# Patient Record
Sex: MALE | Race: ASIAN | NOT HISPANIC OR LATINO | Employment: UNEMPLOYED | URBAN - METROPOLITAN AREA
[De-identification: names, ages, dates, MRNs, and addresses within clinical notes are randomized per-mention and may not be internally consistent; named-entity substitution may affect disease eponyms.]

---

## 2017-02-03 ENCOUNTER — TELEPHONE (OUTPATIENT)
Dept: TRANSPLANT | Facility: CLINIC | Age: 57
End: 2017-02-03

## 2017-03-01 ENCOUNTER — OFFICE VISIT (OUTPATIENT)
Dept: FAMILY MEDICINE | Facility: CLINIC | Age: 57
End: 2017-03-01
Payer: MEDICARE

## 2017-03-01 VITALS
WEIGHT: 155.19 LBS | TEMPERATURE: 98 F | HEIGHT: 69 IN | SYSTOLIC BLOOD PRESSURE: 122 MMHG | HEART RATE: 60 BPM | BODY MASS INDEX: 22.98 KG/M2 | OXYGEN SATURATION: 97 % | DIASTOLIC BLOOD PRESSURE: 82 MMHG | RESPIRATION RATE: 17 BRPM

## 2017-03-01 DIAGNOSIS — N18.6 TYPE 2 DIABETES MELLITUS WITH CHRONIC KIDNEY DISEASE ON CHRONIC DIALYSIS, WITH LONG-TERM CURRENT USE OF INSULIN: Primary | Chronic | ICD-10-CM

## 2017-03-01 DIAGNOSIS — Z79.4 TYPE 2 DIABETES MELLITUS WITH CHRONIC KIDNEY DISEASE ON CHRONIC DIALYSIS, WITH LONG-TERM CURRENT USE OF INSULIN: Primary | Chronic | ICD-10-CM

## 2017-03-01 DIAGNOSIS — Z99.2 ESRD (END STAGE RENAL DISEASE) ON DIALYSIS: ICD-10-CM

## 2017-03-01 DIAGNOSIS — I50.22 CHRONIC SYSTOLIC HEART FAILURE: ICD-10-CM

## 2017-03-01 DIAGNOSIS — J30.89 NON-SEASONAL ALLERGIC RHINITIS DUE TO OTHER ALLERGIC TRIGGER: ICD-10-CM

## 2017-03-01 DIAGNOSIS — N18.6 ESRD (END STAGE RENAL DISEASE) ON DIALYSIS: ICD-10-CM

## 2017-03-01 DIAGNOSIS — Z99.2 TYPE 2 DIABETES MELLITUS WITH CHRONIC KIDNEY DISEASE ON CHRONIC DIALYSIS, WITH LONG-TERM CURRENT USE OF INSULIN: Primary | Chronic | ICD-10-CM

## 2017-03-01 DIAGNOSIS — E11.22 TYPE 2 DIABETES MELLITUS WITH CHRONIC KIDNEY DISEASE ON CHRONIC DIALYSIS, WITH LONG-TERM CURRENT USE OF INSULIN: Primary | Chronic | ICD-10-CM

## 2017-03-01 PROCEDURE — 99213 OFFICE O/P EST LOW 20 MIN: CPT | Mod: PBBFAC,PN | Performed by: INTERNAL MEDICINE

## 2017-03-01 PROCEDURE — 99999 PR PBB SHADOW E&M-EST. PATIENT-LVL III: CPT | Mod: PBBFAC,,, | Performed by: INTERNAL MEDICINE

## 2017-03-01 PROCEDURE — 99214 OFFICE O/P EST MOD 30 MIN: CPT | Mod: S$PBB,,, | Performed by: INTERNAL MEDICINE

## 2017-03-01 RX ORDER — INSULIN GLARGINE 100 [IU]/ML
7 INJECTION, SOLUTION SUBCUTANEOUS NIGHTLY
Qty: 3 ML | Refills: 2 | Status: SHIPPED | OUTPATIENT
Start: 2017-03-01 | End: 2018-03-12 | Stop reason: SDUPTHER

## 2017-03-01 RX ORDER — FLUTICASONE PROPIONATE 50 MCG
1 SPRAY, SUSPENSION (ML) NASAL DAILY
Qty: 16 G | Refills: 2 | Status: SHIPPED | OUTPATIENT
Start: 2017-03-01 | End: 2022-01-01 | Stop reason: SDUPTHER

## 2017-03-01 NOTE — PROGRESS NOTES
"Subjective:       Patient ID: Vega Brownlee is a 56 y.o. male.    Chief Complaint: Diabetes (blood sugar high) and Follow-up    HPI Comments: F/u chronic conditions    HPI: 57 y/o w/ HTN, CHF, DM ESRD on hemodialysis here for follow up. Is scheduled for PD catheter placement next week at [facility]. Has noted increase blood sugars mid to late day associated with increase PO intake. No hypoglycemic symptoms a.m. Fasting glucose 120-140. He has applied for medicare part D awaiting approval before he can be listed for kidney transplant. Tolerating full HD sessions withotu hypotension since changing from carvedilol to metoprolol. He is over due for follow upwith cardiology. Denies LE swelling orthopnea or PND no CP. Walking regularly w/o exertional symptoms    Review of Systems   Constitutional: Negative for activity change, fever and unexpected weight change.   HENT: Negative for congestion, rhinorrhea, sore throat and trouble swallowing.    Eyes: Negative for photophobia and redness.   Respiratory: Negative for cough, chest tightness, shortness of breath and wheezing.    Cardiovascular: Negative for chest pain, palpitations and leg swelling.   Gastrointestinal: Negative for abdominal pain, blood in stool, constipation, diarrhea, nausea and vomiting.   Endocrine: Negative for cold intolerance, heat intolerance and polyuria.   Genitourinary: Negative for decreased urine volume, difficulty urinating, dysuria and urgency.   Musculoskeletal: Negative for arthralgias and back pain.   Skin: Negative for rash.   Neurological: Negative for dizziness, syncope, weakness and headaches.   Psychiatric/Behavioral: Negative for dysphoric mood, sleep disturbance and suicidal ideas.       Objective:     Vitals:    03/01/17 1503   BP: 122/82   BP Location: Left arm   Patient Position: Sitting   BP Method: Manual   Pulse: 60   Resp: 17   Temp: 97.8 °F (36.6 °C)   TempSrc: Oral   SpO2: 97%   Weight: 70.4 kg (155 lb 3.3 oz)   Height: 5' 8.5" " (1.74 m)          Physical Exam   Constitutional: He is oriented to person, place, and time. He appears well-developed and well-nourished.   HENT:   Head: Normocephalic and atraumatic.   Eyes: Conjunctivae are normal. Pupils are equal, round, and reactive to light.   Neck: Normal range of motion.   Cardiovascular: Normal rate and regular rhythm.  Exam reveals no gallop and no friction rub.    No murmur heard.  No LE edema. Right subclavian HD cathater site c/d/i   Pulmonary/Chest: Effort normal and breath sounds normal. He has no wheezes. He has no rales.   Abdominal: Soft. Bowel sounds are normal. There is no tenderness. There is no rebound and no guarding.   Musculoskeletal: Normal range of motion. He exhibits no edema or tenderness.   Neurological: He is alert and oriented to person, place, and time. No cranial nerve deficit.   Skin: Skin is warm and dry.   Psychiatric: He has a normal mood and affect.       Assessment:       1. Type 2 diabetes mellitus with chronic kidney disease on chronic dialysis, with long-term current use of insulin    2. ESRD (end stage renal disease) on dialysis    3. Non-seasonal allergic rhinitis due to other allergic trigger    4. Chronic systolic heart failure        Plan:    1. Increase basal insulin to 7 units nightly repeat a1c in two months.     2. Pursuing PD, management by nephrologist Dr. Lowery    3. Nasal steroid daily    4. Clinically euvolemic due for follow up with cards, referral re entered

## 2017-03-01 NOTE — MR AVS SNAPSHOT
[facility]  [address]  [place] [address]  Phone: [phone]                  Vega Brownlee   3/1/2017 4:00 PM   Office Visit    Description:  Male : 1960   Provider:  Ko Perez MD   Department:  [facility]           Reason for Visit     Diabetes     Follow-up           Diagnoses this Visit        Comments    Type 2 diabetes mellitus with chronic kidney disease on chronic dialysis, with long-term current use of insulin    -  Primary     ESRD (end stage renal disease) on dialysis         Non-seasonal allergic rhinitis due to other allergic trigger                To Do List           Future Appointments        Provider Department Dept Phone    3/1/2017 4:00 PM Ko Perez MD [facility] [phone]      Goals (5 Years of Data)     None       These Medications        Disp Refills Start End    insulin glargine (LANTUS SOLOSTAR) 100 unit/mL (3 mL) InPn pen 3 mL 2 3/1/2017 3/1/2018    Inject 7 Units into the skin every evening. - Subcutaneous    Pharmacy: [facility] [address] Ph #: [phone]       fluticasone (FLONASE) 50 mcg/actuation nasal spray 16 g 2 3/1/2017     1 spray by Each Nare route once daily. - Each Nare    Pharmacy: [address] Ph #: [phone]         [facility] Call     [facility] On [facility] Nurse Care Line -  Assistance  Registered nurses in the [facility] provide clinical advisement, health education, appointment booking, and other advisory services.  Call for this free service at [phone].             Medications           START taking these NEW medications        Refills    fluticasone (FLONASE) 50 mcg/actuation nasal spray 2    Si spray by Each Nare route once daily.    Class: Normal    Route: Each Nare      CHANGE how you are taking these medications     Start Taking Instead of    insulin  glargine (LANTUS SOLOSTAR) 100 unit/mL (3 mL) InPn pen insulin glargine (LANTUS SOLOSTAR) 100 unit/mL (3 mL) InPn pen    Dosage:  Inject 7 Units into the skin every evening. Dosage:  Inject 5 Units into the skin every evening.    Reason for Change:  Reorder            Verify that the below list of medications is an accurate representation of the medications you are currently taking.  If none reported, the list may be blank. If incorrect, please contact your healthcare provider. Carry this list with you in case of emergency.           Current Medications     aspirin (ECOTRIN) 81 MG EC tablet Take 1 tablet (81 mg total) by mouth once daily.    atorvastatin (LIPITOR) 40 MG tablet Take 1 tablet (40 mg total) by mouth every evening.    blood sugar diagnostic Strp 1 each by Misc.(Non-Drug; Combo Route) route as directed.    blood-glucose meter (FREESTYLE SYSTEM KIT) kit Use as instructed    hydrALAZINE (APRESOLINE) 50 MG tablet Take 1 tablet (50 mg total) by mouth every 12 (twelve) hours.    insulin glargine (LANTUS SOLOSTAR) 100 unit/mL (3 mL) InPn pen Inject 7 Units into the skin every evening.    isosorbide mononitrate (IMDUR) 60 MG 24 hr tablet Take 1 tablet (60 mg total) by mouth once daily.    lancets (ACCU-CHEK SOFTCLIX LANCETS) Misc 1 application by Misc.(Non-Drug; Combo Route) route 4 (four) times daily before meals and nightly.    metoprolol tartrate (LOPRESSOR) 25 MG tablet Take 1 tablet (25 mg total) by mouth 2 (two) times daily.    fluticasone (FLONASE) 50 mcg/actuation nasal spray 1 spray by Each Nare route once daily.           Clinical Reference Information           Your Vitals Were     BP                   122/82 (BP Location: Left arm, Patient Position: Sitting, BP Method: Manual)           Blood Pressure          Most Recent Value    BP  122/82      Allergies as of 3/1/2017     No Known Allergies      Immunizations Administered on Date of Encounter - 3/1/2017     None      Maintenance Dialysis History      Start End Type Comments Center    1/8/1888    [facility]            Current Dialysis Center Information     [facility] [address] Phone #:  [phone]    Contact:  [place]  [address] Fax #:  [phone]            [employer] Sign-Up     Activating your [facility] account is as easy as 1-2-3!     1) Visit [email or web address], select Sign Up Now, enter this activation code and your date of birth, then select Next.  [email or web address]  Expires: 4/15/2017  3:38 PM      2) Create a username and password to use when you visit [facility] in the future and select a security question in case you lose your password and select Next.    3) Enter your e-mail address and click Sign Up!    Additional Information  If you have questions, please e-mail [email or web address] or call [phone] to talk to our [facility] staff. Remember, [facility] is NOT to be used for urgent needs. For medical emergencies, dial 911.         Language Assistance Services     ATTENTION: Language assistance services are available, free of charge. Please call [phone].      ATENCIÓN: Si davela escobar, tiene a monte disposición servicios gratuitos de asistencia lingüística. Llame al [phone].     CHÚ Ý: N?u b?n nói Ti?ng Vi?t, có các d?ch v? h? tr? ngôn ng? mi?n phí dành cho b?n. G?i s? [phone].         [facility] complies with applicable Federal civil rights laws and does not discriminate on the basis of race, color, national origin, age, disability, or sex.

## 2017-03-02 ENCOUNTER — TELEPHONE (OUTPATIENT)
Dept: FAMILY MEDICINE | Facility: CLINIC | Age: 57
End: 2017-03-02

## 2017-03-02 DIAGNOSIS — I10 ESSENTIAL HYPERTENSION: Chronic | ICD-10-CM

## 2017-03-02 DIAGNOSIS — N18.6 ESRD (END STAGE RENAL DISEASE) ON DIALYSIS: ICD-10-CM

## 2017-03-02 DIAGNOSIS — Z99.2 ESRD (END STAGE RENAL DISEASE) ON DIALYSIS: ICD-10-CM

## 2017-03-02 DIAGNOSIS — I50.22 CHRONIC SYSTOLIC HEART FAILURE: ICD-10-CM

## 2017-03-02 RX ORDER — METOPROLOL TARTRATE 25 MG/1
TABLET, FILM COATED ORAL
Qty: 60 TABLET | Refills: 5 | Status: SHIPPED | OUTPATIENT
Start: 2017-03-02 | End: 2017-10-02 | Stop reason: SDUPTHER

## 2017-03-02 RX ORDER — ISOSORBIDE MONONITRATE 60 MG/1
TABLET, EXTENDED RELEASE ORAL
Qty: 30 TABLET | Refills: 5 | Status: SHIPPED | OUTPATIENT
Start: 2017-03-02 | End: 2017-10-02 | Stop reason: SDUPTHER

## 2017-03-02 RX ORDER — HYDRALAZINE HYDROCHLORIDE 50 MG/1
TABLET, FILM COATED ORAL
Qty: 60 TABLET | Refills: 5 | Status: SHIPPED | OUTPATIENT
Start: 2017-03-02 | End: 2017-09-22 | Stop reason: SDUPTHER

## 2017-03-02 NOTE — TELEPHONE ENCOUNTER
I spoke with the patient regarding a referral to Cardiology, he refuse to schedule at this time due to him having surgery. The patient states that he will contact the clinic in the future to schedule

## 2017-03-30 ENCOUNTER — TELEPHONE (OUTPATIENT)
Dept: TRANSPLANT | Facility: CLINIC | Age: 57
End: 2017-03-30

## 2017-04-06 DIAGNOSIS — E11.9 TYPE 2 DIABETES MELLITUS WITHOUT COMPLICATION: ICD-10-CM

## 2017-04-10 DIAGNOSIS — Z76.82 ORGAN TRANSPLANT CANDIDATE: Primary | ICD-10-CM

## 2017-04-26 ENCOUNTER — OFFICE VISIT (OUTPATIENT)
Dept: TRANSPLANT | Facility: CLINIC | Age: 57
End: 2017-04-26
Payer: MEDICARE

## 2017-04-26 ENCOUNTER — TELEPHONE (OUTPATIENT)
Dept: TRANSPLANT | Facility: CLINIC | Age: 57
End: 2017-04-26

## 2017-04-26 ENCOUNTER — CLINICAL SUPPORT (OUTPATIENT)
Dept: INFECTIOUS DISEASES | Facility: CLINIC | Age: 57
End: 2017-04-26
Payer: MEDICARE

## 2017-04-26 ENCOUNTER — DOCUMENTATION ONLY (OUTPATIENT)
Dept: TRANSPLANT | Facility: CLINIC | Age: 57
End: 2017-04-26

## 2017-04-26 ENCOUNTER — HOSPITAL ENCOUNTER (OUTPATIENT)
Dept: RADIOLOGY | Facility: HOSPITAL | Age: 57
Discharge: HOME | End: 2017-04-26
Attending: TRANSPLANT SURGERY
Payer: MEDICARE

## 2017-04-26 ENCOUNTER — HOSPITAL ENCOUNTER (OUTPATIENT)
Dept: RADIOLOGY | Facility: HOSPITAL | Age: 57
Discharge: HOME | End: 2017-04-26
Attending: NURSE PRACTITIONER
Payer: MEDICARE

## 2017-04-26 ENCOUNTER — HOSPITAL ENCOUNTER (OUTPATIENT)
Dept: CARDIOLOGY | Facility: CLINIC | Age: 57
Discharge: HOME | End: 2017-04-26
Payer: MEDICARE

## 2017-04-26 VITALS
TEMPERATURE: 98 F | SYSTOLIC BLOOD PRESSURE: 136 MMHG | HEART RATE: 70 BPM | DIASTOLIC BLOOD PRESSURE: 78 MMHG | HEIGHT: 68 IN | WEIGHT: 147.5 LBS | OXYGEN SATURATION: 100 % | RESPIRATION RATE: 18 BRPM | BODY MASS INDEX: 22.35 KG/M2

## 2017-04-26 DIAGNOSIS — E11.22 DIABETES MELLITUS WITH ESRD (END-STAGE RENAL DISEASE): Chronic | ICD-10-CM

## 2017-04-26 DIAGNOSIS — Z76.82 ORGAN TRANSPLANT CANDIDATE: ICD-10-CM

## 2017-04-26 DIAGNOSIS — Z76.82 KIDNEY TRANSPLANT CANDIDATE: ICD-10-CM

## 2017-04-26 DIAGNOSIS — N18.6 ESRD (END STAGE RENAL DISEASE) ON DIALYSIS: ICD-10-CM

## 2017-04-26 DIAGNOSIS — I10 ESSENTIAL HYPERTENSION: Chronic | ICD-10-CM

## 2017-04-26 DIAGNOSIS — Z72.0 TOBACCO ABUSE: Chronic | ICD-10-CM

## 2017-04-26 DIAGNOSIS — E78.5 HYPERLIPIDEMIA, UNSPECIFIED HYPERLIPIDEMIA TYPE: ICD-10-CM

## 2017-04-26 DIAGNOSIS — E44.0 MODERATE PROTEIN MALNUTRITION: Chronic | ICD-10-CM

## 2017-04-26 DIAGNOSIS — N18.6 DIABETES MELLITUS WITH ESRD (END-STAGE RENAL DISEASE): Chronic | ICD-10-CM

## 2017-04-26 DIAGNOSIS — D63.8 ANEMIA OF CHRONIC DISEASE: Chronic | ICD-10-CM

## 2017-04-26 DIAGNOSIS — Z76.82 KIDNEY TRANSPLANT CANDIDATE: Primary | ICD-10-CM

## 2017-04-26 DIAGNOSIS — Z99.2 ESRD (END STAGE RENAL DISEASE) ON DIALYSIS: ICD-10-CM

## 2017-04-26 LAB
DIASTOLIC DYSFUNCTION: NO
RETIRED EF AND QEF - SEE NOTES: 65 (ref 55–65)
TRICUSPID VALVE REGURGITATION: NORMAL

## 2017-04-26 PROCEDURE — 99214 OFFICE O/P EST MOD 30 MIN: CPT | Mod: PBBFAC,25,27,TXP

## 2017-04-26 PROCEDURE — 76770 US EXAM ABDO BACK WALL COMP: CPT | Mod: TC,TXP

## 2017-04-26 PROCEDURE — 93306 TTE W/DOPPLER COMPLETE: CPT | Mod: PBBFAC,TXP | Performed by: INTERNAL MEDICINE

## 2017-04-26 PROCEDURE — 72170 X-RAY EXAM OF PELVIS: CPT | Mod: 26,TXP,, | Performed by: RADIOLOGY

## 2017-04-26 PROCEDURE — 99999 PR PBB SHADOW E&M-EST. PATIENT-LVL I: CPT | Mod: PBBFAC,TXP,,

## 2017-04-26 PROCEDURE — 76770 US EXAM ABDO BACK WALL COMP: CPT | Mod: 26,GC,TXP, | Performed by: RADIOLOGY

## 2017-04-26 PROCEDURE — 72170 X-RAY EXAM OF PELVIS: CPT | Mod: TC,TXP

## 2017-04-26 PROCEDURE — 99203 OFFICE O/P NEW LOW 30 MIN: CPT | Mod: S$PBB,TXP,, | Performed by: TRANSPLANT SURGERY

## 2017-04-26 PROCEDURE — 71020 XR CHEST PA AND LATERAL: CPT | Mod: 26,TXP,, | Performed by: RADIOLOGY

## 2017-04-26 PROCEDURE — 99204 OFFICE O/P NEW MOD 45 MIN: CPT | Mod: S$PBB,TXP,, | Performed by: PHYSICIAN ASSISTANT

## 2017-04-26 PROCEDURE — 71020 XR CHEST PA AND LATERAL: CPT | Mod: TC,TXP

## 2017-04-26 PROCEDURE — 99215 OFFICE O/P EST HI 40 MIN: CPT | Mod: S$PBB,TXP,, | Performed by: NURSE PRACTITIONER

## 2017-04-26 PROCEDURE — 99999 PR PBB SHADOW E&M-EST. PATIENT-LVL IV: CPT | Mod: PBBFAC,TXP,,

## 2017-04-26 NOTE — NURSING
Reviewed pt transplant labs.  Pt to continue to follow-up with dialysis unit dietitians recommendations.      Hanane Monahan MS RD LDN

## 2017-04-26 NOTE — PROGRESS NOTES
PHARM.D. PRE-TRANSPLANT NOTE:    This patient's medication therapy was evaluated as part of his pre-transplant evaluation.    The following pharmacologic concerns were noted: None      Current Outpatient Prescriptions   Medication Sig Dispense Refill    aspirin (ECOTRIN) 81 MG EC tablet Take 1 tablet (81 mg total) by mouth once daily.  0    atorvastatin (LIPITOR) 40 MG tablet Take 1 tablet (40 mg total) by mouth every evening. 90 tablet 3    blood sugar diagnostic Strp 1 each by Misc.(Non-Drug; Combo Route) route as directed. 100 each 0    blood-glucose meter (FREESTYLE SYSTEM KIT) kit Use as instructed 1 each 0    calcium carbonate (TUMS ULTRA) 1,177 mg Chew Take 2 tablets by mouth 3 (three) times daily with meals.       fluticasone (FLONASE) 50 mcg/actuation nasal spray 1 spray by Each Nare route once daily. 16 g 2    FOLIC ACID/VIT BCOMP,C (JAM-YAQUELIN ORAL) Take 1 tablet by mouth once daily.      hydrALAZINE (APRESOLINE) 50 MG tablet TAKE ONE Tablet BY MOUTH EVERY TWELVE HOURS 60 tablet 5    insulin glargine (LANTUS SOLOSTAR) 100 unit/mL (3 mL) InPn pen Inject 7 Units into the skin every evening. 3 mL 2    isosorbide mononitrate (IMDUR) 60 MG 24 hr tablet TAKE ONE Tablet BY MOUTH EVERY DAY 30 tablet 5    lancets (ACCU-CHEK SOFTCLIX LANCETS) Misc 1 application by Misc.(Non-Drug; Combo Route) route 4 (four) times daily before meals and nightly. 120 each 0    metoprolol tartrate (LOPRESSOR) 25 MG tablet TAKE ONE TABLET BY MOUTH TWICE DAILY 60 tablet 5     No current facility-administered medications for this visit.          Currently patient/wife is responsible for preparing / administering this patient's medications on a daily basis.  I am available for consultation and can be contacted, as needed by the other members of the Kidney Transplant team.

## 2017-04-26 NOTE — PROGRESS NOTES
Transplant Surgery  Kidney Transplant Recipient Evaluation    Referring Physician: Alan Lowery  Current Nephrologist: Alan Lowery    Subjective:     Reason for Visit: evaluate transplant candidacy    History of Present Illness: Vega Brownlee is a 56 y.o. year old male undergoing transplant evaluation.    Dialysis History: Vega is on hemodialysis.      Transplant History: N/A    Etiology of Renal Disease: Diabetes Mellitus - Type II (based on medical records from referral).    Review of Systems   Constitutional: Negative for fever.   Respiratory: Negative for cough, shortness of breath and stridor.    Cardiovascular: Negative for leg swelling.   Gastrointestinal: Negative for abdominal distention.   All other systems reviewed and are negative.      Objective:     Physical Exam:  Constitutional:   Vitals reviewed: yes   Well-nourished and well-groomed: yes  Eyes:   Sclerae icteric: no   Extraocular movements intact: yes  GI:    Bowel sounds normal: yes   Tenderness: no    If yes, quadrant/location: not applicable   Palpable masses: no    If yes, quadrant/location: not applicable   Hepatosplenomegaly: no   Ascites: no   Hernia: no    If yes, type/location: not applicable   Surgical scars: no    If yes, type/location: not applicable  Resp:   Effort normal: yes   Breath sounds normal: yes    CV:   Regular rate and rhythm: yes   Heart sounds normal: yes   Femoral pulses normal: yes   Extremities edematous: no  Skin:   Rashes or lesions present: no    If yes, describe:not applicable   Jaundice:: no    Musculoskeletal:   Gait normal: yes   Strength normal: yes  Psych:   Oriented to person, place, and time: yes   Affect and mood normal: yes    Additional comments: not applicable    Counseling: We provided Vega Brownlee with a group education session today.  We discussed kidney transplantation at length with him, including risks, potential complications, and alternatives in the management of his renal failure.  The discussion included  complications related to anesthesia, bleeding, infection, primary nonfunction, and ATN.  I discussed the typical postoperative course, length of hospitalization, the need for long-term immunosuppression, and the need for long-term routine follow-up.  I discussed living-donor and -donor transplantation and the relative advantages and disadvantages of each.  I also discussed average waiting times for both living donation and  donation.  I discussed national and center-specific survival rates.  I also mentioned the potential benefit of multicenter listing to candidates listed with centers within more than one organ procurement organization.  All questions were answered.    Final determination of transplant candidacy will be made once evaluation is complete and reviewed by the Kidney & Kidney/Pancreas Selection Committee.         Transplant Surgery - Candidacy   Assessment/Plan:   Vega Brownlee has end stage renal disease (ESRD) on dialysis. I see no surgical contraindication to placing a kidney transplant. Based on available information, Vega Brownlee is a suitable kidney transplant candidate.     Grupo Esquivel MD

## 2017-04-26 NOTE — PROGRESS NOTES
Transplant Nephrology  Kidney Transplant Recipient Evaluation    Referring Physician: Alan Lowery  Current Nephrologist: Alan Lowery    Subjective:   CC:  Initial evaluation of kidney transplant candidacy.    HPI:  Mr. Brownlee is a 56 y.o. year old White male who has presented to be evaluated as a potential kidney transplant recipient.  He has ESRD secondary to diabetic nephropathy and HTN.  Patient is currently on hemodialysis started on  8/2016. Patient is dialyzing on TTS schedule.  Patient reports that he is tolerating dialysis well. Dialyzes for 3 1/2  Hours.  He has a right chest catheter for dialysis access.     Previous Transplant: no    Past Medical and Surgical History: Mr. Brownlee  has a past medical history of Anemia; Cataract; CHF (congestive heart failure); Diabetes mellitus; Diabetes mellitus, type 2; Disorder of kidney and ureter; Hyperlipidemia; and Hypertension.  He has a past surgical history that includes Portacath placement (08/2016); Lung surgery; Finger amputation (Left); and arm fracture (Left).    Past Social and Family History: Mr. Brownlee reports that he quit smoking about 6 months ago. He started smoking about 35 years ago. He smoked 1.00 pack per day. He does not have any smokeless tobacco history on file. He reports that he does not drink alcohol or use illicit drugs. His family history includes Arthritis in his sister; Diabetes in his mother; Memory loss in his mother; No Known Problems in his brother, father, sister, and sister.      Diagnosed with DM2 at age 40, 16 years ago. He was started on Insulin last year, and is currently on 7 Units /day at night  No family HX of kidney DZ    Lung surgery at [facility] 2003 or 2004. Reports he had an infection. The doctors attempted to stick his lung with a needle to aspirate the fluid out , but it was not successful. He then had a surgery on the left side / lung to drain the lung.       Exercises and walks/ jogs  about 30 minutes /day around the neighborhood.  "Also plays golf 1x/week    Still makes a very small amount of urine     Reports he was told he was in HF with a low EF in 7/2016  Was evaluated by Cardiology/ Dr Sutton and was reported as having fluid overload, which was resolved at this f/u visit.   Subsequent NM stress was reported as normal .    No donors at this time     Past Medical History:   Diagnosis Date    Anemia     Cataract     CHF (congestive heart failure)     Diabetes mellitus     Diabetes mellitus, type 2     Disorder of kidney and ureter     Hyperlipidemia     Hypertension        Review of Systems   Constitutional: Negative for activity change, appetite change, chills, fatigue, fever and unexpected weight change.   HENT: Negative for congestion, facial swelling, postnasal drip, rhinorrhea, sinus pressure, sore throat and trouble swallowing.    Eyes: Negative for pain, redness and visual disturbance.   Respiratory: Negative for cough, chest tightness, shortness of breath and wheezing.         Right chest catheter   Cardiovascular: Negative.  Negative for chest pain, palpitations and leg swelling.   Gastrointestinal: Negative for abdominal pain, diarrhea, nausea and vomiting.   Genitourinary: Positive for decreased urine volume. Negative for dysuria, flank pain and urgency.   Musculoskeletal: Negative for gait problem, neck pain and neck stiffness.   Skin: Negative for rash.   Allergic/Immunologic: Negative for environmental allergies, food allergies and immunocompromised state.   Neurological: Negative for dizziness, weakness, light-headedness and headaches.   Psychiatric/Behavioral: Negative for agitation and confusion. The patient is not nervous/anxious.        Objective:   Blood pressure 136/78, pulse 70, temperature 98.1 °F (36.7 °C), temperature source Oral, resp. rate 18, height 5' 8" (1.727 m), weight 66.9 kg (147 lb 7.8 oz), SpO2 100 %.body mass index is 22.43 kg/(m^2).    Physical Exam   Constitutional: He is oriented to person, " place, and time. He appears well-developed and well-nourished.   HENT:   Head: Normocephalic.   Mouth/Throat: Oropharynx is clear and moist. No oropharyngeal exudate.   Eyes: Conjunctivae and EOM are normal. Pupils are equal, round, and reactive to light. No scleral icterus.   Neck: Normal range of motion. Neck supple.   Cardiovascular: Normal rate, regular rhythm and normal heart sounds.    Pulmonary/Chest: Effort normal and breath sounds normal.       Abdominal: Soft. Normal appearance and bowel sounds are normal. He exhibits no distension and no mass. There is no splenomegaly or hepatomegaly. There is no tenderness. There is no rebound, no guarding, no CVA tenderness, no tenderness at McBurney's point and negative Calix's sign.   Musculoskeletal: Normal range of motion. He exhibits no edema.   Lymphadenopathy:     He has no cervical adenopathy.   Neurological: He is alert and oriented to person, place, and time. He exhibits normal muscle tone. Coordination normal.   Skin: Skin is warm and dry.   Psychiatric: He has a normal mood and affect. His behavior is normal.   Vitals reviewed.      Labs:  Lab Results   Component Value Date    WBC 10.78 04/26/2017    HGB 13.3 (L) 04/26/2017    HCT 40.1 04/26/2017     04/26/2017    K 4.2 04/26/2017     04/26/2017    CO2 26 04/26/2017    BUN 27 (H) 04/26/2017    CREATININE 5.9 (H) 04/26/2017    EGFRNONAA 9.8 (A) 04/26/2017    CALCIUM 9.3 04/26/2017    PHOS 3.0 04/26/2017    MG 2.2 07/27/2016    ALBUMIN 4.0 04/26/2017    AST 21 04/26/2017    ALT 23 04/26/2017    .9 (H) 07/26/2016       Lab Results   Component Value Date    BILIRUBINUA Negative 07/26/2016    AMYLASE 39 07/26/2016    LIPASE 14 07/26/2016    LIPASE 14 07/26/2016    PROTEINUA 3+ (A) 07/26/2016    NITRITE Negative 07/26/2016    RBCUA 6 (H) 07/26/2016    WBCUA 4 07/26/2016       No results found for: HLAABCTYPE    Labs were reviewed with the patient.    Assessment:     1. Kidney transplant  candidate    2. Essential hypertension    3. Diabetes mellitus with ESRD (end-stage renal disease)    4. Anemia of chronic disease    5. Moderate protein malnutrition    6. Tobacco abuse    7. Hyperlipidemia, unspecified hyperlipidemia type    8. ESRD (end stage renal disease) on dialysis    9. Organ transplant candidate        Plan:   Cardiology clearance--> HX hf with low EF  Dr Sutton/ cardiology    Records [facility] 2003/2004 surgery lung, pathology etc.     Body mass index is 22.43 kg/(m^2).      Kidney allocation scheme was also discussed with the patient.  Patient was advised that the average wait time for a kidney in [place] is 3-5 years     Kidney donor profile index (KPDI) and estimated post-transplant survival scores (EPTS) were reviewed. The benefits and risks of accepting a kidney with KDPI > 85% were explained to the patient. Patient verbalized understanding and consented to accept a kidney with KDPI > 85%       The side effects of immunosuppressants were reviewed that include but are not limited to the risk of infection, the risks of cancer (skin and lymphoma being most common), the risk of diabetes associated with prednisone use, acceleration of heart disease and diarrhea( this being more common post transplant).     Reviewed the hospital stay.  Reviewed the post transplant follow up.  Reviewed the importance of maintaining a good weight.  Reviewed the importance of controlling BP.  Reviewed the importance of following the renal diet.      Transplant Candidacy:   Based on available information, Mr. Brownlee is a suitable kidney transplant candidate.  Final determination of transplant candidacy will be made once workup is complete and reviewed by the selection committee.    Hanane Toavr NP       UNOS Patient Status  Functional Status: 60% - Requires occasional assistance but is able to care for needs  Physical Capacity: No Limitations

## 2017-04-26 NOTE — PROGRESS NOTES
Pre Transplant Infectious Diseases Consult  Kidney Transplant Recipient Evaluation    Requesting Physician:  Alan Lowery    Reason for Visit:    Chief Complaint   Patient presents with    Kidney Transplant Pre-evaluation     History of Present Illness  Vega Brownlee is a 56 y.o. year old White male with advanced Kidney disease currently being evaluated for Kidney transplant. Etiology kidney disease is diabetes. Has a chest permacath for dialysis, plans on having fistula on 5/3/17. Patient denies any recent fever, chills, or infective illnesses. Pt reports having an infection on his catheter site in September 2016, was treated and hasn't had a recurrence.       1) Do you have a history of:   YES NO   Diabetes      [x] []     Diabetic Foot Infection/Bone Infection  []        [x]     Surgical Removal of Spleen   []  [x]                  2) Have you had recurrent infections involving:             YES NO  Sinus infections  []         [x]   Sore Throat   []         [x]                 Prostate Infections  []         [x]              Bladder Infections  []         [x]                     Kidney Infections  []         [x]                               Intestinal Infections  []         [x]      Skin Infections   []         [x]       Reproductive Infections          []  [x]   Periodontal Disease  []         [x]        3)Have you ever had: YES     NO UNKNOWN      Chicken Pox   []         [x]  []   Shingles   []         [x]  []   Orolabial Herpes             []  [x]  []   Genital Herpes  []         [x]  []   Cytomegalovirus  []         [x]  []   Rosa-Barr Virus  []         [x]  []   Genital Warts   []         [x]  []   Hepatitis A   []         [x]  []   Hepatitis B   []         [x]  []   Hepatitis C   []         [x]  []   Syphilis   []         [x]  []   Gonorrhea   []         [x]  []   Pelvic Inflammatory   Disease   []         [x]  []   Chlamydia Infection  []         [x]  []   Intestinal parasites   or worms   []         [x]   []   Fungal Infections  []         [x]  []   Blood Infections  []         [x]  []      Of note, patient reports in 2004, had pleural effusion and then had a thoracentesis. Pt was given 2 weeks of antibiotics. No recurrence. No fever or chills or any coughs of pain.    Comment:      4) Have you ever been exposed   YES NO  To someone with tuberculosis?  []   [x]   If yes, what treatment did you receive:     5) What states have you lived in? [place], [place]    6) What countries have you visited for more than 2 weeks?  None                         YES NO  7) Did you have any associated infections?  []  [x]       8) Are you planning to travel outside the    []  [x]   [place] after your transplant?     9) Household                   YES NO  Do you have pets living in your house?    [x]         []   If yes, describe: dog    Do you spend time or live on a farm or     []         [x]   have livestock or other farm animals?  If yes, which ones:    Do you have a fish tank?          []  [x]       Do you have a litter box?      []         [x]     Do you fish or hunt?       []         [x]     Do you clean or skin fish or animals?    []         [x]     Do you consume raw or undercooked    []         [x]   meat, fish, or shellfish?      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    [x]         []   13)Do you hike, camp, or spend     time in wooded areas?   []         [x]        14) The patient's immunization history was reviewed.    Have you ever received:  YES NO UNKNOWN DATES   Routine Childhood vaccines  [x]         []  []      Influenza vaccine   [x]  []  []    Pneumovax    []  [x]  []     Tetanus-diptheria   []         [x]  []    Hepatitis A vaccine series       []  [x]  []    Hepatitis B vaccine series         [x]  []  []    Meningitis vaccine   []         []  [x]    Varicella vaccine   []         []  [x]         Based on the patients immunization history and serologies, immunizations were ordered:         Ordered  Not Ordered  Influenza Vaccine     []    [x]   Hepatitis A series at 0,  6 months   [x]    []   Hepatitis B seriesat 0, 1, and 6 months  []    [x]   Hepatitis B High Dose 0,1, and 6 months  []    [x]   Prevnar      [x]    []   Pneumovax      [x]    []    TDap       [x]    []    Zoster       []    [x]   Menactra      []    [x]            The patient was encouraged to contact us about any problems that may develop after immunization and possible side effects were reviewed.      Previous Transplant: no    Etiology of Kidney Disease: diabetes     Allergies: Review of patient's allergies indicates no known allergies.  Immunization History   Administered Date(s) Administered    PPD Test 07/29/2016    influenza - Quadrivalent - PF (ADULT) 09/13/2016     Past Medical History:   Diagnosis Date    Anemia     CHF (congestive heart failure)     Diabetes mellitus     Diabetes mellitus, type 2     Disorder of kidney and ureter     Hyperlipidemia     Hypertension      Past Surgical History:   Procedure Laterality Date    LUNG SURGERY      PORTACATH PLACEMENT  08/2016      Social History     Social History    Marital status:      Spouse name: N/A    Number of children: N/A    Years of education: N/A     Occupational History    Not on file.     Social History Main Topics    Smoking status: Former Smoker    Smokeless tobacco: Not on file    Alcohol use Not on file    Drug use: Not on file    Sexual activity: Not on file     Other Topics Concern    Not on file     Social History Narrative    No narrative on file       Review of Systems   Constitution: Negative for chills, decreased appetite, fever, weakness, malaise/fatigue, night sweats, weight gain and weight loss.   HENT: Negative for congestion, ear pain, headaches, hearing loss, hoarse voice, sore throat and tinnitus.    Eyes: Positive for  blurred vision and visual disturbance. Negative for pain, vision loss in left eye and vision loss in right eye.   Cardiovascular: Negative for chest pain, dyspnea on exertion, leg swelling and palpitations.   Respiratory: Negative for cough, shortness of breath, sputum production and wheezing.    Skin: Negative for dry skin, itching, rash and suspicious lesions.   Musculoskeletal: Negative for back pain, joint pain, myalgias and neck pain.   Gastrointestinal: Negative for abdominal pain, constipation, diarrhea, heartburn, nausea and vomiting.   Genitourinary: Negative for dysuria, flank pain, frequency, hematuria, hesitancy and urgency.   Neurological: Negative for dizziness, numbness and paresthesias.   Psychiatric/Behavioral: Negative for depression and memory loss. The patient does not have insomnia and is not nervous/anxious.      Physical Exam   Constitutional: He is oriented to person, place, and time. He appears well-developed and well-nourished. No distress.   HENT:   Head: Normocephalic and atraumatic.   Mouth/Throat: Oropharynx is clear and moist.   Eyes: Pupils are equal, round, and reactive to light. Right eye exhibits no discharge. Left eye exhibits no discharge. No scleral icterus.   Neck: Normal range of motion. Neck supple.   Cardiovascular: Normal rate, regular rhythm, normal heart sounds and intact distal pulses.  Exam reveals no gallop and no friction rub.    No murmur heard.  Chest port, bandaged.    Pulmonary/Chest: Effort normal and breath sounds normal. No respiratory distress. He has no wheezes. He has no rales. He exhibits no tenderness.   Abdominal: Bowel sounds are normal. He exhibits no distension and no mass. There is no tenderness. There is no guarding.   Musculoskeletal: Normal range of motion. He exhibits no edema, tenderness or deformity.   Neurological: He is alert and oriented to person, place, and time.   Skin: Skin is warm and dry. No rash noted. He is not diaphoretic. No  erythema.   Psychiatric: He has a normal mood and affect. His behavior is normal. Judgment and thought content normal.   Nursing note and vitals reviewed.    Diagnostics: No results found for: RPR  No results found for: CMVANTIBODIE  No results found for: HIV1X2  No results found for: HTLVIIIANTIB  Hepatitis B Surface Ag   Date Value Ref Range Status   07/29/2016 Negative  Final     Hep B Core Total Ab   Date Value Ref Range Status   07/29/2016 Negative  Final     Hepatitis C Ab   Date Value Ref Range Status   07/29/2016 Positive (A)  Final     No results found for: TOXOIGG  No components found for: TOXOIGGINTER  No results found for: KXM6HNE  No results found for: OJY7GBR  No results found for: VARICELLAZOS  No results found for: VARICELLAINT  No results found for: STRONGANTIGG  No results found for: EPSTEINBARRV  No results found for: HEPBSAB  No results found for: QUANTIFERON  Hep A IgM   Date Value Ref Range Status   07/29/2016 Negative  Final     No results found for: PPD  No results found for this or any previous visit.         Transplant Infectious Diseases - Candidacy    Assessment/Plan:     Transplant Candidacy: Based on available information, there are no identified significant barriers to transplantation from an infectious disease standpoint pending acceptable serologies.  Final determination of transplant candidacy will be made once evaluation is complete and reviewed by the Transplant Selection Committee.    Quantiferon gold, strongyloides, HIV, and RPR pending. If positive, please refer to ID clinic.    Vaccines today:  Tdap  prevnar  Hepatitis A first dose.   Pneumovax 8 weeks after prevnar.       Kenny Scott PA-C         Counseling:I discussed with Ferrari the risk for increased susceptibility to infections following transplantation including increased risk for infection right after transplant and if rejection should occur.  The patients has been counseled on the importance of vaccinations including  but not limited to a yearly flu vaccine.  Specific guidance has been provided to the patient regarding the patients occupation, hobbies and activities to avoid future infectious complications including but not limited to avoiding undercooked meats and seafood, proper hygiene, and contact with animals.

## 2017-04-28 PROBLEM — Z79.4 TYPE 2 DIABETES MELLITUS WITH BOTH EYES AFFECTED BY PROLIFERATIVE RETINOPATHY AND MACULAR EDEMA, WITH LONG-TERM CURRENT USE OF INSULIN: Status: ACTIVE | Noted: 2017-04-28

## 2017-04-28 PROBLEM — E11.3513 TYPE 2 DIABETES MELLITUS WITH BOTH EYES AFFECTED BY PROLIFERATIVE RETINOPATHY AND MACULAR EDEMA, WITH LONG-TERM CURRENT USE OF INSULIN: Status: ACTIVE | Noted: 2017-04-28

## 2017-05-01 DIAGNOSIS — Z76.82 ORGAN TRANSPLANT CANDIDATE: Primary | ICD-10-CM

## 2017-05-04 DIAGNOSIS — Z76.82 ORGAN TRANSPLANT CANDIDATE: Primary | ICD-10-CM

## 2017-05-08 ENCOUNTER — TELEPHONE (OUTPATIENT)
Dept: TRANSPLANT | Facility: CLINIC | Age: 57
End: 2017-05-08

## 2017-05-08 NOTE — TELEPHONE ENCOUNTER
----- Message from Darrel Sutton MD sent at 5/4/2017  2:58 PM CDT -----  Regarding: RE: Transplant clearance  Will do.    Mary, can you schedule the patient for a follow up OV with me for cardiac clearance?    Thanks  ----- Message -----     From: Marisol Smith     Sent: 5/4/2017   9:15 AM       To: Darrel Sutton MD  Subject: Transplant clearance                             Mr Brownlee is being considered for listing for a kidney transplant and needs cardiology clearance. He had an updated ECHO done on 4/26/2017. Can you address please?    Marisol Smith RN  Transplant Coordinator

## 2017-05-10 ENCOUNTER — LAB VISIT (OUTPATIENT)
Dept: LAB | Facility: HOSPITAL | Age: 57
End: 2017-05-10
Attending: NURSE PRACTITIONER
Payer: MEDICARE

## 2017-05-10 ENCOUNTER — OFFICE VISIT (OUTPATIENT)
Dept: CARDIOLOGY | Facility: CLINIC | Age: 57
End: 2017-05-10
Payer: MEDICARE

## 2017-05-10 VITALS
HEART RATE: 64 BPM | DIASTOLIC BLOOD PRESSURE: 63 MMHG | HEIGHT: 68 IN | WEIGHT: 148.13 LBS | SYSTOLIC BLOOD PRESSURE: 132 MMHG | BODY MASS INDEX: 22.45 KG/M2 | RESPIRATION RATE: 15 BRPM | OXYGEN SATURATION: 98 %

## 2017-05-10 DIAGNOSIS — Z79.4 TYPE 2 DIABETES MELLITUS WITH BOTH EYES AFFECTED BY PROLIFERATIVE RETINOPATHY AND MACULAR EDEMA, WITH LONG-TERM CURRENT USE OF INSULIN: ICD-10-CM

## 2017-05-10 DIAGNOSIS — E78.5 HYPERLIPIDEMIA, UNSPECIFIED HYPERLIPIDEMIA TYPE: ICD-10-CM

## 2017-05-10 DIAGNOSIS — Z01.810 PREOP CARDIOVASCULAR EXAM: Primary | ICD-10-CM

## 2017-05-10 DIAGNOSIS — E11.22 DIABETES MELLITUS WITH ESRD (END-STAGE RENAL DISEASE): Chronic | ICD-10-CM

## 2017-05-10 DIAGNOSIS — Z99.2 ESRD (END STAGE RENAL DISEASE) ON DIALYSIS: ICD-10-CM

## 2017-05-10 DIAGNOSIS — I10 ESSENTIAL HYPERTENSION: Chronic | ICD-10-CM

## 2017-05-10 DIAGNOSIS — E11.3513 TYPE 2 DIABETES MELLITUS WITH BOTH EYES AFFECTED BY PROLIFERATIVE RETINOPATHY AND MACULAR EDEMA, WITH LONG-TERM CURRENT USE OF INSULIN: ICD-10-CM

## 2017-05-10 DIAGNOSIS — Z76.82 ORGAN TRANSPLANT CANDIDATE: ICD-10-CM

## 2017-05-10 DIAGNOSIS — N18.6 DIABETES MELLITUS WITH ESRD (END-STAGE RENAL DISEASE): Chronic | ICD-10-CM

## 2017-05-10 DIAGNOSIS — N18.6 ESRD (END STAGE RENAL DISEASE) ON DIALYSIS: ICD-10-CM

## 2017-05-10 LAB — AFP SERPL-MCNC: 1.3 NG/ML

## 2017-05-10 PROCEDURE — 87522 HEPATITIS C REVRS TRNSCRPJ: CPT | Mod: TXP

## 2017-05-10 PROCEDURE — 93010 ELECTROCARDIOGRAM REPORT: CPT | Mod: S$PBB,TXP,, | Performed by: INTERNAL MEDICINE

## 2017-05-10 PROCEDURE — 82105 ALPHA-FETOPROTEIN SERUM: CPT | Mod: TXP

## 2017-05-10 PROCEDURE — 36415 COLL VENOUS BLD VENIPUNCTURE: CPT | Mod: TXP

## 2017-05-10 PROCEDURE — 99999 PR PBB SHADOW E&M-EST. PATIENT-LVL III: CPT | Mod: PBBFAC,TXP,, | Performed by: INTERNAL MEDICINE

## 2017-05-10 PROCEDURE — 93005 ELECTROCARDIOGRAM TRACING: CPT | Mod: PBBFAC,TXP | Performed by: INTERNAL MEDICINE

## 2017-05-10 PROCEDURE — 99214 OFFICE O/P EST MOD 30 MIN: CPT | Mod: S$PBB,TXP,, | Performed by: INTERNAL MEDICINE

## 2017-05-10 NOTE — MR AVS SNAPSHOT
[facility]  [address] [facility] [address] [place] [address]  Phone: [phone]                  Vega Brownlee   5/10/2017 8:40 AM   Office Visit    Description:  Male : 1960   Provider:  Darrel Sutton MD   Department:  [facility]           Reason for Visit     Hypertension                To Do List           Goals (5 Years of Data)     None      [facility]     [facility] Care Line -  Assistance  Unless otherwise directed by your provider, please contact [facility], our nurse care line that is available for  assistance.     Registered nurses in the [facility] provide: appointment scheduling, clinical advisement, health education, and other advisory services.  Call: [phone] (toll free)               Medications                Verify that the below list of medications is an accurate representation of the medications you are currently taking.  If none reported, the list may be blank. If incorrect, please contact your healthcare provider. Carry this list with you in case of emergency.           Current Medications     aspirin (ECOTRIN) 81 MG EC tablet Take 1 tablet (81 mg total) by mouth once daily.    atorvastatin (LIPITOR) 40 MG tablet Take 1 tablet (40 mg total) by mouth every evening.    blood sugar diagnostic Strp 1 each by Misc.(Non-Drug; Combo Route) route as directed.    blood-glucose meter (FREESTYLE SYSTEM KIT) kit Use as instructed    calcium carbonate (TUMS ULTRA) 1,177 mg Chew Take 2 tablets by mouth 3 (three) times daily with meals.     fluticasone (FLONASE) 50 mcg/actuation nasal spray 1 spray by Each Nare route once daily.    FOLIC ACID/VIT BCOMP,C (JAM-YAQUELIN ORAL) Take 1 tablet by mouth once daily.    hydrALAZINE (APRESOLINE) 50 MG tablet TAKE ONE Tablet BY MOUTH EVERY TWELVE HOURS    insulin glargine (LANTUS SOLOSTAR) 100 unit/mL (3 mL) InPn pen Inject 7 Units into the skin every evening.    isosorbide mononitrate  "(IMDUR) 60 MG 24 hr tablet TAKE ONE Tablet BY MOUTH EVERY DAY    lancets (ACCU-CHEK SOFTCLIX LANCETS) Misc 1 application by Misc.(Non-Drug; Combo Route) route 4 (four) times daily before meals and nightly.    metoprolol tartrate (LOPRESSOR) 25 MG tablet TAKE ONE TABLET BY MOUTH TWICE DAILY           Clinical Reference Information           Your Vitals Were     BP Pulse Resp Height Weight SpO2    132/63 64 15 5' 8" (1.727 m) 67.2 kg (148 lb 2.4 oz) 98%    BMI                22.53 kg/m2          Blood Pressure          Most Recent Value    BP  132/63      Allergies as of 5/10/2017     No Known Allergies      Immunizations Administered on Date of Encounter - 5/10/2017     None      Maintenance Dialysis History     Start End Type Comments Center    1/8/1888    [facility]            Current Dialysis Center Information     [facility] [address] Phone #:  [phone]    Contact:  [place]  [id number] Fax #:  [phone]            Transplant Information        Txp Date Organ Coordinator Care Team     Kidney Meghana Townsend Referring Physician:  Alan Lowery MD   Current Nephrologist:  Alan Lowery MD         [facility] Sign-Up     Activating your [facility] account is as easy as 1-2-3!     1) Visit [email or web address], select Sign Up Now, enter this activation code and your date of birth, then select Next.  [email or web address]  Expires: 6/24/2017  9:00 AM      2) Create a username and password to use when you visit [facility] in the future and select a security question in case you lose your password and select Next.    3) Enter your e-mail address and click Sign Up!    Additional Information  If you have questions, please e-mail [email or web address] or call [phone] to talk to our [facility] staff. Remember, [facility] is NOT to be used for urgent needs. For medical emergencies, dial 911.         Language Assistance Services     ATTENTION: Language assistance services are available, free of " charge. Please call [phone].      ATENCIÓN: Si habla español, tiene a monte disposición servicios gratuitos de asistencia lingüística. Llame al [phone].     CHÚ Ý: N?u b?n nói Ti?ng Vi?t, có các d?ch v? h? tr? ngôn ng? mi?n phí dành cho b?n. G?i s? [phone].         [facility] complies with applicable Federal civil rights laws and does not discriminate on the basis of race, color, national origin, age, disability, or sex.

## 2017-05-10 NOTE — PROGRESS NOTES
CARDIOVASCULAR PROGRESS NOTE    REASON FOR CONSULT:   Vega Brownlee is a 56 y.o. male who presents for follow up of recent hospitalization.    PCP: Ana  Neph: RUBEN Lowery  Transplant: Alvin  HISTORY OF PRESENT ILLNESS:   The patient returns for follow-up and at the request of transplant nephrology.  His son accompanies him to the office visit today.  The patient reports a generally asymptomatic status without angina or dyspnea.  He tells me he walks around his neighborhood for 30 minutes at a clip without problems, and plays golf without any issues.  He's had no shortness of breath, palpitations, lightheadedness, dizziness, or syncope.  He denies PND, orthopnea, or lower extremity edema.  There's been no melena, hematuria, or claudicant symptoms.    I took the liberty of exercising the patient the office today.  He was able to climb up 2 flights of stairs without any symptoms or limitations.    CARDIOVASCULAR HISTORY:   none    PAST MEDICAL HISTORY:     Past Medical History:   Diagnosis Date    Anemia     Cataract     CHF (congestive heart failure)     Diabetes mellitus     Diabetes mellitus, type 2     Disorder of kidney and ureter     Hyperlipidemia     Hypertension        PAST SURGICAL HISTORY:     Past Surgical History:   Procedure Laterality Date    arm fracture Left     FINGER AMPUTATION Left     left index finger    LUNG SURGERY      PORTACATH PLACEMENT  08/2016       ALLERGIES AND MEDICATION:   Review of patient's allergies indicates:  No Known Allergies  Previous Medications    ASPIRIN (ECOTRIN) 81 MG EC TABLET    Take 1 tablet (81 mg total) by mouth once daily.    ATORVASTATIN (LIPITOR) 40 MG TABLET    Take 1 tablet (40 mg total) by mouth every evening.    BLOOD SUGAR DIAGNOSTIC STRP    1 each by Misc.(Non-Drug; Combo Route) route as directed.    BLOOD-GLUCOSE METER (FREESTYLE SYSTEM KIT) KIT    Use as instructed    CALCIUM CARBONATE (TUMS ULTRA) 1,177 MG CHEW    Take 2 tablets by mouth 3  (three) times daily with meals.     FLUTICASONE (FLONASE) 50 MCG/ACTUATION NASAL SPRAY    1 spray by Each Nare route once daily.    FOLIC ACID/VIT BCOMP,C (JAM-YAQUELIN ORAL)    Take 1 tablet by mouth once daily.    HYDRALAZINE (APRESOLINE) 50 MG TABLET    TAKE ONE Tablet BY MOUTH EVERY TWELVE HOURS    INSULIN GLARGINE (LANTUS SOLOSTAR) 100 UNIT/ML (3 ML) INPN PEN    Inject 7 Units into the skin every evening.    ISOSORBIDE MONONITRATE (IMDUR) 60 MG 24 HR TABLET    TAKE ONE Tablet BY MOUTH EVERY DAY    LANCETS (ACCU-CHEK SOFTCLIX LANCETS) MISC    1 application by Misc.(Non-Drug; Combo Route) route 4 (four) times daily before meals and nightly.    METOPROLOL TARTRATE (LOPRESSOR) 25 MG TABLET    TAKE ONE TABLET BY MOUTH TWICE DAILY       SOCIAL HISTORY:     Social History     Social History    Marital status:      Spouse name: N/A    Number of children: N/A    Years of education: N/A     Occupational History    Not on file.     Social History Main Topics    Smoking status: Former Smoker     Packs/day: 1.00     Start date: 4/26/1982     Quit date: 9/26/2016    Smokeless tobacco: Not on file    Alcohol use No    Drug use: No    Sexual activity: Not on file     Other Topics Concern    Not on file     Social History Narrative       FAMILY HISTORY:     Family History   Problem Relation Age of Onset    Diabetes Mother     Memory loss Mother     No Known Problems Father     No Known Problems Sister     No Known Problems Brother     Arthritis Sister     No Known Problems Sister        REVIEW OF SYSTEMS:   Review of Systems   Constitutional: Negative for chills, diaphoresis and fever.   HENT: Negative for nosebleeds.    Eyes: Negative for blurred vision, double vision and photophobia.   Respiratory: Negative for hemoptysis, shortness of breath and wheezing.    Cardiovascular: Negative for chest pain, palpitations, orthopnea, claudication, leg swelling and PND.   Gastrointestinal: Negative for abdominal  "pain, blood in stool, heartburn, melena, nausea and vomiting.   Genitourinary: Negative for flank pain and hematuria.   Musculoskeletal: Negative for falls, myalgias and neck pain.   Skin: Negative for rash.   Neurological: Negative for dizziness, seizures, loss of consciousness, weakness and headaches.   Endo/Heme/Allergies: Negative for polydipsia. Does not bruise/bleed easily.   Psychiatric/Behavioral: Negative for depression and memory loss. The patient is not nervous/anxious.        PHYSICAL EXAM:     Vitals:    05/10/17 0848   BP: 132/63   Pulse: 64   Resp: 15    Body mass index is 22.53 kg/(m^2).  Weight: 67.2 kg (148 lb 2.4 oz)   Height: 5' 8" (172.7 cm)     Physical Exam   Constitutional: He is oriented to person, place, and time. He appears well-developed and well-nourished. He is cooperative.  Non-toxic appearance. No distress.   HENT:   Head: Normocephalic and atraumatic.   Eyes: Conjunctivae and EOM are normal. Pupils are equal, round, and reactive to light. No scleral icterus.   Neck: Trachea normal. Neck supple. Normal carotid pulses and no JVD present. Carotid bruit is not present. No rigidity. No edema present. No thyromegaly present.   Cardiovascular: Normal rate, regular rhythm, S1 normal, S2 normal and normal heart sounds.  PMI is not displaced.  Exam reveals no gallop and no friction rub.    No murmur heard.  Pulses:       Carotid pulses are 2+ on the right side, and 2+ on the left side.  Pulmonary/Chest: Effort normal and breath sounds normal. No respiratory distress. He has no wheezes. He has no rales. He exhibits no tenderness.   HD catheter in R chest wall   Abdominal: Soft. Bowel sounds are normal. He exhibits no distension and no mass. There is no hepatosplenomegaly. There is no tenderness.   Musculoskeletal: He exhibits no edema or tenderness.   Feet:   Right Foot:   Skin Integrity: Negative for ulcer.   Left Foot:   Skin Integrity: Negative for ulcer.   Neurological: He is alert and " oriented to person, place, and time. No cranial nerve deficit.   Skin: Skin is warm and dry. No rash noted. No erythema.   Psychiatric: He has a normal mood and affect. His speech is normal and behavior is normal.   Vitals reviewed.      DATA:   EKG: (personally reviewed tracing)  5/10/17 SR 63    Laboratory:  CBC:    Recent Labs  Lab 07/31/16  0607 08/01/16  0533 04/26/17  0755   WHITE BLOOD CELL COUNT 8.35 7.31 10.78   HEMOGLOBIN 7.9 L 8.3 L 13.3 L   HEMATOCRIT 24.3 L 24.2 L 40.1   PLATELETS 324 440 H 215       CHEMISTRIES:    Recent Labs  Lab 07/26/16  1305  07/27/16  0435  08/01/16  0533 11/11/16  0852 04/26/17  0755   GLUCOSE 287 H  < > 103  < > 140 H 93 101   SODIUM 139  < > 141  < > 137 139 136   POTASSIUM 4.8  < > 4.8  < > 3.8 4.9 4.2   BUN BLD 38 H  < > 41 H  < > 61 H 62 H 27 H   CREATININE 7.1 H  < > 7.2 H  < > 6.6 H 7.3 H 5.9 H   EGFR IF  9 A  < > 9 A  < > 10 A 9 A 11.3 A   EGFR IF NON- 8 A  < > 8 A  < > 9 A 8 A 9.8 A   CALCIUM 6.0 LL  < > 6.2 LL  < > 5.9 LL 8.1 L 9.3   MAGNESIUM 2.0  --  2.2  --   --   --   --    < > = values in this interval not displayed.    CARDIAC BIOMARKERS:    Recent Labs  Lab 07/26/16  1722 07/26/16  2135 07/27/16  0435 07/27/16  0817 07/27/16  1151   CPK 1349 H  1349 H  1349 H 1183 H  --  996 H  --    CPK MB 8.3 H  --   --   --   --    TROPONIN I 0.060 H  0.059 H 0.084 H 0.075 H  --  0.047 H       COAGS:    Recent Labs  Lab 07/26/16  1305 04/26/17  0755   INR 1.0 0.9       LIPIDS/LFTS:    Recent Labs  Lab 07/29/16  0325 11/11/16  0852 04/26/17  0755   CHOLESTEROL 206 H 151 101 L   TRIGLYCERIDES 207 H 119 164 H   HDL 22 L 38 L 28 L   LDL CHOLESTEROL 142.6 89.2 40.2 L   NON-HDL CHOLESTEROL 184 113 73   AST 13 54 H 21   ALT 10 93 H 23       Cardiovascular Testing:  Echo 4/26/17 (EF improved from 35% in 7/2016)    1 - Mildly enlarged ascending aorta.     2 - Normal left ventricular systolic function (EF 60-65%).     3 - Normal left ventricular  diastolic function.     4 - No wall motion abnormalities.     5 - Normal right ventricular systolic function .     6 - Trivial tricuspid regurgitation.     Nenita MPI 8/1/16  Nuclear Quantitative Functional Analysis:   LVEF: 51 %  LVED Volume: 168 ml  LVES Volume: 83 ml  Impression: NORMAL MYOCARDIAL PERFUSION  1. The perfusion scan is free of evidence for myocardial ischemia or injury.   2. There is a mild to moderate intensity fixed defect in the inferior wall of the left ventricle, secondary to diaphragm attenuation.   3. There is abnormal wall motion at rest showing mild global hypokinesis of the left ventricle.   4. Resting LV function is normal.   5. The ventricular volumes are normal at rest and stress.   6. The extracardiac distribution of radioactivity is normal.     ASSESSMENT:   # Preop for kidney transplant.  Pt had good exercise capacity as demonstrated in the office today and is at low risk for periop CV events.  No need for repeat stress testing.  # ESRD/HD per renal.  # HTN, controlled  # DM  # HLP, LDL acceptable    PLAN:   Cont med rx  Follow up with nephrology/transplant as planned  RTC 6 months    Darrel Sutton MD, FACC

## 2017-05-11 LAB
HCV GENTYP SERPL NAA+PROBE: NORMAL
HCV QUALITATIVE RESULT: NOT DETECTED
HCV QUANTITATIVE LOG: <1.08 LOG (10) IU/ML
HCV RNA SPEC NAA+PROBE-ACNC: <12 IU/ML

## 2017-05-11 NOTE — PROGRESS NOTES
CLINIC TEACHING NOTE    Vega Brownlee was seen in transplant clinic today.  Coordinator verified that the patient viewed the teaching video and received written educational material.    The following information was reviewed:  · Waiting list time for cadaveric vs. living donation  · Waiting list process including: back-up calls, notification of changes in contact information, dialysis/physician information to the transplant coordinator, notifications of changes in health or if hospitalized, and notification of travel out of the area  · Monthly antibody testing to be obtained by the dialysis unit or arranged through a local lab and mailed to the transplant center.  Patient informed that if blood is not sent monthly and a kidney becomes available, the patient will need to come to the hospital to provide a fresh sample of blood for testing.    Patient was instructed that once on the waiting list, an appointment with the transplant physician will be scheduled yearly or every 6 months to ensure patient remains an acceptable transplant candidate.    Patient also informed that at the time of transplant, participation in a research protocol may be offered.  Notified that this is strictly voluntary and will not affect the quality of care received.      The option to have name placed on multiple transplant lists to increase opportunity for transplant was reviewed.    All patient questions were answered.  Patient verbalized understanding of above information.    Patient presents as a suitable candidate for transplant.

## 2017-05-24 RX ORDER — ATORVASTATIN CALCIUM 40 MG/1
40 TABLET, FILM COATED ORAL NIGHTLY
Qty: 90 TABLET | Refills: 3 | Status: SHIPPED | OUTPATIENT
Start: 2017-05-24 | End: 2018-07-18 | Stop reason: SDUPTHER

## 2017-05-29 ENCOUNTER — HOSPITAL ENCOUNTER (OUTPATIENT)
Dept: RADIOLOGY | Facility: HOSPITAL | Age: 57
Discharge: HOME | End: 2017-05-29
Attending: NURSE PRACTITIONER
Payer: MEDICARE

## 2017-05-29 ENCOUNTER — OFFICE VISIT (OUTPATIENT)
Dept: UROLOGY | Facility: CLINIC | Age: 57
End: 2017-05-29
Payer: MEDICARE

## 2017-05-29 VITALS
DIASTOLIC BLOOD PRESSURE: 56 MMHG | WEIGHT: 154.56 LBS | SYSTOLIC BLOOD PRESSURE: 130 MMHG | HEIGHT: 68 IN | BODY MASS INDEX: 23.43 KG/M2 | HEART RATE: 69 BPM

## 2017-05-29 DIAGNOSIS — Z76.82 ORGAN TRANSPLANT CANDIDATE: ICD-10-CM

## 2017-05-29 DIAGNOSIS — Z99.2 ESRD (END STAGE RENAL DISEASE) ON DIALYSIS: Primary | ICD-10-CM

## 2017-05-29 DIAGNOSIS — N42.89 MASS OF PROSTATE DETERMINED BY ULTRASOUND: ICD-10-CM

## 2017-05-29 DIAGNOSIS — N18.6 ESRD (END STAGE RENAL DISEASE) ON DIALYSIS: Primary | ICD-10-CM

## 2017-05-29 PROCEDURE — 99999 PR PBB SHADOW E&M-EST. PATIENT-LVL III: CPT | Mod: PBBFAC,TXP,, | Performed by: UROLOGY

## 2017-05-29 PROCEDURE — 99204 OFFICE O/P NEW MOD 45 MIN: CPT | Mod: S$PBB,TXP,, | Performed by: UROLOGY

## 2017-05-29 PROCEDURE — 99213 OFFICE O/P EST LOW 20 MIN: CPT | Mod: PBBFAC,25,TXP | Performed by: UROLOGY

## 2017-05-29 PROCEDURE — 93978 VASCULAR STUDY: CPT | Mod: 26,TXP,, | Performed by: RADIOLOGY

## 2017-05-29 RX ORDER — HYDROCODONE BITARTRATE AND ACETAMINOPHEN 5; 325 MG/1; MG/1
TABLET ORAL
COMMUNITY
Start: 2017-05-19 | End: 2017-06-07 | Stop reason: CLARIF

## 2017-05-29 RX ORDER — SEVELAMER CARBONATE 800 MG/1
800 TABLET, FILM COATED ORAL
Status: ON HOLD | COMMUNITY
Start: 2017-05-23 | End: 2022-01-01 | Stop reason: HOSPADM

## 2017-05-29 NOTE — PROGRESS NOTES
Subjective:       Patient ID: Vega Brownlee is a 56 y.o. male.    Chief Complaint: No chief complaint on file.      HPI: Vega Brownlee is a 56 y.o. White male who presents today for evaluation and management of a hypoechoic prostatic mass seen on a retroperitoneal ultrasound.    The patient has CKD stage V and is on hemodialysis due to diabetes. The patient has been seen by the transplant service for evaluation for a kidney transplant. On a screening retroperitoneal ultrasound, a hypoechoic mass was seen in the prostate.    The patient now makes very little urine, but he denies gross hematuria. Prior to dialysis, he also denies any urinary symptoms.    This mass was simply found incidentally.    The patient denies a family history of prostate cancer. His PSA is less than 1.0. Furthermore, he denies any urologic history of any sort.    He presents today for evaluation and management of this mass.    Review of patient's allergies indicates:  No Known Allergies    Current Outpatient Prescriptions   Medication Sig Dispense Refill    aspirin (ECOTRIN) 81 MG EC tablet Take 1 tablet (81 mg total) by mouth once daily.  0    atorvastatin (LIPITOR) 40 MG tablet Take 1 tablet (40 mg total) by mouth every evening. 90 tablet 3    blood sugar diagnostic Strp 1 each by Misc.(Non-Drug; Combo Route) route as directed. 100 each 0    blood-glucose meter (FREESTYLE SYSTEM KIT) kit Use as instructed 1 each 0    calcium carbonate (TUMS ULTRA) 1,177 mg Chew Take 2 tablets by mouth 3 (three) times daily with meals.       fluticasone (FLONASE) 50 mcg/actuation nasal spray 1 spray by Each Nare route once daily. 16 g 2    FOLIC ACID/VIT BCOMP,C (JAM-YAQUELIN ORAL) Take 1 tablet by mouth once daily.      hydrALAZINE (APRESOLINE) 50 MG tablet TAKE ONE Tablet BY MOUTH EVERY TWELVE HOURS 60 tablet 5    hydrocodone-acetaminophen 5-325mg (NORCO) 5-325 mg per tablet       insulin glargine (LANTUS SOLOSTAR) 100 unit/mL (3 mL) InPn pen Inject 7  Units into the skin every evening. 3 mL 2    isosorbide mononitrate (IMDUR) 60 MG 24 hr tablet TAKE ONE Tablet BY MOUTH EVERY DAY 30 tablet 5    lancets (ACCU-CHEK SOFTCLIX LANCETS) Misc 1 application by Misc.(Non-Drug; Combo Route) route 4 (four) times daily before meals and nightly. 120 each 0    metoprolol tartrate (LOPRESSOR) 25 MG tablet TAKE ONE TABLET BY MOUTH TWICE DAILY 60 tablet 5    RENVELA 800 mg Tab        No current facility-administered medications for this visit.        Past Medical History:   Diagnosis Date    Anemia     Cataract     CHF (congestive heart failure)     Diabetes mellitus     Diabetes mellitus, type 2     Disorder of kidney and ureter     Hyperlipidemia     Hypertension        Past Surgical History:   Procedure Laterality Date    arm fracture Left     FINGER AMPUTATION Left     left index finger    LUNG SURGERY      PORTACATH PLACEMENT  08/2016       Family History   Problem Relation Age of Onset    Diabetes Mother     Memory loss Mother     No Known Problems Father     No Known Problems Sister     No Known Problems Brother     Arthritis Sister     No Known Problems Sister        Review of Systems    Review of Systems   Constitutional: Negative for fever, chills, activity change, appetite change and unexpected weight change.   HENT: Negative for congestion, nosebleeds, sneezing, sore throat and trouble swallowing.    Eyes: Negative for pain, discharge, redness and visual disturbance.   Respiratory: Negative for cough, choking, chest tightness and shortness of breath.    Cardiovascular: Negative for chest pain, palpitations and leg swelling.   Gastrointestinal: Negative for nausea, vomiting, abdominal pain, diarrhea, blood in stool, abdominal distention and anal bleeding.  Genitourinary: As documented per HPI   Endocrine: Negative for cold intolerance, heat intolerance, polydipsia, polyphagia and polyuria.   Musculoskeletal: Negative for myalgias, gait problem,  neck pain and neck stiffness.   Skin: Negative for color change, pallor, rash and wound.   Allergic/Immunologic: Negative for immunocompromised state.   Neurological: Negative for seizures, facial asymmetry, speech difficulty, weakness and light-headedness.   Hematological: Negative for adenopathy. Does not bruise/bleed easily.   Psychiatric/Behavioral: Negative for hallucinations, behavioral problems, self-injury and agitation. The patient is not hyperactive.      All other systems were reviewed and were negative.    Objective:     Vitals:    05/29/17 1123   BP: (!) 130/56   Pulse: 69     Physical Exam   Vitals reviewed.  Constitutional: He is oriented to person, place, and time. He appears well-developed and well-nourished. No distress.   HENT:   Head: Normocephalic and atraumatic.   Right Ear: External ear normal.   Left Ear: External ear normal.   Nose: Nose normal.   Eyes: EOM are normal. Pupils are equal, round, and reactive to light. Right eye exhibits no discharge. Left eye exhibits no discharge.   Neck: Normal range of motion. Neck supple. No tracheal deviation present. No thyroid enlargement or tenderness.   Cardiovascular: Regular rhythm and intact distal pulses. No signs of peripheral edema.   Pulmonary/Chest: Effort normal and breath sounds normal. No stridor. No respiratory distress. He has no wheezes.   Abdominal: Soft. Bowel sounds are normal. He exhibits no distension. There is no tenderness. Hernia confirmed negative in the right inguinal area and confirmed negative in the left inguinal area. No hepatic or splenic enlargement or tenderness.  Genitourinary: Urethral meatus in normal anatomic position without lesions. No evidence of hypospadias. Penis circumcised without phimosis, lesions, or masses. Right testis and epididymis show no mass, no swelling and no tenderness. Right testis is descended. Left testis and epididymis show no mass, no swelling and no tenderness. Left testis is descended. No  scrotal lesions or rashes.  PAULIE: Normal sphincter tone, seminal vesicles non-palpable, no anal or perineal masses or rashes.  Musculoskeletal: Normal range of motion. He exhibits no edema.   Neurological: He is alert and oriented to person, place, and time. He exhibits normal muscle tone. Coordination normal.   Skin: Skin is warm. No rash noted.   Lymphatic: No palpable lymph nodes.  Psychiatric: He has a normal mood and affect. His behavior is normal. Judgment and thought content normal.     Lab Results   Component Value Date    PSA 0.86 04/26/2017     Lab Results   Component Value Date    CREATININE 5.9 (H) 04/26/2017     Lab Results   Component Value Date    EGFRNONAA 9.8 (A) 04/26/2017     Lab Results   Component Value Date    ESTGFRAFRICA 11.3 (A) 04/26/2017     I have personally reviewed all the patient's relevant  imaging.    Retroperitoneal ultrasound (4/26/17): Incidental note is made of a hypoechoic mass within the prostate measuring 1.5 x 1.1 x 1.1 cm. Sonographic features are nonspecific and further evaluation is recommended with correlation to PSA values and prostate MRI.    Assessment:       1. ESRD (end stage renal disease) on dialysis    2. Organ transplant candidate    3. Mass of prostate determined by ultrasound        Plan:     Diagnoses and all orders for this visit:    ESRD (end stage renal disease) on dialysis    Organ transplant candidate    Mass of prostate determined by ultrasound    The patient has an incidental prostatic mass noted on recent retroperitoneal ultrasound. His PSA is normal.    Radiographically, the mass does not appear malignant, however it does need further evaluation.    I believe it would be best evaluated with a transrectal ultrasound with biopsy and cystoscopy. I explained the procedures to the patient and his family as well as their logistics.    The patient will let me know when he wishes to proceed with these procedures. He will have to coordinate them with his  dialysis.    I answered all his and his family's questions.    I encouraged him or any of his family members to call or email me with questions and/or concerns.    I spent 45 minutes with the patient of which more than half was spent in coordinating the patient's care as well as in direct consultation with the patient in regards to our treatment and plan.

## 2017-05-29 NOTE — LETTER
May 29, 2017      Ko Perez MD  [address] [facility]  [place] [address]           [facility]  [address]  [place] [address]  Phone: [phone]          Patient: Vega Brownlee   MR Number: [id number]   YOB: 1960   Date of Visit: 5/29/2017       Dear Dr. Ko Perez:    Thank you for referring Vega Brownlee to me for evaluation. Attached you will find relevant portions of my assessment and plan of care.    If you have questions, please do not hesitate to call me. I look forward to following Vega Brownlee along with you.    Sincerely,    Michael Mcclure MD    Enclosure  CC:  No Recipients    If you would like to receive this communication electronically, please contact [email or web address] or [phone] to request more information on [facility] Link access.    For providers and/or their staff who would like to refer a patient to [facility], please contact us through our one-stop-shop provider referral line, [facility], at [phone].    If you feel you have received this communication in error or would no longer like to receive these types of communications, please e-mail [email or web address]

## 2017-06-05 DIAGNOSIS — N42.89 PROSTATIC MASS: Primary | ICD-10-CM

## 2017-06-08 ENCOUNTER — ANESTHESIA EVENT (OUTPATIENT)
Dept: SURGERY | Facility: HOSPITAL | Age: 57
End: 2017-06-08
Payer: MEDICARE

## 2017-06-08 ENCOUNTER — HOSPITAL ENCOUNTER (OUTPATIENT)
Facility: HOSPITAL | Age: 57
Discharge: HOME | End: 2017-06-08
Attending: UROLOGY | Admitting: UROLOGY
Payer: MEDICARE

## 2017-06-08 ENCOUNTER — SURGERY (OUTPATIENT)
Age: 57
End: 2017-06-08

## 2017-06-08 ENCOUNTER — ANESTHESIA (OUTPATIENT)
Dept: SURGERY | Facility: HOSPITAL | Age: 57
End: 2017-06-08
Payer: MEDICARE

## 2017-06-08 VITALS
HEIGHT: 68 IN | DIASTOLIC BLOOD PRESSURE: 61 MMHG | HEART RATE: 58 BPM | TEMPERATURE: 98 F | OXYGEN SATURATION: 98 % | WEIGHT: 154 LBS | RESPIRATION RATE: 16 BRPM | BODY MASS INDEX: 23.34 KG/M2 | SYSTOLIC BLOOD PRESSURE: 116 MMHG

## 2017-06-08 DIAGNOSIS — R97.20 ELEVATED PSA: ICD-10-CM

## 2017-06-08 DIAGNOSIS — N42.89 PROSTATIC MASS: ICD-10-CM

## 2017-06-08 DIAGNOSIS — N42.83 CYST OF PROSTATE: Primary | ICD-10-CM

## 2017-06-08 LAB
POCT GLUCOSE: 165 MG/DL (ref 70–110)
POCT GLUCOSE: 166 MG/DL (ref 70–110)

## 2017-06-08 PROCEDURE — 88304 TISSUE EXAM BY PATHOLOGIST: CPT | Mod: TXP | Performed by: PATHOLOGY

## 2017-06-08 PROCEDURE — 25000003 PHARM REV CODE 250: Mod: TXP | Performed by: UROLOGY

## 2017-06-08 PROCEDURE — 63600175 PHARM REV CODE 636 W HCPCS: Mod: TXP | Performed by: REGISTERED NURSE

## 2017-06-08 PROCEDURE — 55700 PR BIOPSY OF PROSTATE,NEEDLE/PUNCH: CPT | Mod: GC,TXP,, | Performed by: UROLOGY

## 2017-06-08 PROCEDURE — 76942 ECHO GUIDE FOR BIOPSY: CPT | Mod: 26,59,GC,TXP | Performed by: UROLOGY

## 2017-06-08 PROCEDURE — 88304 TISSUE EXAM BY PATHOLOGIST: CPT | Mod: 26,TXP,, | Performed by: PATHOLOGY

## 2017-06-08 PROCEDURE — 71000033 HC RECOVERY, INTIAL HOUR: Mod: TXP | Performed by: UROLOGY

## 2017-06-08 PROCEDURE — 37000008 HC ANESTHESIA 1ST 15 MINUTES: Mod: TXP | Performed by: UROLOGY

## 2017-06-08 PROCEDURE — 37000009 HC ANESTHESIA EA ADD 15 MINS: Mod: TXP | Performed by: UROLOGY

## 2017-06-08 PROCEDURE — 55700 HC BIOPSY OF PROSTATE - NEEDLE OR PUNCH: CPT

## 2017-06-08 PROCEDURE — 52000 CYSTOURETHROSCOPY: CPT | Mod: 59,GC,TXP, | Performed by: UROLOGY

## 2017-06-08 PROCEDURE — 82962 GLUCOSE BLOOD TEST: CPT | Mod: TXP | Performed by: UROLOGY

## 2017-06-08 PROCEDURE — 36000707: Mod: TXP | Performed by: UROLOGY

## 2017-06-08 PROCEDURE — 36000706: Mod: TXP | Performed by: UROLOGY

## 2017-06-08 PROCEDURE — 71000015 HC POSTOP RECOV 1ST HR: Mod: TXP | Performed by: UROLOGY

## 2017-06-08 PROCEDURE — 76872 US TRANSRECTAL: CPT | Mod: 26,GC,TXP, | Performed by: UROLOGY

## 2017-06-08 PROCEDURE — D9220A PRA ANESTHESIA: Mod: CRNA,TXP,, | Performed by: REGISTERED NURSE

## 2017-06-08 PROCEDURE — 63600175 PHARM REV CODE 636 W HCPCS: Mod: TXP | Performed by: STUDENT IN AN ORGANIZED HEALTH CARE EDUCATION/TRAINING PROGRAM

## 2017-06-08 PROCEDURE — 25000003 PHARM REV CODE 250: Mod: TXP | Performed by: REGISTERED NURSE

## 2017-06-08 PROCEDURE — D9220A PRA ANESTHESIA: Mod: ANES,TXP,, | Performed by: ANESTHESIOLOGY

## 2017-06-08 PROCEDURE — 52000 CYSTOURETHROSCOPY: CPT

## 2017-06-08 RX ORDER — MIDAZOLAM HYDROCHLORIDE 1 MG/ML
INJECTION INTRAMUSCULAR; INTRAVENOUS
Status: DISCONTINUED
Start: 2017-06-08 | End: 2017-06-08 | Stop reason: HOSPADM

## 2017-06-08 RX ORDER — MIDAZOLAM HYDROCHLORIDE 1 MG/ML
INJECTION, SOLUTION INTRAMUSCULAR; INTRAVENOUS
Status: DISCONTINUED | OUTPATIENT
Start: 2017-06-08 | End: 2017-06-08

## 2017-06-08 RX ORDER — FENTANYL CITRATE 50 UG/ML
INJECTION, SOLUTION INTRAMUSCULAR; INTRAVENOUS
Status: DISCONTINUED | OUTPATIENT
Start: 2017-06-08 | End: 2017-06-08

## 2017-06-08 RX ORDER — PHENYLEPHRINE HYDROCHLORIDE 10 MG/ML
INJECTION INTRAVENOUS
Status: DISCONTINUED | OUTPATIENT
Start: 2017-06-08 | End: 2017-06-08

## 2017-06-08 RX ORDER — LIDOCAINE HYDROCHLORIDE 20 MG/ML
JELLY TOPICAL
Status: DISCONTINUED | OUTPATIENT
Start: 2017-06-08 | End: 2017-06-08 | Stop reason: HOSPADM

## 2017-06-08 RX ORDER — LIDOCAINE HYDROCHLORIDE 10 MG/ML
1 INJECTION, SOLUTION EPIDURAL; INFILTRATION; INTRACAUDAL; PERINEURAL ONCE
Status: COMPLETED | OUTPATIENT
Start: 2017-06-08 | End: 2017-06-08

## 2017-06-08 RX ORDER — LIDOCAINE HCL/PF 100 MG/5ML
SYRINGE (ML) INTRAVENOUS
Status: DISCONTINUED | OUTPATIENT
Start: 2017-06-08 | End: 2017-06-08

## 2017-06-08 RX ORDER — FENTANYL CITRATE 50 UG/ML
INJECTION, SOLUTION INTRAMUSCULAR; INTRAVENOUS
Status: DISCONTINUED
Start: 2017-06-08 | End: 2017-06-08 | Stop reason: HOSPADM

## 2017-06-08 RX ORDER — SODIUM CHLORIDE 9 MG/ML
INJECTION, SOLUTION INTRAVENOUS CONTINUOUS
Status: DISCONTINUED | OUTPATIENT
Start: 2017-06-08 | End: 2017-06-08 | Stop reason: HOSPADM

## 2017-06-08 RX ORDER — PROPOFOL 10 MG/ML
INJECTION, EMULSION INTRAVENOUS
Status: DISCONTINUED
Start: 2017-06-08 | End: 2017-06-08 | Stop reason: HOSPADM

## 2017-06-08 RX ORDER — PROPOFOL 10 MG/ML
VIAL (ML) INTRAVENOUS CONTINUOUS PRN
Status: DISCONTINUED | OUTPATIENT
Start: 2017-06-08 | End: 2017-06-08

## 2017-06-08 RX ORDER — CEFTRIAXONE 1 G/50ML
INJECTION, SOLUTION INTRAVENOUS
Status: DISCONTINUED
Start: 2017-06-08 | End: 2017-06-08 | Stop reason: HOSPADM

## 2017-06-08 RX ADMIN — LIDOCAINE HYDROCHLORIDE 100 MG: 20 INJECTION, SOLUTION INTRAVENOUS at 10:06

## 2017-06-08 RX ADMIN — FENTANYL CITRATE 50 MCG: 50 INJECTION, SOLUTION INTRAMUSCULAR; INTRAVENOUS at 10:06

## 2017-06-08 RX ADMIN — CEFTRIAXONE 1 G: 1 INJECTION, SOLUTION INTRAVENOUS at 10:06

## 2017-06-08 RX ADMIN — MIDAZOLAM HYDROCHLORIDE 2 MG: 1 INJECTION, SOLUTION INTRAMUSCULAR; INTRAVENOUS at 10:06

## 2017-06-08 RX ADMIN — SODIUM CHLORIDE 250 ML: 0.9 INJECTION, SOLUTION INTRAVENOUS at 09:06

## 2017-06-08 RX ADMIN — PHENYLEPHRINE HYDROCHLORIDE 100 MCG: 10 INJECTION INTRAVENOUS at 10:06

## 2017-06-08 RX ADMIN — PHENYLEPHRINE HYDROCHLORIDE 200 MCG: 10 INJECTION INTRAVENOUS at 10:06

## 2017-06-08 RX ADMIN — LIDOCAINE HYDROCHLORIDE 10 ML: 20 JELLY TOPICAL at 10:06

## 2017-06-08 RX ADMIN — LIDOCAINE HYDROCHLORIDE 10 MG: 10 INJECTION, SOLUTION EPIDURAL; INFILTRATION; INTRACAUDAL; PERINEURAL at 09:06

## 2017-06-08 RX ADMIN — PROPOFOL 150 MCG/KG/MIN: 10 INJECTION, EMULSION INTRAVENOUS at 10:06

## 2017-06-08 NOTE — PLAN OF CARE
Discharge instructions reviewed with patient and spouse. No questions verbalized. Handouts provided. VSS. Tolerating PO liquids without difficulty. Denies pain or nausea. Released from care by anesthesia. Ready for discharge

## 2017-06-08 NOTE — ANESTHESIA PREPROCEDURE EVALUATION
06/08/2017  Vega Brownlee is a 56 y.o., male.    Anesthesia Evaluation    I have reviewed the Patient Summary Reports.    I have reviewed the Nursing Notes.   I have reviewed the Medications.     Review of Systems  Anesthesia Hx:  No problems with previous Anesthesia    Social:  Non-Smoker, No Alcohol Use    EENT/Dental:EENT/Dental Normal   Cardiovascular:   Hypertension CHF    Pulmonary:  Pulmonary Normal    Renal/:   Chronic Renal Disease, ESRD    Musculoskeletal:  Musculoskeletal Normal    Neurological:  Neurology Normal    Endocrine:   Diabetes, well controlled, type 2    Dermatological:  Skin Normal    Psych:  Psychiatric Normal           Physical Exam  General:  Well nourished, Obesity    Airway/Jaw/Neck:  Airway Findings: Mouth Opening: Normal Tongue: Normal  Mallampati: II  Jaw/Neck Findings:  Neck ROM: Normal ROM     Eyes/Ears/Nose:  EYES/EARS/NOSE FINDINGS: Normal    Chest/Lungs:  Chest/Lungs Findings: Clear to auscultation     Heart/Vascular:  Heart Findings: Rate: Normal  Rhythm: Regular Rhythm        Mental Status:  Mental Status Findings:  Cooperative, Alert and Oriented         Anesthesia Plan  Type of Anesthesia, risks & benefits discussed:  Anesthesia Type:  general  Patient's Preference:   Intra-op Monitoring Plan: standard ASA monitors  Intra-op Monitoring Plan Comments:   Post Op Pain Control Plan:   Post Op Pain Control Plan Comments:   Induction:   IV  Beta Blocker:  Patient is not currently on a Beta-Blocker (No further documentation required).       Informed Consent: Patient understands risks and agrees with Anesthesia plan.  Questions answered. Anesthesia consent signed with patient.  ASA Score: 4     Day of Surgery Review of History & Physical:            Ready For Surgery From Anesthesia Perspective.

## 2017-06-08 NOTE — DISCHARGE INSTRUCTIONS
Prostate Biopsy    Cancer occurs when abnormal cells form a tumor. A tumor is a lump of cells that grow uncontrolled. A core needle biopsy will be done if your health care provider thinks you have prostate cancer. A thin needle is used to remove small samples of prostate tissue. These samples are checked for cancer.  Taking tissue samples  A biopsy takes about 15 to 20 minutes. You may be given an enema or suppository before the biopsy to clear the bowels. Antibiotics are given at least 1 hour prior to the biopsy. During the procedure:  · You will be given antibiotics to stop infection.  · You may be given a sedative, local pain killer, or pain medicine.  · A small probe is put into the rectum as you lie on your side. A picture of your prostate can then be seen on a monitor. This is called a transrectal ultrasound (TRUS).  · Your health care provider will use the TRUS picture as a guide. He or she will use a thin needle to remove tiny tissue samples from some sites in the prostate.  · These tissue samples are sent to the pathology department. They are looked at under a microscope so a diagnosis can be made.   Risks and complications of core needle biopsy  · Infection  · Blood in urine, stool, or semen  · Pain   Home care  You may have had the biopsy done through your rectum, your urethra, or through the skin between your scrotum and rectum. Your health care provider will tell you what to do after the biopsy. These instructions are based on your health condition, the type of biopsy, and your providers practices.  · Your provider may give you pain medicine or acetaminophen for discomfort or pain. Follow your providers instructions for taking these medicines. Dont take aspirin or ibuprofen after the procedure. If you have a chronic liver or kidney disease, talk with your provider before taking these medicines. Also talk with your provider if youve had a stomach ulcer or GI bleeding.   · You may need to take  antibiotic medicine for 1 to 2 days. This will help prevent an infection. Signs of an infection include chills, pain, or fever.  · You may be told to drink 8 ounces of water every 30 minutes for 2 hours. This will help ease any discomfort. You can also take a warm bath or put a warm, damp washcloth over your urethra to help ease the pain.  · You may see minor bleeding after the procedure. This is normal and usually needs no treatment. You may see blood in your urine or semen (rust color). You may also have light bleeding from your rectum if you have hemorrhoids.  · Your provider will tell you when you can go back to your normal activities. This includes sex, exercise, and straining physically. Discuss these with your provider.  When to seek medical advice  Call your health care provider right away if any of these occur:  · You arent able to urinate, or see that you have less flow of urine  · Chills and fever of 100.4°F (38°C)    · Severe pain  · Signs of your infection getting worse. These include worsening pain, pain in your side under the rib cage or in the low back, or foul-smelling urine.  · Blood clots or bright red blood in your stool or urine  · You feel confused or very tired  Date Last Reviewed: 9/12/2014  © 8978-0068 [employer]. [address] [place] [address], [place] [address]. All rights reserved. This information is not intended as a substitute for professional medical care. Always follow your healthcare professional's instructions.

## 2017-06-08 NOTE — PROGRESS NOTES
Spoke with Dr. Arrington, and he states since patient is on hemodialysis he does not have to urinate prior to discharge

## 2017-06-08 NOTE — OP NOTE
[facility]  Operative Note    Date: 06/08/2017    Pre-Op Diagnosis:   Prostate lesion on ultrasound     Organ transplant candidate  Patient Active Problem List    Diagnosis Date Noted    Cyst of prostate 06/08/2017    Type 2 diabetes mellitus with both eyes affected by proliferative retinopathy and macular edema, with long-term current use of insulin 04/28/2017    ESRD (end stage renal disease) on dialysis 12/14/2016    Hyperlipidemia 08/04/2016    Essential hypertension 07/26/2016    Diabetes mellitus with ESRD (end-stage renal disease) 07/26/2016    Anemia of chronic disease 07/26/2016    Moderate protein malnutrition 07/26/2016    Tobacco abuse 07/26/2016         Post-Op Diagnosis: same    Procedure(s) Performed:   1.  Prostate biopsy  2.  Transrectal US  3.  Cytoscopy     Specimen(s): 4 core biopsies    Staff Surgeon: Michael Mcclure MD    Assistant Surgeon: Willam Arrington MD    Anesthesia: Monitored Local Anesthesia with Sedation    Indications: Vega Brownlee is a 56 y.o. male with ESRD on dialysis with midline prostatic cyst diagnosed on retroperitoneal US during transplant evaluation .      Findings:     - prostate 33 cc   - cyst midline, arises just distal to seminal vesicles   - 4 biopsies taken of cyst; first biopsy went directly through cyst    Estimated Blood Loss: min    Drains: none    Procedure in detail:  We confirmed that the patient took their pre-preocedure antibiotic as prescribed - cipro 500 mg po bid - as well as their fleet's enema.  After informed consent was obtained, the patient was transferred to the cystoscopy suite and placed in the supine position.  Anesthesia was administered.      Flexible cystoscopy was performed.  The entire urethra was visualized, and this demonstrated anterior displacement of the veru montanum.  There were no other abnormalities.  The ureteral orifices were in normal anatomic position and noted to efflux clear yellow urine.  There  were no bladder stones, trabeculations, or lesions concerning for malignancy.     The US probe was introduced into the rectum and the prostate was identified on ultrasound. Total prostate volume was  33 g.  Under ultrasound guidance, 4 biopsies were taken from the cyst in the midline of the prostate. These specimens were sent to pathology for analysis. The ultra sound probe was removed and the procedure was terminated.      The patient toelrated the procedure well and was transferred to there ecovery room in stable condition.      Disposition:  The patient will follow up with Dr. Mcclure in 2 weeks.      Willam Arrington MD    As the attending surgeon, Dr. Mcclure was present and performed all key aspects of the operation.

## 2017-06-08 NOTE — INTERVAL H&P NOTE
The patient has been examined and the H&P has been reviewed:    I concur with the findings and no changes have occurred since H&P was written.   To OR today for cystoscopy and TRUS biopsy     Anesthesia/Surgery risks, benefits and alternative options discussed and understood by patient/family.          Active Hospital Problems    Diagnosis  POA    Elevated PSA [R97.20]  Yes    Prostatic mass [N42.9]  Yes      Resolved Hospital Problems    Diagnosis Date Resolved POA   No resolved problems to display.

## 2017-06-08 NOTE — ANESTHESIA POSTPROCEDURE EVALUATION
"Anesthesia Post Evaluation    Patient: Vega Brownlee    Procedure(s) Performed: Procedure(s) (LRB):  BIOPSY-PROSTATE (N/A)  CYSTOSCOPY (N/A)  TRANSRECTAL ULTRASOUND (N/A)    Final Anesthesia Type: general  Patient location during evaluation: PACU  Patient participation: Yes- Able to Participate  Level of consciousness: awake and alert  Post-procedure vital signs: reviewed and stable  Pain management: adequate  Airway patency: patent  PONV status at discharge: No PONV  Anesthetic complications: no      Cardiovascular status: blood pressure returned to baseline and stable  Respiratory status: unassisted and nasal cannula  Hydration status: euvolemic  Follow-up not needed.        Visit Vitals  BP (!) 153/70 (BP Location: Left arm, Patient Position: Lying, BP Method: Automatic)   Pulse 61   Temp 36.6 °C (97.9 °F) (Oral)   Resp 18   Ht 5' 8" (1.727 m)   Wt 69.9 kg (154 lb)   SpO2 98%   BMI 23.42 kg/m²       Pain/Iza Score: Pain Assessment Performed: Yes (6/8/2017  9:19 AM)  Presence of Pain: denies (6/8/2017  9:19 AM)      "

## 2017-06-08 NOTE — TRANSFER OF CARE
"Anesthesia Transfer of Care Note    Patient: Vega Brownlee    Procedure(s) Performed: Procedure(s) (LRB):  BIOPSY-PROSTATE (N/A)  CYSTOSCOPY (N/A)  TRANSRECTAL ULTRASOUND (N/A)    Patient location: [facility]    Anesthesia Type: general    Transport from OR: Transported from OR on 6-10 L/min O2 by face mask with adequate spontaneous ventilation    Post pain: adequate analgesia    Post assessment: no apparent anesthetic complications    Post vital signs: stable    Level of consciousness: sedated    Nausea/Vomiting: no nausea/vomiting    Complications: none    Transfer of care protocol was followed      Last vitals:   Visit Vitals  BP (!) 153/70 (BP Location: Left arm, Patient Position: Lying, BP Method: Automatic)   Pulse 61   Temp 36.6 °C (97.9 °F) (Oral)   Resp 18   Ht 5' 8" (1.727 m)   Wt 69.9 kg (154 lb)   SpO2 98%   BMI 23.42 kg/m²     "

## 2017-06-09 ENCOUNTER — TELEPHONE (OUTPATIENT)
Dept: FAMILY MEDICINE | Facility: CLINIC | Age: 57
End: 2017-06-09

## 2017-06-09 ENCOUNTER — APPOINTMENT (OUTPATIENT)
Dept: RADIOLOGY | Facility: HOSPITAL | Age: 57
End: 2017-06-09
Attending: INTERNAL MEDICINE
Payer: MEDICARE

## 2017-06-09 ENCOUNTER — OFFICE VISIT (OUTPATIENT)
Dept: FAMILY MEDICINE | Facility: CLINIC | Age: 57
End: 2017-06-09
Payer: MEDICARE

## 2017-06-09 VITALS
BODY MASS INDEX: 22.62 KG/M2 | HEART RATE: 62 BPM | OXYGEN SATURATION: 98 % | RESPIRATION RATE: 17 BRPM | SYSTOLIC BLOOD PRESSURE: 120 MMHG | DIASTOLIC BLOOD PRESSURE: 60 MMHG | HEIGHT: 68 IN | TEMPERATURE: 98 F | WEIGHT: 149.25 LBS

## 2017-06-09 DIAGNOSIS — E11.22 DIABETES MELLITUS WITH ESRD (END-STAGE RENAL DISEASE): Chronic | ICD-10-CM

## 2017-06-09 DIAGNOSIS — N18.6 DIABETES MELLITUS WITH ESRD (END-STAGE RENAL DISEASE): Chronic | ICD-10-CM

## 2017-06-09 DIAGNOSIS — Z01.818 PRE-OPERATIVE EXAM: ICD-10-CM

## 2017-06-09 DIAGNOSIS — Z01.818 PRE-OPERATIVE EXAM: Primary | ICD-10-CM

## 2017-06-09 DIAGNOSIS — H25.11 NUCLEAR SCLEROSIS, RIGHT: ICD-10-CM

## 2017-06-09 PROCEDURE — 99214 OFFICE O/P EST MOD 30 MIN: CPT | Mod: S$PBB,,, | Performed by: INTERNAL MEDICINE

## 2017-06-09 PROCEDURE — 99213 OFFICE O/P EST LOW 20 MIN: CPT | Mod: PBBFAC,25,PN | Performed by: INTERNAL MEDICINE

## 2017-06-09 PROCEDURE — 71020 XR CHEST PA AND LATERAL: CPT | Mod: 26,NTX,, | Performed by: RADIOLOGY

## 2017-06-09 PROCEDURE — 3045F PR MOST RECENT HEMOGLOBIN A1C LEVEL 7.0-9.0%: CPT | Mod: ,,, | Performed by: INTERNAL MEDICINE

## 2017-06-09 PROCEDURE — 99999 PR PBB SHADOW E&M-EST. PATIENT-LVL III: CPT | Mod: PBBFAC,,, | Performed by: INTERNAL MEDICINE

## 2017-06-09 PROCEDURE — 71020 XR CHEST PA AND LATERAL: CPT | Mod: TC,PN,TXP

## 2017-06-09 NOTE — PROGRESS NOTES
"Subjective:       Patient ID: Vega Brownlee is a 56 y.o. male.    Chief Complaint: Pre-op Exam (eye surgery)    Pre operative clearance cataract OD (Dr. Hankins)    HPI: 57 y/o w/ DM ESRD on HD presents for pre operative clearance for right nuclear sclerosis. Feels well compliant with HD, recent placement of right AVF still getting HD through right chest. Recent cardiac eval with negative nuclear stress test. Denies dyspnea on exertion, LE swelling. Blood glucose reasonable well controlle dno hypoglycemic symtpoms. ophthalologist request CXR as part of pre operative clearance      Review of Systems   Constitutional: Negative for activity change, fever and unexpected weight change.   HENT: Negative for congestion, rhinorrhea, sore throat and trouble swallowing.    Eyes: Negative for photophobia and redness.   Respiratory: Negative for cough, chest tightness, shortness of breath and wheezing.    Cardiovascular: Negative for chest pain, palpitations and leg swelling.   Gastrointestinal: Negative for abdominal pain, blood in stool, constipation, diarrhea, nausea and vomiting.   Endocrine: Negative for cold intolerance, heat intolerance and polyuria.   Genitourinary: Negative for decreased urine volume, difficulty urinating, dysuria and urgency.   Musculoskeletal: Negative for arthralgias and back pain.   Skin: Negative for rash.   Neurological: Negative for dizziness, syncope, weakness and headaches.   Psychiatric/Behavioral: Negative for dysphoric mood, sleep disturbance and suicidal ideas.       Objective:     Vitals:    06/09/17 1054   BP: 120/60   BP Location: Left arm   Patient Position: Sitting   BP Method: Manual   Pulse: 62   Resp: 17   Temp: 98.4 °F (36.9 °C)   TempSrc: Oral   SpO2: 98%   Weight: 67.7 kg (149 lb 4 oz)   Height: 5' 8" (1.727 m)          Physical Exam   Constitutional: He is oriented to person, place, and time. He appears well-developed and well-nourished.   HENT:   Head: Normocephalic and " atraumatic.   Eyes: Conjunctivae are normal. Pupils are equal, round, and reactive to light.   Neck: Normal range of motion.   Cardiovascular: Normal rate and regular rhythm.  Exam reveals no gallop and no friction rub.    No murmur heard.  Right chest catheter with dressing in place, c/d/i no tenderness no LE edema   Pulmonary/Chest: Effort normal and breath sounds normal. He has no wheezes. He has no rales.   Abdominal: Soft. Bowel sounds are normal. There is no tenderness. There is no rebound and no guarding.   Musculoskeletal: Normal range of motion. He exhibits no edema or tenderness.   Neurological: He is alert and oriented to person, place, and time. No cranial nerve deficit.   Skin: Skin is warm and dry.   Psychiatric: He has a normal mood and affect.       Assessment:       1. Pre-operative exam    2. Nuclear sclerosis, right    3. Diabetes mellitus with ESRD (end-stage renal disease)        Plan:        1. Medically optimized for low risk surgery no indication for further CV testing. Will fax clearance note recent EKG, CXR today    2. Per Dr. Hankins    3. Well controlled continue current insulin

## 2017-07-03 ENCOUNTER — OFFICE VISIT (OUTPATIENT)
Dept: UROLOGY | Facility: CLINIC | Age: 57
End: 2017-07-03
Payer: MEDICARE

## 2017-07-03 VITALS
SYSTOLIC BLOOD PRESSURE: 173 MMHG | HEART RATE: 66 BPM | HEIGHT: 68 IN | DIASTOLIC BLOOD PRESSURE: 74 MMHG | WEIGHT: 152.13 LBS | BODY MASS INDEX: 23.05 KG/M2

## 2017-07-03 DIAGNOSIS — N42.89 PROSTATIC MASS: Primary | ICD-10-CM

## 2017-07-03 DIAGNOSIS — N18.6 ESRD (END STAGE RENAL DISEASE) ON DIALYSIS: ICD-10-CM

## 2017-07-03 DIAGNOSIS — Z99.2 ESRD (END STAGE RENAL DISEASE) ON DIALYSIS: ICD-10-CM

## 2017-07-03 DIAGNOSIS — Z76.82 ORGAN TRANSPLANT CANDIDATE: ICD-10-CM

## 2017-07-03 PROCEDURE — 99213 OFFICE O/P EST LOW 20 MIN: CPT | Mod: PBBFAC,TXP | Performed by: UROLOGY

## 2017-07-03 PROCEDURE — 99999 PR PBB SHADOW E&M-EST. PATIENT-LVL III: CPT | Mod: PBBFAC,TXP,, | Performed by: UROLOGY

## 2017-07-03 PROCEDURE — 99213 OFFICE O/P EST LOW 20 MIN: CPT | Mod: S$PBB,TXP,, | Performed by: UROLOGY

## 2017-07-03 RX ORDER — DUREZOL 0.5 MG/ML
EMULSION OPHTHALMIC
Status: ON HOLD | COMMUNITY
Start: 2017-06-12 | End: 2022-01-01 | Stop reason: HOSPADM

## 2017-07-03 RX ORDER — HYDROCODONE BITARTRATE AND ACETAMINOPHEN 10; 325 MG/1; MG/1
TABLET ORAL
COMMUNITY
Start: 2017-06-12 | End: 2017-07-03

## 2017-07-03 NOTE — PROGRESS NOTES
Subjective:       Patient ID: Vega Brownlee is a 56 y.o. male.    Chief Complaint: No chief complaint on file.      HPI: Vega Brownlee is a 56 y.o. White male who returns today in follow-up for evaluation and management of a hypoechoic prostatic mass seen on a retroperitoneal ultrasound s/p prostate needle biopsy.    The patient has CKD stage V and is on hemodialysis due to diabetes. The patient has been seen by the transplant service for evaluation for a kidney transplant. On a screening retroperitoneal ultrasound, a hypoechoic mass was seen in the prostate.    The patient now makes very little urine, but he denies gross hematuria. Prior to dialysis, he also denies any urinary symptoms.    This mass was simply found incidentally.    The patient denies a family history of prostate cancer. His PSA is less than 1.0. Furthermore, he denies any urologic history of any sort.    On June 8, 2017, he underwent a biopsy of this mass to exclude any neoplastic process. He has done well since the procedure with no complaints.    The pathology from the biopsy is benign.    He returns today to review the results of the procedure.    Review of patient's allergies indicates:  No Known Allergies    Current Outpatient Prescriptions   Medication Sig Dispense Refill    aspirin (ECOTRIN) 81 MG EC tablet Take 1 tablet (81 mg total) by mouth once daily.  0    atorvastatin (LIPITOR) 40 MG tablet Take 1 tablet (40 mg total) by mouth every evening. 90 tablet 3    blood sugar diagnostic Strp 1 each by Misc.(Non-Drug; Combo Route) route as directed. 100 each 0    blood-glucose meter (FREESTYLE SYSTEM KIT) kit Use as instructed 1 each 0    calcium carbonate (TUMS ULTRA) 1,177 mg Chew Take 2 tablets by mouth 3 (three) times daily with meals.       DUREZOL 0.05 % Drop ophthalmic solution       fluticasone (FLONASE) 50 mcg/actuation nasal spray 1 spray by Each Nare route once daily. 16 g 2    FOLIC ACID/VIT BCOMP,C (JAM-YAQUELIN ORAL) Take 1  tablet by mouth once daily.      hydrALAZINE (APRESOLINE) 50 MG tablet TAKE ONE Tablet BY MOUTH EVERY TWELVE HOURS 60 tablet 5    insulin glargine (LANTUS SOLOSTAR) 100 unit/mL (3 mL) InPn pen Inject 7 Units into the skin every evening. 3 mL 2    isosorbide mononitrate (IMDUR) 60 MG 24 hr tablet TAKE ONE Tablet BY MOUTH EVERY DAY 30 tablet 5    lancets (ACCU-CHEK SOFTCLIX LANCETS) Misc 1 application by Misc.(Non-Drug; Combo Route) route 4 (four) times daily before meals and nightly. 120 each 0    metoprolol tartrate (LOPRESSOR) 25 MG tablet TAKE ONE TABLET BY MOUTH TWICE DAILY 60 tablet 5    RENVELA 800 mg Tab Take 800 mg by mouth 3 (three) times daily with meals.        No current facility-administered medications for this visit.        Past Medical History:   Diagnosis Date    Anemia     Cataract     CHF (congestive heart failure)     Diabetes mellitus     Diabetes mellitus, type 2     Disorder of kidney and ureter     Hyperlipidemia     Hypertension        Past Surgical History:   Procedure Laterality Date    arm fracture Left     FINGER AMPUTATION Left     left index finger    LUNG SURGERY      PORTACATH PLACEMENT  08/2016       Family History   Problem Relation Age of Onset    Diabetes Mother     Memory loss Mother     No Known Problems Father     No Known Problems Sister     No Known Problems Brother     Arthritis Sister     No Known Problems Sister        Review of Systems    Review of Systems   Constitutional: Negative for fever, chills, activity change, appetite change and unexpected weight change.   HENT: Negative for congestion, nosebleeds, sneezing, sore throat and trouble swallowing.    Eyes: Negative for pain, discharge, redness and visual disturbance.   Respiratory: Negative for cough, choking, chest tightness and shortness of breath.    Cardiovascular: Negative for chest pain, palpitations and leg swelling.   Gastrointestinal: Negative for nausea, vomiting, abdominal pain,  diarrhea, blood in stool, abdominal distention and anal bleeding.  Genitourinary: As documented per HPI   Endocrine: Negative for cold intolerance, heat intolerance, polydipsia, polyphagia and polyuria.   Musculoskeletal: Negative for myalgias, gait problem, neck pain and neck stiffness.   Skin: Negative for color change, pallor, rash and wound.   Allergic/Immunologic: Negative for immunocompromised state.   Neurological: Negative for seizures, facial asymmetry, speech difficulty, weakness and light-headedness.   Hematological: Negative for adenopathy. Does not bruise/bleed easily.   Psychiatric/Behavioral: Negative for hallucinations, behavioral problems, self-injury and agitation. The patient is not hyperactive.      All other systems were reviewed and were negative.    Objective:     Vitals:    07/03/17 0935   BP: (!) 173/74   Pulse: 66     Physical Exam   Vitals reviewed.  Constitutional: He is oriented to person, place, and time. He appears well-developed and well-nourished. No distress.   HENT:   Head: Normocephalic and atraumatic.   Right Ear: External ear normal.   Left Ear: External ear normal.   Nose: Nose normal.   Eyes: EOM are normal. Pupils are equal, round, and reactive to light. Right eye exhibits no discharge. Left eye exhibits no discharge.   Neck: Normal range of motion. Neck supple. No tracheal deviation present. No thyroid enlargement or tenderness.   Cardiovascular: Regular rhythm and intact distal pulses. No signs of peripheral edema.   Pulmonary/Chest: Effort normal and breath sounds normal. No stridor. No respiratory distress. He has no wheezes.   Abdominal: Soft. Bowel sounds are normal. He exhibits no distension. There is no tenderness. Hernia confirmed negative in the right inguinal area and confirmed negative in the left inguinal area. No hepatic or splenic enlargement or tenderness.  Genitourinary: Urethral meatus in normal anatomic position without lesions. No evidence of hypospadias.  Penis circumcised without phimosis, lesions, or masses. Right testis and epididymis show no mass, no swelling and no tenderness. Right testis is descended. Left testis and epididymis show no mass, no swelling and no tenderness. Left testis is descended. No scrotal lesions or rashes.  PAULIE: Normal sphincter tone, seminal vesicles non-palpable, no anal or perineal masses or rashes.  Musculoskeletal: Normal range of motion. He exhibits no edema.   Neurological: He is alert and oriented to person, place, and time. He exhibits normal muscle tone. Coordination normal.   Skin: Skin is warm. No rash noted.   Lymphatic: No palpable lymph nodes.  Psychiatric: He has a normal mood and affect. His behavior is normal. Judgment and thought content normal.     Lab Results   Component Value Date    PSA 0.86 04/26/2017     Lab Results   Component Value Date    CREATININE 7.8 (H) 06/09/2017     Lab Results   Component Value Date    EGFRNONAA 7 (A) 06/09/2017     Lab Results   Component Value Date    ESTGFRAFRICA 8 (A) 06/09/2017     I have personally reviewed all the patient's relevant  imaging.    Retroperitoneal ultrasound (4/26/17): Incidental note is made of a hypoechoic mass within the prostate measuring 1.5 x 1.1 x 1.1 cm. Sonographic features are nonspecific and further evaluation is recommended with correlation to PSA values and prostate MRI.    Assessment:       1. Prostatic mass    2. ESRD (end stage renal disease) on dialysis    3. Organ transplant candidate        Plan:     Diagnoses and all orders for this visit:    Prostatic mass    ESRD (end stage renal disease) on dialysis    Organ transplant candidate    The patient has an incidental prostatic mass noted on recent retroperitoneal ultrasound with a normal PSA. His biopsy results are benign, which is great.    From a urologic standpoint at this point in time, there is no contraindication to transplant listing and/or actual organ transplantation.    The patient can  return to see me as needed.    I answered all his and his family's questions.    I encouraged him or any of his family members to call or email me with questions and/or concerns.    I spent 15 minutes with the patient of which more than half was spent in coordinating the patient's care as well as in direct consultation with the patient in regards to our treatment and plan.

## 2017-07-03 NOTE — PATIENT INSTRUCTIONS
Benign Prostatic Hyperplasia    The prostate is a small gland that makes semen. As you age, the prostate grows. If it becomes too big, it may cause problems with urination. This condition is called benign prostatic hyperplasia (BPH).  Symptoms of BPH  BPH is common in men over age 60. Thats because the prostate grows bigger during a mans life. As it grows, it presses against the urethra. The urethra carries urine out of your body from your bladder through your penis. Your bladder may also weaken as you age. It may not empty completely after you urinate.  Men with BPH may have these symptoms:  · The urge to frequently urinate, especially at night  · Leaking or dribbling of urine  · A weak stream of urine  · Not able to urinate, or having trouble starting to urinate  Diagnosing BPH  BPH can hurt your bladder and kidneys. It can also lead to bladder stones and urinary tract infections. If you think you may have BPH, talk with your healthcare provider. Early treatment can prevent problems.  Several tests can diagnose BPH. These include:  · Digital rectal exam. During this procedure, your provider puts a gloved, greased (lubricated) finger into your rectum to check the size of your prostate.  · Imaging tests. X-rays and other imaging tests can find problems in your kidneys or bladder.  · Cystoscopy. This test uses a flexible tube with a camera (scope) to look inside your urinary tract.  · Urine flow study. This test uses a special device to see how fast urine leaves your body.  Treating BPH  If you have mild symptoms, you may not need treatment. You may be able to control your BPH with lifestyle changes. Some men feel better if they limit or avoid alcohol and caffeinated drinks like coffee. Not drinking too many fluids at night can also help. Increasing your physical activity may ease symptoms, too.  Kegel exercises may also help. They strengthen the pelvic muscle to prevent urine from leaking. While urinating,  contract your pelvic muscle to stop or slow down the flow of urine. Hold for 10 seconds. Repeat at least 5 times. Do the exercise 3 to 5 times each day.  Certain medicines can worsen BPH symptoms. These include medicines for congestion, allergies, and depression. Medicines that increase your urine flow (diuretics) can also worsen BPH symptoms. If you take any of these, talk with your provider. You may need to take another medicine or change how much you take.  BPH symptoms often get worse as the prostate grows. So at some point you may need treatment. Your provider may prescribe medicine to shrink the prostate or stop its growth. Other treatments can make the urethra wider to let urine to flow more easily. There are also some minimally invasive techniques to remove prostate tissue.  If your BPH is severe, your health care provider may recommend surgery. Surgery takes out enlarged parts of the prostate gland. Your health care provider can figure out the best option for you based on your age, overall health, and other factors.  BPH and Prostate Cancer  BPH and prostate cancer share some symptoms. Thats why its important to talk with your provider about your symptoms. Men with BPH arent more likely to develop this cancer. But they may have higher levels of the prostate-specific antigen (PSA). A higher PSA level may also be a sign of prostate cancer. Certain tests help distinguish BPH from prostate cancer. They include prostate ultrasound and biopsy.  Date Last Reviewed: 5/31/2015 © 2000-2016 [employer] [address] [place] [address], [place] [address]. All rights reserved. This information is not intended as a substitute for professional medical care. Always follow your healthcare professional's instructions.

## 2017-07-06 ENCOUNTER — TELEPHONE (OUTPATIENT)
Dept: FAMILY MEDICINE | Facility: CLINIC | Age: 57
End: 2017-07-06

## 2017-07-06 NOTE — TELEPHONE ENCOUNTER
----- Message from Sun Reid sent at 7/6/2017  1:23 PM CDT -----  Contact: Jean Mcallister with [facility] pre-admit says she need patient's x-ray results faxed to her  Fax [phone]

## 2017-07-18 ENCOUNTER — TELEPHONE (OUTPATIENT)
Dept: TRANSPLANT | Facility: CLINIC | Age: 57
End: 2017-07-18

## 2017-07-18 DIAGNOSIS — N18.6 DIABETES MELLITUS WITH ESRD (END-STAGE RENAL DISEASE): Primary | Chronic | ICD-10-CM

## 2017-07-18 DIAGNOSIS — E11.22 DIABETES MELLITUS WITH ESRD (END-STAGE RENAL DISEASE): Primary | Chronic | ICD-10-CM

## 2017-07-18 DIAGNOSIS — Z76.82 ORGAN TRANSPLANT CANDIDATE: ICD-10-CM

## 2017-07-18 NOTE — TELEPHONE ENCOUNTER
----- Message from Meghana Townsend sent at 7/18/2017  9:04 AM CDT -----  Please put an order in for a colonoscopy here  SH

## 2017-07-21 ENCOUNTER — COMMITTEE REVIEW (OUTPATIENT)
Dept: TRANSPLANT | Facility: CLINIC | Age: 57
End: 2017-07-21

## 2017-07-21 NOTE — LETTER
July 21, 2017    Alan Lowery  [address] [place] [address] [address]  [place] [address]       Dear Dr. Alan Lowery    Patient: Vega Brownlee   MR Number: [id number]   YOB: 1960     Your patient, Vega Brownlee, was recently discussed at the [facility] Selection Committee.  I am happy to inform you that Vega has been approved for transplantation pending Colonoscopy.  He has met selection criteria for a kidney transplant related to ESRD secondary to primary diagnosis of Diabetes Mellitus - Type II. Your patient will be placed on the cadaveric wait list pending final financial approval from insurance company.     We appreciate your confidence in allowing us to participate in your patients care.      If you have any questions or concerns, please do not hesitate to contact me.    Sincerely,      Hailey Moctezuma MD  Medical Director, Kidney & Kidney/Pancreas Transplantation    Cc:  Vega Brownlee/[facility]

## 2017-07-21 NOTE — COMMITTEE REVIEW
Native Organ Dx: Diabetes Mellitus - Type II      SELECTION COMMITTEE NOTE    Vega Brownlee was presented at selection committee on 7/21/2017.  Patient met selection criteria for kidney transplant related to ESRD due to  Diabetes Mellitus - Type II.  No absolute contraindications to transplant at this time.  Patient will be placed on the cadaveric wait list pending colonoscopy and final financial approval from insurance company.  Patient will return to clinic for routine appointment in 1 year(s). Patient does meet criteria for High KDPI kidney offer.    Patient informed of committee's decision. Endoscopy number given to patient to call to set up. Patient verbalized understanding.     Note written by Meghana Townsend RN    ===============================================  I was present at the meeting and attest to the decision of the committee

## 2017-07-25 ENCOUNTER — TELEPHONE (OUTPATIENT)
Dept: TRANSPLANT | Facility: CLINIC | Age: 57
End: 2017-07-25

## 2017-08-07 DIAGNOSIS — Z12.11 SPECIAL SCREENING FOR MALIGNANT NEOPLASMS, COLON: Primary | ICD-10-CM

## 2017-08-07 DIAGNOSIS — Z99.2 ESRD ON DIALYSIS: ICD-10-CM

## 2017-08-07 DIAGNOSIS — N18.6 ESRD ON DIALYSIS: ICD-10-CM

## 2017-08-07 RX ORDER — POLYETHYLENE GLYCOL 3350, SODIUM SULFATE ANHYDROUS, SODIUM BICARBONATE, SODIUM CHLORIDE, POTASSIUM CHLORIDE 236; 22.74; 6.74; 5.86; 2.97 G/4L; G/4L; G/4L; G/4L; G/4L
4 POWDER, FOR SOLUTION ORAL ONCE
Qty: 4000 ML | Refills: 0 | Status: SHIPPED | OUTPATIENT
Start: 2017-08-07 | End: 2017-08-07

## 2017-08-15 DIAGNOSIS — Z12.11 SPECIAL SCREENING FOR MALIGNANT NEOPLASMS, COLON: Primary | ICD-10-CM

## 2017-08-15 RX ORDER — SODIUM, POTASSIUM,MAG SULFATES 17.5-3.13G
SOLUTION, RECONSTITUTED, ORAL ORAL
Qty: 1 BOTTLE | Refills: 0 | Status: ON HOLD | OUTPATIENT
Start: 2017-08-15 | End: 2017-08-16 | Stop reason: ALTCHOICE

## 2017-08-16 ENCOUNTER — ANESTHESIA (OUTPATIENT)
Dept: ENDOSCOPY | Facility: HOSPITAL | Age: 57
End: 2017-08-16
Payer: MEDICARE

## 2017-08-16 ENCOUNTER — LAB VISIT (OUTPATIENT)
Dept: LAB | Facility: HOSPITAL | Age: 57
End: 2017-08-16
Attending: COLON & RECTAL SURGERY
Payer: MEDICARE

## 2017-08-16 ENCOUNTER — ANESTHESIA EVENT (OUTPATIENT)
Dept: ENDOSCOPY | Facility: HOSPITAL | Age: 57
End: 2017-08-16
Payer: MEDICARE

## 2017-08-16 ENCOUNTER — HOSPITAL ENCOUNTER (OUTPATIENT)
Facility: HOSPITAL | Age: 57
Discharge: HOME | End: 2017-08-16
Attending: COLON & RECTAL SURGERY | Admitting: COLON & RECTAL SURGERY
Payer: MEDICARE

## 2017-08-16 VITALS
DIASTOLIC BLOOD PRESSURE: 66 MMHG | BODY MASS INDEX: 22.22 KG/M2 | TEMPERATURE: 98 F | OXYGEN SATURATION: 100 % | RESPIRATION RATE: 20 BRPM | WEIGHT: 146.63 LBS | SYSTOLIC BLOOD PRESSURE: 153 MMHG | HEART RATE: 63 BPM | HEIGHT: 68 IN

## 2017-08-16 DIAGNOSIS — Z12.11 SCREENING FOR COLON CANCER: ICD-10-CM

## 2017-08-16 DIAGNOSIS — N18.6 ESRD ON DIALYSIS: ICD-10-CM

## 2017-08-16 DIAGNOSIS — Z99.2 ESRD ON DIALYSIS: ICD-10-CM

## 2017-08-16 LAB
POCT GLUCOSE: 93 MG/DL (ref 70–110)
POTASSIUM SERPL-SCNC: 4.9 MMOL/L

## 2017-08-16 PROCEDURE — 27201012 HC FORCEPS, HOT/COLD, DISP: Mod: TXP | Performed by: COLON & RECTAL SURGERY

## 2017-08-16 PROCEDURE — 84132 ASSAY OF SERUM POTASSIUM: CPT | Mod: TXP

## 2017-08-16 PROCEDURE — 37000009 HC ANESTHESIA EA ADD 15 MINS: Mod: TXP | Performed by: COLON & RECTAL SURGERY

## 2017-08-16 PROCEDURE — 88305 TISSUE EXAM BY PATHOLOGIST: CPT | Mod: 26,TXP,, | Performed by: PATHOLOGY

## 2017-08-16 PROCEDURE — 88305 TISSUE EXAM BY PATHOLOGIST: CPT | Mod: TXP | Performed by: PATHOLOGY

## 2017-08-16 PROCEDURE — 63600175 PHARM REV CODE 636 W HCPCS: Mod: TXP | Performed by: NURSE ANESTHETIST, CERTIFIED REGISTERED

## 2017-08-16 PROCEDURE — D9220A PRA ANESTHESIA: Mod: 33,TXP,, | Performed by: ANESTHESIOLOGY

## 2017-08-16 PROCEDURE — 25000003 PHARM REV CODE 250: Mod: TXP | Performed by: NURSE PRACTITIONER

## 2017-08-16 PROCEDURE — 45380 COLONOSCOPY AND BIOPSY: CPT | Mod: TXP | Performed by: COLON & RECTAL SURGERY

## 2017-08-16 PROCEDURE — 36415 COLL VENOUS BLD VENIPUNCTURE: CPT | Mod: TXP

## 2017-08-16 PROCEDURE — 45380 COLONOSCOPY AND BIOPSY: CPT | Mod: TXP,,, | Performed by: COLON & RECTAL SURGERY

## 2017-08-16 PROCEDURE — 37000008 HC ANESTHESIA 1ST 15 MINUTES: Mod: TXP | Performed by: COLON & RECTAL SURGERY

## 2017-08-16 PROCEDURE — C1773 RET DEV, INSERTABLE: HCPCS | Mod: TXP | Performed by: COLON & RECTAL SURGERY

## 2017-08-16 RX ORDER — PROPOFOL 10 MG/ML
VIAL (ML) INTRAVENOUS CONTINUOUS PRN
Status: DISCONTINUED | OUTPATIENT
Start: 2017-08-16 | End: 2017-08-16

## 2017-08-16 RX ORDER — LIDOCAINE HCL/PF 100 MG/5ML
SYRINGE (ML) INTRAVENOUS
Status: DISCONTINUED | OUTPATIENT
Start: 2017-08-16 | End: 2017-08-16

## 2017-08-16 RX ORDER — PROPOFOL 10 MG/ML
VIAL (ML) INTRAVENOUS
Status: DISCONTINUED | OUTPATIENT
Start: 2017-08-16 | End: 2017-08-16

## 2017-08-16 RX ORDER — SODIUM CHLORIDE 9 MG/ML
INJECTION, SOLUTION INTRAVENOUS CONTINUOUS
Status: DISCONTINUED | OUTPATIENT
Start: 2017-08-16 | End: 2017-08-16 | Stop reason: HOSPADM

## 2017-08-16 RX ADMIN — LIDOCAINE HYDROCHLORIDE 60 MG: 20 INJECTION, SOLUTION INTRAVENOUS at 12:08

## 2017-08-16 RX ADMIN — SODIUM CHLORIDE: 0.9 INJECTION, SOLUTION INTRAVENOUS at 11:08

## 2017-08-16 RX ADMIN — PROPOFOL 200 MCG/KG/MIN: 10 INJECTION, EMULSION INTRAVENOUS at 12:08

## 2017-08-16 RX ADMIN — PROPOFOL 80 MG: 10 INJECTION, EMULSION INTRAVENOUS at 12:08

## 2017-08-16 NOTE — H&P
Endoscopy H&P    Procedure : Colonoscopy      asymptomatic screening exam      Past Medical History:   Diagnosis Date    Anemia     Cataract     CHF (congestive heart failure)     Diabetes mellitus     Diabetes mellitus, type 2     Disorder of kidney and ureter     Hyperlipidemia     Hypertension        Family History   Problem Relation Age of Onset    Diabetes Mother     Memory loss Mother     No Known Problems Father     No Known Problems Sister     No Known Problems Brother     Arthritis Sister     No Known Problems Sister        Social History     Social History    Marital status:      Spouse name: N/A    Number of children: N/A    Years of education: N/A     Occupational History    Not on file.     Social History Main Topics    Smoking status: Former Smoker     Packs/day: 1.00     Start date: 4/26/1982     Quit date: 9/26/2016    Smokeless tobacco: Former User    Alcohol use No    Drug use: No    Sexual activity: Not on file     Other Topics Concern    Not on file     Social History Narrative    No narrative on file       Review of Systems:  Respiratory ROS: no cough, shortness of breath, or wheezing  Cardiovascular ROS: no chest pain or dyspnea on exertion  Gastrointestinal ROS: no abdominal pain, change in bowel habits, or black or bloody stools  Musculoskeletal ROS: negative  Neurological ROS: no TIA or stroke symptoms        Physical Exam:  General: no distress  Head: normocephalic  Neck: supple, symmetrical, trachea midline  Lungs:  clear to auscultation bilaterally and normal respiratory effort  Heart: regular rate and rhythm, S1, S2 normal, no murmur, rub or gallop  Abdomen: soft, non-tender non-distented; bowel sounds normal; no masses,  no organomegaly  Extremities: no cyanosis or edema, or clubbing       Deep Sedation: Mallampati Score II (hard and soft palate, upper portion of tonsils anduvula  visible)    II    Assessment and Plan:  Proceed with Colonoscopy

## 2017-08-16 NOTE — ANESTHESIA PREPROCEDURE EVALUATION
08/16/2017  Vega Brownlee is a 56 y.o., male.    Anesthesia Evaluation    I have reviewed the Patient Summary Reports.    I have reviewed the Nursing Notes.      Review of Systems  Anesthesia Hx:  No problems with previous Anesthesia    Cardiovascular:   Hypertension CHF    Renal/:   Chronic Renal Disease, ESRD, Dialysis    Endocrine:   Diabetes        Physical Exam  General:  Well nourished    Airway/Jaw/Neck:  Airway Findings: Mouth Opening: Normal Tongue: Normal  General Airway Assessment: Good  Mallampati: II  TM Distance: Normal, at least 6 cm  Jaw/Neck Findings:  Neck ROM: Normal ROM     Eyes/Ears/Nose:  Eyes/Ears/Nose Findings:    Dental:  Dental Findings: In tact   Chest/Lungs:  Chest/Lungs Findings: Normal Respiratory Rate     Heart/Vascular:  Heart Findings: Rate: Normal  Rhythm: Regular Rhythm        Mental Status:  Mental Status Findings:  Cooperative, Alert and Oriented         Anesthesia Plan  Type of Anesthesia, risks & benefits discussed:  Anesthesia Type:  general  Patient's Preference: general  Intra-op Monitoring Plan: standard ASA monitors  Intra-op Monitoring Plan Comments:   Post Op Pain Control Plan:   Post Op Pain Control Plan Comments:   Induction:   IV  Beta Blocker:  Patient is on a Beta-Blocker and has received one dose within the past 24 hours (No further documentation required).       Informed Consent: Patient understands risks and agrees with Anesthesia plan.  Questions answered. Anesthesia consent signed with patient.  ASA Score: 4     Day of Surgery Review of History & Physical:    H&P update referred to the surgeon.         Ready For Surgery From Anesthesia Perspective.

## 2017-08-16 NOTE — ANESTHESIA POSTPROCEDURE EVALUATION
"Anesthesia Post Evaluation    Patient: Vega Brownlee    Procedure(s) Performed: Procedure(s) (LRB):  COLONOSCOPY (N/A)    Final Anesthesia Type: general  Patient location during evaluation: GI PACU  Patient participation: Yes- Able to Participate  Level of consciousness: awake and alert, oriented and awake  Post-procedure vital signs: reviewed and stable  Pain management: adequate  Airway patency: patent  PONV status at discharge: No PONV  Anesthetic complications: no      Cardiovascular status: blood pressure returned to baseline and hemodynamically stable  Respiratory status: unassisted, spontaneous ventilation and room air  Hydration status: euvolemic  Follow-up not needed.        Visit Vitals  BP (!) 153/66   Pulse 63   Temp 36.8 °C (98.3 °F) (Oral)   Resp 20   Ht 5' 8" (1.727 m)   Wt 66.5 kg (146 lb 9.7 oz)   SpO2 100%   BMI 22.29 kg/m²       Pain/Iza Score: Pain Assessment Performed: Yes (8/16/2017  1:28 PM)  Presence of Pain: denies (8/16/2017  1:28 PM)  Pain Rating Prior to Med Admin: 0 (8/16/2017  1:28 PM)  Iza Score: 10 (8/16/2017  1:28 PM)      "

## 2017-08-16 NOTE — TRANSFER OF CARE
"Anesthesia Transfer of Care Note    Patient: Vega Brownlee    Procedure(s) Performed: Procedure(s) (LRB):  COLONOSCOPY (N/A)    Patient location: GI    Anesthesia Type: general    Transport from OR: Transported from OR on room air with adequate spontaneous ventilation    Post pain: adequate analgesia    Post assessment: no apparent anesthetic complications    Post vital signs: stable    Level of consciousness: awake and alert    Nausea/Vomiting: no nausea/vomiting    Complications: none    Transfer of care protocol was followed      Last vitals:   Visit Vitals  BP (!) 144/65 (BP Location: Left arm, Patient Position: Lying)   Pulse 63   Temp 36.8 °C (98.3 °F) (Oral)   Resp 18   Ht 5' 8" (1.727 m)   Wt 66.5 kg (146 lb 9.7 oz)   SpO2 100%   BMI 22.29 kg/m²     "

## 2017-08-16 NOTE — DISCHARGE INSTRUCTIONS
Colonoscopy     A camera attached to a flexible tube with a viewing lens is used to take video pictures.     Colonoscopy is a test to view the inside of your lower digestive tract (colon and rectum). Sometimes it can show the last part of the small intestine (ileum). During the test, small pieces of tissue may be removed for testing. This is called a biopsy. Small growths, such as polyps, may also be removed.   Why is colonoscopy done?  The test is done to help look for colon cancer. And it can help find the source of abdominal pain, bleeding, and changes in bowel habits. It may be needed once a year, depending on factors such as your:  · Age  · Health history  · Family health history  · Symptoms  · Results from any prior colonoscopy  Risks and possible complications  These include:  · Bleeding               · A puncture or tear in the colon   · Risks of anesthesia  · A cancer lesion not being seen  Getting ready   To prepare for the test:  · Talk with your healthcare provider about the risks of the test (see below). Also ask your healthcare provider about alternatives to the test.  · Tell your healthcare provider about any medicines you take. Also tell him or her about any health conditions you may have.  · Make sure your rectum and colon are empty for the test. Follow the diet and bowel prep instructions exactly. If you dont, the test may need to be rescheduled.  · Plan for a friend or family member to drive you home after the test.     Colonoscopy provides an inside view of the entire colon.     You may discuss the results with your doctor right away or at a future visit.  During the test   The test is usually done in the hospital on an outpatient basis. This means you go home the same day. The procedure takes about 30 minutes. During that time:  · You are given relaxing (sedating) medicine through an IV line. You may be drowsy, or fully asleep.  · The healthcare provider will first give you a physical exam to  check for anal and rectal problems.  · Then the anus is lubricated and the scope inserted.  · If you are awake, you may have a feeling similar to needing to have a bowel movement. You may also feel pressure as air is pumped into the colon. Its OK to pass gas during the procedure.  · Biopsy, polyp removal, or other treatments may be done during the test.  After the test   You may have gas right after the test. It can help to try to pass it to help prevent later bloating. Your healthcare provider may discuss the results with you right away. Or you may need to schedule a follow-up visit to talk about the results. After the test, you can go back to your normal eating and other activities. You may be tired from the sedation and need to rest for a few hours.  Date Last Reviewed: 11/1/2016  © 8505-4186 [employer], "[employer] [address] [place] [address], [place] [address]. All rights reserved. This information is not intended as a substitute for professional medical care. Always follow your healthcare professional's instructions.

## 2017-08-16 NOTE — PATIENT INSTRUCTIONS
Discharge Summary/Instructions after an Endoscopic Procedure  Patient Name: Vega Brownlee  Patient MRN: [id number]  Patient YOB: 1960 Wednesday, August 16, 2017  Sebastián Lamas MD  RESTRICTIONS:  During your procedure today, you received medications for sedation.  These   medications may affect your judgment, balance and coordination.  Therefore,   for 24 hours, you have the following restrictions:   - DO NOT drive a car, operate machinery, make legal/financial decisions,   sign important papers or drink alcohol.    ACTIVITY:  The following day: return to full activity including work, except no heavy   lifting, straining or running for 3 days if polyps were removed.  DIET:  Eat and drink normally unless instructed otherwise.  TREATMENT FOR COMMON SIDE EFFECTS:  - Mild abdominal pain, belching, bloating or excessive gas: rest, eat   lightly and use a heating pad.  - Sore Throat: treat with throat lozenges and/or gargle with warm salt   water.  SYMPTOMS TO WATCH FOR AND REPORT TO YOUR PHYSICIAN:  1. Abdominal pain or bloating, other than gas cramps.  2. Chest pain.  3. Back pain.  4. Chills or fever occurring within 24 hours after the procedure.  5. Rectal bleeding, which would show as bright red, maroon, or black stools.   (A tablespoon of blood from the rectum is not serious, especially if   hemorrhoids are present.)  6. Vomiting.  7. Weakness or dizziness.  8. Because air was used during the procedure, expelling large amounts of air   from your rectum or belching is normal.  9. If a bowel prep was taken, you may not have a bowel movement for 1-3   days.  This is normal.  GO DIRECTLY TO THE EMERGENCY ROOM IF YOU HAVE ANY OF THE FOLLOWING:   Difficulty breathing  Chills and/or fever over 101 F   Persistent vomiting and/or vomiting blood   Severe abdominal pain   Severe chest pain   Black, tarry stools   Bleeding- more than one tablespoon  Your doctor recommends these additional instructions:  If any biopsies were  taken, your doctorâs clinic will call you in 1 to 2   weeks with any results.  Your physician has recommended a repeat colonoscopy in five years for   surveillance.   You are being discharged to home.   Resume your regular diet indefinitely.  For questions, problems or results please call your physician - Sebastián Lamas MD at Work:  [phone].  [facility], EMERGENCY ROOM PHONE NUMBER: [phone]  IF A COMPLICATION OR EMERGENCY SITUATION ARISES AND YOU ARE UNABLE TO REACH   YOUR PHYSICIAN - GO DIRECTLY TO THE EMERGENCY ROOM.  Sebastián Lamas MD  8/16/2017 12:49:45 PM  This report has been verified and signed electronically.

## 2017-08-23 ENCOUNTER — TELEPHONE (OUTPATIENT)
Dept: ENDOSCOPY | Facility: HOSPITAL | Age: 57
End: 2017-08-23

## 2017-09-07 ENCOUNTER — TELEPHONE (OUTPATIENT)
Dept: TRANSPLANT | Facility: CLINIC | Age: 57
End: 2017-09-07

## 2017-09-07 DIAGNOSIS — Z76.82 ORGAN TRANSPLANT CANDIDATE: Primary | ICD-10-CM

## 2017-09-07 NOTE — TELEPHONE ENCOUNTER
"  KIDNEY WAIT LISTING NOTE    Date of Financial clearance to list: 17    N/[facility]:     Organ: Kidney  Name:       Vega Brownlee   : 1960          Gender:     male    MRN#: [id number]                                 State of Permanent Residence:  [address] [place] [address]  Ethnicity: Hebrew   Race:      White    CLINICAL INFORMATION   Candidate Medical Urgency Status: Active  Number of Previous Kidney Transplants:   Number of Previous Solid Organ Transplants:   Is this Candidate a Prior Living Donor: no  (If yes, please generate letter to [employer] with patient's date of donation, recipient SSN, signed by Surgical Director after patient is listed in order to receive priority points).      ABO  ABO Blood Group:   O POS     ABO Confirmation: (THESE DATES MUST BE PRIOR TO THE LIST DATE AND SUPPORTED BY SEPARATE LAB REPORTS)    Internal Results    Lab Results   Component Value Date    GROUPTRH O POS 2017    GROUPTRH O POS 2016     No results found for: ABO    External Results    ABO Date 1:    ABO Date 2  Are either of these ABO results based on External Labs? no  (If Yes, STOP and go to source document in Media Tab for verification).    VITALS  Height: 5' 8"   Weight:  147  (Use height from Transplant clinic visits only).  Did you enter height/weight? Yes    HLA    Class I:  Lab Results   Component Value Date    XGIF7XK 11 2017    ZALE0AJ 33 2017    AVYA2UA 13 2017    SXQL7EU 58 2017    FBQKB4WT 4 2017    KAYWT3GZ XX 2017    QDRNM2ND 10 2017    XKERB1PQ 7 2017       Class II:  Lab Results   Component Value Date    [id number] 12 2017    KAQVRW48IA 13 2017    UBBYTB678KR 52 2017    XUKJZM0929 XX 2017    GQDPE3ZA 7 2017    CNEHA9UL 6 2017       Tested for HLA Antibodies: Yes, no antibodies detected     If result is "Positive" antibodies are detected     If result is "Negative or questionable" no antibodies " detected    Lab Results   Component Value Date    CIPRAS Positive 04/26/2017    CIIPRAS Negative 04/26/2017       DIALYSIS INFORMATION  Is patient Pre-Dialysis: No     Report GFR being used as the criteria for placement on the kidney list. If not, leave blank  GFR < or = 20 ml/min? n/a  If Yes, Specify value  ___   ml/min     Initial date GFR became 20 or less:   Is GFR obtained from an Outside lab Result? n/a  (If YES verify with source document scanned into media)    If patient on Dialysis:    Is candidate currently on dialysis for ESRD? Yes  If Yes,  Date Chronic Dialysis Started:   8/16/2016  (verify with source document in Media Tab)   Dialysis Unit Name: [facility]  [address] [place] [address]                        Physician Name:  Dr. Hailey Hernandez  NPI#: [id number]    DIABETES INFORMATION  Primary Native Kidney Diagnosis: Diabetes Mellitus - Type II  C-Peptide Value - No results found for: CPEPTIDE  Current Diabetes Status: Type II    FOR NON-KIDNEY DEPARTMENT USE ONLY:  Additional Organs Registered? none    Maximum Acceptable Number of HLA Mismatches  ABDR:     6      (0-6)               AB:               (0-4)  ADR:   _____  (0-4)              BDR: _____ (0-4)  A:        _____  (0-2)              B:      _____ (0-2)          DR: ______ (0-2)    Will Recipient Accept?   Accept HBcAB Positive Organ:            Yes  Accept HBV RANJEET Positive Organ:        no  Accept HCV Antibody Positive Organ: no   Accept HCV RANJEET Positive Organ: no  Accept KDPI > 85 Donor ?: No                        Local: No                           Import: No    ### NURSE TO VERIFY CONSENT AND MAKE ANY NECESSARY CHANGES NEEDED IN UNET AT THE TIME OF VERIFICATION ###    Unacceptible Antigens  If yes, list     Lab Results   Component Value Date    ZO0AVUU Negative 04/26/2017    CIIAB Negative 04/26/2017       ### DO NOT LIST IF ANTIGEN VALUE WEAK ###    ### DO NOT LIST ANTIGEN AS UNACCEPTABLE IF IT HAS * OR : IN THE  RESULT  VALUE ###

## 2017-09-07 NOTE — LETTER
September 7, 2017    Vega Brownlee  [address] [place] [id number]    Dear Vega Brownlee:  MRN: [id number]    Congratulations! As of 9/7/17, your name has been placed on the cadaveric kidney waiting list at [facility].  Your candidacy for kidney transplant is based on the following criteria: ESRD due to Diabetes Mellitus - Type II.    If you have any friends or family members interested in donating a kidney to you, please have them call the donor coordinator.  If you are a pre-dialysis patient or if you dialyze at home monthly transplant blood kits will be sent directly to you.  If you dialyze at a dialysis center the kits will be sent to your dialysis unit.      While active on the list, you must keep us notified of any changes in your health status.  Should your medical condition change and transplantation is no longer a viable option, it may be necessary to deactivate your status or to remove you from the [employer] list.    Please notify your coordinator when there are changes to your telephone number, address, insurance coverage, or dialysis unit.  If you have any alternate phone numbers that you would like us to have, please let us know.  Your Listed Transplant Coordinator will be Walter Mo RN.  Feel free to call your coordinator at [phone] or [phone] should you have any questions.    The [facility] [employer] [facility] and the [employer] for Organ Sharing requires that we inform any patient placed on our waiting list of information that would impact their ability to receive a transplant.  [facility]s [facility] and Kidney/Pancreas transplant program has a transplant surgeon and physician available 365 days a year, 24 hours a day, and 7 days a week to facilitate organ acceptance, procurement, and implantation, and to address urgent patient issues.  You will be notified in writing of any changes to our key personnel and of any changes to our staffing plan that would impact your ability to receive a  transplant.    Attached is a letter from the [employer] for Organ Sharing ([employer]).  It describes the services and information offered to patients by [employer] and the Organ Procurement and Transplant Network.    Again, we congratulate you on your success and look forward to working with you very closely in the future.    Sincerely,    Hailey Moctezuma M.D.                                                Medical Director, Kidney and Kidney/Pancreas Transplant  [facility] [address] [address]  [phone]  Cc: Alan Lowery MD/[facility]          OPTN/UNOS: Your Resource for Organ Transplant Information        If you have a question regarding your own medical care, you always should call your transplant center first. However, for general organ transplant-related information, you can call the [employer] Organ [employer]aring (UN[employer]) toll-free patient services line at 1-616.892.4422.    Anyone, including potential transplant candidates, recipients, family members/friends, living donors, and/or donor family members can call this number to:    · talk about organ donation, living donation, how transplant and donation work, the donation process, transplant policies, and transplant/donor information;  · get a free patient information kit with helpful booklets, waiting list and transplant information, and a list of all transplant centers;  · ask questions about the Organ Procurement and Transplantation Network (OPTN) web site (www.optn.transplant.hrsa.gov); the [employer]OS Web site (www.unos.org); or the UNOS web site for living donors and transplant recipients (www.transplantliving.org);  · learn how [employer] and the OPTN can help you;  · talk about any concerns that you may have with a transplant center and how they perform    UNOS is a not-for-profit organization that provides all of the administrative services for the national OPTN under federal contract to the  Health Resources and Services Administration (HRSA), an agency under the U.S. Department of Health and Human Services (HHS).     [employer] and OPTN responsibilities include:    · writing educational material for patients, the public and professionals;  · helping to make people aware of the need for donated organs and tissue;  · writing organ transplant policy with help from doctors, nurses, transplant patients/candidates, donor families, living donors, and the public;  · coordinating the organ matching and placement process;  · collecting information about every organ transplant and donation that occurs in the [place].    Remember, you should contact your transplant center directly if you have questions or concerns about your own medical care including medical records, work-up progress and test reports. [facility] is not your transplant center, and staff at [facility] will not be able to transfer you to your transplant center, so keep your transplant centers phone number handy. But, while you research your transplant needs and learn as much as you can about transplantation and donation, we welcome your call to our toll-free patient services line at [phone].

## 2017-09-11 DIAGNOSIS — Z76.82 ORGAN TRANSPLANT CANDIDATE: Primary | ICD-10-CM

## 2017-09-22 DIAGNOSIS — Z99.2 ESRD (END STAGE RENAL DISEASE) ON DIALYSIS: ICD-10-CM

## 2017-09-22 DIAGNOSIS — I10 ESSENTIAL HYPERTENSION: Chronic | ICD-10-CM

## 2017-09-22 DIAGNOSIS — N18.6 ESRD (END STAGE RENAL DISEASE) ON DIALYSIS: ICD-10-CM

## 2017-09-22 RX ORDER — HYDRALAZINE HYDROCHLORIDE 50 MG/1
TABLET, FILM COATED ORAL
Qty: 60 TABLET | Refills: 5 | Status: SHIPPED | OUTPATIENT
Start: 2017-09-22 | End: 2018-03-26 | Stop reason: SDUPTHER

## 2017-10-02 DIAGNOSIS — I50.22 CHRONIC SYSTOLIC HEART FAILURE: ICD-10-CM

## 2017-10-02 DIAGNOSIS — Z99.2 ESRD (END STAGE RENAL DISEASE) ON DIALYSIS: ICD-10-CM

## 2017-10-02 DIAGNOSIS — I10 ESSENTIAL HYPERTENSION: Chronic | ICD-10-CM

## 2017-10-02 DIAGNOSIS — N18.6 ESRD (END STAGE RENAL DISEASE) ON DIALYSIS: ICD-10-CM

## 2017-10-02 RX ORDER — ISOSORBIDE MONONITRATE 60 MG/1
TABLET, EXTENDED RELEASE ORAL
Qty: 30 TABLET | Refills: 5 | Status: SHIPPED | OUTPATIENT
Start: 2017-10-02 | End: 2018-04-04 | Stop reason: SDUPTHER

## 2017-10-02 RX ORDER — METOPROLOL TARTRATE 25 MG/1
TABLET, FILM COATED ORAL
Qty: 60 TABLET | Refills: 5 | Status: SHIPPED | OUTPATIENT
Start: 2017-10-02 | End: 2018-04-20

## 2017-10-10 ENCOUNTER — TELEPHONE (OUTPATIENT)
Dept: FAMILY MEDICINE | Facility: CLINIC | Age: 57
End: 2017-10-10

## 2017-10-10 NOTE — TELEPHONE ENCOUNTER
----- Message from Angel Shepard sent at 10/10/2017 12:33 PM CDT -----  Contact: Wife-Marie  Called to schedule clearance apt for eye surgery. Wife can be reached @ [phone].

## 2017-10-11 ENCOUNTER — OFFICE VISIT (OUTPATIENT)
Dept: FAMILY MEDICINE | Facility: CLINIC | Age: 57
End: 2017-10-11
Payer: MEDICARE

## 2017-10-11 VITALS
HEIGHT: 68 IN | OXYGEN SATURATION: 98 % | SYSTOLIC BLOOD PRESSURE: 110 MMHG | BODY MASS INDEX: 22.69 KG/M2 | HEART RATE: 70 BPM | TEMPERATURE: 98 F | DIASTOLIC BLOOD PRESSURE: 60 MMHG | RESPIRATION RATE: 17 BRPM | WEIGHT: 149.69 LBS

## 2017-10-11 DIAGNOSIS — E11.22 DIABETES MELLITUS WITH ESRD (END-STAGE RENAL DISEASE): Chronic | ICD-10-CM

## 2017-10-11 DIAGNOSIS — H25.13 NUCLEAR SCLEROSIS OF BOTH EYES: Primary | ICD-10-CM

## 2017-10-11 DIAGNOSIS — N18.6 DIABETES MELLITUS WITH ESRD (END-STAGE RENAL DISEASE): Chronic | ICD-10-CM

## 2017-10-11 PROCEDURE — 99999 PR PBB SHADOW E&M-EST. PATIENT-LVL III: CPT | Mod: PBBFAC,,, | Performed by: INTERNAL MEDICINE

## 2017-10-11 PROCEDURE — 99214 OFFICE O/P EST MOD 30 MIN: CPT | Mod: S$PBB,,, | Performed by: INTERNAL MEDICINE

## 2017-10-11 PROCEDURE — 99213 OFFICE O/P EST LOW 20 MIN: CPT | Mod: PBBFAC,PN | Performed by: INTERNAL MEDICINE

## 2017-10-11 NOTE — PROGRESS NOTES
"Subjective:       Patient ID: Vega Brownlee is a 57 y.o. male.    Chief Complaint: Pre-op Exam (eye surgery)    Pre operative clearance for IOP placement OD    HPI: 56 y/o w/ DM ESRD on HD presents for medical clearance of ocular lens placement. Compliant with HD no problem swith run time denies CP orthopnea or LE edema.       Review of Systems   Constitutional: Negative for activity change, appetite change, fatigue, fever and unexpected weight change.   HENT: Negative for ear pain, rhinorrhea and sore throat.    Eyes: Negative for discharge and visual disturbance.   Respiratory: Negative for chest tightness, shortness of breath and wheezing.    Cardiovascular: Negative for chest pain, palpitations and leg swelling.   Gastrointestinal: Negative for abdominal pain, constipation and diarrhea.   Endocrine: Negative for cold intolerance and heat intolerance.   Genitourinary: Negative for dysuria and hematuria.   Musculoskeletal: Negative for joint swelling and neck stiffness.   Skin: Negative for rash.   Neurological: Negative for dizziness, syncope, weakness and headaches.   Psychiatric/Behavioral: Negative for suicidal ideas.       Objective:     Vitals:    10/11/17 1148   BP: 110/60   BP Location: Right arm   Patient Position: Sitting   BP Method: Medium (Manual)   Pulse: 70   Resp: 17   Temp: 98 °F (36.7 °C)   TempSrc: Oral   SpO2: 98%   Weight: 67.9 kg (149 lb 11.1 oz)   Height: 5' 8" (1.727 m)          Physical Exam   Constitutional: He is oriented to person, place, and time. He appears well-developed and well-nourished.   HENT:   Head: Normocephalic and atraumatic.   Eyes: Conjunctivae are normal. Pupils are equal, round, and reactive to light.   Neck: Normal range of motion.   Cardiovascular: Normal rate and regular rhythm.  Exam reveals no gallop and no friction rub.    No murmur heard.  Right arm fistula +thrill no LE edema   Pulmonary/Chest: Effort normal and breath sounds normal. He has no wheezes. He has no " rales.   Abdominal: Soft. Bowel sounds are normal. There is no tenderness. There is no rebound and no guarding.   Musculoskeletal: Normal range of motion. He exhibits no edema or tenderness.   Neurological: He is alert and oriented to person, place, and time. No cranial nerve deficit.   Skin: Skin is warm and dry.   Psychiatric: He has a normal mood and affect.       Assessment:       1. Nuclear sclerosis of both eyes    2. Diabetes mellitus with ESRD (end-stage renal disease)        Plan:    1. Low risk procedure medically stablized for this without further CV testing, form completed and given to patient stating the same    2. Stable continue basal insulin HD per nephrology

## 2017-10-24 ENCOUNTER — LAB VISIT (OUTPATIENT)
Dept: LAB | Facility: HOSPITAL | Age: 57
End: 2017-10-24
Payer: MEDICARE

## 2017-10-24 DIAGNOSIS — Z76.82 ORGAN TRANSPLANT CANDIDATE: ICD-10-CM

## 2017-10-24 PROCEDURE — 86829 HLA CLASS I/II ANTIBODY QUAL: CPT | Mod: 91,PO,TXP

## 2017-10-24 PROCEDURE — 86977 RBC SERUM PRETX INCUBJ/INHIB: CPT | Mod: PO,TXP

## 2017-11-13 ENCOUNTER — LAB VISIT (OUTPATIENT)
Dept: LAB | Facility: HOSPITAL | Age: 57
End: 2017-11-13
Payer: MEDICARE

## 2017-11-13 DIAGNOSIS — Z76.82 ORGAN TRANSPLANT CANDIDATE: ICD-10-CM

## 2017-11-13 PROCEDURE — 86829 HLA CLASS I/II ANTIBODY QUAL: CPT | Mod: PO,TXP

## 2017-11-13 PROCEDURE — 86977 RBC SERUM PRETX INCUBJ/INHIB: CPT | Mod: PO,TXP

## 2017-11-13 PROCEDURE — 86829 HLA CLASS I/II ANTIBODY QUAL: CPT | Mod: 91,PO,TXP

## 2017-11-14 LAB — HPRA INTERPRETATION: NORMAL

## 2017-11-24 ENCOUNTER — OFFICE VISIT (OUTPATIENT)
Dept: FAMILY MEDICINE | Facility: CLINIC | Age: 57
End: 2017-11-24
Payer: MEDICARE

## 2017-11-24 VITALS
TEMPERATURE: 98 F | SYSTOLIC BLOOD PRESSURE: 118 MMHG | HEART RATE: 68 BPM | DIASTOLIC BLOOD PRESSURE: 68 MMHG | OXYGEN SATURATION: 95 % | HEIGHT: 68 IN | RESPIRATION RATE: 17 BRPM | BODY MASS INDEX: 23.43 KG/M2 | WEIGHT: 154.56 LBS

## 2017-11-24 DIAGNOSIS — Z01.818 PREOP EXAMINATION: Primary | ICD-10-CM

## 2017-11-24 PROCEDURE — 99214 OFFICE O/P EST MOD 30 MIN: CPT | Mod: S$PBB,,, | Performed by: NURSE PRACTITIONER

## 2017-11-24 PROCEDURE — 99215 OFFICE O/P EST HI 40 MIN: CPT | Mod: PBBFAC,PN | Performed by: NURSE PRACTITIONER

## 2017-11-24 PROCEDURE — 99999 PR PBB SHADOW E&M-EST. PATIENT-LVL V: CPT | Mod: PBBFAC,,, | Performed by: NURSE PRACTITIONER

## 2017-11-24 NOTE — PROGRESS NOTES
Subjective:       Patient ID: Vega Brownlee is a 57 y.o. male.    Chief Complaint: Pre-op Exam (Cataract surgery 11/28/17 - left eye )    HPI Mr Brownlee is here for preop clearance for right eye cataract surgery.  He had left eye done last month, no complications, he is compliant with HD. No CP or SOB, recent problem with rhinorrhea.  Review of Systems   Constitutional: Negative for fever.   Respiratory: Negative.    Cardiovascular: Negative.        Objective:      Physical Exam   Constitutional: He is oriented to person, place, and time. He appears well-developed and well-nourished. He does not appear ill. No distress.   HENT:   Head: Normocephalic and atraumatic.   Cardiovascular: Normal rate, regular rhythm and normal heart sounds.  Exam reveals no friction rub.    No murmur heard.  Pulmonary/Chest: Effort normal and breath sounds normal. No respiratory distress. He has no wheezes.   Musculoskeletal: Normal range of motion.   Neurological: He is alert and oriented to person, place, and time. No cranial nerve deficit.   Skin: Skin is warm and dry.   Vitals reviewed.      Assessment:       1. Preop examination        Plan:       Preop examination    He is medically optimized for cataract surgery, may proceed at low risk.    F/u prn with PCP

## 2017-11-24 NOTE — PATIENT INSTRUCTIONS
Follow up with primary care provider prn  Go to ER for new, worse or concerning symptoms    ?2?????(Diabetes)?? ?????  ?2?????? ?? ???? ???????. ???? ???? ?? ?? ?? ????. ???? ??? ??? ??? ???? ??? ????. ??? ?? ??? ??? ? ?? ??? ???? ????. ???? ????? ? ?? ??? ????, ???? ?? ??? ?? ? ????. ???? ????? ?? ?? ??????.  ?? ??? ????.    ???? ?? ???? ? ????. ?? ??? ?? ???? ?? ?? ???? ? ? ?? ?????.  · ??????? ?? ?? ??? ??? ?? ????.  · ??????? ?? ???? ????, ????, ????? ?? ?? ??? ????.  · ??????? ??? ?? ??? ???? ????, ????, ????? ???? ???.  ??? ????.  ??????? ??? ??? ??? ???. ? ?? ???? ????. ????? ??? ?? ???? ???? ??? ???? ????? ????.  · ????? ????? ?? ?? ????? ????? ?? ???.  · ???? ??? ?? ??? ??? ????. ??? ?? ??? ?? ??? ? ? ????. ??, ??, ???? ?? ???, ?? ??? ?? ?? ??? ?? ??? ??? ??? ???.  · ??????? ??? ??? ???. ?? ?? ??? ??? ??? ?????. ??? ??? ????.  ??? ????.  ??? ?? ??? ????? ? ??? ???. ??? ???? ???? ? ?? ???? ??? ? ??? ?????. ??? ?? ???? ??? ???.  · ???? ?? ?? ????? ? ? ??? ???? ?? ????.  · ?????? ? ??? ??, ???? ????, ???? ?? ??? ?????. ?? ???? ??, ?? ???? ???? ?? ????.  ?????? ?????.  ???????? ???? ?? ??? ????. ??? ?, ??, ?? ?? ??? ?? ? ????.  · ??????? ?? ??? ?? ??? ???? ?? ?? ????.  · ????? ???? ?? ??? ?? ? ??? ?? ??? ???. ??? ?? ? ?? ?? ????? ?????(A1C) ??? ?????. ? ?? ??? ?? ?? 2-3?? ?? ???? ??? ? ????? ? ? ????.  · ??????? ??? ?? ?????! ??? ???? ????? ?? ???? ? ??????. ?? ??? ?? ????? ?? ????.  Date Last Reviewed: 2/6/2014  © 0995-6446 [employer]. [address] [place] [address], [place] [address]. All rights reserved. This information is not intended as a substitute for professional medical care. Always follow your healthcare professional's instructions.

## 2017-11-30 LAB — HPRA INTERPRETATION: NORMAL

## 2017-12-18 LAB
CLASS I LUMINEX SCREEN RESULT - ADSORBED: POSITIVE
CLASS II LUMINEX SCREEN RESULT - ADSORBED: NEGATIVE
SCRLA TESTING DATE: NORMAL
SERUM COLLECTION DT - SCRLA: NORMAL

## 2017-12-19 PROCEDURE — 86832 HLA CLASS I HIGH DEFIN QUAL: CPT | Mod: PO,TXP

## 2017-12-19 PROCEDURE — 86833 HLA CLASS II HIGH DEFIN QUAL: CPT | Mod: PO,TXP

## 2017-12-19 PROCEDURE — 86977 RBC SERUM PRETX INCUBJ/INHIB: CPT | Mod: PO,TXP

## 2017-12-27 LAB
CLASS I ANTIBODY COMMENTS - LUMINEX: NORMAL
CLASS II ANTIBODIES - LUMINEX: NEGATIVE
CPRA %: 0
SERUM COLLECTION DT - LUMINEX CLASS I: NORMAL
SERUM COLLECTION DT - LUMINEX CLASS II: NORMAL
SPCL1 TESTING DATE: NORMAL
SPCL2 TESTING DATE: NORMAL
SPLUA TESTING DATE: NORMAL

## 2018-03-12 DIAGNOSIS — E11.22 TYPE 2 DIABETES MELLITUS WITH CHRONIC KIDNEY DISEASE ON CHRONIC DIALYSIS, WITH LONG-TERM CURRENT USE OF INSULIN: Chronic | ICD-10-CM

## 2018-03-12 DIAGNOSIS — Z99.2 TYPE 2 DIABETES MELLITUS WITH CHRONIC KIDNEY DISEASE ON CHRONIC DIALYSIS, WITH LONG-TERM CURRENT USE OF INSULIN: Chronic | ICD-10-CM

## 2018-03-12 DIAGNOSIS — N18.6 TYPE 2 DIABETES MELLITUS WITH CHRONIC KIDNEY DISEASE ON CHRONIC DIALYSIS, WITH LONG-TERM CURRENT USE OF INSULIN: Chronic | ICD-10-CM

## 2018-03-12 DIAGNOSIS — Z79.4 TYPE 2 DIABETES MELLITUS WITH CHRONIC KIDNEY DISEASE ON CHRONIC DIALYSIS, WITH LONG-TERM CURRENT USE OF INSULIN: Chronic | ICD-10-CM

## 2018-03-12 RX ORDER — INSULIN GLARGINE 100 [IU]/ML
7 INJECTION, SOLUTION SUBCUTANEOUS NIGHTLY
Qty: 3 ML | Refills: 2 | Status: SHIPPED | OUTPATIENT
Start: 2018-03-12 | End: 2018-03-16 | Stop reason: SDUPTHER

## 2018-03-12 NOTE — TELEPHONE ENCOUNTER
Last office visit 11/24/17. Last a1c 4/27/17 with reading of  7.8.. Patient has scheduled office visit for 3/26/18.

## 2018-03-15 ENCOUNTER — LAB VISIT (OUTPATIENT)
Dept: LAB | Facility: HOSPITAL | Age: 58
End: 2018-03-15
Payer: MEDICARE

## 2018-03-15 DIAGNOSIS — Z76.82 ORGAN TRANSPLANT CANDIDATE: ICD-10-CM

## 2018-03-15 PROCEDURE — 86833 HLA CLASS II HIGH DEFIN QUAL: CPT | Mod: 91,PO,TXP

## 2018-03-15 PROCEDURE — 86977 RBC SERUM PRETX INCUBJ/INHIB: CPT | Mod: 91,PO,TXP

## 2018-03-15 PROCEDURE — 86832 HLA CLASS I HIGH DEFIN QUAL: CPT | Mod: PO,TXP

## 2018-03-16 DIAGNOSIS — Z79.4 TYPE 2 DIABETES MELLITUS WITH CHRONIC KIDNEY DISEASE ON CHRONIC DIALYSIS, WITH LONG-TERM CURRENT USE OF INSULIN: Chronic | ICD-10-CM

## 2018-03-16 DIAGNOSIS — Z79.4 TYPE 2 DIABETES MELLITUS WITH BOTH EYES AFFECTED BY PROLIFERATIVE RETINOPATHY AND MACULAR EDEMA, WITH LONG-TERM CURRENT USE OF INSULIN: Primary | ICD-10-CM

## 2018-03-16 DIAGNOSIS — N18.6 TYPE 2 DIABETES MELLITUS WITH CHRONIC KIDNEY DISEASE ON CHRONIC DIALYSIS, WITH LONG-TERM CURRENT USE OF INSULIN: Chronic | ICD-10-CM

## 2018-03-16 DIAGNOSIS — E11.22 DIABETES MELLITUS WITH ESRD (END-STAGE RENAL DISEASE): Chronic | ICD-10-CM

## 2018-03-16 DIAGNOSIS — N18.6 DIABETES MELLITUS WITH ESRD (END-STAGE RENAL DISEASE): Chronic | ICD-10-CM

## 2018-03-16 DIAGNOSIS — E11.3513 TYPE 2 DIABETES MELLITUS WITH BOTH EYES AFFECTED BY PROLIFERATIVE RETINOPATHY AND MACULAR EDEMA, WITH LONG-TERM CURRENT USE OF INSULIN: Primary | ICD-10-CM

## 2018-03-16 DIAGNOSIS — E11.22 TYPE 2 DIABETES MELLITUS WITH CHRONIC KIDNEY DISEASE ON CHRONIC DIALYSIS, WITH LONG-TERM CURRENT USE OF INSULIN: Chronic | ICD-10-CM

## 2018-03-16 DIAGNOSIS — Z99.2 TYPE 2 DIABETES MELLITUS WITH CHRONIC KIDNEY DISEASE ON CHRONIC DIALYSIS, WITH LONG-TERM CURRENT USE OF INSULIN: Chronic | ICD-10-CM

## 2018-03-16 RX ORDER — CALCIUM ACETATE 667 MG/1
667 CAPSULE ORAL
COMMUNITY
Start: 2018-01-29 | End: 2019-11-25 | Stop reason: SDUPTHER

## 2018-03-16 RX ORDER — INSULIN GLARGINE 100 [IU]/ML
7 INJECTION, SOLUTION SUBCUTANEOUS NIGHTLY
Qty: 6 ML | Refills: 3 | Status: SHIPPED | OUTPATIENT
Start: 2018-03-16 | End: 2019-07-06 | Stop reason: SDUPTHER

## 2018-03-16 NOTE — TELEPHONE ENCOUNTER
----- Message from Kady Flores sent at 3/16/2018  9:17 AM CDT -----  Contact: [facility]/[phone]  [facility] called stating that the patient's lancets isn't covered. She's requesting a different medication. Thank you.

## 2018-03-19 DIAGNOSIS — Z76.82 ORGAN TRANSPLANT CANDIDATE: Primary | ICD-10-CM

## 2018-03-21 LAB — HPRA INTERPRETATION: NORMAL

## 2018-03-26 ENCOUNTER — OFFICE VISIT (OUTPATIENT)
Dept: FAMILY MEDICINE | Facility: CLINIC | Age: 58
End: 2018-03-26
Payer: MEDICARE

## 2018-03-26 VITALS
RESPIRATION RATE: 17 BRPM | DIASTOLIC BLOOD PRESSURE: 66 MMHG | BODY MASS INDEX: 22.92 KG/M2 | SYSTOLIC BLOOD PRESSURE: 170 MMHG | HEIGHT: 68 IN | OXYGEN SATURATION: 98 % | WEIGHT: 151.25 LBS | HEART RATE: 65 BPM | TEMPERATURE: 99 F

## 2018-03-26 DIAGNOSIS — I10 ESSENTIAL HYPERTENSION: Chronic | ICD-10-CM

## 2018-03-26 DIAGNOSIS — N18.6 ESRD (END STAGE RENAL DISEASE) ON DIALYSIS: ICD-10-CM

## 2018-03-26 DIAGNOSIS — Z99.2 ESRD (END STAGE RENAL DISEASE) ON DIALYSIS: ICD-10-CM

## 2018-03-26 PROCEDURE — 99999 PR PBB SHADOW E&M-EST. PATIENT-LVL III: CPT | Mod: PBBFAC,,, | Performed by: INTERNAL MEDICINE

## 2018-03-26 PROCEDURE — 99213 OFFICE O/P EST LOW 20 MIN: CPT | Mod: PBBFAC,PN | Performed by: INTERNAL MEDICINE

## 2018-03-26 PROCEDURE — 99214 OFFICE O/P EST MOD 30 MIN: CPT | Mod: S$PBB,,, | Performed by: INTERNAL MEDICINE

## 2018-03-26 RX ORDER — HYDRALAZINE HYDROCHLORIDE 100 MG/1
100 TABLET, FILM COATED ORAL EVERY 12 HOURS
Qty: 60 TABLET | Refills: 3 | Status: SHIPPED | OUTPATIENT
Start: 2018-03-26 | End: 2018-04-04 | Stop reason: SDUPTHER

## 2018-03-26 NOTE — PROGRESS NOTES
"Subjective:       Patient ID: Vega Brownlee is a 57 y.o. male.    Chief Complaint: Diabetes; Hypertension; and Follow-up    Discuss hypertension    HPI: 56 y/o w/ HTN DM ESRD on HD (TRS at [facility] [place], Dr. Lowery nephrologist) here to discuss elevated blood pressures. BP has been running systolics in 150-170s after HD no chest pain no LE edema. Is able to run full times makes minimal amount of urine. No pain. Did have recent procedure by [facility] vascular surgeon for what sounds to be stenosis of his AV fistula.       Review of Systems   Constitutional: Negative for activity change, appetite change, fatigue, fever and unexpected weight change.   HENT: Negative for ear pain, rhinorrhea and sore throat.    Eyes: Negative for discharge and visual disturbance.   Respiratory: Negative for chest tightness, shortness of breath and wheezing.    Cardiovascular: Negative for chest pain, palpitations and leg swelling.   Gastrointestinal: Negative for abdominal pain, constipation and diarrhea.   Endocrine: Negative for cold intolerance and heat intolerance.   Genitourinary: Negative for dysuria and hematuria.   Musculoskeletal: Negative for joint swelling and neck stiffness.   Skin: Negative for rash.   Neurological: Negative for dizziness, syncope, weakness and headaches.   Psychiatric/Behavioral: Negative for suicidal ideas.       Objective:     Vitals:    03/26/18 1138   BP: (!) 170/66   BP Location: Left arm   Patient Position: Sitting   BP Method: Medium (Manual)   Pulse: 65   Resp: 17   Temp: 98.7 °F (37.1 °C)   TempSrc: Oral   SpO2: 98%   Weight: 68.6 kg (151 lb 3.8 oz)   Height: 5' 8" (1.727 m)          Physical Exam   Constitutional: He is oriented to person, place, and time. He appears well-developed and well-nourished.   HENT:   Head: Normocephalic and atraumatic.   Eyes: Conjunctivae are normal. No scleral icterus.   Neck: Normal range of motion.   Cardiovascular: Normal rate and regular rhythm.  Exam reveals no gallop and " no friction rub.    No murmur heard.  Right arm fistula +thrill no LE edema   Pulmonary/Chest: Effort normal and breath sounds normal. He has no wheezes. He has no rales.   Abdominal: Soft. Bowel sounds are normal. There is no tenderness. There is no rebound and no guarding.   Musculoskeletal: Normal range of motion. He exhibits no edema or tenderness.   Neurological: He is alert and oriented to person, place, and time. No cranial nerve deficit.   Skin: Skin is warm and dry.   Psychiatric: He has a normal mood and affect.       Assessment:       1. ESRD (end stage renal disease) on dialysis    2. Essential hypertension        Plan:    1/2. Records request from his HD center to see if dry weight has been adjusted. Increase hydralazine to 100mg bid with BP check next week

## 2018-04-02 ENCOUNTER — CLINICAL SUPPORT (OUTPATIENT)
Dept: FAMILY MEDICINE | Facility: CLINIC | Age: 58
End: 2018-04-02
Payer: COMMERCIAL

## 2018-04-02 VITALS — DIASTOLIC BLOOD PRESSURE: 68 MMHG | HEART RATE: 70 BPM | SYSTOLIC BLOOD PRESSURE: 178 MMHG

## 2018-04-02 DIAGNOSIS — N18.6 DIABETES MELLITUS WITH ESRD (END-STAGE RENAL DISEASE): Primary | ICD-10-CM

## 2018-04-02 DIAGNOSIS — Z01.30 BLOOD PRESSURE CHECK: Primary | ICD-10-CM

## 2018-04-02 DIAGNOSIS — E11.22 DIABETES MELLITUS WITH ESRD (END-STAGE RENAL DISEASE): Primary | ICD-10-CM

## 2018-04-02 PROCEDURE — 99999 PR PBB SHADOW E&M-EST. PATIENT-LVL III: CPT | Mod: PBBFAC,,,

## 2018-04-02 RX ORDER — ASPIRIN 81 MG/1
81 TABLET ORAL
COMMUNITY

## 2018-04-02 RX ORDER — FUROSEMIDE 80 MG/1
80 TABLET ORAL DAILY
COMMUNITY
End: 2018-05-21

## 2018-04-02 RX ORDER — PEN NEEDLE, DIABETIC 30 GX3/16"
NEEDLE, DISPOSABLE MISCELLANEOUS
Qty: 100 EACH | Refills: 1 | Status: SHIPPED | OUTPATIENT
Start: 2018-04-02 | End: 2018-07-18 | Stop reason: SDUPTHER

## 2018-04-02 RX ORDER — ISOSORBIDE MONONITRATE 60 MG/1
60 TABLET, EXTENDED RELEASE ORAL
COMMUNITY
End: 2018-04-02 | Stop reason: SDUPTHER

## 2018-04-02 RX ORDER — CARVEDILOL 12.5 MG/1
12.5 TABLET ORAL 2 TIMES DAILY
COMMUNITY
End: 2018-04-20 | Stop reason: SDUPTHER

## 2018-04-02 RX ORDER — HYDRALAZINE HYDROCHLORIDE 50 MG/1
25 TABLET, FILM COATED ORAL EVERY 12 HOURS
COMMUNITY
End: 2018-04-04 | Stop reason: SDUPTHER

## 2018-04-02 NOTE — PROGRESS NOTES
.  Vega Brownlee 57 y.o. male is here today for Blood Pressure check.     History of HTN yes.    Review of patient's allergies indicates:  No Known Allergies    Creatinine   Date Value Ref Range Status   06/09/2017 7.8 (H) 0.5 - 1.4 mg/dL Final     Sodium   Date Value Ref Range Status   06/09/2017 133 (L) 136 - 145 mmol/L Final     Potassium   Date Value Ref Range Status   08/16/2017 4.9 3.5 - 5.1 mmol/L Final   ]  Patient verifies taking blood pressure medications on a regular basis at the same time of the day, but hasn't been taking the correct dosage as prescribed by Dr Perez on 3/26/18. Taking Apresoline 50 mg BID instead of 100 mg BID as ordered by Dr Perez. Also, doesn't take Metoprolol.    Current Outpatient Prescriptions:     atorvastatin (LIPITOR) 40 MG tablet, Take 1 tablet (40 mg total) by mouth every evening., Disp: 90 tablet, Rfl: 3    blood sugar diagnostic Strp, 1 each by Misc.(Non-Drug; Combo Route) route as directed., Disp: 100 each, Rfl: 0    calcium acetate (PHOSLO) 667 mg capsule, Take 667 mg by mouth 3 (three) times daily with meals. , Disp: , Rfl:     calcium carbonate (TUMS ULTRA) 1,177 mg Chew, Take 2 tablets by mouth 3 (three) times daily with meals. , Disp: , Rfl:     carvedilol (COREG) 12.5 MG tablet, Take 12.5 mg by mouth 2 (two) times daily. , Disp: , Rfl:     FOLIC ACID/VIT BCOMP,C (JAM-YAQUELIN ORAL), Take 1 tablet by mouth once daily., Disp: , Rfl:     furosemide (LASIX) 80 MG tablet, Take 80 mg by mouth once daily. , Disp: , Rfl:     hydrALAZINE (APRESOLINE) 50 MG tablet, Take 25 mg by mouth every 12 (twelve) hours. , Disp: , Rfl:     insulin glargine (BASAGLAR KWIKPEN U-100 INSULIN) 100 unit/mL (3 mL) InPn pen, Inject 7 Units into the skin every evening., Disp: 6 mL, Rfl: 3    isosorbide mononitrate (IMDUR) 60 MG 24 hr tablet, TAKE ONE TABLET BY MOUTH EVERY DAY, Disp: 30 tablet, Rfl: 5    lancets (ACCU-CHEK SOFTCLIX LANCETS) Misc, 1 application by Misc.(Non-Drug; Combo  Route) route 4 (four) times daily before meals and nightly., Disp: 120 each, Rfl: 0    aspirin (ECOTRIN) 81 MG EC tablet, Take 81 mg by mouth., Disp: , Rfl:     blood-glucose meter (FREESTYLE SYSTEM KIT) kit, Use as instructed, Disp: 1 each, Rfl: 0    DUREZOL 0.05 % Drop ophthalmic solution, , Disp: , Rfl:     fluticasone (FLONASE) 50 mcg/actuation nasal spray, 1 spray by Each Nare route once daily., Disp: 16 g, Rfl: 2    hydrALAZINE (APRESOLINE) 100 MG tablet, Take 1 tablet (100 mg total) by mouth every 12 (twelve) hours., Disp: 60 tablet, Rfl: 3    metoprolol tartrate (LOPRESSOR) 25 MG tablet, TAKE ONE TABLET BY MOUTH TWICE DAILY, Disp: 60 tablet, Rfl: 5    RENVELA 800 mg Tab, Take 800 mg by mouth 3 (three) times daily with meals. , Disp: , Rfl:      Does patient have record of home blood pressure readings -  no. Readings have been averaging - goes to dialysis 3x/week where blood pressures are monitored. States that BP's are high before he gets on the machine and then gets whatever BP pill they give him and blood pressures goes down to 140's.     Last dose of blood pressure medication was taken at 9 AM this morning.    Patient is asymptomatic.     Complains of - no complaints    Blood pressure  180/72 and Pulse 62.    Blood pressure reading after 15 minutes was 178/68, Pulse 70.    Dr. Perez was  notified. Orders for pt to take his Apresoline as prescribed on 3/26/18, and return in one week for repeat blood pressure check. Pt and daughter verbalized understanding.

## 2018-04-02 NOTE — TELEPHONE ENCOUNTER
----- Message from Merry Jones sent at 4/2/2018 11:12 AM CDT -----  Contact: Pt in person  Pt needs Rx for [place]. Please call [phone]

## 2018-04-03 LAB
CLASS I ANTIBODIES - LUMINEX: NORMAL
CLASS II ANTIBODIES - LUMINEX: NORMAL
LUMINEX CLASS I & II SPECIFICITY INTERPRETATION: NORMAL
SERUM COLLECTION DT - LUMINEX CLASS I: NORMAL
SERUM COLLECTION DT - LUMINEX CLASS II: NORMAL
SPCL1 TESTING DATE: NORMAL
SPCL2 TESTING DATE: NORMAL
SPCLU TESTING DATE: NORMAL

## 2018-04-04 ENCOUNTER — TELEPHONE (OUTPATIENT)
Dept: FAMILY MEDICINE | Facility: CLINIC | Age: 58
End: 2018-04-04

## 2018-04-04 DIAGNOSIS — Z99.2 ESRD (END STAGE RENAL DISEASE) ON DIALYSIS: ICD-10-CM

## 2018-04-04 DIAGNOSIS — N18.6 ESRD (END STAGE RENAL DISEASE) ON DIALYSIS: ICD-10-CM

## 2018-04-04 DIAGNOSIS — I10 ESSENTIAL HYPERTENSION: Chronic | ICD-10-CM

## 2018-04-04 RX ORDER — ISOSORBIDE MONONITRATE 60 MG/1
TABLET, EXTENDED RELEASE ORAL
Qty: 30 TABLET | Refills: 3 | Status: SHIPPED | OUTPATIENT
Start: 2018-04-04 | End: 2018-06-04 | Stop reason: SDUPTHER

## 2018-04-04 RX ORDER — HYDRALAZINE HYDROCHLORIDE 100 MG/1
100 TABLET, FILM COATED ORAL EVERY 12 HOURS
Qty: 60 TABLET | Refills: 3 | Status: SHIPPED | OUTPATIENT
Start: 2018-04-04 | End: 2019-04-09 | Stop reason: SDUPTHER

## 2018-04-04 NOTE — TELEPHONE ENCOUNTER
----- Message from Telma Gray sent at 4/3/2018 11:34 AM CDT -----  Contact: Eli Brownlee (Daughter)  Torrie calling to speak to a nurse regarding med below. [phone].

## 2018-04-09 ENCOUNTER — CLINICAL SUPPORT (OUTPATIENT)
Dept: FAMILY MEDICINE | Facility: CLINIC | Age: 58
End: 2018-04-09
Payer: COMMERCIAL

## 2018-04-09 ENCOUNTER — TELEPHONE (OUTPATIENT)
Dept: FAMILY MEDICINE | Facility: CLINIC | Age: 58
End: 2018-04-09

## 2018-04-09 VITALS
OXYGEN SATURATION: 98 % | RESPIRATION RATE: 16 BRPM | HEART RATE: 76 BPM | SYSTOLIC BLOOD PRESSURE: 200 MMHG | DIASTOLIC BLOOD PRESSURE: 80 MMHG

## 2018-04-09 DIAGNOSIS — I10 HYPERTENSION, ESSENTIAL: Primary | ICD-10-CM

## 2018-04-09 PROCEDURE — 99999 PR PBB SHADOW E&M-EST. PATIENT-LVL III: CPT | Mod: PBBFAC,,,

## 2018-04-09 RX ORDER — CLONIDINE HYDROCHLORIDE 0.1 MG/1
0.1 TABLET ORAL ONCE
Status: COMPLETED | OUTPATIENT
Start: 2018-04-09 | End: 2018-04-09

## 2018-04-09 RX ADMIN — CLONIDINE HYDROCHLORIDE 0.1 MG: 0.1 TABLET ORAL at 02:04

## 2018-04-09 NOTE — PROGRESS NOTES
Vega Brownlee 57 y.o. male is here today for Blood Pressure check.     History of HTN yes.    Review of patient's allergies indicates:  No Known Allergies  Creatinine   Date Value Ref Range Status   06/09/2017 7.8 (H) 0.5 - 1.4 mg/dL Final     Sodium   Date Value Ref Range Status   06/09/2017 133 (L) 136 - 145 mmol/L Final     Potassium   Date Value Ref Range Status   08/16/2017 4.9 3.5 - 5.1 mmol/L Final   ]  Patient verifies taking blood pressure medications on a regular basis at the same time of the day.     Current Outpatient Prescriptions:     atorvastatin (LIPITOR) 40 MG tablet, Take 1 tablet (40 mg total) by mouth every evening., Disp: 90 tablet, Rfl: 3    blood sugar diagnostic Strp, 1 each by Misc.(Non-Drug; Combo Route) route as directed., Disp: 100 each, Rfl: 0    calcium acetate (PHOSLO) 667 mg capsule, Take 667 mg by mouth 3 (three) times daily with meals. , Disp: , Rfl:     calcium carbonate (TUMS ULTRA) 1,177 mg Chew, Take 2 tablets by mouth 3 (three) times daily with meals. , Disp: , Rfl:     carvedilol (COREG) 12.5 MG tablet, Take 12.5 mg by mouth 2 (two) times daily. , Disp: , Rfl:     DUREZOL 0.05 % Drop ophthalmic solution, , Disp: , Rfl:     fluticasone (FLONASE) 50 mcg/actuation nasal spray, 1 spray by Each Nare route once daily., Disp: 16 g, Rfl: 2    FOLIC ACID/VIT BCOMP,C (JAM-YAQUELIN ORAL), Take 1 tablet by mouth once daily., Disp: , Rfl:     furosemide (LASIX) 80 MG tablet, Take 80 mg by mouth once daily. , Disp: , Rfl:     hydrALAZINE (APRESOLINE) 100 MG tablet, Take 1 tablet (100 mg total) by mouth every 12 (twelve) hours., Disp: 60 tablet, Rfl: 3    insulin glargine (BASAGLAR KWIKPEN U-100 INSULIN) 100 unit/mL (3 mL) InPn pen, Inject 7 Units into the skin every evening., Disp: 6 mL, Rfl: 3    isosorbide mononitrate (IMDUR) 60 MG 24 hr tablet, TAKE ONE TABLET BY MOUTH EVERY DAY, Disp: 30 tablet, Rfl: 3    lancets (ACCU-CHEK SOFTCLIX LANCETS) Misc, 1 application by  "Misc.(Non-Drug; Combo Route) route 4 (four) times daily before meals and nightly., Disp: 120 each, Rfl: 0    pen needle, diabetic 31 gauge x 3/16" Ndle, BD Ultra Fine Mini pen needles (5mm x 31g) - dispense 100, Disp: 100 each, Rfl: 1    RENVELA 800 mg Tab, Take 800 mg by mouth 3 (three) times daily with meals. , Disp: , Rfl:     aspirin (ECOTRIN) 81 MG EC tablet, Take 81 mg by mouth., Disp: , Rfl:     blood-glucose meter (FREESTYLE SYSTEM KIT) kit, Use as instructed, Disp: 1 each, Rfl: 0    metoprolol tartrate (LOPRESSOR) 25 MG tablet, TAKE ONE TABLET BY MOUTH TWICE DAILY, Disp: 60 tablet, Rfl: 5     Does patient have record of home blood pressure readings -  no. Blood pressures are monitored at dialysis center on Tues, Thurs, and Sat @ [facility].    Last dose of blood pressure medication was taken this morning at 9 AM (Imdur, Lasix, Hydralazine, and Coreg). Pt needs much coaching with how to take meds. He was instructed at last BP check a week ago that Dr Perez wanted him to stop the Metoprolol, but he continued to take med until he ran out of it on last Friday. This order was also explained to pt's daughter upon last visit.    Patient is asymptomatic.     Complains of  - no complaints    BP: (!) 200/80 , Pulse: 76  @ 1:30 PM.    Blood pressure reading after 15 minutes was 202/82 (manual - left arm), 200/87 (dinamap - left leg), and 176/71 (dinamap - left arm)    Dr. Caballero was notified and new order was for recheck of blood pressure and Clonidine 0.1 mg po, which was given to pt at 2:15 PM.   Blood pressure at 2:50 PM was 188/78 - left arm manual. Dr Caballero was notified and new order was given to take his Hydralazine as soon as he gets home instead of 9 PM tonight. Pt still needs much cueing with directions for taking meds. Asked if his daughter could come to appts with him and he stated that she's away at school.      Dr Caballero would like for pt to follow up with Dr Perez.                    "

## 2018-04-09 NOTE — TELEPHONE ENCOUNTER
Dr Perez, please see my encounter for blood pressure check today. Orders were given by Dr Caballero since you weren't here. She would like for pt to follow up with you. Please let Bella know when to schedule pt

## 2018-04-11 DIAGNOSIS — N18.6 ESRD (END STAGE RENAL DISEASE) ON DIALYSIS: ICD-10-CM

## 2018-04-11 DIAGNOSIS — I10 ESSENTIAL HYPERTENSION: Chronic | ICD-10-CM

## 2018-04-11 DIAGNOSIS — Z99.2 ESRD (END STAGE RENAL DISEASE) ON DIALYSIS: ICD-10-CM

## 2018-04-20 ENCOUNTER — OFFICE VISIT (OUTPATIENT)
Dept: FAMILY MEDICINE | Facility: CLINIC | Age: 58
End: 2018-04-20
Payer: MEDICARE

## 2018-04-20 VITALS
BODY MASS INDEX: 22.38 KG/M2 | DIASTOLIC BLOOD PRESSURE: 72 MMHG | HEIGHT: 68 IN | HEART RATE: 72 BPM | OXYGEN SATURATION: 98 % | SYSTOLIC BLOOD PRESSURE: 158 MMHG | TEMPERATURE: 98 F | WEIGHT: 147.69 LBS | RESPIRATION RATE: 17 BRPM

## 2018-04-20 DIAGNOSIS — N18.6 ESRD (END STAGE RENAL DISEASE) ON DIALYSIS: ICD-10-CM

## 2018-04-20 DIAGNOSIS — E11.22 DIABETES MELLITUS WITH ESRD (END-STAGE RENAL DISEASE): Chronic | ICD-10-CM

## 2018-04-20 DIAGNOSIS — I10 ESSENTIAL HYPERTENSION: Primary | Chronic | ICD-10-CM

## 2018-04-20 DIAGNOSIS — N18.6 DIABETES MELLITUS WITH ESRD (END-STAGE RENAL DISEASE): Chronic | ICD-10-CM

## 2018-04-20 DIAGNOSIS — Z99.2 ESRD (END STAGE RENAL DISEASE) ON DIALYSIS: ICD-10-CM

## 2018-04-20 PROCEDURE — 99999 PR PBB SHADOW E&M-EST. PATIENT-LVL IV: CPT | Mod: PBBFAC,,, | Performed by: INTERNAL MEDICINE

## 2018-04-20 PROCEDURE — 99214 OFFICE O/P EST MOD 30 MIN: CPT | Mod: PBBFAC,PN | Performed by: INTERNAL MEDICINE

## 2018-04-20 PROCEDURE — 99214 OFFICE O/P EST MOD 30 MIN: CPT | Mod: S$PBB,,, | Performed by: INTERNAL MEDICINE

## 2018-04-20 RX ORDER — CARVEDILOL 12.5 MG/1
12.5 TABLET ORAL 2 TIMES DAILY
Qty: 180 TABLET | Refills: 1 | Status: SHIPPED | OUTPATIENT
Start: 2018-04-20 | End: 2018-07-30 | Stop reason: SDUPTHER

## 2018-04-20 RX ORDER — CLONIDINE HYDROCHLORIDE 0.2 MG/1
0.2 TABLET ORAL 2 TIMES DAILY
Qty: 180 TABLET | Refills: 2 | Status: SHIPPED | OUTPATIENT
Start: 2018-04-20 | End: 2019-01-14 | Stop reason: SDUPTHER

## 2018-04-20 NOTE — PROGRESS NOTES
Subjective:       Patient ID: Vega Brownlee is a 57 y.o. male.    Chief Complaint: Hypertension and Follow-up    Hypertension   This is a chronic problem. The current episode started more than 1 year ago. The problem has been waxing and waning since onset. The problem is uncontrolled. Pertinent negatives include no anxiety, chest pain, headaches, palpitations, peripheral edema or shortness of breath. Risk factors for coronary artery disease include diabetes mellitus and male gender (ESRD on HD). Past treatments include beta blockers, direct vasodilators and diuretics. The current treatment provides mild improvement. Hypertensive end-organ damage includes kidney disease and retinopathy. Identifiable causes of hypertension include chronic renal disease.   despite increase in hydralazine post HD systolic BP still in 150's did not bring his medications with him today reviewed list and he reports being out of carvedilol.   Review of Systems   Constitutional: Negative for activity change, appetite change, fatigue, fever and unexpected weight change.   HENT: Negative for ear pain, rhinorrhea and sore throat.    Eyes: Negative for discharge and visual disturbance.   Respiratory: Negative for chest tightness, shortness of breath and wheezing.    Cardiovascular: Negative for chest pain, palpitations and leg swelling.   Gastrointestinal: Negative for abdominal pain, constipation and diarrhea.   Endocrine: Negative for cold intolerance and heat intolerance.   Genitourinary: Negative for dysuria and hematuria.   Musculoskeletal: Negative for joint swelling and neck stiffness.   Skin: Negative for rash.   Neurological: Negative for dizziness, syncope, weakness and headaches.   Psychiatric/Behavioral: Negative for suicidal ideas.       Objective:     Vitals:    04/20/18 1319 04/20/18 1354   BP: (!) 160/66 (!) 158/72   BP Location: Left arm    Patient Position: Sitting    BP Method: Medium (Manual)    Pulse: 72 72   Resp: 17    Temp:  "98 °F (36.7 °C)    TempSrc: Oral    SpO2: 98%    Weight: 67 kg (147 lb 11.3 oz)    Height: 5' 8" (1.727 m)           Physical Exam   Constitutional: He is oriented to person, place, and time. He appears well-developed and well-nourished.   HENT:   Head: Normocephalic and atraumatic.   Eyes: Conjunctivae are normal. No scleral icterus.   Neck: Normal range of motion.   Cardiovascular: Normal rate and regular rhythm.  Exam reveals no gallop and no friction rub.    No murmur heard.  No LE edema   Pulmonary/Chest: Effort normal and breath sounds normal. He has no wheezes. He has no rales.   Abdominal: Soft. Bowel sounds are normal. There is no tenderness. There is no rebound and no guarding.   Musculoskeletal: Normal range of motion. He exhibits no edema or tenderness.   Neurological: He is alert and oriented to person, place, and time. No cranial nerve deficit.   Skin: Skin is warm and dry.   Psychiatric: He has a normal mood and affect.       Assessment and Plan   1. Essential hypertension  Add clonidine BP check in two weeks will bring all medicatiosn from home for reconcilliation  - cloNIDine (CATAPRES) 0.2 MG tablet; Take 1 tablet (0.2 mg total) by mouth 2 (two) times daily.  Dispense: 180 tablet; Refill: 2  - carvedilol (COREG) 12.5 MG tablet; Take 1 tablet (12.5 mg total) by mouth 2 (two) times daily.  Dispense: 180 tablet; Refill: 1    2. ESRD (end stage renal disease) on dialysis  HD per nephrologist    3. Diabetes mellitus with ESRD (end-stage renal disease)  Screening [facility] glucose at goal continue  "

## 2018-05-21 ENCOUNTER — OFFICE VISIT (OUTPATIENT)
Dept: FAMILY MEDICINE | Facility: CLINIC | Age: 58
End: 2018-05-21
Payer: MEDICARE

## 2018-05-21 VITALS
TEMPERATURE: 98 F | BODY MASS INDEX: 22.38 KG/M2 | RESPIRATION RATE: 12 BRPM | OXYGEN SATURATION: 98 % | HEART RATE: 68 BPM | DIASTOLIC BLOOD PRESSURE: 68 MMHG | SYSTOLIC BLOOD PRESSURE: 184 MMHG | HEIGHT: 68 IN | WEIGHT: 147.69 LBS

## 2018-05-21 DIAGNOSIS — N18.6 ESRD (END STAGE RENAL DISEASE) ON DIALYSIS: ICD-10-CM

## 2018-05-21 DIAGNOSIS — I1A.0 RESISTANT HYPERTENSION: Primary | ICD-10-CM

## 2018-05-21 DIAGNOSIS — Z99.2 ESRD (END STAGE RENAL DISEASE) ON DIALYSIS: ICD-10-CM

## 2018-05-21 PROCEDURE — 99214 OFFICE O/P EST MOD 30 MIN: CPT | Mod: S$PBB,,, | Performed by: INTERNAL MEDICINE

## 2018-05-21 PROCEDURE — 99214 OFFICE O/P EST MOD 30 MIN: CPT | Mod: PBBFAC,PN | Performed by: INTERNAL MEDICINE

## 2018-05-21 PROCEDURE — 99999 PR PBB SHADOW E&M-EST. PATIENT-LVL IV: CPT | Mod: PBBFAC,,, | Performed by: INTERNAL MEDICINE

## 2018-05-21 RX ORDER — FUROSEMIDE 80 MG/1
80 TABLET ORAL DAILY
Qty: 90 TABLET | Refills: 1 | Status: SHIPPED | OUTPATIENT
Start: 2018-05-21 | End: 2018-09-28 | Stop reason: SDUPTHER

## 2018-05-21 NOTE — PROGRESS NOTES
"Subjective:       Patient ID: Vega Brownlee is a 57 y.o. male.    Chief Complaint: Follow-up (1 month)    F/u HTN    HPI: 56 y/o w/ HTN DM ESRD on HD (TRS, Dr. Menchaca) presents for BP follow up. Reports BP still running high even on HD. In fact he has noted BP has gone up during dialysis for last two weeks with systolics in 180's he denies symptoms of headache vision changes or LE swelling. He does urinate approximately three times per day no decrease in this. He denies dyspnea or shortness of breath. He brings old pill bottels showing doses of his medicaitons carvedilol 12.5mg clonidine 0.2mg hydralazine 100mg and isosorbide mononitrate 60mg. He notes BP was higher after starting to use his left arm AV fistula.       Review of Systems   Constitutional: Negative for activity change, appetite change, fatigue, fever and unexpected weight change.   HENT: Negative for ear pain, rhinorrhea and sore throat.    Eyes: Negative for discharge and visual disturbance.   Respiratory: Negative for chest tightness, shortness of breath and wheezing.    Cardiovascular: Negative for chest pain, palpitations and leg swelling.   Gastrointestinal: Negative for abdominal pain, constipation and diarrhea.   Endocrine: Negative for cold intolerance and heat intolerance.   Genitourinary: Negative for dysuria and hematuria.   Musculoskeletal: Negative for joint swelling and neck stiffness.   Skin: Negative for rash.   Neurological: Negative for dizziness, syncope, weakness and headaches.   Psychiatric/Behavioral: Negative for suicidal ideas.       Objective:     Vitals:    05/21/18 1339 05/21/18 1408   BP: (!) 190/72 (!) 184/68   BP Location: Left arm    Patient Position: Sitting    BP Method: Medium (Manual)    Pulse: 63 68   Resp: 12    Temp: 98.3 °F (36.8 °C)    TempSrc: Oral    SpO2: 98%    Weight: 67 kg (147 lb 11.3 oz)    Height: 5' 8" (1.727 m)           Physical Exam   Constitutional: He is oriented to person, place, and " time. He appears well-developed and well-nourished.   HENT:   Head: Normocephalic and atraumatic.   Eyes: Conjunctivae are normal. Pupils are equal, round, and reactive to light.   Neck: Normal range of motion.   Cardiovascular: Normal rate and regular rhythm.  Exam reveals no gallop and no friction rub.    No murmur heard.  Pitting edema of bileral ankles, left upper arm AV fistula palpable thrill   Pulmonary/Chest: Effort normal and breath sounds normal. He has no wheezes. He has no rales.   Abdominal: Soft. Bowel sounds are normal. There is no tenderness. There is no rebound and no guarding.   Musculoskeletal: Normal range of motion. He exhibits no tenderness.   Neurological: He is alert and oriented to person, place, and time. No cranial nerve deficit.   Skin: Skin is warm and dry.   Psychiatric: He has a normal mood and affect.       Assessment and Plan   1. Resistant hypertension  In light of persistant elevated BP and he is making urinee with some evidence of excess volume trial non HD day loop diuretic referal to cards  - Ambulatory referral to Cardiology  - furosemide (LASIX) 80 MG tablet; Take 1 tablet (80 mg total) by mouth once daily.  Dispense: 90 tablet; Refill: 1    2. ESRD (end stage renal disease) on dialysis  Continue HD per nephrologist

## 2018-06-04 ENCOUNTER — OFFICE VISIT (OUTPATIENT)
Dept: CARDIOLOGY | Facility: CLINIC | Age: 58
End: 2018-06-04
Payer: MEDICARE

## 2018-06-04 VITALS
SYSTOLIC BLOOD PRESSURE: 160 MMHG | BODY MASS INDEX: 22.22 KG/M2 | HEIGHT: 68 IN | HEART RATE: 59 BPM | RESPIRATION RATE: 15 BRPM | WEIGHT: 146.63 LBS | DIASTOLIC BLOOD PRESSURE: 80 MMHG

## 2018-06-04 DIAGNOSIS — E11.3513 TYPE 2 DIABETES MELLITUS WITH BOTH EYES AFFECTED BY PROLIFERATIVE RETINOPATHY AND MACULAR EDEMA, WITH LONG-TERM CURRENT USE OF INSULIN: ICD-10-CM

## 2018-06-04 DIAGNOSIS — Z01.810 PREOP CARDIOVASCULAR EXAM: ICD-10-CM

## 2018-06-04 DIAGNOSIS — E78.2 MIXED HYPERLIPIDEMIA: ICD-10-CM

## 2018-06-04 DIAGNOSIS — E11.22 DIABETES MELLITUS WITH ESRD (END-STAGE RENAL DISEASE): Chronic | ICD-10-CM

## 2018-06-04 DIAGNOSIS — Z79.4 TYPE 2 DIABETES MELLITUS WITH BOTH EYES AFFECTED BY PROLIFERATIVE RETINOPATHY AND MACULAR EDEMA, WITH LONG-TERM CURRENT USE OF INSULIN: ICD-10-CM

## 2018-06-04 DIAGNOSIS — N18.6 DIABETES MELLITUS WITH ESRD (END-STAGE RENAL DISEASE): Chronic | ICD-10-CM

## 2018-06-04 DIAGNOSIS — N18.6 ESRD (END STAGE RENAL DISEASE) ON DIALYSIS: ICD-10-CM

## 2018-06-04 DIAGNOSIS — I10 ESSENTIAL HYPERTENSION: Primary | Chronic | ICD-10-CM

## 2018-06-04 DIAGNOSIS — Z99.2 ESRD (END STAGE RENAL DISEASE) ON DIALYSIS: ICD-10-CM

## 2018-06-04 PROCEDURE — 99999 PR PBB SHADOW E&M-EST. PATIENT-LVL III: CPT | Mod: PBBFAC,TXP,, | Performed by: INTERNAL MEDICINE

## 2018-06-04 PROCEDURE — 93010 ELECTROCARDIOGRAM REPORT: CPT | Mod: TXP,,, | Performed by: INTERNAL MEDICINE

## 2018-06-04 PROCEDURE — 99213 OFFICE O/P EST LOW 20 MIN: CPT | Mod: PBBFAC,TXP | Performed by: INTERNAL MEDICINE

## 2018-06-04 PROCEDURE — 99214 OFFICE O/P EST MOD 30 MIN: CPT | Mod: S$PBB,TXP,, | Performed by: INTERNAL MEDICINE

## 2018-06-04 RX ORDER — ISOSORBIDE MONONITRATE 120 MG/1
120 TABLET, EXTENDED RELEASE ORAL DAILY
Qty: 90 TABLET | Refills: 3 | Status: SHIPPED | OUTPATIENT
Start: 2018-06-04 | End: 2019-03-01 | Stop reason: SDUPTHER

## 2018-06-04 NOTE — LETTER
June 4, 2018      Ko Perez MD  [address] [facility] [address]           [facility] - Cardiology  [address] [facility] [address]  [facility] [address]  Phone: [phone]          Patient: Vega Brownlee   MR Number: [id number]   YOB: 1960   Date of Visit: 6/4/2018       Dear Dr. Ko Perez:    Thank you for referring Vega Brownlee to me for evaluation. Attached you will find relevant portions of my assessment and plan of care.    If you have questions, please do not hesitate to call me. I look forward to following Vega Brownlee along with you.    Sincerely,    Darrel Sutton MD    Enclosure  CC:  MD Hanane Villavicencio NP    If you would like to receive this communication electronically, please contact [email or web address] or [phone] to request more information on [facility] [employer] access.    For providers and/or their staff who would like to refer a patient to [facility], please contact us through our one-stop-shop provider referral line, [facility] [place], at [phone].    If you feel you have received this communication in error or would no longer like to receive these types of communications, please e-mail [email or web address]

## 2018-06-04 NOTE — PROGRESS NOTES
CARDIOVASCULAR PROGRESS NOTE    REASON FOR CONSULT:   Vega Brownlee is a 57 y.o. male who presents for follow up of HTN.    PCP: Ana  Neph: RUBEN Lowery  Transplant: Alvin  HISTORY OF PRESENT ILLNESS:   Last seen 5/10/17.    The patient returns for follow-up.  He reports a generally asymptomatic status without angina or dyspnea.  He's had no palpitations, lightheadedness, dizziness, or syncope.  There's been no PND, orthopnea, or lower extremity edema.  He denies melena, hematuria, or claudicant symptoms.  The patient does me he exercises almost daily without any symptoms or limitation.    The patient's primary care physician has noted the patient's blood pressure be out of control.  The patient tells me his blood pressure is in the 140 systolic range on dialysis days.  It was 160/70 on my manual check today.      On review of the patient's records in scheduling, and appears that he has a nuclear stress test scheduled for preoperative cardiac evaluation prior to kidney transplant.  This stress test has been scheduled on the [place] at the [facility], and the patient lives here in the [facility].  I'll see if we can get that rescheduled for the [facility].    CARDIOVASCULAR HISTORY:   none    PAST MEDICAL HISTORY:     Past Medical History:   Diagnosis Date    Anemia     Cataract     CHF (congestive heart failure)     Diabetes mellitus     Diabetes mellitus, type 2     Disorder of kidney and ureter     Hyperlipidemia     Hypertension        PAST SURGICAL HISTORY:     Past Surgical History:   Procedure Laterality Date    arm fracture Left     COLONOSCOPY N/A 8/16/2017    Procedure: COLONOSCOPY;  Surgeon: Sebastián Lamas MD;  Location: [address]);  Service: Endoscopy;  Laterality: N/A;  dialysis pt/potassium level 1st/svn    FINGER AMPUTATION Left     left index finger    LUNG SURGERY      PORTACATH PLACEMENT  08/2016       ALLERGIES AND MEDICATION:   Review of patient's allergies indicates:  No  "Known Allergies  Previous Medications    ASPIRIN (ECOTRIN) 81 MG EC TABLET    Take 81 mg by mouth.    ATORVASTATIN (LIPITOR) 40 MG TABLET    Take 1 tablet (40 mg total) by mouth every evening.    BLOOD SUGAR DIAGNOSTIC STRP    1 each by Misc.(Non-Drug; Combo Route) route as directed.    BLOOD-GLUCOSE METER (FREESTYLE SYSTEM KIT) KIT    Use as instructed    CALCIUM ACETATE (PHOSLO) 667 MG CAPSULE    Take 667 mg by mouth 3 (three) times daily with meals.     CALCIUM CARBONATE (TUMS ULTRA) 1,177 MG CHEW    Take 2 tablets by mouth 3 (three) times daily with meals.     CARVEDILOL (COREG) 12.5 MG TABLET    Take 1 tablet (12.5 mg total) by mouth 2 (two) times daily.    CLONIDINE (CATAPRES) 0.2 MG TABLET    Take 1 tablet (0.2 mg total) by mouth 2 (two) times daily.    DUREZOL 0.05 % DROP OPHTHALMIC SOLUTION        FLUTICASONE (FLONASE) 50 MCG/ACTUATION NASAL SPRAY    1 spray by Each Nare route once daily.    FOLIC ACID/VIT BCOMP,C (JAM-YAQUELIN ORAL)    Take 1 tablet by mouth once daily.    FUROSEMIDE (LASIX) 80 MG TABLET    Take 1 tablet (80 mg total) by mouth once daily.    HYDRALAZINE (APRESOLINE) 100 MG TABLET    Take 1 tablet (100 mg total) by mouth every 12 (twelve) hours.    INSULIN GLARGINE (BASAGLAR KWIKPEN U-100 INSULIN) 100 UNIT/ML (3 ML) INPN PEN    Inject 7 Units into the skin every evening.    ISOSORBIDE MONONITRATE (IMDUR) 60 MG 24 HR TABLET    TAKE ONE TABLET BY MOUTH EVERY DAY    LANCETS (ACCU-CHEK SOFTCLIX LANCETS) MISC    1 application by Misc.(Non-Drug; Combo Route) route 4 (four) times daily before meals and nightly.    PEN NEEDLE, DIABETIC 31 GAUGE X 3/16" NDLE    BD Ultra Fine Mini pen needles (5mm x 31g) - dispense 100    RENVELA 800 MG TAB    Take 800 mg by mouth 3 (three) times daily with meals.        SOCIAL HISTORY:     Social History     Social History    Marital status:      Spouse name: N/A    Number of children: N/A    Years of education: N/A     Occupational History    Not on file. " "    Social History Main Topics    Smoking status: Former Smoker     Packs/day: 1.00     Start date: 4/26/1982     Quit date: 9/26/2016    Smokeless tobacco: Former User    Alcohol use No    Drug use: No    Sexual activity: Not on file     Other Topics Concern    Not on file     Social History Narrative    No narrative on file       FAMILY HISTORY:     Family History   Problem Relation Age of Onset    Diabetes Mother     Memory loss Mother     No Known Problems Father     No Known Problems Sister     No Known Problems Brother     Arthritis Sister     No Known Problems Sister        REVIEW OF SYSTEMS:   Review of Systems   Constitutional: Negative for chills, diaphoresis and fever.   HENT: Negative for nosebleeds.    Eyes: Negative for blurred vision, double vision and photophobia.   Respiratory: Negative for hemoptysis, shortness of breath and wheezing.    Cardiovascular: Negative for chest pain, palpitations, orthopnea, claudication, leg swelling and PND.   Gastrointestinal: Negative for abdominal pain, blood in stool, heartburn, melena, nausea and vomiting.   Genitourinary: Negative for flank pain and hematuria.   Musculoskeletal: Negative for falls, myalgias and neck pain.   Skin: Negative for rash.   Neurological: Negative for dizziness, seizures, loss of consciousness, weakness and headaches.   Endo/Heme/Allergies: Negative for polydipsia. Does not bruise/bleed easily.   Psychiatric/Behavioral: Negative for depression and memory loss. The patient is not nervous/anxious.        PHYSICAL EXAM:     Vitals:    06/04/18 0936   BP: (!) 160/80   Pulse: (!) 59   Resp: 15    Body mass index is 22.29 kg/m².  Weight: 66.5 kg (146 lb 9.7 oz)   Height: 5' 8" (172.7 cm)     Physical Exam   Constitutional: He is oriented to person, place, and time. He appears well-developed and well-nourished. He is cooperative.  Non-toxic appearance. No distress.   HENT:   Head: Normocephalic and atraumatic.   Eyes: Conjunctivae " and EOM are normal. Pupils are equal, round, and reactive to light. No scleral icterus.   Neck: Trachea normal and normal range of motion. Neck supple. Normal carotid pulses and no JVD present. Carotid bruit is not present. No neck rigidity. No tracheal deviation and no edema present. No thyromegaly present.   Cardiovascular: Normal rate, regular rhythm, S1 normal and S2 normal.  PMI is not displaced.  Exam reveals no gallop and no friction rub.    No murmur heard.  Pulses:       Carotid pulses are 2+ on the right side, and 2+ on the left side.  RUE HD fistula present   Pulmonary/Chest: Effort normal and breath sounds normal. No respiratory distress. He has no wheezes. He has no rales. He exhibits no tenderness.   Abdominal: Soft. He exhibits no distension. There is no hepatosplenomegaly.   Musculoskeletal: He exhibits no edema or tenderness.   L elbow deformity from prior fx repair   Feet:   Right Foot:   Skin Integrity: Negative for ulcer.   Left Foot:   Skin Integrity: Negative for ulcer.   Neurological: He is alert and oriented to person, place, and time. No cranial nerve deficit.   Skin: Skin is warm and dry. No rash noted. No erythema.   Psychiatric: He has a normal mood and affect. His speech is normal and behavior is normal.   Vitals reviewed.      DATA:   EKG: (personally reviewed tracing)  6/4/18 SR 59, peaked TW    Laboratory:  CBC:    Recent Labs  Lab 08/01/16  0533 04/26/17  0755 06/09/17  1135   WHITE BLOOD CELL COUNT 7.31 10.78 8.78   HEMOGLOBIN 8.3 L 13.3 L 10.4 L   HEMATOCRIT 24.2 L 40.1 31.4 L   PLATELETS 440 H 215 264       CHEMISTRIES:    Recent Labs  Lab 07/26/16  1305  07/27/16  0435  11/11/16  0852 04/26/17  0755 06/09/17  1135 08/16/17  1045   GLUCOSE 287 H  < > 103  < > 93 101 274 H  --    SODIUM 139  < > 141  < > 139 136 133 L  --    POTASSIUM 4.8  < > 4.8  < > 4.9 4.2 4.8 4.9   BUN BLD 38 H  < > 41 H  < > 62 H 27 H 44 H  --    CREATININE 7.1 H  < > 7.2 H  < > 7.3 H 5.9 H 7.8 H  --    EGFR  IF  9 A  < > 9 A  < > 9 A 11.3 A 8 A  --    EGFR IF NON- 8 A  < > 8 A  < > 8 A 9.8 A 7 A  --    CALCIUM 6.0 LL  < > 6.2 LL  < > 8.1 L 9.3 9.0  --    MAGNESIUM 2.0  --  2.2  --   --   --   --   --    < > = values in this interval not displayed.    CARDIAC BIOMARKERS:    Recent Labs  Lab 07/26/16  1722 07/26/16  2135 07/27/16  0435 07/27/16  0817 07/27/16  1151   CPK 1349 H  1349 H  1349 H 1183 H  --  996 H  --    CPK MB 8.3 H  --   --   --   --    TROPONIN I 0.060 H  0.059 H 0.084 H 0.075 H  --  0.047 H       COAGS:    Recent Labs  Lab 07/26/16  1305 04/26/17  0755   INR 1.0 0.9       LIPIDS/LFTS:    Recent Labs  Lab 07/29/16  0325 11/11/16  0852 04/26/17  0755 06/09/17  1135   CHOLESTEROL 206 H 151 101 L  --    TRIGLYCERIDES 207 H 119 164 H  --    HDL 22 L 38 L 28 L  --    LDL CHOLESTEROL 142.6 89.2 40.2 L  --    NON-HDL CHOLESTEROL 184 113 73  --    AST 13 54 H 21 21   ALT 10 93 H 23 18       Cardiovascular Testing:  Echo 4/26/17 (EF improved from 35% in 7/2016)    1 - Mildly enlarged ascending aorta.     2 - Normal left ventricular systolic function (EF 60-65%).     3 - Normal left ventricular diastolic function.     4 - No wall motion abnormalities.     5 - Normal right ventricular systolic function .     6 - Trivial tricuspid regurgitation.     N[facility]ita MPI 8/1/16  Nuclear Quantitative Functional Analysis:   LVEF: 51 %  LVED Volume: 168 ml  LVES Volume: 83 ml  Impression: NORMAL MYOCARDIAL PERFUSION  1. The perfusion scan is free of evidence for myocardial ischemia or injury.   2. There is a mild to moderate intensity fixed defect in the inferior wall of the left ventricle, secondary to diaphragm attenuation.   3. There is abnormal wall motion at rest showing mild global hypokinesis of the left ventricle.   4. Resting LV function is normal.   5. The ventricular volumes are normal at rest and stress.   6. The extracardiac distribution of radioactivity is normal.     ASSESSMENT:   #  Preop for kidney transplant.  Pt had good exercise capacity as self reported. MPI ordered by transplant nephrology and I'll see if we get get that done for the pt on the [facility].  # ESRD/HD per renal.  # HTN, uncontrolled  # DM  # HLP, on atorva 40mg    PLAN:   Cont med rx  Inc Imdur to 120mg QD and titrate as needed, can also titrate hydrala or add ACEi (I will defer to nephrology and PCP, he has near term follow up with neph and also at HD)  Follow up with nephrology/transplant as planned  Consider rescheduling MPI on [facility] for pt convenience  RTC 6 months    Darrel Sutton MD, FACC

## 2018-06-05 ENCOUNTER — TELEPHONE (OUTPATIENT)
Dept: TRANSPLANT | Facility: CLINIC | Age: 58
End: 2018-06-05

## 2018-06-05 NOTE — TELEPHONE ENCOUNTER
----- Message from Cait Davison sent at 6/4/2018  4:10 PM CDT -----  Contact: patient   Patient was rescheduled for all testing on [facility] per staff member at [facility] location. Patient has appts beginning from 7:40am-4pm.      Thanks, Cait!

## 2018-06-06 DIAGNOSIS — Z76.82 ORGAN TRANSPLANT CANDIDATE: Primary | ICD-10-CM

## 2018-06-11 ENCOUNTER — PATIENT MESSAGE (OUTPATIENT)
Dept: FAMILY MEDICINE | Facility: CLINIC | Age: 58
End: 2018-06-11

## 2018-06-11 ENCOUNTER — APPOINTMENT (OUTPATIENT)
Dept: RADIOLOGY | Facility: HOSPITAL | Age: 58
End: 2018-06-11
Attending: FAMILY MEDICINE
Payer: MEDICARE

## 2018-06-11 ENCOUNTER — OFFICE VISIT (OUTPATIENT)
Dept: FAMILY MEDICINE | Facility: CLINIC | Age: 58
End: 2018-06-11
Payer: MEDICARE

## 2018-06-11 VITALS
HEIGHT: 68 IN | TEMPERATURE: 98 F | HEART RATE: 64 BPM | WEIGHT: 144.19 LBS | SYSTOLIC BLOOD PRESSURE: 157 MMHG | DIASTOLIC BLOOD PRESSURE: 55 MMHG | BODY MASS INDEX: 21.85 KG/M2 | OXYGEN SATURATION: 96 %

## 2018-06-11 DIAGNOSIS — R09.89 CHEST CONGESTION: ICD-10-CM

## 2018-06-11 DIAGNOSIS — R09.89 CHEST CONGESTION: Primary | ICD-10-CM

## 2018-06-11 DIAGNOSIS — J18.0 BRONCHOPNEUMONIA, UNSPECIFIED ORGANISM: Primary | ICD-10-CM

## 2018-06-11 PROCEDURE — 99999 PR PBB SHADOW E&M-EST. PATIENT-LVL III: CPT | Mod: PBBFAC,,, | Performed by: FAMILY MEDICINE

## 2018-06-11 PROCEDURE — 99213 OFFICE O/P EST LOW 20 MIN: CPT | Mod: PBBFAC,25,PN | Performed by: FAMILY MEDICINE

## 2018-06-11 PROCEDURE — 71046 X-RAY EXAM CHEST 2 VIEWS: CPT | Mod: 26,NTX,, | Performed by: RADIOLOGY

## 2018-06-11 PROCEDURE — 71046 X-RAY EXAM CHEST 2 VIEWS: CPT | Mod: TC,FY,PN,TXP

## 2018-06-11 PROCEDURE — 99213 OFFICE O/P EST LOW 20 MIN: CPT | Mod: S$PBB,,, | Performed by: FAMILY MEDICINE

## 2018-06-11 RX ORDER — DOXYCYCLINE 100 MG/1
100 CAPSULE ORAL EVERY 12 HOURS
Qty: 20 CAPSULE | Refills: 0 | Status: SHIPPED | OUTPATIENT
Start: 2018-06-11 | End: 2018-06-21

## 2018-06-11 RX ORDER — GUAIFENESIN 1200 MG/1
1 TABLET, EXTENDED RELEASE ORAL 2 TIMES DAILY PRN
Qty: 20 TABLET | Refills: 0 | Status: SHIPPED | OUTPATIENT
Start: 2018-06-11 | End: 2018-06-21

## 2018-06-11 NOTE — PROGRESS NOTES
Chief Complaint   Patient presents with    Cough    Chest Congestion       HPI    Vega Brownlee is 57 y.o. male. The encounter diagnosis was Chest congestion.    57 year old male with ESRD on Dialysis comes to clinic with complaint of chest congestion.  Patient reports cough started about 5 days ago.  Two days later patient reports myalgias and worsening congestion.  Patient reports that he is able to clear this congestion with cough but symptoms have persisted. He notes subjective fever for 2 days with multiple sick contacts at his dialysis center.        He denies shortness of breath, wheezing, and sinus congestion.  He reports using OTC Nyquil which did improve his symptoms.      Review of Systems   Constitutional: Positive for fatigue and fever. Negative for activity change, chills and diaphoresis.   HENT: Positive for congestion. Negative for postnasal drip, rhinorrhea, sinus pain, sinus pressure, sneezing and sore throat.    Respiratory: Positive for cough. Negative for chest tightness, shortness of breath and wheezing.    Cardiovascular: Negative for chest pain.   Musculoskeletal: Negative for gait problem.   Psychiatric/Behavioral: Negative for suicidal ideas.           Current Outpatient Prescriptions:     atorvastatin (LIPITOR) 40 MG tablet, Take 1 tablet (40 mg total) by mouth every evening., Disp: 90 tablet, Rfl: 3    blood sugar diagnostic Strp, 1 each by Misc.(Non-Drug; Combo Route) route as directed., Disp: 100 each, Rfl: 0    aspirin (ECOTRIN) 81 MG EC tablet, Take 81 mg by mouth., Disp: , Rfl:     blood-glucose meter (FREESTYLE SYSTEM KIT) kit, Use as instructed, Disp: 1 each, Rfl: 0    calcium acetate (PHOSLO) 667 mg capsule, Take 667 mg by mouth 3 (three) times daily with meals. , Disp: , Rfl:     calcium carbonate (TUMS ULTRA) 1,177 mg Chew, Take 2 tablets by mouth 3 (three) times daily with meals. , Disp: , Rfl:     carvedilol (COREG) 12.5 MG tablet, Take 1 tablet (12.5 mg total) by mouth  "2 (two) times daily., Disp: 180 tablet, Rfl: 1    cloNIDine (CATAPRES) 0.2 MG tablet, Take 1 tablet (0.2 mg total) by mouth 2 (two) times daily., Disp: 180 tablet, Rfl: 2    doxycycline (VIBRAMYCIN) 100 MG Cap, Take 1 capsule (100 mg total) by mouth every 12 (twelve) hours., Disp: 20 capsule, Rfl: 0    DUREZOL 0.05 % Drop ophthalmic solution, , Disp: , Rfl:     fluticasone (FLONASE) 50 mcg/actuation nasal spray, 1 spray by Each Nare route once daily., Disp: 16 g, Rfl: 2    FOLIC ACID/VIT BCOMP,C (JAM-YAQUELIN ORAL), Take 1 tablet by mouth once daily., Disp: , Rfl:     furosemide (LASIX) 80 MG tablet, Take 1 tablet (80 mg total) by mouth once daily., Disp: 90 tablet, Rfl: 1    guaiFENesin (MUCINEX) 1,200 mg Ta12, Take 1 tablet by mouth 2 (two) times daily as needed (congestion)., Disp: 20 tablet, Rfl: 0    hydrALAZINE (APRESOLINE) 100 MG tablet, Take 1 tablet (100 mg total) by mouth every 12 (twelve) hours., Disp: 60 tablet, Rfl: 3    insulin glargine (BASAGLAR KWIKPEN U-100 INSULIN) 100 unit/mL (3 mL) InPn pen, Inject 7 Units into the skin every evening., Disp: 6 mL, Rfl: 3    isosorbide mononitrate (IMDUR) 120 MG 24 hr tablet, Take 1 tablet (120 mg total) by mouth once daily., Disp: 90 tablet, Rfl: 3    lancets (ACCU-CHEK SOFTCLIX LANCETS) Misc, 1 application by Misc.(Non-Drug; Combo Route) route 4 (four) times daily before meals and nightly., Disp: 120 each, Rfl: 0    pen needle, diabetic 31 gauge x 3/16" Ndle, BD Ultra Fine Mini pen needles (5mm x 31g) - dispense 100, Disp: 100 each, Rfl: 1    RENVELA 800 mg Tab, Take 800 mg by mouth 3 (three) times daily with meals. , Disp: , Rfl:       Blood pressure (!) 157/55, pulse 64, temperature 97.9 °F (36.6 °C), temperature source Oral, height 5' 8" (1.727 m), weight 65.4 kg (144 lb 2.9 oz), SpO2 96 %.    Physical Exam   Constitutional: Vital signs are normal. He appears well-developed and well-nourished. He does not appear ill. No distress.   HENT: "   Mouth/Throat: Normal dentition.   Neck: Trachea normal. No thyromegaly present.   Cardiovascular: Normal rate, regular rhythm and intact distal pulses.    No murmur heard.  Pulmonary/Chest: Effort normal. No accessory muscle usage. No tachypnea. No respiratory distress. He has decreased breath sounds in the left middle field and the left lower field. He has no wheezes. He has rhonchi in the left middle field and the left lower field. He has no rales. He exhibits no deformity.   Musculoskeletal:   Normal gait. No decreased range of motion of major joints.   Neurological: He is not disoriented.   Skin: Skin is intact. Capillary refill takes less than 2 seconds.   Psychiatric: His speech is normal and behavior is normal. His mood appears not anxious. He does not exhibit a depressed mood.       Lab Visit on 05/17/2018   Component Date Value Ref Range Status    HPRA Interpretation 05/15/2018 Monthly screening not required at this time. Sample stored for Crossmatch availability.   Final    HLA Freeze and Hold Interpretation 05/15/2018 Monthly screening not required at this time. Sample stored for Crossmatch availability.   Final    HLAFH Testing Date 05/15/2018 06/04/2018 12:00 AM   Final    HLAFH Collection Date 05/15/2018 05/15/2018 12:14 PM   Final   Lab Visit on 04/11/2018   Component Date Value Ref Range Status    HPRA Interpretation 04/10/2018 Monthly screening not required at this time. Sample stored for Crossmatch availability.   Final    HLA Freeze and Hold Interpretation 04/10/2018 Monthly screening not required at this time. Sample stored for Crossmatch availability.   Final    HLAFH Testing Date 04/10/2018 05/02/2018 12:00 AM   Final    HLAFH Collection Date 04/10/2018 04/10/2018 02:00 PM   Final   Lab Visit on 03/15/2018   Component Date Value Ref Range Status    HPRA Interpretation 03/06/2018 This HPRA test has been reflexed to a [place] PRA Specificity. This HPRA test has been reflexed to a  Luminex PRA Specificity.   Final    Luminex Class I & II Specificity I* 03/06/2018 Due to high sample background patient sera must be adsorbed.   Final    SPCLU Testing Date 03/06/2018 03/23/2018 12:00 AM   Final    SPCL1 Testing Date 03/06/2018 03/22/2018 12:00 AM   Final    Serum Collection DT - Luminex Clas* 03/06/2018 03/06/2018 03:33 PM   Final    Class I Antibodies - Luminex 03/06/2018 Inconclusive   Final    SPCL2 Testing Date 03/06/2018 03/23/2018 12:00 AM   Final    Serum Collection DT - Luminex Clas* 03/06/2018 03/06/2018 03:33 PM   Final    Class II Antibodies - Luminex 03/06/2018 Inconclusive   Final    SPLUA Testing Date 03/06/2018 04/06/2018 12:00 AM   Final    cPRA % 03/06/2018 0   Final    SPCL1 Testing Date 03/06/2018 04/06/2018 12:00 AM   Final    Serum Collection DT - Luminex Clas* 03/06/2018 03/06/2018 03:33 PM   Final    Class I Antibody Comments - Luminex 03/06/2018 B*15:02   Final    SPCL2 Testing Date 03/06/2018 04/06/2018 12:00 AM   Final    Serum Collection DT - Luminex Clas* 03/06/2018 03/06/2018 03:33 PM   Final    Class II Antibodies - Luminex 03/06/2018 Negative   Final   ]    Assessment:    1. Chest congestion          Ferrari was seen today for cough and chest congestion.    Diagnoses and all orders for this visit:    Chest congestion  -     X-Ray Chest PA And Lateral; Future  - New problem. Abnormal lung exam in immunocompromised patient. Obtain chest xray to rule out lower respiratory tract infection. Treat as indicated.    See phone note for updated treatment plan.          FOLLOW UP: Follow-up if symptoms worsen or fail to improve.

## 2018-06-12 ENCOUNTER — TELEPHONE (OUTPATIENT)
Dept: FAMILY MEDICINE | Facility: CLINIC | Age: 58
End: 2018-06-12

## 2018-06-12 NOTE — TELEPHONE ENCOUNTER
----- Message from Dominique Rodriguez MD sent at 6/11/2018  4:45 PM CDT -----  Please contact patient and inform that the area of concern on his exam was concerning for infection.  I have ordered antibiotics and sent these to his pharmacy.  I would like for him to take this medication as prescribed in addition to Mucinex 1200 mg 2 times per day for 10 days.  If he begins to experience shortness of breath, wheezing, or worsening fatigue he should be seen urgently or contact the clinic.

## 2018-06-13 ENCOUNTER — LAB VISIT (OUTPATIENT)
Dept: LAB | Facility: HOSPITAL | Age: 58
End: 2018-06-13
Payer: MEDICARE

## 2018-06-13 DIAGNOSIS — Z76.82 ORGAN TRANSPLANT CANDIDATE: ICD-10-CM

## 2018-06-13 PROCEDURE — 86833 HLA CLASS II HIGH DEFIN QUAL: CPT | Mod: PO,TXP

## 2018-06-13 PROCEDURE — 86832 HLA CLASS I HIGH DEFIN QUAL: CPT | Mod: PO,TXP

## 2018-06-13 PROCEDURE — 86977 RBC SERUM PRETX INCUBJ/INHIB: CPT | Mod: 91,PO,TXP

## 2018-06-15 ENCOUNTER — HOSPITAL ENCOUNTER (OUTPATIENT)
Dept: CARDIOLOGY | Facility: HOSPITAL | Age: 58
Discharge: HOME | End: 2018-06-15
Attending: NURSE PRACTITIONER
Payer: MEDICARE

## 2018-06-15 ENCOUNTER — HOSPITAL ENCOUNTER (OUTPATIENT)
Dept: RADIOLOGY | Facility: HOSPITAL | Age: 58
Discharge: HOME | End: 2018-06-15
Attending: NURSE PRACTITIONER
Payer: MEDICARE

## 2018-06-15 DIAGNOSIS — Z76.82 ORGAN TRANSPLANT CANDIDATE: ICD-10-CM

## 2018-06-15 LAB — DIASTOLIC DYSFUNCTION: NO

## 2018-06-15 PROCEDURE — 76770 US EXAM ABDO BACK WALL COMP: CPT | Mod: TC,TXP

## 2018-06-15 PROCEDURE — 71046 X-RAY EXAM CHEST 2 VIEWS: CPT | Mod: 26,TXP,, | Performed by: RADIOLOGY

## 2018-06-15 PROCEDURE — 93018 CV STRESS TEST I&R ONLY: CPT | Mod: TXP,,, | Performed by: INTERNAL MEDICINE

## 2018-06-15 PROCEDURE — 93016 CV STRESS TEST SUPVJ ONLY: CPT | Mod: TXP,,, | Performed by: INTERNAL MEDICINE

## 2018-06-15 PROCEDURE — 93017 CV STRESS TEST TRACING ONLY: CPT | Mod: TXP

## 2018-06-15 PROCEDURE — 78452 HT MUSCLE IMAGE SPECT MULT: CPT | Mod: 26,TXP,, | Performed by: INTERNAL MEDICINE

## 2018-06-15 PROCEDURE — 63600175 PHARM REV CODE 636 W HCPCS: Mod: TXP

## 2018-06-15 PROCEDURE — 76770 US EXAM ABDO BACK WALL COMP: CPT | Mod: 26,TXP,, | Performed by: RADIOLOGY

## 2018-06-15 PROCEDURE — A9502 TC99M TETROFOSMIN: HCPCS | Mod: TXP

## 2018-06-15 PROCEDURE — 71046 X-RAY EXAM CHEST 2 VIEWS: CPT | Mod: TC,FY,TXP

## 2018-06-15 RX ORDER — REGADENOSON 0.08 MG/ML
INJECTION, SOLUTION INTRAVENOUS
Status: DISPENSED
Start: 2018-06-15 | End: 2018-06-15

## 2018-06-18 NOTE — PROGRESS NOTES
Patient's chart reviewed today. Patient due for follow-up in June 2019. Appointments will be scheduled per protocol. Pt on internal hold due to being treated for pneumonia with antibiotics and will need Pulmonology clearance. HLA sample current, in lab, and being received on a regular basis.

## 2018-06-19 DIAGNOSIS — Z76.82 ORGAN TRANSPLANT CANDIDATE: Primary | ICD-10-CM

## 2018-06-21 ENCOUNTER — TELEPHONE (OUTPATIENT)
Dept: FAMILY MEDICINE | Facility: CLINIC | Age: 58
End: 2018-06-21

## 2018-06-27 LAB — HPRA INTERPRETATION: NORMAL

## 2018-07-10 LAB
CLASS I ANTIBODIES - LUMINEX: NEGATIVE
CLASS II ANTIBODIES - LUMINEX: NEGATIVE
CPRA %: 0
SERUM COLLECTION DT - LUMINEX CLASS I: NORMAL
SERUM COLLECTION DT - LUMINEX CLASS II: NORMAL
SPCL1 TESTING DATE: NORMAL
SPCL2 TESTING DATE: NORMAL
SPLUA TESTING DATE: NORMAL

## 2018-07-18 DIAGNOSIS — E11.22 DIABETES MELLITUS WITH ESRD (END-STAGE RENAL DISEASE): ICD-10-CM

## 2018-07-18 DIAGNOSIS — I10 ESSENTIAL HYPERTENSION: Chronic | ICD-10-CM

## 2018-07-18 DIAGNOSIS — N18.6 DIABETES MELLITUS WITH ESRD (END-STAGE RENAL DISEASE): ICD-10-CM

## 2018-07-18 RX ORDER — PEN NEEDLE, DIABETIC 30 GX3/16"
NEEDLE, DISPOSABLE MISCELLANEOUS
Qty: 100 EACH | Refills: 1 | Status: SHIPPED | OUTPATIENT
Start: 2018-07-18 | End: 2022-01-01 | Stop reason: SDUPTHER

## 2018-07-18 RX ORDER — ATORVASTATIN CALCIUM 40 MG/1
40 TABLET, FILM COATED ORAL NIGHTLY
Qty: 90 TABLET | Refills: 3 | Status: SHIPPED | OUTPATIENT
Start: 2018-07-18 | End: 2019-09-17 | Stop reason: SDUPTHER

## 2018-07-30 RX ORDER — CARVEDILOL 12.5 MG/1
12.5 TABLET ORAL 2 TIMES DAILY
Qty: 180 TABLET | Refills: 1 | Status: SHIPPED | OUTPATIENT
Start: 2018-07-30 | End: 2019-04-24 | Stop reason: SDUPTHER

## 2018-08-08 ENCOUNTER — TELEPHONE (OUTPATIENT)
Dept: TRANSPLANT | Facility: CLINIC | Age: 58
End: 2018-08-08

## 2018-08-08 ENCOUNTER — OFFICE VISIT (OUTPATIENT)
Dept: PULMONOLOGY | Facility: CLINIC | Age: 58
End: 2018-08-08
Payer: MEDICARE

## 2018-08-08 ENCOUNTER — OFFICE VISIT (OUTPATIENT)
Dept: TRANSPLANT | Facility: CLINIC | Age: 58
End: 2018-08-08
Payer: MEDICARE

## 2018-08-08 ENCOUNTER — LAB VISIT (OUTPATIENT)
Dept: LAB | Facility: HOSPITAL | Age: 58
End: 2018-08-08
Payer: MEDICARE

## 2018-08-08 VITALS
WEIGHT: 149.25 LBS | SYSTOLIC BLOOD PRESSURE: 190 MMHG | OXYGEN SATURATION: 98 % | HEIGHT: 68 IN | BODY MASS INDEX: 22.62 KG/M2 | HEART RATE: 60 BPM | DIASTOLIC BLOOD PRESSURE: 80 MMHG

## 2018-08-08 VITALS
WEIGHT: 147.25 LBS | BODY MASS INDEX: 21.81 KG/M2 | HEART RATE: 58 BPM | DIASTOLIC BLOOD PRESSURE: 67 MMHG | HEIGHT: 69 IN | RESPIRATION RATE: 16 BRPM | TEMPERATURE: 99 F | SYSTOLIC BLOOD PRESSURE: 137 MMHG | OXYGEN SATURATION: 96 %

## 2018-08-08 DIAGNOSIS — N18.6 DIABETES MELLITUS WITH ESRD (END-STAGE RENAL DISEASE): Chronic | ICD-10-CM

## 2018-08-08 DIAGNOSIS — Z99.2 ESRD (END STAGE RENAL DISEASE) ON DIALYSIS: Primary | ICD-10-CM

## 2018-08-08 DIAGNOSIS — J90 PLEURAL EFFUSION: Primary | ICD-10-CM

## 2018-08-08 DIAGNOSIS — E11.3513 TYPE 2 DIABETES MELLITUS WITH BOTH EYES AFFECTED BY PROLIFERATIVE RETINOPATHY AND MACULAR EDEMA, WITH LONG-TERM CURRENT USE OF INSULIN: ICD-10-CM

## 2018-08-08 DIAGNOSIS — I10 ESSENTIAL HYPERTENSION: Chronic | ICD-10-CM

## 2018-08-08 DIAGNOSIS — N18.6 ESRD (END STAGE RENAL DISEASE) ON DIALYSIS: Primary | ICD-10-CM

## 2018-08-08 DIAGNOSIS — Z76.82 PATIENT ON WAITING LIST FOR KIDNEY TRANSPLANT: ICD-10-CM

## 2018-08-08 DIAGNOSIS — E11.22 DIABETES MELLITUS WITH ESRD (END-STAGE RENAL DISEASE): Chronic | ICD-10-CM

## 2018-08-08 DIAGNOSIS — Z76.82 ORGAN TRANSPLANT CANDIDATE: ICD-10-CM

## 2018-08-08 DIAGNOSIS — Z79.4 TYPE 2 DIABETES MELLITUS WITH BOTH EYES AFFECTED BY PROLIFERATIVE RETINOPATHY AND MACULAR EDEMA, WITH LONG-TERM CURRENT USE OF INSULIN: ICD-10-CM

## 2018-08-08 LAB — COMPLEXED PSA SERPL-MCNC: 0.45 NG/ML

## 2018-08-08 PROCEDURE — 99999 PR PBB SHADOW E&M-EST. PATIENT-LVL IV: CPT | Mod: PBBFAC,TXP,, | Performed by: INTERNAL MEDICINE

## 2018-08-08 PROCEDURE — 36415 COLL VENOUS BLD VENIPUNCTURE: CPT | Mod: TXP

## 2018-08-08 PROCEDURE — 86706 HEP B SURFACE ANTIBODY: CPT | Mod: TXP

## 2018-08-08 PROCEDURE — 99215 OFFICE O/P EST HI 40 MIN: CPT | Mod: S$PBB,TXP,, | Performed by: INTERNAL MEDICINE

## 2018-08-08 PROCEDURE — 99204 OFFICE O/P NEW MOD 45 MIN: CPT | Mod: S$PBB,TXP,, | Performed by: INTERNAL MEDICINE

## 2018-08-08 PROCEDURE — 99214 OFFICE O/P EST MOD 30 MIN: CPT | Mod: PBBFAC,27,TXP | Performed by: INTERNAL MEDICINE

## 2018-08-08 PROCEDURE — 84153 ASSAY OF PSA TOTAL: CPT | Mod: TXP

## 2018-08-08 PROCEDURE — 99214 OFFICE O/P EST MOD 30 MIN: CPT | Mod: PBBFAC,TXP | Performed by: INTERNAL MEDICINE

## 2018-08-08 NOTE — PROGRESS NOTES
Transplant Recipient Adult Psychosocial Assessment-UPDATE     [facility]  [address] [facility] [address]    Telephone Information:   Mobile [phone]   Home  [phone] (home)  Work  There is no work phone number on file.  E-mail  No e-mail address on record    Sex: male  YOB: 1960  Age: 57 y.o.    Encounter Date: 2018  U[place] Citizen: no has permanent residence card; wife is [place] citizen  Primary Language: German but pt speaks English    Needed: no    Emergency Contact:  Name: Marie Brownlee  Relationship: wife  Address: same as above  Phone Numbers:  n/a (home), [phone] (work), [phone] (mobile)    Family/Social Support:   Number of dependents/: none  Marital history:  once to current wife for 27 yrs.  Two adult children who are in college:  Eli Brownlee, age 23 (Pharmacy School in [place]) and Jeffrey Browlnee, age 20 ([employer]).  Other family dynamics: Mother is living at age 84, has brain tumor and in nursing home in [facility], father  at age 87.  Pt has three sisters and one brother, two of whom live in ; one sister, age 61 living in [place] and another in [place].      Household Composition:    Pt and wife, Marie Brownlee (age not disclosed) live in their home in [place], [place]  Do you and your caregivers have access to reliable transportation? yes  PRIMARY CAREGIVER: Marie Brownlee will be primary caregiver, phone number [phone].      provided in-depth information to patient and caregiver regarding pre- and post-transplant caregiver role.   strongly encourages patient and caregiver to have concrete plan regarding post-transplant care giving, including back-up caregiver(s) to ensure care giving needs are met as needed.    Patient and Caregiver states understanding all aspects of caregiver role/commitment and is able/willing/committed to being caregiver to the fullest extent necessary.    Patient and Caregiver verbalizes understanding of the education  provided today and caregiver responsibilities.         remains available. Patient and Caregiver agree to contact  in a timely manner if concerns arise.      Able to take time off work without financial concerns: yes.     Additional Significant Others who will Assist with Transplant:  Name: Darrel Astudillo [phone]  Age: 46  City: [place] State: [place]  Relationship: friend  Does person drive? yes    Name: Eli Brownlee  [phone]  Age: 46  City: NO State: [place]  Relationship: friend  Does person drive? yes    Living Will: no  Healthcare Power of : no pt reports trusting wife with medical decisions   Advance Directives on file: <<no information> per medical record.  Verbally reviewed LW/HCPA information.   provided patient with copy of LW/HCPA documents and provided education on completion of forms.    Living Donors: No. Education and resource information given to patient.    Highest Education Level: Associate/Bachelor Degree  Reading Ability: college  Reports difficulty with: seeing due to diabetes related eye changes.  Pt reports starting treatment to improve vision.   Learns Best By:  demonstration     Status: no  VA Benefits: no     Working for Income: No  If no, reason not working: Disability  Patient is disabled.  Prior to disability, patient  was employed as owner of [employer] ..    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    Monthly Income:  Other: $NOT DISCLOSED  Able to afford all costs now and if transplanted, including medications: yes  Patient and Caregiver verbalizes understanding of personal responsibilities related to transplant costs and the importance of having a financial plan to ensure that patients transplant costs are fully covered.       provided fundraising information/education. Patient and Caregiververbalizes  understanding.   remains available.    Insurance:   Payor/Plan Subscr  Sex Relation Sub. Ins. ID Effective Group Num   1. MEDICARE - ME* TREY KHAN 1960 Male  [id number]                                    [address]   2. BLUE CROSS BL* TREY KHAN 1960 Male  [id number] 3/1/17 [id number]                                   [address] [address]     Primary Insurance (for UNOS reporting): Public Insurance - Medicare FFS (Fee For Service)  Secondary Insurance (for UNOS reporting): Private Insurance  Patient and Caregiver verbalizes clear understanding that patient may experience difficulty obtaining and/or be denied insurance coverage post-surgery. This includes and is not limited to disability insurance, life insurance, health insurance, burial insurance, long term care insurance, and other insurances.      Patient and Caregiver also reports understanding that future health concerns related to or unrelated to transplantation may not be covered by patient's insurance.  Resources and information provided and reviewed.     Patient and Caregiver provides verbal permission to release any necessary information to outside resources for patient care and discharge planning.  Resources and information provided are reviewed.      Dialysis Adherence: Patient reports good adherence with dialysis treatments.    Infusion Service: patient utilizing? no  Home Health: patient utilizing? no  DME: no  Pulmonary/Cardiac Rehab: denies   ADLS: Pt states ability to independently accomplish all adls..      Adherence:  Adherence education and counseling provided.     Per History Section:  Past Medical History:   Diagnosis Date    A-V fistula     Anemia     Cataract     CHF (congestive heart failure)     Diabetes mellitus, type 2     Disorder of kidney and ureter     Hyperlipidemia     Hypertension      Social History   Substance Use Topics    Smoking status: Former Smoker     Packs/day: 1.00     Years: 30.00      Start date: 4/26/1982     Quit date: 9/26/2016    Smokeless tobacco: Former User    Alcohol use No     History   Drug Use No     History   Sexual Activity    Sexual activity: Yes    Partners: Female       Per Today's Psychosocial:  Tobacco: Pt reports he smoked 1ppd for 40 yrs and quit last September (2016).  Alcohol: none, patient denies any use.  Illicit Drugs/Non-prescribed Medications: none, patient denies any use.    Patient and Caregiver states clear understanding of the potential impact of substance use as it relates to transplant candidacy and is aware of possible random substance screening.  Substance abstinence/cessation counseling, education and resources provided and reviewed.     Arrests/DWI/Treatment/Rehab: patient denies    Psychiatric History:    Mental Health: Pt denies any mental health concerns.  Psychiatrist/Counselor: none  Medications:  none  Suicide/Homicide Issues: Pt denies current or past suicidal/homicidal ideation.   Safety at home: Pt denies any home safety concerns.      Knowledge: Patient and Caregiver states having clear understanding and realistic expectations regarding the potential risks and potential benefits of organ transplantation and organ donation and agrees to discuss with health care team members and support system members, as well as to utilize available resources and express questions and/or concerns in order to further facilitate the pt informed decision-making.  Resources and information provided and reviewed.    Patient and Caregiver is aware of [facility]'s affiliation and/or partnership with agencies in home health care, LTAC, SNF, [facility], and other hospitals and clinics.    Understanding: Patient and Caregiver reports having a clear understanding of the many lifetime commitments involved with being a transplant recipient, including costs, compliance, medications, lab work, procedures, appointments, concrete and financial planning, preparedness, timely and  appropriate communication of concerns, abstinence (ETOH, tobacco, illicit non-prescribed drugs), adherence to all health care team recommendations, support system and caregiver involvement, appropriate and timely resource utilization and follow-through, mental health counseling as needed/recommended, and patient and caregiver responsibilities.  Social Service Handbook, resources and detailed educational information provided and reviewed.  Educational information provided.    Patient and Caregiver also reports current and expected compliance with health care regime and states having a clear understanding of the importance of compliance.      Patient and Caregiver reports a clear understanding that risks and benefits may be involved with organ transplantation and with organ donation.       Patient and Caregiver also reports clear understanding that psychosocial risk factors may affect patient, and include but are not limited to feelings of depression, generalized anxiety, anxiety regarding dependence on others, post traumatic stress disorder, feelings of guilt and other emotional and/or mental concerns, and/or exacerbation of existing mental health concerns.  Detailed resources provided and discussed.      Patient and Caregiver agrees to access appropriate resources in a timely manner as needed and/or as recommended, and to communicate concerns appropriately.  Patient and Caregiver also reports a clear understanding of treatment options available.     Patient and Caregiver received education in a group setting.   reviewed education, provided additional information, and answered questions.    Feelings or Concerns: Pt denies concerns; wife states the wait time is a concern for her.    Coping: Pt states hecopes by exercising, watching comedies, praying and attending the [employer] in [place].    Goals: return to work and spend time with family.  Patient referred to Vocational  Rehabilitation.    Interview Behavior: Patient and Caregiver presents as alert and oriented x 4, pleasant, good eye contact, well groomed, recall good, concentration/judgement good, average intelligence, calm, communicative, cooperative and asking and answering questions appropriately. Pt was accompanied by his daughter Eli who appeared to be supportive of pt's pursuit of transplant.         Transplant Social Work - Candidacy  Assessment/Plan:     Psychosocial Suitability: Patient presents as an acceptable candidate for kidney transplant at this time. Based on psychosocial risk factors, patient presents as low risk, due to absence of significant psychosocial risk factors.    Recommendations/Additional Comments:SW recommends that pt conduct fundraising to assist pt with pay for cost of medications, food, gas, and other transplant related needs. SW recommends that pt remain aware of potential mental health concerns and contact the team if any concerns arise. SW recommends that pt remain abstinent from tobacco, ETOH, and drug use. SW supports pt's continued dialysis adherence. SW remains available to answer any questions or concerns that arise as the pt moves through the transplant process.     Irene Townsend, MSW, LMSW

## 2018-08-08 NOTE — LETTER
August 8, 2018        Alan Lowery  [address] [place] [address] [address]  [place] [address]  Phone: [phone]  Fax: [phone]             Mike J[facility]- Transplant  [address]  [facility] [address]  Phone: [phone]   Patient: Vega Brownlee   MR Number: [id number]   YOB: 1960   Date of Visit: 8/8/2018       Dear Dr. Alan Lowery    Thank you for referring Vega Brownlee to me for evaluation. Attached you will find relevant portions of my assessment and plan of care.    If you have questions, please do not hesitate to call me. I look forward to following Vega Brownlee along with you.    Sincerely,    Sonya Daigle MD    Enclosure    If you would like to receive this communication electronically, please contact [email or web address] or [phone] to request [facility] access.    [facility] is a tool which provides read-only access to select patient information with whom you have a relationship. Its easy to use and provides real time access to review your patients record including encounter summaries, notes, results, and demographic information.    If you feel you have received this communication in error or would no longer like to receive these types of communications, please e-mail [email or web address]

## 2018-08-08 NOTE — PROGRESS NOTES
KIDNEY, KIDNEY/PANCREAS, PANCREAS TRANSPLANT RECIPIENT REEVALUATION    Requesting Physician: No ref. provider found    SUBJECTIVE     CC:   Six monthly/Annual reassessment of kidney transplant candidacy.    HPI:    Mr. Brownlee is a 57 y.o. year old White male who has presented to be evaluated as a potential kidney transplant recipient.  He has ESRD secondary to diabetic nephropathy and HTN.  Patient is currently on hemodialysis started on  8/2016. Patient is dialyzing on TTS schedule.  Patient reports that he is tolerating dialysis well. Dialyzes for 3 1/2  Hours.  He has a right chest catheter for dialysis access.  he is currently on hemodialysis started in 2016. Patient is dialyzing on TTS schedule.  Patient reports that he is tolerating dialysis well.  He is currently active on the wait-list. He is here for his scheduled follow up appointment.      Has  pulmonary appointment for abnormal CXR:minimal patchy opacification now seen in the left lower lobe suggesting infectious or inflammatory process such as bronchopneumonia.    Had prostate biopsy in 2017 with normal pathology    Patient denies any recent ER visit, hospitalization or recent history of coronary artery disease, stroke, seizure disorder, deep venous thrombosis, pulmonary embolism, malignancies. Patient states that he is doing well. he denies any CP, SOB,  orthopnea, PND or syncope. The appetite is good and denies any nausea, vomiting, diarrhea, abdominal pain, melena. The bowel movements are regular and weight is stable. he has no cough, fever, chills, weight loss or night sweats.  he has no focal weakness, sensory loss, numbness or paresthesias.         Functional Status: he plays golf and walks miles without any symptoms of chest pain, SOB, claudication.   Previous Transplant: no  Previous Blood Transfusion:  no  Anticoagulation/ antiplatelet therapy and reason: no  Potential Donor: no  High KDPI candidate: yes        Past Medical History:  Past Medical  History:   Diagnosis Date    A-V fistula     Anemia     Cataract     CHF (congestive heart failure)     Diabetes mellitus     Diabetes mellitus, type 2     Disorder of kidney and ureter     Hyperlipidemia     Hypertension        Past Surgical History:  Past Surgical History:   Procedure Laterality Date    arm fracture Left     COLONOSCOPY N/A 8/16/2017    Procedure: COLONOSCOPY;  Surgeon: Sebastián Lamas MD;  Location: [facility] ([address] [place] [address]);  Service: Endoscopy;  Laterality: N/A;  dialysis pt/potassium level 1st/svn    FINGER AMPUTATION Left     left index finger    LUNG SURGERY      PORTACATH PLACEMENT  08/2016         Family History:  Family History   Problem Relation Age of Onset    Diabetes Mother     Memory loss Mother     No Known Problems Father     No Known Problems Sister     No Known Problems Brother     Arthritis Sister     No Known Problems Sister        Social History:  Social History     Social History    Marital status:      Spouse name: N/A    Number of children: N/A    Years of education: N/A     Occupational History    Not on file.     Social History Main Topics    Smoking status: Former Smoker     Packs/day: 1.00     Start date: 4/26/1982     Quit date: 9/26/2016    Smokeless tobacco: Former User    Alcohol use No    Drug use: No    Sexual activity: Not on file     Other Topics Concern    Not on file     Social History Narrative    No narrative on file           Current Medication  Current Outpatient Prescriptions   Medication Sig Dispense Refill    atorvastatin (LIPITOR) 40 MG tablet Take 1 tablet (40 mg total) by mouth every evening. 90 tablet 3    blood sugar diagnostic Strp 1 each by Misc.(Non-Drug; Combo Route) route as directed. 100 each 0    calcium acetate (PHOSLO) 667 mg capsule Take 667 mg by mouth 3 (three) times daily with meals.       calcium carbonate (TUMS ULTRA) 1,177 mg Chew Take 2 tablets by mouth 3 (three) times daily with meals.        "carvedilol (COREG) 12.5 MG tablet Take 1 tablet (12.5 mg total) by mouth 2 (two) times daily. 180 tablet 1    cloNIDine (CATAPRES) 0.2 MG tablet Take 1 tablet (0.2 mg total) by mouth 2 (two) times daily. 180 tablet 2    FOLIC ACID/VIT BCOMP,C (JAM-YAQUELIN ORAL) Take 1 tablet by mouth once daily.      furosemide (LASIX) 80 MG tablet Take 1 tablet (80 mg total) by mouth once daily. 90 tablet 1    hydrALAZINE (APRESOLINE) 100 MG tablet Take 1 tablet (100 mg total) by mouth every 12 (twelve) hours. 60 tablet 3    insulin glargine (BASAGLAR KWIKPEN U-100 INSULIN) 100 unit/mL (3 mL) InPn pen Inject 7 Units into the skin every evening. 6 mL 3    isosorbide mononitrate (IMDUR) 120 MG 24 hr tablet Take 1 tablet (120 mg total) by mouth once daily. 90 tablet 3    lancets (ACCU-CHEK SOFTCLIX LANCETS) Misc 1 application by Misc.(Non-Drug; Combo Route) route 4 (four) times daily before meals and nightly. 120 each 0    pen needle, diabetic 31 gauge x 3/16" Ndle BD Ultra Fine Mini pen needles (5mm x 31g) - dispense 100 100 each 1    aspirin (ECOTRIN) 81 MG EC tablet Take 81 mg by mouth.      blood-glucose meter (FREESTYLE SYSTEM KIT) kit Use as instructed 1 each 0    DUREZOL 0.05 % Drop ophthalmic solution       fluticasone (FLONASE) 50 mcg/actuation nasal spray 1 spray by Each Nare route once daily. 16 g 2    RENVELA 800 mg Tab Take 800 mg by mouth 3 (three) times daily with meals.        No current facility-administered medications for this visit.              Review of Systems    Constitutional: Negative for activity change, appetite change, chills, fatigue, fever and unexpected weight change.   HENT: Negative for congestion, facial swelling, postnasal drip, rhinorrhea, sinus pressure, sore throat and trouble swallowing.    Eyes: Negative for pain, redness and visual disturbance.   Respiratory: Negative for cough, chest tightness, shortness of breath and wheezing.    Cardiovascular: Negative.  Negative for chest pain, " palpitations and leg swelling.   Gastrointestinal: Negative for abdominal pain, diarrhea, nausea and vomiting.   Musculoskeletal: Negative for gait problem, neck pain and neck stiffness. RUE AVF  Skin: Negative for rash.   Allergic/Immunologic: Negative for environmental allergies, food allergies and immunocompromised state.   Neurological: Negative for dizziness, weakness, light-headedness and headaches.   Psychiatric/Behavioral: Negative for agitation and confusion. The patient is not nervous/anxious.      OBJECTIVE       Body mass index is 22.06 kg/m².    Vitals:    08/08/18 0730   BP: 137/67   Pulse: (!) 58   Resp: 16   Temp: 98.6 °F (37 °C)       Physical Exam  General: No acute distress, well groomed  HEENT: Normocephalic, atraumatic,  moist mucous membranes, no oral ulcerations/lesions   Neck: Supple, symmetrical, trachea midline, no masses, thyroid is normal without nodules or enlargement   Respiratory: Clear to auscultation bilaterally, respirations unlabored, no rales/rhonchi/wheezing   Cardiovacular: Regular rate and rhythm, S1, S2 normal, no murmurs,  Gastrointestinal: Soft, non-tender, bowel sounds normal, no masses, no palpable organomegaly  Extremities: No clubbing or cyanosis of upper extremities bilaterally, no pedal edema bilaterally;   Skin: warm and dry; no rash on exposed skin  Lymph nodes: Cervical and supraclavicular nodes normal   Neurologic: No focal neurologic deficits. alert and oriented x 3   Musculoskeletal: moves all extremities without difficulty, FROM, 5/5 strength, ambulates without an assistive device, RUE AVF  Psychiatric: Normal mood and affect. Responds appropriately to questions.        Labs:  Reviewed with the patient      ASSESSMENT     1. ESRD (end stage renal disease) on dialysis    2. Essential hypertension    3. Type 2 diabetes mellitus with both eyes affected by proliferative retinopathy and macular edema, with long-term current use of insulin    4. Diabetes mellitus with  ESRD (end-stage renal disease)    5. Patient on waiting list for kidney transplant        PLAN       Transplant Candidacy:      Mr. Brownlee is a a suitable for kidney transplantation. He remains in overall stable health, and will remain internally inactive on the transplant list.     - Pulmonary consult: CXR with minimal patchy opacification now seen in the left lower lobe suggesting infectious or inflammatory process such as bronchopneumonia.    - Obtain record for his lung surgery in 2004 at [facility]

## 2018-08-08 NOTE — PATIENT INSTRUCTIONS
1.  NeilMed Sinus Rinse Regular Kit    Recipe 1/4 teaspoon of salt and 1/4 teaspoon of baking soda in distilled water.  Be sure to tilt head forward as shown in picture.

## 2018-08-08 NOTE — LETTER
August 8, 2018      Merrill Preston MD  [address] [facility] [address]           [facility]  [address]  [place] [address]  Phone: [phone]          Patient: Vega Brownlee   MR Number: [id number]   YOB: 1960   Date of Visit: 8/8/2018       Dear Dr. Merrill Preston:    Thank you for referring Vega Brownlee to me for evaluation. Attached you will find relevant portions of my assessment and plan of care.    If you have questions, please do not hesitate to call me. I look forward to following Vega Brownlee along with you.    Sincerely,    George Adams MD    Enclosure  CC:  No Recipients    If you would like to receive this communication electronically, please contact [email or web address] or [phone] to request more information on [employer] access.    For providers and/or their staff who would like to refer a patient to [facility], please contact us through our one-stop-shop provider referral line, More[facility] Thomas, at [phone].    If you feel you have received this communication in error or would no longer like to receive these types of communications, please e-mail [email or web address]

## 2018-08-08 NOTE — PROGRESS NOTES
Vega Brownlee  was seen as a new patient at the request  Merrill Preston MD for the evaluation of  Abnormal cxr.    CHIEF COMPLAINT:  Abnormal Chest X-ray      HISTORY OF PRESENT ILLNESS: Vega Brownlee is a 57 y.o. male  has a past medical history of A-V fistula; Anemia; Cataract; CHF (congestive heart failure); Diabetes mellitus, type 2; Disorder of kidney and ureter; Hyperlipidemia; and Hypertension.  Patient is currently enlisted for kidney pancreas transplantation.  Currently undergoing hemodialysis 3 T-Th-Sat.  Patient presented to pcp office 6/11/18 with cough and subjective fever.  cxr with lll infiltrate.  Patient was started on doxycycline.  Cough is better.  Mainly in morning with whitish phlegm.  No fever/chills.  No chest pain.  No orthopnea.  No pnd.      Patient with smoked 30 pack years.  Quit in 2016.  Walk 20-30 minutes without issue.  Still play golf.  [place] [facility] [place]  Effusion was benign.      PAST MEDICAL HISTORY:    Active Ambulatory Problems     Diagnosis Date Noted    Essential hypertension 07/26/2016    Diabetes mellitus with ESRD (end-stage renal disease) 07/26/2016    Anemia of chronic disease 07/26/2016    Moderate protein malnutrition 07/26/2016    Tobacco abuse 07/26/2016    Hyperlipidemia 08/04/2016    ESRD (end stage renal disease) on dialysis 12/14/2016    Type 2 diabetes mellitus with both eyes affected by proliferative retinopathy and macular edema, with long-term current use of insulin 04/28/2017    Cyst of prostate 06/08/2017    Screening for colon cancer 08/16/2017     Resolved Ambulatory Problems     Diagnosis Date Noted    Acute respiratory failure with hypoxia 07/26/2016    On mechanically assisted ventilation 07/26/2016    Acute renal failure 07/26/2016    Hyperkalemia 07/26/2016    CHF exacerbation 07/28/2016     Past Medical History:   Diagnosis Date    A-V fistula     Anemia     Cataract     CHF (congestive  heart failure)     Diabetes mellitus, type 2     Disorder of kidney and ureter     Hyperlipidemia     Hypertension                 PAST SURGICAL HISTORY:    Past Surgical History:   Procedure Laterality Date    arm fracture Left     COLONOSCOPY N/A 8/16/2017    Procedure: COLONOSCOPY;  Surgeon: Sebastián Lamas MD;  Location: [facility] ([address] [place] [address]);  Service: Endoscopy;  Laterality: N/A;  dialysis pt/potassium level 1st/svn    FINGER AMPUTATION Left     left index finger    LUNG SURGERY      thoracotomy    PORTACATH PLACEMENT  08/2016         FAMILY HISTORY:                Family History   Problem Relation Age of Onset    Diabetes Mother     Memory loss Mother     No Known Problems Father     No Known Problems Sister     No Known Problems Brother     Arthritis Sister     No Known Problems Sister        SOCIAL HISTORY:          Tobacco:   History   Smoking Status    Former Smoker    Packs/day: 1.00    Years: 30.00    Start date: 4/26/1982    Quit date: 9/26/2016   Smokeless Tobacco    Former User     alcohol use:    History   Alcohol Use No               Occupation:     ALLERGIES:  Review of patient's allergies indicates:  No Known Allergies    CURRENT MEDICATIONS:    Current Outpatient Prescriptions   Medication Sig Dispense Refill    aspirin (ECOTRIN) 81 MG EC tablet Take 81 mg by mouth.      atorvastatin (LIPITOR) 40 MG tablet Take 1 tablet (40 mg total) by mouth every evening. 90 tablet 3    blood sugar diagnostic Strp 1 each by Misc.(Non-Drug; Combo Route) route as directed. 100 each 0    calcium acetate (PHOSLO) 667 mg capsule Take 667 mg by mouth 3 (three) times daily with meals.       calcium carbonate (TUMS ULTRA) 1,177 mg Chew Take 2 tablets by mouth 3 (three) times daily with meals.       carvedilol (COREG) 12.5 MG tablet Take 1 tablet (12.5 mg total) by mouth 2 (two) times daily. 180 tablet 1    cloNIDine (CATAPRES) 0.2 MG tablet Take 1 tablet (0.2 mg total) by mouth  "2 (two) times daily. 180 tablet 2    DUREZOL 0.05 % Drop ophthalmic solution       fluticasone (FLONASE) 50 mcg/actuation nasal spray 1 spray by Each Nare route once daily. 16 g 2    FOLIC ACID/VIT BCOMP,C (JAM-YAQUELIN ORAL) Take 1 tablet by mouth once daily.      furosemide (LASIX) 80 MG tablet Take 1 tablet (80 mg total) by mouth once daily. 90 tablet 1    hydrALAZINE (APRESOLINE) 100 MG tablet Take 1 tablet (100 mg total) by mouth every 12 (twelve) hours. 60 tablet 3    insulin glargine (BASAGLAR KWIKPEN U-100 INSULIN) 100 unit/mL (3 mL) InPn pen Inject 7 Units into the skin every evening. 6 mL 3    isosorbide mononitrate (IMDUR) 120 MG 24 hr tablet Take 1 tablet (120 mg total) by mouth once daily. 90 tablet 3    lancets (ACCU-CHEK SOFTCLIX LANCETS) Misc 1 application by Misc.(Non-Drug; Combo Route) route 4 (four) times daily before meals and nightly. 120 each 0    pen needle, diabetic 31 gauge x 3/16" Ndle BD Ultra Fine Mini pen needles (5mm x 31g) - dispense 100 100 each 1    RENVELA 800 mg Tab Take 800 mg by mouth 3 (three) times daily with meals.       blood-glucose meter (FREESTYLE SYSTEM KIT) kit Use as instructed 1 each 0     No current facility-administered medications for this visit.                   REVIEW OF SYSTEMS:     Pulmonary related symptoms as per HPI.  Gen:  no weight loss, no fever, no night sweat  HEENT:  no visual changes, no sore throat, no hearing loss  CV:  Per hpi  GI:  no melena, no hematochezia, no diarhea, no constipation.  :  no dysuria, no hematuria, no hesistancy, no dribbling  Neuro:  no syncope, no vertigo, no tinitus  Psych:  No homocide or suicide ideation; no depression.  Endocrine:  No heat or cold intolerance.  Sleep:  +mild snoring; no witnessed apnea.  Feeling rested upon awake.  Otherwise, a balance of systems reviewed is negative.          PHYSICAL EXAM:  Vitals:    08/08/18 1036   BP: (!) 190/80   Pulse: 60   SpO2: 98%   Weight: 67.7 kg (149 lb 4 oz) " "  Height: 5' 8" (1.727 m)   PainSc: 0-No pain     Body mass index is 22.69 kg/m².     GENERAL:  well develop; no apparent distress  HEENT:  + nasal congestion; no discharge noted; class 4 modified mallampatti.   NECK:  supple; no palpable masses.  CARDIO: regular rate and rhythm  PULM:  clear to auscultation bilaterally; no intercostals retractions; no accessory muscle usage   ABDOMEN:  soft nontender/nondistended.  +bowel sound  EXTREMITIES no cce  NEURO:  CN II-XII intact.  5/5 motor in all extremities.  sensation grossly intact   to light touch.  PSYCH:  normal affect.  Alert and oriented x 4    LABS  Pulmonary Functions Testing Results: none  ABG none  CXR:  6/15/18 when compared to 6/9/17 blunting of costophrenic angle bilaterally.    CT CHEST:  none    Echo 4/26/18  CONCLUSIONS     1 - Mildly enlarged ascending aorta.     2 - Normal left ventricular systolic function (EF 60-65%).     3 - Normal left ventricular diastolic function.     4 - No wall motion abnormalities.     5 - Normal right ventricular systolic function .     6 - Trivial tricuspid regurgitation.     ASSESSMENT    ICD-10-CM ICD-9-CM    1. Pleural effusion J90 511.9 Brain natriuretic peptide      X-Ray Chest PA And Lateral       PLAN:  Nasal congestion - asymptomatic    Pleural effusion - small and assymtomatic.  Most likely related to volume status as patient is no Hd.  Will schedule bnp and repeat cxr.      Patient will Follow-up if symptoms worsen or fail to improve. with md/np.    CC: Send copy of this note to Merrill Preston MD   "

## 2018-08-09 LAB
HEP. B SURF AB, QUAL: POSITIVE
HEP. B SURF AB, QUANT.: 40 MIU/ML

## 2018-08-09 NOTE — PROGRESS NOTES
LISTED PATIENT EDUCATION NOTE    Mr. Vega Brownlee was seen in listed kidney transplant clinic for evaluation for kidney, kidney/pancreas or pancreas only transplant.  The patient attended a group education session that discussed/reviewed the following aspects of transplantation: evaluation and selection committee process, [facility] waitlist management/multiple listings, types of organs offered (KDPI < 85%, KDPI > 85%, PHS increased risk, DCD), financial aspects, surgical procedures, dietary instruction pre- and post-transplant, health maintenance pre- and post-transplant, post-transplant hospitalization and outpatient follow-up, potential to participate in a research protocol, and medication management and side effects.  A question and answer session was provided after the presentation.    The patient was seen by all members of the multi-disciplinary team to include: Nephrologist/PA, Surgeon, , Transplant Coordinator, , Pharmacist and Dietician (if applicable).    The patient reviewed and signed all consents for evaluation which were witnessed and sent to scanning into the EPIC chart.    The patient was given an education book and plan for further evaluation based on his individual assessment.      The patient was encouraged to call with any questions or concerns.

## 2018-08-10 ENCOUNTER — HOSPITAL ENCOUNTER (OUTPATIENT)
Dept: RADIOLOGY | Facility: HOSPITAL | Age: 58
Discharge: HOME | End: 2018-08-10
Attending: INTERNAL MEDICINE
Payer: MEDICARE

## 2018-08-10 DIAGNOSIS — J90 PLEURAL EFFUSION: ICD-10-CM

## 2018-08-10 PROCEDURE — 71046 X-RAY EXAM CHEST 2 VIEWS: CPT | Mod: TC,FY,NTX

## 2018-08-10 PROCEDURE — 71046 X-RAY EXAM CHEST 2 VIEWS: CPT | Mod: 26,NTX,, | Performed by: RADIOLOGY

## 2018-08-15 NOTE — PROGRESS NOTES
Patient's chart reviewed today. Patient due for follow-up in August 2019. Appointments will be scheduled per protocol. HLA sample current, in lab, and being received on a regular basis.

## 2018-08-16 ENCOUNTER — TELEPHONE (OUTPATIENT)
Dept: SLEEP MEDICINE | Facility: OTHER | Age: 58
End: 2018-08-16

## 2018-08-16 NOTE — TELEPHONE ENCOUNTER
Result of cxr and bnp d/w patient.  Persistent small effusion with markedly elevated bnp.  Recommend lowering dry weight.  Patient will d/w nephrologist, Dr. Lowery.  I will try to reach out to him as well.

## 2018-09-28 DIAGNOSIS — I1A.0 RESISTANT HYPERTENSION: ICD-10-CM

## 2018-09-28 RX ORDER — FUROSEMIDE 80 MG/1
80 TABLET ORAL DAILY
Qty: 90 TABLET | Refills: 1 | Status: SHIPPED | OUTPATIENT
Start: 2018-09-28 | End: 2019-06-17 | Stop reason: SDUPTHER

## 2018-11-05 RX ORDER — HYDRALAZINE HYDROCHLORIDE 100 MG/1
TABLET, FILM COATED ORAL
Qty: 60 TABLET | Refills: 3 | OUTPATIENT
Start: 2018-11-05

## 2018-12-07 RX ORDER — HYDRALAZINE HYDROCHLORIDE 100 MG/1
TABLET, FILM COATED ORAL
Qty: 60 TABLET | Refills: 3 | OUTPATIENT
Start: 2018-12-07

## 2018-12-10 NOTE — TELEPHONE ENCOUNTER
Called pharmacy about pt refill request    pharmacy fax paper stated pt not found . Staff called pharmacy 12/10/2018.pharmacy found pt information as a female and pt is a male pt

## 2019-01-14 DIAGNOSIS — I10 ESSENTIAL HYPERTENSION: Chronic | ICD-10-CM

## 2019-01-16 RX ORDER — CLONIDINE HYDROCHLORIDE 0.2 MG/1
TABLET ORAL
Qty: 180 TABLET | Refills: 2 | Status: SHIPPED | OUTPATIENT
Start: 2019-01-16 | End: 2019-10-02 | Stop reason: SDUPTHER

## 2019-01-25 DIAGNOSIS — I10 ESSENTIAL HYPERTENSION: Chronic | ICD-10-CM

## 2019-01-28 RX ORDER — CARVEDILOL 12.5 MG/1
TABLET ORAL
Qty: 180 TABLET | Refills: 1 | OUTPATIENT
Start: 2019-01-28

## 2019-03-01 DIAGNOSIS — I10 ESSENTIAL HYPERTENSION: Chronic | ICD-10-CM

## 2019-03-01 DIAGNOSIS — Z99.2 ESRD (END STAGE RENAL DISEASE) ON DIALYSIS: ICD-10-CM

## 2019-03-01 DIAGNOSIS — N18.6 ESRD (END STAGE RENAL DISEASE) ON DIALYSIS: ICD-10-CM

## 2019-03-04 RX ORDER — ISOSORBIDE MONONITRATE 120 MG/1
TABLET, EXTENDED RELEASE ORAL
Qty: 90 TABLET | Refills: 3 | Status: SHIPPED | OUTPATIENT
Start: 2019-03-04 | End: 2019-08-13 | Stop reason: SDUPTHER

## 2019-04-03 RX ORDER — HYDRALAZINE HYDROCHLORIDE 100 MG/1
TABLET, FILM COATED ORAL
Qty: 60 TABLET | Refills: 3 | OUTPATIENT
Start: 2019-04-03

## 2019-04-05 RX ORDER — HYDRALAZINE HYDROCHLORIDE 100 MG/1
TABLET, FILM COATED ORAL
Qty: 60 TABLET | Refills: 3 | OUTPATIENT
Start: 2019-04-05

## 2019-04-09 RX ORDER — HYDRALAZINE HYDROCHLORIDE 100 MG/1
100 TABLET, FILM COATED ORAL EVERY 12 HOURS
Qty: 60 TABLET | Refills: 3 | Status: SHIPPED | OUTPATIENT
Start: 2019-04-09 | End: 2019-08-13 | Stop reason: SDUPTHER

## 2019-04-09 NOTE — TELEPHONE ENCOUNTER
----- Message from Mago Gordon sent at 4/8/2019  4:13 PM CDT -----  Contact: [facility]ansisc[facility]iu  [phone]  .Type: RX Refill Request    Who Called:Pharmacy    Refill or New Rx:refill    RX Name and Strength: hydrALAZINE (APRESOLINE) 100 MG tablet    How is the patient currently taking it? (ex. 1XDay):    Is this a 30 day or 90 day RX: 90 day    Preferred Pharmacy with phone number: [facility] [address] [address]  Phone: [phone] Fax: [phone]        Local or Mail Order: local    Ordering Provider    Would the patient rather a call back or a response via [facility]?  Call back    Best Call Back Number: [phone]    Additional Information:

## 2019-04-22 DIAGNOSIS — I10 ESSENTIAL HYPERTENSION: Chronic | ICD-10-CM

## 2019-04-24 DIAGNOSIS — I10 ESSENTIAL HYPERTENSION: Chronic | ICD-10-CM

## 2019-04-24 RX ORDER — CARVEDILOL 12.5 MG/1
12.5 TABLET ORAL 2 TIMES DAILY
Qty: 180 TABLET | Refills: 1 | Status: SHIPPED | OUTPATIENT
Start: 2019-04-24 | End: 2020-07-15 | Stop reason: SDUPTHER

## 2019-04-24 NOTE — TELEPHONE ENCOUNTER
Pt notified of refill completion. Pt requested follow up appointment and was scheduled for 5/15/2019

## 2019-05-01 ENCOUNTER — PATIENT OUTREACH (OUTPATIENT)
Dept: ADMINISTRATIVE | Facility: HOSPITAL | Age: 59
End: 2019-05-01

## 2019-05-01 DIAGNOSIS — E11.3513 TYPE 2 DIABETES MELLITUS WITH BOTH EYES AFFECTED BY PROLIFERATIVE RETINOPATHY AND MACULAR EDEMA, WITH LONG-TERM CURRENT USE OF INSULIN: Primary | ICD-10-CM

## 2019-05-01 DIAGNOSIS — Z79.4 TYPE 2 DIABETES MELLITUS WITH BOTH EYES AFFECTED BY PROLIFERATIVE RETINOPATHY AND MACULAR EDEMA, WITH LONG-TERM CURRENT USE OF INSULIN: Primary | ICD-10-CM

## 2019-05-06 ENCOUNTER — LAB VISIT (OUTPATIENT)
Dept: LAB | Facility: HOSPITAL | Age: 59
End: 2019-05-06
Attending: INTERNAL MEDICINE
Payer: MEDICARE

## 2019-05-06 DIAGNOSIS — Z79.4 TYPE 2 DIABETES MELLITUS WITH BOTH EYES AFFECTED BY PROLIFERATIVE RETINOPATHY AND MACULAR EDEMA, WITH LONG-TERM CURRENT USE OF INSULIN: ICD-10-CM

## 2019-05-06 DIAGNOSIS — E11.3513 TYPE 2 DIABETES MELLITUS WITH BOTH EYES AFFECTED BY PROLIFERATIVE RETINOPATHY AND MACULAR EDEMA, WITH LONG-TERM CURRENT USE OF INSULIN: ICD-10-CM

## 2019-05-06 LAB
CHOLEST SERPL-MCNC: 107 MG/DL (ref 120–199)
CHOLEST/HDLC SERPL: 3.8 {RATIO} (ref 2–5)
ESTIMATED AVG GLUCOSE: 137 MG/DL (ref 68–131)
HBA1C MFR BLD HPLC: 6.4 % (ref 4–5.6)
HDLC SERPL-MCNC: 28 MG/DL (ref 40–75)
HDLC SERPL: 26.2 % (ref 20–50)
LDLC SERPL CALC-MCNC: 65.4 MG/DL (ref 63–159)
NONHDLC SERPL-MCNC: 79 MG/DL
TRIGL SERPL-MCNC: 68 MG/DL (ref 30–150)

## 2019-05-06 PROCEDURE — 83036 HEMOGLOBIN GLYCOSYLATED A1C: CPT | Mod: TXP

## 2019-05-06 PROCEDURE — 36415 COLL VENOUS BLD VENIPUNCTURE: CPT | Mod: PN,NTX

## 2019-05-06 PROCEDURE — 80061 LIPID PANEL: CPT | Mod: NTX

## 2019-05-06 RX ORDER — CARVEDILOL 12.5 MG/1
TABLET ORAL
Qty: 180 TABLET | Refills: 1 | Status: SHIPPED | OUTPATIENT
Start: 2019-05-06 | End: 2020-04-25

## 2019-05-15 ENCOUNTER — OFFICE VISIT (OUTPATIENT)
Dept: FAMILY MEDICINE | Facility: CLINIC | Age: 59
End: 2019-05-15
Payer: MEDICARE

## 2019-05-15 VITALS — HEART RATE: 57 BPM | DIASTOLIC BLOOD PRESSURE: 75 MMHG | TEMPERATURE: 99 F | SYSTOLIC BLOOD PRESSURE: 182 MMHG

## 2019-05-15 DIAGNOSIS — I10 ESSENTIAL HYPERTENSION: Chronic | ICD-10-CM

## 2019-05-15 DIAGNOSIS — Z99.2 ESRD (END STAGE RENAL DISEASE) ON DIALYSIS: ICD-10-CM

## 2019-05-15 DIAGNOSIS — N18.6 DIABETES MELLITUS WITH ESRD (END-STAGE RENAL DISEASE): Primary | Chronic | ICD-10-CM

## 2019-05-15 DIAGNOSIS — N18.6 ESRD (END STAGE RENAL DISEASE) ON DIALYSIS: ICD-10-CM

## 2019-05-15 DIAGNOSIS — E11.22 DIABETES MELLITUS WITH ESRD (END-STAGE RENAL DISEASE): Primary | Chronic | ICD-10-CM

## 2019-05-15 PROCEDURE — 99214 OFFICE O/P EST MOD 30 MIN: CPT | Mod: S$PBB,,, | Performed by: INTERNAL MEDICINE

## 2019-05-15 PROCEDURE — 99212 OFFICE O/P EST SF 10 MIN: CPT | Mod: PBBFAC,PN | Performed by: INTERNAL MEDICINE

## 2019-05-15 PROCEDURE — 99999 PR PBB SHADOW E&M-EST. PATIENT-LVL II: ICD-10-PCS | Mod: PBBFAC,,, | Performed by: INTERNAL MEDICINE

## 2019-05-15 PROCEDURE — 99214 PR OFFICE/OUTPT VISIT, EST, LEVL IV, 30-39 MIN: ICD-10-PCS | Mod: S$PBB,,, | Performed by: INTERNAL MEDICINE

## 2019-05-15 PROCEDURE — 99999 PR PBB SHADOW E&M-EST. PATIENT-LVL II: CPT | Mod: PBBFAC,,, | Performed by: INTERNAL MEDICINE

## 2019-05-15 NOTE — Clinical Note
Patient listed for kidney transplant getting regular HLA labs, he is questioning if he needs  To be seen in transplant clinic.

## 2019-05-15 NOTE — PROGRESS NOTES
Subjective:       Patient ID: Vega Brownlee is a 58 y.o. male.    Chief Complaint: Establish Care and Follow-up (BP)    F/u chronic conditoins    HPI: 57 y/o w/ DM ESRD on HD (TRS) presents for follow up of chronic condition. BP elevated days between HD comes down with HD able to run full times no LE swelling no dyspnea.     Review of Systems   Constitutional: Negative for activity change, appetite change, fatigue, fever and unexpected weight change.   HENT: Negative for ear pain, rhinorrhea and sore throat.    Eyes: Negative for discharge and visual disturbance.   Respiratory: Negative for chest tightness, shortness of breath and wheezing.    Cardiovascular: Negative for chest pain, palpitations and leg swelling.   Gastrointestinal: Negative for abdominal pain, constipation and diarrhea.   Endocrine: Negative for cold intolerance and heat intolerance.   Genitourinary: Negative for dysuria and hematuria.   Musculoskeletal: Negative for joint swelling and neck stiffness.   Skin: Negative for rash.   Neurological: Negative for dizziness, syncope, weakness and headaches.   Psychiatric/Behavioral: Negative for suicidal ideas.       Objective:     Vitals:    05/15/19 1548   BP: (!) 182/75   Pulse: (!) 57   Temp: 98.5 °F (36.9 °C)          Physical Exam   Constitutional: He is oriented to person, place, and time. He appears well-developed and well-nourished.   HENT:   Head: Normocephalic and atraumatic.   Eyes: Conjunctivae are normal.   Neck: Normal range of motion.   Cardiovascular: Normal rate and regular rhythm. Exam reveals no gallop and no friction rub.   No murmur heard.  Left arm fistula with palpable thrill no LE edema   Pulmonary/Chest: Effort normal and breath sounds normal. He has no wheezes. He has no rales.   Abdominal: Soft. Bowel sounds are normal. There is no tenderness. There is no rebound and no guarding.   Musculoskeletal: Normal range of motion. He exhibits no edema or tenderness.   Neurological: He is  alert and oriented to person, place, and time. No cranial nerve deficit.   Protective Sensation (w/ 10 gram monofilament):  Right: Intact  Left: Intact    Visual Inspection:  Normal -  Bilateral    Pedal Pulses:   Right: Present  Left: Present    Posterior tibialis:   Right:Present  Left: Present     Skin: Skin is warm and dry.   Psychiatric: He has a normal mood and affect.       Assessment and Plan   1. Diabetes mellitus with ESRD (end-stage renal disease)  Continue HD per nephrologist. Due for follow up for transplant listing    2. Essential hypertension  Above goal between HD unable to tolerate higher doses of betablocker in light of bradycardia, has had hypotension with HD in past avoid over titration     3. ESRD (end stage renal disease) on dialysis  As above

## 2019-05-21 DIAGNOSIS — Z76.82 ORGAN TRANSPLANT CANDIDATE: Primary | ICD-10-CM

## 2019-06-05 DIAGNOSIS — I1A.0 RESISTANT HYPERTENSION: ICD-10-CM

## 2019-06-08 RX ORDER — FUROSEMIDE 80 MG/1
TABLET ORAL
Qty: 90 TABLET | Refills: 1 | OUTPATIENT
Start: 2019-06-08

## 2019-06-12 DIAGNOSIS — I1A.0 RESISTANT HYPERTENSION: ICD-10-CM

## 2019-06-17 DIAGNOSIS — I1A.0 RESISTANT HYPERTENSION: ICD-10-CM

## 2019-06-17 RX ORDER — FUROSEMIDE 80 MG/1
80 TABLET ORAL DAILY
Qty: 90 TABLET | Refills: 1 | Status: SHIPPED | OUTPATIENT
Start: 2019-06-17 | End: 2019-06-19 | Stop reason: SDUPTHER

## 2019-06-19 RX ORDER — FUROSEMIDE 80 MG/1
TABLET ORAL
Qty: 90 TABLET | Refills: 1 | Status: SHIPPED | OUTPATIENT
Start: 2019-06-19 | End: 2019-12-12 | Stop reason: SDUPTHER

## 2019-07-06 DIAGNOSIS — N18.6 DIABETES MELLITUS WITH ESRD (END-STAGE RENAL DISEASE): Chronic | ICD-10-CM

## 2019-07-06 DIAGNOSIS — Z79.4 TYPE 2 DIABETES MELLITUS WITH BOTH EYES AFFECTED BY PROLIFERATIVE RETINOPATHY AND MACULAR EDEMA, WITH LONG-TERM CURRENT USE OF INSULIN: ICD-10-CM

## 2019-07-06 DIAGNOSIS — E11.3513 TYPE 2 DIABETES MELLITUS WITH BOTH EYES AFFECTED BY PROLIFERATIVE RETINOPATHY AND MACULAR EDEMA, WITH LONG-TERM CURRENT USE OF INSULIN: ICD-10-CM

## 2019-07-06 DIAGNOSIS — E11.22 DIABETES MELLITUS WITH ESRD (END-STAGE RENAL DISEASE): Chronic | ICD-10-CM

## 2019-07-09 RX ORDER — INSULIN GLARGINE 100 [IU]/ML
INJECTION, SOLUTION SUBCUTANEOUS
Qty: 15 ML | Refills: 2 | Status: SHIPPED | OUTPATIENT
Start: 2019-07-09 | End: 2020-08-03 | Stop reason: SDUPTHER

## 2019-07-11 ENCOUNTER — TELEPHONE (OUTPATIENT)
Dept: FAMILY MEDICINE | Facility: CLINIC | Age: 59
End: 2019-07-11

## 2019-07-11 DIAGNOSIS — Z76.82 ORGAN TRANSPLANT CANDIDATE: Primary | ICD-10-CM

## 2019-07-11 NOTE — TELEPHONE ENCOUNTER
----- Message from Laine King sent at 7/11/2019  1:09 PM CDT -----  Contact: pt daughter  Pt called to get refill on speak BASAGLAR KWIKPEN U-100 INSULIN glargine 100 units/mL (3mL) SubQ pen blood sugar diagnostic Strip pt completley out of the medication.              Pt callback number [phone]

## 2019-07-11 NOTE — TELEPHONE ENCOUNTER
I spoke to the daughter and the pharmacy. The refill was sent and received on 7/9/2019. Pt daughter will contact the pharmacy.

## 2019-08-09 ENCOUNTER — HOSPITAL ENCOUNTER (OUTPATIENT)
Dept: CARDIOLOGY | Facility: HOSPITAL | Age: 59
Discharge: HOME | End: 2019-08-09
Attending: NURSE PRACTITIONER
Payer: MEDICARE

## 2019-08-09 VITALS
SYSTOLIC BLOOD PRESSURE: 182 MMHG | BODY MASS INDEX: 22.69 KG/M2 | HEIGHT: 68 IN | DIASTOLIC BLOOD PRESSURE: 75 MMHG | HEART RATE: 68 BPM

## 2019-08-09 DIAGNOSIS — Z76.82 ORGAN TRANSPLANT CANDIDATE: ICD-10-CM

## 2019-08-09 LAB
AORTIC ROOT ANNULUS: 3.51 CM
AORTIC VALVE CUSP SEPERATION: 2.12 CM
ASCENDING AORTA: 3.24 CM
AV INDEX (PROSTH): 0.58
AV MEAN GRADIENT: 8 MMHG
AV PEAK GRADIENT: 14 MMHG
AV VALVE AREA: 2.36 CM2
AV VELOCITY RATIO: 0.62
CV ECHO LV RWT: 0.43 CM
DOP CALC AO PEAK VEL: 1.9 M/S
DOP CALC AO VTI: 53.78 CM
DOP CALC LVOT AREA: 4.1 CM2
DOP CALC LVOT DIAMETER: 2.28 CM
DOP CALC LVOT PEAK VEL: 1.18 M/S
DOP CALC LVOT STROKE VOLUME: 127.16 CM3
DOP CALCLVOT PEAK VEL VTI: 31.16 CM
E WAVE DECELERATION TIME: 210.45 MSEC
E/A RATIO: 1.47
ECHO LV POSTERIOR WALL: 1.23 CM (ref 0.6–1.1)
FRACTIONAL SHORTENING: 28 % (ref 28–44)
INTERVENTRICULAR SEPTUM: 1.28 CM (ref 0.6–1.1)
IVRT: 0.09 MSEC
LA MAJOR: 6.61 CM
LA MINOR: 6.56 CM
LA WIDTH: 5.68 CM
LEFT ATRIUM SIZE: 5.06 CM
LEFT ATRIUM VOLUME: 160.87 CM3
LEFT INTERNAL DIMENSION IN SYSTOLE: 4.15 CM (ref 2.1–4)
LEFT VENTRICLE DIASTOLIC VOLUME: 162.55 ML
LEFT VENTRICLE SYSTOLIC VOLUME: 76.53 ML
LEFT VENTRICULAR INTERNAL DIMENSION IN DIASTOLE: 5.74 CM (ref 3.5–6)
LEFT VENTRICULAR MASS: 310.43 G
MV PEAK A VEL: 1.02 M/S
MV PEAK E VEL: 1.5 M/S
PISA TR MAX VEL: 3.82 M/S
PULM VEIN S/D RATIO: 0.96
PV PEAK D VEL: 0.79 M/S
PV PEAK S VEL: 0.76 M/S
PV PEAK VELOCITY: 1 CM/S
RA MAJOR: 5.09 CM
RA PRESSURE: 3 MMHG
RA WIDTH: 4 CM
RIGHT VENTRICULAR END-DIASTOLIC DIMENSION: 3.56 CM
RV TISSUE DOPPLER FREE WALL SYSTOLIC VELOCITY 1 (APICAL 4 CHAMBER VIEW): 11.24 CM/S
SINUS: 3.54 CM
STJ: 2.56 CM
TR MAX PG: 58 MMHG
TRICUSPID ANNULAR PLANE SYSTOLIC EXCURSION: 2.84 CM
TV REST PULMONARY ARTERY PRESSURE: 61 MMHG

## 2019-08-09 PROCEDURE — 93306 TTE W/DOPPLER COMPLETE: CPT | Mod: 26,TXP,, | Performed by: INTERNAL MEDICINE

## 2019-08-09 PROCEDURE — 93306 TRANSTHORACIC ECHO (TTE) COMPLETE (CUPID ONLY): ICD-10-PCS | Mod: 26,TXP,, | Performed by: INTERNAL MEDICINE

## 2019-08-09 PROCEDURE — 93306 TTE W/DOPPLER COMPLETE: CPT | Mod: TXP

## 2019-08-10 ENCOUNTER — PATIENT OUTREACH (OUTPATIENT)
Dept: ADMINISTRATIVE | Facility: OTHER | Age: 59
End: 2019-08-10

## 2019-08-13 ENCOUNTER — OFFICE VISIT (OUTPATIENT)
Dept: CARDIOLOGY | Facility: CLINIC | Age: 59
End: 2019-08-13
Payer: MEDICARE

## 2019-08-13 VITALS
HEART RATE: 59 BPM | RESPIRATION RATE: 15 BRPM | OXYGEN SATURATION: 97 % | BODY MASS INDEX: 21.72 KG/M2 | DIASTOLIC BLOOD PRESSURE: 80 MMHG | SYSTOLIC BLOOD PRESSURE: 178 MMHG | WEIGHT: 143.31 LBS | HEIGHT: 68 IN

## 2019-08-13 DIAGNOSIS — I10 ESSENTIAL HYPERTENSION: Primary | Chronic | ICD-10-CM

## 2019-08-13 DIAGNOSIS — E11.22 DIABETES MELLITUS WITH ESRD (END-STAGE RENAL DISEASE): Chronic | ICD-10-CM

## 2019-08-13 DIAGNOSIS — Z01.810 PREOP CARDIOVASCULAR EXAM: ICD-10-CM

## 2019-08-13 DIAGNOSIS — E78.2 MIXED HYPERLIPIDEMIA: ICD-10-CM

## 2019-08-13 DIAGNOSIS — Z99.2 ESRD (END STAGE RENAL DISEASE) ON DIALYSIS: ICD-10-CM

## 2019-08-13 DIAGNOSIS — N18.6 ESRD (END STAGE RENAL DISEASE) ON DIALYSIS: ICD-10-CM

## 2019-08-13 DIAGNOSIS — N18.6 DIABETES MELLITUS WITH ESRD (END-STAGE RENAL DISEASE): Chronic | ICD-10-CM

## 2019-08-13 PROCEDURE — 99214 PR OFFICE/OUTPT VISIT, EST, LEVL IV, 30-39 MIN: ICD-10-PCS | Mod: S$PBB,25,NTX, | Performed by: INTERNAL MEDICINE

## 2019-08-13 PROCEDURE — 99999 PR PBB SHADOW E&M-EST. PATIENT-LVL III: CPT | Mod: PBBFAC,TXP,, | Performed by: INTERNAL MEDICINE

## 2019-08-13 PROCEDURE — 93010 ELECTROCARDIOGRAM REPORT: CPT | Mod: S$PBB,NTX,, | Performed by: INTERNAL MEDICINE

## 2019-08-13 PROCEDURE — 93010 EKG 12-LEAD: ICD-10-PCS | Mod: S$PBB,NTX,, | Performed by: INTERNAL MEDICINE

## 2019-08-13 PROCEDURE — 93005 ELECTROCARDIOGRAM TRACING: CPT | Mod: PBBFAC,TXP | Performed by: INTERNAL MEDICINE

## 2019-08-13 PROCEDURE — 99999 PR PBB SHADOW E&M-EST. PATIENT-LVL III: ICD-10-PCS | Mod: PBBFAC,TXP,, | Performed by: INTERNAL MEDICINE

## 2019-08-13 PROCEDURE — 99214 OFFICE O/P EST MOD 30 MIN: CPT | Mod: S$PBB,25,NTX, | Performed by: INTERNAL MEDICINE

## 2019-08-13 PROCEDURE — 99213 OFFICE O/P EST LOW 20 MIN: CPT | Mod: PBBFAC,TXP,25 | Performed by: INTERNAL MEDICINE

## 2019-08-13 RX ORDER — HYDRALAZINE HYDROCHLORIDE 100 MG/1
150 TABLET, FILM COATED ORAL EVERY 12 HOURS
Qty: 270 TABLET | Refills: 3 | Status: SHIPPED | OUTPATIENT
Start: 2019-08-13 | End: 2020-10-17

## 2019-08-13 RX ORDER — ISOSORBIDE MONONITRATE 120 MG/1
240 TABLET, EXTENDED RELEASE ORAL DAILY
Qty: 180 TABLET | Refills: 3 | Status: SHIPPED | OUTPATIENT
Start: 2019-08-13 | End: 2020-07-15

## 2019-08-13 RX ORDER — HYDRALAZINE HYDROCHLORIDE 100 MG/1
TABLET, FILM COATED ORAL
Qty: 180 TABLET | Refills: 3 | Status: SHIPPED | OUTPATIENT
Start: 2019-08-13 | End: 2019-08-13 | Stop reason: SDUPTHER

## 2019-08-13 NOTE — PROGRESS NOTES
CARDIOVASCULAR PROGRESS NOTE    REASON FOR CONSULT:   Vega Brownlee is a 58 y.o. male who presents for follow up of HTN.    PCP: Ana  Neph: RUBEN Lowery  Transplant: Alvin  HISTORY OF PRESENT ILLNESS:   Last seen June 2018.    The patient returns for follow-up.  He denies intercurrent angina or dyspnea.  There has been no palpitations, lightheadedness, dizziness, or syncope.  He denies any PND, orthopnea, melena, hematuria, or claudicant symptoms.  Does describe some occasional lower extremity edema.  The patient reports good exercise capacity and tells me he can climb up and down stairs without any limitations.    Vitals - 1 value per visit 5/21/2018 6/4/2018 6/11/2018 8/8/2018 8/8/2018   SYSTOLIC 184 160 157 190 137   DIASTOLIC 68 80 55 80 67     Vitals - 1 value per visit 5/15/2019 8/9/2019 8/13/2019   SYSTOLIC 182 182 178   DIASTOLIC 75 75 80       CARDIOVASCULAR HISTORY:   none    PAST MEDICAL HISTORY:     Past Medical History:   Diagnosis Date    A-V fistula     Anemia     Cataract     CHF (congestive heart failure)     Diabetes mellitus, type 2     Disorder of kidney and ureter     Hyperlipidemia     Hypertension        PAST SURGICAL HISTORY:     Past Surgical History:   Procedure Laterality Date    arm fracture Left     BIOPSY-PROSTATE N/A 6/8/2017    Performed [facility] at [facility] 1ST FLR    COLONOSCOPY N/A 8/16/2017    Performed by Sebastián Lamas MD at [facility] ENDO (4TH FLR)    CYSTOSCOPY N/A 6/8/2017    Performed by [facility] at [facility] OR 1ST FLR    FINGER AMPUTATION Left     left index finger    LUNG SURGERY      thoracotomy    PORTACATH PLACEMENT  08/2016    TRANSRECTAL ULTRASOUND N/A 6/8/2017    Performed by [facility] at [facility] OR 1ST FLR       ALLERGIES AND MEDICATION:   Review of patient's allergies indicates:  No Known Allergies  Previous Medications    ASPIRIN (ECOTRIN) 81 MG EC TABLET    Take 81 mg by mouth.    ATORVASTATIN (LIPITOR) 40 MG TABLET     "Take 1 tablet (40 mg total) by mouth every evening.    BASAGLAR KWIKPEN U-100 INSULIN GLARGINE 100 UNITS/ML (3ML) SUBQ PEN    INJECT 7 UNITS SUBCUTANEOUSLY EVERY EVENING    BLOOD SUGAR DIAGNOSTIC STRP    1 each by Misc.(Non-Drug; Combo Route) route as directed.    BLOOD-GLUCOSE METER (FREESTYLE SYSTEM KIT) KIT    Use as instructed    CALCIUM ACETATE (PHOSLO) 667 MG CAPSULE    Take 667 mg by mouth 3 (three) times daily with meals.     CALCIUM CARBONATE (TUMS ULTRA) 1,177 MG CHEW    Take 2 tablets by mouth 3 (three) times daily with meals.     CARVEDILOL (COREG) 12.5 MG TABLET    TAKE ONE TABLET BY MOUTH TWICE DAILY    CARVEDILOL (COREG) 12.5 MG TABLET    Take 1 tablet (12.5 mg total) by mouth 2 (two) times daily.    CLONIDINE (CATAPRES) 0.2 MG TABLET    TAKE ONE TABLET BY MOUTH TWICE DAILY    DUREZOL 0.05 % DROP OPHTHALMIC SOLUTION        FLUTICASONE (FLONASE) 50 MCG/ACTUATION NASAL SPRAY    1 spray by Each Nare route once daily.    FOLIC ACID/VIT BCOMP,C (JAM-YAQUELIN ORAL)    Take 1 tablet by mouth once daily.    FUROSEMIDE (LASIX) 80 MG TABLET    TAKE ONE TABLET BY MOUTH ONCE DAILY    HYDRALAZINE (APRESOLINE) 100 MG TABLET    TAKE ONE TABLET BY MOUTH EVERY TWELVE HOURS    ISOSORBIDE MONONITRATE (IMDUR) 120 MG 24 HR TABLET    TAKE ONE TABLET BY MOUTH EVERY DAY    LANCETS (ACCU-CHEK SOFTCLIX LANCETS) MISC    1 application by Misc.(Non-Drug; Combo Route) route 4 (four) times daily before meals and nightly.    PEN NEEDLE, DIABETIC 31 GAUGE X 3/16" NDLE    BD Ultra Fine Mini pen needles (5mm x 31g) - dispense 100    RENVELA 800 MG TAB    Take 800 mg by mouth 3 (three) times daily with meals.        SOCIAL HISTORY:     Social History     Socioeconomic History    Marital status:      Spouse name: Not on file    Number of children: Not on file    Years of education: Not on file    Highest education level: Not on file   Occupational History    Not on file   Social Needs    Financial resource strain: Not on file "    Food insecurity:     Worry: Not on file     Inability: Not on file    Transportation needs:     Medical: Not on file     Non-medical: Not on file   Tobacco Use    Smoking status: Former Smoker     Packs/day: 1.00     Years: 30.00     Pack years: 30.00     Start date: 1982     Last attempt to quit: 2016     Years since quittin.8    Smokeless tobacco: Former User   Substance and Sexual Activity    Alcohol use: No    Drug use: No    Sexual activity: Yes     Partners: Female   Lifestyle    Physical activity:     Days per week: Not on file     Minutes per session: Not on file    Stress: Not on file   Relationships    Social connections:     Talks on phone: Not on file     Gets together: Not on file     Attends Orthodox service: Not on file     Active member of club or organization: Not on file     Attends meetings of clubs or organizations: Not on file     Relationship status: Not on file   Other Topics Concern    Not on file   Social History Narrative    Not on file       FAMILY HISTORY:     Family History   Problem Relation Age of Onset    Diabetes Mother     Memory loss Mother     No Known Problems Father     No Known Problems Sister     No Known Problems Brother     Arthritis Sister     No Known Problems Sister        REVIEW OF SYSTEMS:   Review of Systems   Constitutional: Negative for chills, diaphoresis and fever.   HENT: Negative for nosebleeds.    Eyes: Negative for blurred vision, double vision and photophobia.   Respiratory: Negative for hemoptysis, shortness of breath and wheezing.    Cardiovascular: Positive for leg swelling. Negative for chest pain, palpitations, orthopnea, claudication and PND.   Gastrointestinal: Negative for abdominal pain, blood in stool, heartburn, melena, nausea and vomiting.   Genitourinary: Negative for flank pain and hematuria.   Musculoskeletal: Negative for falls, myalgias and neck pain.   Skin: Negative for rash.   Neurological: Negative for  "dizziness, seizures, loss of consciousness, weakness and headaches.   Endo/Heme/Allergies: Negative for polydipsia. Does not bruise/bleed easily.   Psychiatric/Behavioral: Negative for depression and memory loss. The patient is not nervous/anxious.        PHYSICAL EXAM:     Vitals:    08/13/19 1503   BP: (!) 178/80   Pulse: (!) 59   Resp: 15    Body mass index is 21.79 kg/m².  Weight: 65 kg (143 lb 4.8 oz)   Height: 5' 8" (172.7 cm)     Physical Exam   Constitutional: He is oriented to person, place, and time. He appears well-developed and well-nourished. He is cooperative.  Non-toxic appearance. No distress.   HENT:   Head: Normocephalic and atraumatic.   Eyes: Pupils are equal, round, and reactive to light. Conjunctivae and EOM are normal. No scleral icterus.   Neck: Trachea normal and normal range of motion. Neck supple. Normal carotid pulses and no JVD present. Carotid bruit is not present. No neck rigidity. No tracheal deviation and no edema present. No thyromegaly present.   Cardiovascular: Normal rate, regular rhythm, S1 normal and S2 normal. PMI is not displaced. Exam reveals no gallop and no friction rub.   No murmur heard.  Pulses:       Carotid pulses are 2+ on the right side, and 2+ on the left side.  RUE HD fistula present   Pulmonary/Chest: Effort normal and breath sounds normal. No stridor. No respiratory distress. He has no wheezes. He has no rales. He exhibits no tenderness.   Abdominal: Soft. He exhibits no distension. There is no hepatosplenomegaly.   Musculoskeletal: Normal range of motion. He exhibits edema. He exhibits no tenderness.   L elbow deformity from prior fx repair   Feet:   Right Foot:   Skin Integrity: Negative for ulcer.   Left Foot:   Skin Integrity: Negative for ulcer.   Neurological: He is alert and oriented to person, place, and time. No cranial nerve deficit.   Skin: Skin is warm and dry. No rash noted. No erythema.   Psychiatric: He has a normal mood and affect. His speech is " normal and behavior is normal.   Vitals reviewed.      DATA:   EKG: (personally reviewed tracing)  8/13/19 SR 59, PRWP    Laboratory:  CBC:  Recent Labs   Lab 04/26/17  0755 06/09/17  1135   WBC 10.78 8.78   Hemoglobin 13.3 L 10.4 L   Hematocrit 40.1 31.4 L   Platelets 215 264       CHEMISTRIES:  Recent Labs   Lab 11/11/16  0852 04/26/17  0755 06/09/17  1135 08/16/17  1045   Glucose 93 101 274 H  --    Sodium 139 136 133 L  --    Potassium 4.9 4.2 4.8 4.9   BUN, Bld 62 H 27 H 44 H  --    Creatinine 7.3 H 5.9 H 7.8 H  --    eGFR if  9 A 11.3 A 8 A  --    eGFR if non  8 A 9.8 A 7 A  --    Calcium 8.1 L 9.3 9.0  --        CARDIAC BIOMARKERS:        COAGS:  Recent Labs   Lab 04/26/17  0755   INR 0.9       LIPIDS/LFTS:  Recent Labs   Lab 11/11/16  0852 04/26/17  0755 06/09/17  1135 05/06/19  0902   Cholesterol 151 101 L  --  107 L   Triglycerides 119 164 H  --  68   HDL 38 L 28 L  --  28 L   LDL Cholesterol 89.2 40.2 L  --  65.4   Non-HDL Cholesterol 113 73  --  79   AST 54 H 21 21  --    ALT 93 H 23 18  --        Cardiovascular Testing:  Echo 8/9/19  · Low normal left ventricular systolic function. The estimated ejection fraction is 50%  · Concentric left ventricular hypertrophy.  · Normal LV diastolic function.  · Normal right ventricular systolic function.  · Moderate left atrial enlargement.  · Mild to moderate tricuspid regurgitation.  · The estimated PA systolic pressure is 61 mm Hg  · Pulmonary hypertension present.  · Trivial pericardial effusion.    L MPI 6/15/18  Nuclear Quantitative Functional Analysis:   LVEF: 59 %  Impression: NORMAL MYOCARDIAL PERFUSION  1. The perfusion scan is free of evidence for myocardial ischemia or injury.   2. There is a mild intensity fixed defect in the inferior wall of the left ventricle, secondary to diaphragm attenuation.   3. Resting wall motion is physiologic.   4. Resting LV function is normal.   5. The ventricular volumes are normal at rest  and stress.   6. The extracardiac distribution of radioactivity is normal.   7. When compared to the previous study from 08/01/2016, no change    ASSESSMENT:   # Preop for kidney transplant.  Pt had good exercise capacity.   # ESRD/HD per renal.  # HTN, uncontrolled  # DM  # HLP, on atorva 40mg    PLAN:   Cont med rx  Inc Imdur to 240mg qd  Inc hydrala 150mg bid  Follow up with nephrology/transplant as planned  The patient is at acceptable cardiac risk for kidney transplant.  RTC 3 months    Darrel Sutton MD, FACC

## 2019-08-14 ENCOUNTER — OFFICE VISIT (OUTPATIENT)
Dept: TRANSPLANT | Facility: CLINIC | Age: 59
End: 2019-08-14
Payer: MEDICARE

## 2019-08-14 ENCOUNTER — HOSPITAL ENCOUNTER (OUTPATIENT)
Dept: RADIOLOGY | Facility: HOSPITAL | Age: 59
Discharge: HOME | End: 2019-08-14
Attending: NURSE PRACTITIONER
Payer: MEDICARE

## 2019-08-14 ENCOUNTER — HOSPITAL ENCOUNTER (OUTPATIENT)
Dept: RADIOLOGY | Facility: HOSPITAL | Age: 59
Discharge: HOME | End: 2019-08-14
Attending: TRANSPLANT SURGERY
Payer: MEDICARE

## 2019-08-14 VITALS
HEIGHT: 69 IN | TEMPERATURE: 98 F | RESPIRATION RATE: 18 BRPM | BODY MASS INDEX: 21.52 KG/M2 | WEIGHT: 145.31 LBS | SYSTOLIC BLOOD PRESSURE: 150 MMHG | OXYGEN SATURATION: 95 % | HEART RATE: 58 BPM | DIASTOLIC BLOOD PRESSURE: 58 MMHG

## 2019-08-14 DIAGNOSIS — I10 ESSENTIAL HYPERTENSION: Chronic | ICD-10-CM

## 2019-08-14 DIAGNOSIS — E78.2 MIXED HYPERLIPIDEMIA: Primary | ICD-10-CM

## 2019-08-14 DIAGNOSIS — N18.6 DIABETES MELLITUS WITH ESRD (END-STAGE RENAL DISEASE): Chronic | ICD-10-CM

## 2019-08-14 DIAGNOSIS — N18.6 ESRD (END STAGE RENAL DISEASE) ON DIALYSIS: ICD-10-CM

## 2019-08-14 DIAGNOSIS — Z76.82 ORGAN TRANSPLANT CANDIDATE: ICD-10-CM

## 2019-08-14 DIAGNOSIS — E11.22 DIABETES MELLITUS WITH ESRD (END-STAGE RENAL DISEASE): Chronic | ICD-10-CM

## 2019-08-14 DIAGNOSIS — D63.8 ANEMIA OF CHRONIC DISEASE: Chronic | ICD-10-CM

## 2019-08-14 DIAGNOSIS — Z99.2 ESRD (END STAGE RENAL DISEASE) ON DIALYSIS: ICD-10-CM

## 2019-08-14 PROCEDURE — 72170 X-RAY EXAM OF PELVIS: CPT | Mod: 26,TXP,, | Performed by: RADIOLOGY

## 2019-08-14 PROCEDURE — 99215 OFFICE O/P EST HI 40 MIN: CPT | Mod: S$PBB,TXP,, | Performed by: INTERNAL MEDICINE

## 2019-08-14 PROCEDURE — 72170 X-RAY EXAM OF PELVIS: CPT | Mod: TC,TXP

## 2019-08-14 PROCEDURE — 93978 US DOPP ILIACS BILATERAL: ICD-10-PCS | Mod: 26,TXP,, | Performed by: RADIOLOGY

## 2019-08-14 PROCEDURE — 71046 X-RAY EXAM CHEST 2 VIEWS: CPT | Mod: 26,TXP,, | Performed by: RADIOLOGY

## 2019-08-14 PROCEDURE — 72170 XR PELVIS ROUTINE AP: ICD-10-PCS | Mod: 26,TXP,, | Performed by: RADIOLOGY

## 2019-08-14 PROCEDURE — 71046 XR CHEST PA AND LATERAL: ICD-10-PCS | Mod: 26,TXP,, | Performed by: RADIOLOGY

## 2019-08-14 PROCEDURE — 99999 PR PBB SHADOW E&M-EST. PATIENT-LVL III: CPT | Mod: PBBFAC,TXP,,

## 2019-08-14 PROCEDURE — 99999 PR PBB SHADOW E&M-EST. PATIENT-LVL III: ICD-10-PCS | Mod: PBBFAC,TXP,,

## 2019-08-14 PROCEDURE — 99215 PR OFFICE/OUTPT VISIT, EST, LEVL V, 40-54 MIN: ICD-10-PCS | Mod: S$PBB,TXP,, | Performed by: INTERNAL MEDICINE

## 2019-08-14 PROCEDURE — 93978 VASCULAR STUDY: CPT | Mod: 26,TXP,, | Performed by: RADIOLOGY

## 2019-08-14 PROCEDURE — 71046 X-RAY EXAM CHEST 2 VIEWS: CPT | Mod: TC,TXP

## 2019-08-14 PROCEDURE — 99213 OFFICE O/P EST LOW 20 MIN: CPT | Mod: PBBFAC,25,TXP

## 2019-08-14 PROCEDURE — 93978 VASCULAR STUDY: CPT | Mod: TC,TXP

## 2019-08-14 NOTE — PROGRESS NOTES
KIDNEY, KIDNEY/PANCREAS, PANCREAS TRANSPLANT RECIPIENT REEVALUATION    Requesting Physician: Dr. Coleman     CC:   Six monthly/Annual reassessment of kidney transplant candidacy.    HPI:  Mr. Brownlee is a 59 y.o. year old White male who has presented to be evaluated as a potential kidney transplant recipient.  He has ESRD secondary to diabetic nephropathy and HTN.  Patient is currently on hemodialysis started on  8/2016. Patient is dialyzing on TTS schedule.  Patient reports that he is tolerating dialysis well. Dialyzes for 3 1/2  Hours.  He has RUE AVF.  he is currently on hemodialysis started in 2016. Patient is dialyzing on TTS schedule.  Patient reports that he is tolerating dialysis well.  He is currently active on the wait-list. He is here for his scheduled follow up appointment.    - Echocardiogram PA 61: cardiology cleared him on 8/14/19  - He still smokes a cigarette once a week  - No recent hospitalization and surgery  - Had  pulmonary visit for abnormal CXR:minimal patchy opacification in the left lower lobe in 2018. small pleural effusion.   - Had prostate biopsy in 2017 with normal pathology     Patient denies any recent ER visit, hospitalization or recent history of coronary artery disease, stroke, seizure disorder, deep venous thrombosis, pulmonary embolism, malignancies. Patient states that he is doing well. he denies any CP, SOB, nausea, vomiting, diarrhea, abdominal pain, cough, fever, chills, weight loss or night sweats.        Functional Status: he plays golf and does fishing and walks miles without any symptoms of chest pain, SOB, claudication.   Previous Transplant: no  Previous Blood Transfusion:  no  Anticoagulation/ antiplatelet therapy and reason: no  Potential Donor: no  High KDPI candidate: yes  Meets center eligibility for accepting HCV+ donor offer:yes      Past Medical History:  Past Medical History:   Diagnosis Date    A-V fistula     Anemia     Cataract     CHF (congestive  heart failure)     Diabetes mellitus, type 2     Disorder of kidney and ureter     Hyperlipidemia     Hypertension        Past Surgical History:  Past Surgical History:   Procedure Laterality Date    arm fracture Left     BIOPSY-PROSTATE N/A 2017    [facility] 1ST [facility]LR    COLONOSCOPY N/A 2017    [facility] by Sebast[facility]án Lamas MD at [facility] ENDO (4TH FLR)    CYSTOSCOPY N/A 2017    [facility] at [facility] OR 1ST FLR    FINGER AMPUTATION Left     left index finger    LUNG SURGERY      thoracotomy    PORTACATH PLACEMENT  2016    TRANSRECTAL ULTRASOUND N/A 2017    Per[facility] by [facility] at [facility] OR 1ST FLR         Family History:  Family History   Problem Relation Age of Onset    Diabetes Mother     Memory loss Mother     No Known Problems Father     No Known Problems Sister     No Known Problems Brother     Arthritis Sister     No Known Problems Sister        Social History:  Social History     Socioeconomic History    Marital status:      Spouse name: Not on file    Number of children: Not on file    Years of education: Not on file    Highest education level: Not on file   Occupational History    Not on file   Social Needs    Financial resource strain: Not on file    Food insecurity:     Worry: Not on file     Inability: Not on file    Transportation needs:     Medical: Not on file     Non-medical: Not on file   Tobacco Use    Smoking status: Former Smoker     Packs/day: 1.00     Years: 30.00     Pack years: 30.00     Start date: 1982     Last attempt to quit: 2016     Years since quittin.8    Smokeless tobacco: Former User   Substance and Sexual Activity    Alcohol use: No    Drug use: No    Sexual activity: Yes     Partners: Female   Lifestyle    Physical activity:     Days per week: Not on file     Minutes per session: Not on file    Stress: Not on file   Relationships    Social  connections:     Talks on phone: Not on file     Gets together: Not on file     Attends Restorationism service: Not on file     Active member of club or organization: Not on file     Attends meetings of clubs or organizations: Not on file     Relationship status: Not on file   Other Topics Concern    Not on file   Social History Narrative    Not on file           Current Medication  Current Outpatient Medications   Medication Sig Dispense Refill    aspirin (ECOTRIN) 81 MG EC tablet Take 81 mg by mouth.      atorvastatin (LIPITOR) 40 MG tablet Take 1 tablet (40 mg total) by mouth every evening. 90 tablet 3    BASAGLAR KWIKPEN U-100 INSULIN glargine 100 units/mL (3mL) SubQ pen INJECT 7 UNITS SUBCUTANEOUSLY EVERY EVENING 15 mL 2    blood sugar diagnostic Strp 1 each by Misc.(Non-Drug; Combo Route) route as directed. 100 each 0    blood-glucose meter (FREESTYLE SYSTEM KIT) kit Use as instructed 1 each 0    calcium acetate (PHOSLO) 667 mg capsule Take 667 mg by mouth 3 (three) times daily with meals.       calcium carbonate (TUMS ULTRA) 1,177 mg Chew Take 2 tablets by mouth 3 (three) times daily with meals.       carvedilol (COREG) 12.5 MG tablet TAKE ONE TABLET BY MOUTH TWICE DAILY 180 tablet 1    carvedilol (COREG) 12.5 MG tablet Take 1 tablet (12.5 mg total) by mouth 2 (two) times daily. 180 tablet 1    cloNIDine (CATAPRES) 0.2 MG tablet TAKE ONE TABLET BY MOUTH TWICE DAILY 180 tablet 2    DUREZOL 0.05 % Drop ophthalmic solution       fluticasone (FLONASE) 50 mcg/actuation nasal spray 1 spray by Each Nare route once daily. 16 g 2    FOLIC ACID/VIT BCOMP,C (JAM-YAQUELIN ORAL) Take 1 tablet by mouth once daily.      furosemide (LASIX) 80 MG tablet TAKE ONE TABLET BY MOUTH ONCE DAILY 90 tablet 1    hydrALAZINE (APRESOLINE) 100 MG tablet Take 1.5 tablets (150 mg total) by mouth every 12 (twelve) hours. 270 tablet 3    isosorbide mononitrate (IMDUR) 120 MG 24 hr tablet Take 2 tablets (240 mg total) by mouth once  "daily. 180 tablet 3    lancets (ACCU-CHEK SOFTCLIX LANCETS) Misc 1 application by Misc.(Non-Drug; Combo Route) route 4 (four) times daily before meals and nightly. 120 each 0    pen needle, diabetic 31 gauge x 3/16" Ndle BD Ultra Fine Mini pen needles (5mm x 31g) - dispense 100 100 each 1    RENVELA 800 mg Tab Take 800 mg by mouth 3 (three) times daily with meals.        No current facility-administered medications for this visit.              Review of Systems    Constitutional: + fatigue after HD  Respiratory: Negative for cough, chest tightness, shortness of breath and wheezing.    Cardiovascular: Negative.  Negative for chest pain, palpitations and leg swelling.   Gastrointestinal: Negative for abdominal pain, diarrhea, nausea and vomiting.   Musculoskeletal: Negative for gait problem, neck pain and neck stiffness. RUE AVF  Skin: Negative for rash.   Allergic/Immunologic: Negative for environmental allergies, food allergies and immunocompromised state.   Neurological: Negative for dizziness, weakness, light-headedness and headaches.   Psychiatric/Behavioral: Negative for agitation and confusion. The patient is not nervous/anxious.     OBJECTIVE       Body mass index is 21.67 kg/m².    Vitals:    08/14/19 0759   BP: (!) 150/58   Pulse: (!) 58   Resp: 18   Temp: 97.7 °F (36.5 °C)       Physical Exam  General: No acute distress, well groomed  Respiratory: Clear to auscultation bilaterally, respirations unlabored, no rales/rhonchi/wheezing   Cardiovacular: Regular rate and rhythm, S1, S2 normal, no murmurs,  Gastrointestinal: Soft, non-tender, bowel sounds normal, no masses, no palpable organomegaly  Extremities: No clubbing or cyanosis of upper extremities bilaterally, no pedal edema bilaterally;   Skin: warm and dry; no rash on exposed skin  Lymph nodes: Cervical and supraclavicular nodes normal   Neurologic: No focal neurologic deficits. alert and oriented x 3   Musculoskeletal: moves all extremities without " difficulty, FROM, 5/5 strength, ambulates without an assistive device, RUE AVF, left 4th finger amuptation  Psychiatric: Normal mood and affect. Responds appropriately to questions.        Labs:  Reviewed with the patient      ASSESSMENT     1. Mixed hyperlipidemia    2. Essential hypertension    3. Diabetes mellitus with ESRD (end-stage renal disease)    4. Anemia of chronic disease    5. ESRD (end stage renal disease) on dialysis        PLAN       Transplant Candidacy:    Mr. Brownlee is a a suitable for kidney transplantation.   Meets center eligibility for accepting HCV+ donor offer - yes.  Patient educated on HCV+ donors. Vega is willing to accept HCV+ donor offer - yes   Patient is a candidate for KDPI > 85 kidney donor offer - yes.   He has experienced health changes that warrant further investigation.  Patient will be placed in an inactive status at present.    - Discuss the case for:  -1 PA pressure of 61 with normal functional capacity. Cardiology cleared him  - 2 abnormal chest X ray

## 2019-08-14 NOTE — PROGRESS NOTES
Transplant Recipient Adult Psychosocial Assessment-UPDATE     Vega Brownlee   [facility] [address]  Telephone Information:   Mobile [phone]   Home  [phone] (home)  Work  There is no work phone number on file.  E-mail  No e-mail address on record    Sex: male  YOB: 1960  Age: 58 y.o.    Encounter Date: 2019  [place] Citizen: no has permanent residence card; wife is [place] citizen  Primary Language: Upper sorbian but pt speaks English    Needed: no    Emergency Contact:  Name: Marie Brownlee  Relationship: wife  Address: same as above  Phone Numbers:  n/a (home), [phone] (work), [phone] (mobile)    Family/Social Support:   Number of dependents/: none  Marital history:  once to current wife for 28 yrs.  Two adult children who are in college:  Eli Brownlee, age 25 (Pharmacy School in [place]) and Jeffrey Brownlee, age 23 ([employer]).  Other family dynamics: Mother is living at age 84, has brain tumor and in nursing home in [facility], father  at age 87.  Pt has three sisters and one brother, two of whom live in ; one sister, age 61 living in [place] and another in [place].      Household Composition:    Pt and wife, Marie Brownlee (age not disclosed) live in their home in [place], [place]  Do you and your caregivers have access to reliable transportation? yes  PRIMARY CAREGIVER: Marie Brownlee will be primary caregiver, phone number [phone].      provided in-depth information to patient and caregiver regarding pre- and post-transplant caregiver role.   strongly encourages patient and caregiver to have concrete plan regarding post-transplant care giving, including back-up caregiver(s) to ensure care giving needs are met as needed.    Patient and Caregiver states understanding all aspects of caregiver role/commitment and is able/willing/committed to being caregiver to the fullest extent necessary.    Patient and Caregiver verbalizes understanding of the education  provided today and caregiver responsibilities.         remains available. Patient and Caregiver agree to contact  in a timely manner if concerns arise.      Able to take time off work without financial concerns: yes.     Additional Significant Others who will Assist with Transplant:  Name: Darrel Astudillo [phone]  Age: 47  City: NO State: [place]  Relationship: friend  Does person drive? yes    Name: Eli Brownlee  [phone]  Age: 47  City: NO State: [place]  Relationship: friend  Does person drive? yes    Living Will: no  Healthcare Power of : no pt reports trusting wife with medical decisions   Advance Directives on file: <<no information> per medical record.  Verbally reviewed LW/HCPA information.   provided patient with copy of LW/HCPA documents and provided education on completion of forms.    Living Donors: No. Education and resource information given to patient.    Highest Education Level: Associate/Bachelor Degree  Reading Ability: college  Reports difficulty with: seeing due to diabetes related eye changes.  Pt reports starting treatment to improve vision.   Learns Best By:  demonstration     Status: no  VA Benefits: no     Working for Income: No  If no, reason not working: Disability  Patient is disabled.  Prior to disability, patient  was employed as owner of [employer] ..    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    Monthly Income:  Other: $NOT DISCLOSED  Able to afford all costs now and if transplanted, including medications: yes  Patient and Caregiver verbalizes understanding of personal responsibilities related to transplant costs and the importance of having a financial plan to ensure that patients transplant costs are fully covered.       provided fundraising information/education. Patient and Caregiververbalizes  understanding.   remains available.    Insurance:   Payor/Plan Subscr  Sex Relation Sub. Ins. ID Effective Group Num   1. MEDICARE - [place]* TREY KHAN 1960 Male  [id number]                                    [address]   2. BLUE CROSS BL* TREY KHAN 1960 Male  [id number] 3/1/17 [id number]                                   [address] [address]     Primary Insurance (for UNOS reporting): Public Insurance - Medicare FFS (Fee For Service)  Secondary Insurance (for UNOS reporting): Private Insurance  Patient and Caregiver verbalizes clear understanding that patient may experience difficulty obtaining and/or be denied insurance coverage post-surgery. This includes and is not limited to disability insurance, life insurance, health insurance, burial insurance, long term care insurance, and other insurances.      Patient and Caregiver also reports understanding that future health concerns related to or unrelated to transplantation may not be covered by patient's insurance.  Resources and information provided and reviewed.     Patient and Caregiver provides verbal permission to release any necessary information to outside resources for patient care and discharge planning.  Resources and information provided are reviewed.      Dialysis Adherence: Patient reports good adherence with dialysis treatments.    Infusion Service: patient utilizing? no  Home Health: patient utilizing? no  DME: no  Pulmonary/Cardiac Rehab: denies   ADLS: Pt states ability to independently accomplish all adls..      Adherence:  Adherence education and counseling provided.     Per History Section:  Past Medical History:   Diagnosis Date    A-V fistula     Anemia     Cataract     CHF (congestive heart failure)     Diabetes mellitus, type 2     Disorder of kidney and ureter     Hyperlipidemia     Hypertension      Social History     Tobacco Use    Smoking status: Former Smoker     Packs/day: 1.00     Years: 30.00     Pack  years: 30.00     Start date: 1982     Last attempt to quit: 2016     Years since quittin.8    Smokeless tobacco: Former User   Substance Use Topics    Alcohol use: No     Social History     Substance and Sexual Activity   Drug Use No     Social History     Substance and Sexual Activity   Sexual Activity Yes    Partners: Female       Per Today's Psychosocial:  Tobacco: Pt reports he smoked 1ppd for 40 yrs and quit last September ().  Alcohol: none, patient denies any use.  Illicit Drugs/Non-prescribed Medications: none, patient denies any use.    Patient and Caregiver states clear understanding of the potential impact of substance use as it relates to transplant candidacy and is aware of possible random substance screening.  Substance abstinence/cessation counseling, education and resources provided and reviewed.     Arrests/DWI/Treatment/Rehab: patient denies    Psychiatric History:    Mental Health: Pt denies any mental health concerns.  Psychiatrist/Counselor: none  Medications:  none  Suicide/Homicide Issues: Pt denies current or past suicidal/homicidal ideation.   Safety at home: Pt denies any home safety concerns.      Knowledge: Patient and Caregiver states having clear understanding and realistic expectations regarding the potential risks and potential benefits of organ transplantation and organ donation and agrees to discuss with health care team members and support system members, as well as to utilize available resources and express questions and/or concerns in order to further facilitate the pt informed decision-making.  Resources and information provided and reviewed.    Patient and Caregiver is aware of [facility]'s affiliation and/or partnership with agencies in home health care, LTAC, SNF, [facility], and other hospitals and clinics.    Understanding: Patient and Caregiver reports having a clear understanding of the many lifetime commitments involved with being a transplant recipient,  including costs, compliance, medications, lab work, procedures, appointments, concrete and financial planning, preparedness, timely and appropriate communication of concerns, abstinence (ETOH, tobacco, illicit non-prescribed drugs), adherence to all health care team recommendations, support system and caregiver involvement, appropriate and timely resource utilization and follow-through, mental health counseling as needed/recommended, and patient and caregiver responsibilities.  Social Service Handbook, resources and detailed educational information provided and reviewed.  Educational information provided.    Patient and Caregiver also reports current and expected compliance with health care regime and states having a clear understanding of the importance of compliance.      Patient and Caregiver reports a clear understanding that risks and benefits may be involved with organ transplantation and with organ donation.       Patient and Caregiver also reports clear understanding that psychosocial risk factors may affect patient, and include but are not limited to feelings of depression, generalized anxiety, anxiety regarding dependence on others, post traumatic stress disorder, feelings of guilt and other emotional and/or mental concerns, and/or exacerbation of existing mental health concerns.  Detailed resources provided and discussed.      Patient and Caregiver agrees to access appropriate resources in a timely manner as needed and/or as recommended, and to communicate concerns appropriately.  Patient and Caregiver also reports a clear understanding of treatment options available.     Patient and Caregiver received education in a group setting.   reviewed education, provided additional information, and answered questions.    Feelings or Concerns: Pt denies concerns; wife states the wait time is a concern for her.    Coping: Pt states hecopes by exercising, watching comedies, praying and attending the  [facility] in [place].    Goals: return to work and spend time with family.  Patient referred to Vocational Rehabilitation.    Interview Behavior: Patient and Caregiver presents as alert and oriented x 4, pleasant, good eye contact, well groomed, recall good, concentration/judgement good, average intelligence, calm, communicative, cooperative and asking and answering questions appropriately. Pt was accompanied by his son Jeffrey who appeared to be supportive of pt's pursuit of transplant.         Transplant Social Work - Candidacy  Assessment/Plan:     Psychosocial Suitability: Patient presents as an acceptable candidate for kidney transplant at this time. Based on psychosocial risk factors, patient presents as low risk, due to absence of significant psychosocial risk factors.    Recommendations/Additional Comments:SW recommends that pt conduct fundraising to assist pt with pay for cost of medications, food, gas, and other transplant related needs. SW recommends that pt remain aware of potential mental health concerns and contact the team if any concerns arise. SW recommends that pt remain abstinent from tobacco, ETOH, and drug use. SW supports pt's continued dialysis adherence. SW remains available to answer any questions or concerns that arise as the pt moves through the transplant process.     Yina Rodriguez LMSW

## 2019-08-21 ENCOUNTER — TELEPHONE (OUTPATIENT)
Dept: TRANSPLANT | Facility: CLINIC | Age: 59
End: 2019-08-21

## 2019-08-21 DIAGNOSIS — Z76.82 ORGAN TRANSPLANT CANDIDATE: Primary | ICD-10-CM

## 2019-08-21 NOTE — LETTER
August 21, 2019    Vega Brownlee  [address] [address]      Dear Vega Brownlee:  MRN: [id number]    The [facility] team reviewed your transplant candidacy.  It is our programs opinion that you are temporarily not a transplant candidate at our facility as of 8/21/2019 because of abnormal chest x-ray and elevated pulmonary artery pressure, which requires further evaluation. You will remain listed on the wait list, but will not be able to receive a transplant until this issue is resolved.      Attached is a letter from the United Network for Organ Sharing (UNOS).  It describes the services and information offered to patients by UNOS and the Organ Procurement and Transplant Network.    The [facility] team remains available to answer any questions.  Should you have any questions regarding this decision, please do not hesitate to contact our pre-transplant office.      Sincerely,        Hailey Moctezuma MD  Medical Director, Kidney & Kidney/Pancreas Transplantation  lh/Enclosure    In basket:  Dr. Alan Lowery  Faxed to:   [facility]          OPTN/UNOS: Your Resource for Organ Transplant Information        If you have a question regarding your own medical care, you always should call your transplant center first. However, for general organ transplant-related information, you can call the United Network for Organ Sharing (UNOS) toll-free patient services line at 1-158.637.4922.    Anyone, including potential transplant candidates, recipients, family members/friends, living donors, and/or donor family members can call this number to:    · talk about organ donation, living donation, how transplant and donation work, the donation process, transplant policies, and transplant/donor information;  · get a free patient information kit with helpful booklets, waiting list and transplant information, and a list of all transplant centers;  · ask questions about the Organ Procurement  and Transplantation Network (OPTN) web site (www.optn.transplant.hrsa.gov); the UNOS Web site (www.unos.org); or the UNOS web site for living donors and transplant recipients (www.transplantliving.org);  · learn how UNOS and the OPTN can help you;  · talk about any concerns that you may have with a transplant center and how they perform    UNOS is a not-for-profit organization that provides all of the administrative services for the national OPTN under federal contract to the Health Resources and Services Administration (HRSA), an agency under the U.S. Department of Health and Human Services (HHS).     UNOS and OPTN responsibilities include:    · writing educational material for patients, the public and professionals;  · helping to make people aware of the need for donated organs and tissue;  · writing organ transplant policy with help from doctors, nurses, transplant patients/candidates, donor families, living donors, and the public;  · coordinating the organ matching and placement process;  · collecting information about every organ transplant and donation that occurs in the [place].    Remember, you should contact your transplant center directly if you have questions or concerns about your own medical care including medical records, work-up progress and test reports. [facility] is not your transplant center, and staff at [facility] will not be able to transfer you to your transplant center, so keep your transplant centers phone number handy. But, while you research your transplant needs and learn as much as you can about transplantation and donation, we welcome your call to our toll-free patient services line at [phone].

## 2019-08-21 NOTE — PROGRESS NOTES
Vega Brownlee medical records reviewed with . Provider determined that due to having potential safety issues that the patient will be made inactive on waitlist at this time.       Vega Brownlee notified of safety concerns related to transplant at this time. Patients dialysis unit and Nephrologist notified of above concerns by letter.

## 2019-08-21 NOTE — PROGRESS NOTES
Discussed pt at Wed selection meeting. It is determined that pt will need a CT chest due to pleural effusion and follow-up with Pulm HTN clinic due to elevated PAP with possible RHC.

## 2019-08-29 ENCOUNTER — TELEPHONE (OUTPATIENT)
Dept: PULMONOLOGY | Facility: CLINIC | Age: 59
End: 2019-08-29

## 2019-08-29 DIAGNOSIS — R06.02 SOB (SHORTNESS OF BREATH): Primary | ICD-10-CM

## 2019-09-17 RX ORDER — ATORVASTATIN CALCIUM 40 MG/1
40 TABLET, FILM COATED ORAL NIGHTLY
Qty: 90 TABLET | Refills: 3 | Status: SHIPPED | OUTPATIENT
Start: 2019-09-17 | End: 2020-10-02 | Stop reason: SDUPTHER

## 2019-10-02 ENCOUNTER — TELEPHONE (OUTPATIENT)
Dept: FAMILY MEDICINE | Facility: CLINIC | Age: 59
End: 2019-10-02

## 2019-10-02 DIAGNOSIS — I10 ESSENTIAL HYPERTENSION: Chronic | ICD-10-CM

## 2019-10-02 RX ORDER — CLONIDINE HYDROCHLORIDE 0.2 MG/1
0.2 TABLET ORAL 3 TIMES DAILY
Qty: 270 TABLET | Refills: 2 | Status: SHIPPED | OUTPATIENT
Start: 2019-10-02 | End: 2020-07-15

## 2019-10-02 RX ORDER — CLONIDINE HYDROCHLORIDE 0.2 MG/1
TABLET ORAL
Qty: 180 TABLET | Refills: 2 | Status: SHIPPED | OUTPATIENT
Start: 2019-10-02 | End: 2019-10-02

## 2019-10-02 NOTE — TELEPHONE ENCOUNTER
Pt daughter is requesting that the prescription be sent to reflect the extra doses so that it will be covered by insurance as he is sometimes too early for refill and has to pay out of pocket.

## 2019-10-02 NOTE — TELEPHONE ENCOUNTER
Pt daughter reports that pt is doing dialysis three times a week and after treatment pt BP is always above baseline. The doctor at dialysis has instructed pt that he can take an extra Clonidine on the days his BP is up from baseline and the daughter wants to confirm that this safe for the pt to do? Please advise.

## 2019-10-02 NOTE — TELEPHONE ENCOUNTER
"----- Message from Mago Gordon sent at 10/2/2019 10:29 AM CDT -----  Contact: Eli daughter  [phone]  .Type: RX Refill Request    Who Called:  Eli "Daughter"    Have you contacted your pharmacy:  yes    Refill or New Rx: refill    RX Name and Strength: cloNIDine (CATAPRES) 0.2 MG tablet    Is this a 30 day or 90 day RX: 90 day     Preferred Pharmacy with phone number: [facility] [address] [address]  Phone: [phone] Fax: [phone]    Local or Mail Order: local     Would the patient rather a call back or a response via [facility]?  Call back     Best Call Back Number: [phone]          "

## 2019-10-03 ENCOUNTER — TELEPHONE (OUTPATIENT)
Dept: TRANSPLANT | Facility: CLINIC | Age: 59
End: 2019-10-03

## 2019-10-29 ENCOUNTER — PATIENT OUTREACH (OUTPATIENT)
Dept: ADMINISTRATIVE | Facility: OTHER | Age: 59
End: 2019-10-29

## 2019-11-01 ENCOUNTER — HOSPITAL ENCOUNTER (OUTPATIENT)
Dept: PULMONOLOGY | Facility: CLINIC | Age: 59
Discharge: HOME | End: 2019-11-01
Payer: MEDICARE

## 2019-11-01 ENCOUNTER — OFFICE VISIT (OUTPATIENT)
Dept: PULMONOLOGY | Facility: CLINIC | Age: 59
End: 2019-11-01
Payer: MEDICARE

## 2019-11-01 ENCOUNTER — HOSPITAL ENCOUNTER (OUTPATIENT)
Dept: RADIOLOGY | Facility: HOSPITAL | Age: 59
Discharge: HOME | End: 2019-11-01
Attending: INTERNAL MEDICINE
Payer: MEDICARE

## 2019-11-01 VITALS
OXYGEN SATURATION: 96 % | BODY MASS INDEX: 21.67 KG/M2 | DIASTOLIC BLOOD PRESSURE: 90 MMHG | BODY MASS INDEX: 21.67 KG/M2 | SYSTOLIC BLOOD PRESSURE: 180 MMHG | WEIGHT: 143 LBS | HEIGHT: 68 IN | HEART RATE: 59 BPM | WEIGHT: 143 LBS | HEIGHT: 68 IN

## 2019-11-01 DIAGNOSIS — R06.02 SOB (SHORTNESS OF BREATH): ICD-10-CM

## 2019-11-01 DIAGNOSIS — L08.0 PUSTULAR RASH: ICD-10-CM

## 2019-11-01 DIAGNOSIS — Z76.82 ORGAN TRANSPLANT CANDIDATE: ICD-10-CM

## 2019-11-01 DIAGNOSIS — J90 PLEURAL EFFUSION ON RIGHT: Primary | ICD-10-CM

## 2019-11-01 DIAGNOSIS — R91.8 ABNORMAL FINDINGS ON DIAGNOSTIC IMAGING OF LUNG: ICD-10-CM

## 2019-11-01 DIAGNOSIS — Z98.890 S/P THORACENTESIS: ICD-10-CM

## 2019-11-01 LAB
APPEARANCE FLD: CLEAR
BODY FLD TYPE: NORMAL
BODY FLUID SOURCE, LDH: NORMAL
COLOR FLD: YELLOW
DLCO ADJ PRE: 8.62 ML/(MIN*MMHG) (ref 20.83–34.69)
DLCO SINGLE BREATH LLN: 20.83
DLCO SINGLE BREATH PRE REF: 31 %
DLCO SINGLE BREATH REF: 27.76
DLCOC SBVA LLN: 2.87
DLCOC SBVA PRE REF: 73.1 %
DLCOC SBVA REF: 4.12
DLCOC SINGLE BREATH LLN: 20.83
DLCOC SINGLE BREATH PRE REF: 31 %
DLCOC SINGLE BREATH REF: 27.76
DLCOCSBVAULN: 5.36
DLCOCSINGLEBREATHULN: 34.69
DLCOSINGLEBREATHULN: 34.69
DLCOVA LLN: 2.87
DLCOVA PRE REF: 73.1 %
DLCOVA PRE: 3.01 ML/(MIN*MMHG*L) (ref 2.87–5.36)
DLCOVA REF: 4.12
DLCOVAULN: 5.36
DLVAADJ PRE: 3.01 ML/(MIN*MMHG*L) (ref 2.87–5.36)
ERVN2 LLN: 1.15
ERVN2 PRE REF: 46.8 %
ERVN2 PRE: 0.54 L (ref 1.15–1.15)
ERVN2 REF: 1.15
ERVN2ULN: 1.15
FEF 25 75 LLN: 1.55
FEF 25 75 PRE REF: 37.4 %
FEF 25 75 REF: 2.81
FEV05 LLN: 1.52
FEV05 REF: 2.65
FEV1 FVC LLN: 69
FEV1 FVC PRE REF: 94.9 %
FEV1 FVC REF: 78
FEV1 LLN: 2.76
FEV1 PRE REF: 44.3 %
FEV1 REF: 3.29
FRCN2 LLN: 2.5
FRCN2 PRE REF: 45 %
FRCN2 REF: 3.49
FRCN2ULN: 4.48
FVC LLN: 3.58
FVC PRE REF: 46.7 %
FVC REF: 4.2
IVC PRE: 1.78 L (ref 3.58–4.83)
IVC SINGLE BREATH LLN: 3.58
IVC SINGLE BREATH PRE REF: 42.3 %
IVC SINGLE BREATH REF: 4.2
IVCSINGLEBREATHULN: 4.83
LDH FLD L TO P-CCNC: 123 U/L
LYMPHOCYTES NFR FLD MANUAL: 18 %
MESOTHL CELL NFR FLD MANUAL: 2 %
MONOS+MACROS NFR FLD MANUAL: 80 %
PEF LLN: 6.24
PEF PRE REF: 73.4 %
PEF REF: 8.24
PRE DLCO: 8.62 ML/(MIN*MMHG) (ref 20.83–34.69)
PRE FEF 25 75: 1.05 L/S (ref 1.55–4.07)
PRE FET 100: 5.46 SEC
PRE FEV05 REF: 45.4 %
PRE FEV1 FVC: 74.39 % (ref 68.93–87.83)
PRE FEV1: 1.46 L (ref 2.76–3.82)
PRE FEV5: 1.2 L (ref 1.52–3.79)
PRE FRC N2: 1.57 L
PRE FVC: 1.96 L (ref 3.58–4.83)
PRE PEF: 6.04 L/S (ref 6.24–10.23)
PROT FLD-MCNC: 3.7 G/DL
RVN2 LLN: 1.66
RVN2 PRE REF: 44.1 %
RVN2 PRE: 1.03 L (ref 1.66–3.01)
RVN2 REF: 2.33
RVN2TLCN2 LLN: 27.99
RVN2TLCN2 PRE REF: 97.4 %
RVN2TLCN2 PRE: 36.01 % (ref 27.99–45.95)
RVN2TLCN2 REF: 36.97
RVN2TLCN2ULN: 45.95
RVN2ULN: 3.01
SPECIMEN SOURCE: NORMAL
TLCN2 LLN: 5.59
TLCN2 PRE REF: 42.4 %
TLCN2 PRE: 2.86 L (ref 5.59–7.89)
TLCN2 REF: 6.74
TLCN2ULN: 7.89
VA PRE: 2.86 L (ref 6.59–6.59)
VA SINGLE BREATH LLN: 6.59
VA SINGLE BREATH PRE REF: 43.4 %
VA SINGLE BREATH REF: 6.59
VASINGLEBREATHULN: 6.59
VCMAXN2 LLN: 3.58
VCMAXN2 PRE REF: 43.6 %
VCMAXN2 PRE: 1.83 L (ref 3.58–4.83)
VCMAXN2 REF: 4.2
VCMAXN2ULN: 4.83
WBC # FLD: 53 /CU MM

## 2019-11-01 PROCEDURE — 87070 CULTURE OTHR SPECIMN AEROBIC: CPT | Mod: TXP

## 2019-11-01 PROCEDURE — 71250 CT THORAX DX C-: CPT | Mod: TC,TXP

## 2019-11-01 PROCEDURE — 71250 CT CHEST WITHOUT CONTRAST: ICD-10-PCS | Mod: 26,TXP,, | Performed by: RADIOLOGY

## 2019-11-01 PROCEDURE — 99215 PR OFFICE/OUTPT VISIT, EST, LEVL V, 40-54 MIN: ICD-10-PCS | Mod: 25,S$PBB,GC,NTX

## 2019-11-01 PROCEDURE — 87206 SMEAR FLUORESCENT/ACID STAI: CPT | Mod: TXP

## 2019-11-01 PROCEDURE — 87205 SMEAR GRAM STAIN: CPT | Mod: TXP

## 2019-11-01 PROCEDURE — 88305 TISSUE EXAM BY PATHOLOGIST: CPT | Mod: 26,NTX,, | Performed by: PATHOLOGY

## 2019-11-01 PROCEDURE — 94727 GAS DIL/WSHOT DETER LNG VOL: CPT | Mod: 26,S$PBB,NTX, | Performed by: INTERNAL MEDICINE

## 2019-11-01 PROCEDURE — 99999 PR PBB SHADOW E&M-EST. PATIENT-LVL IV: ICD-10-PCS | Mod: PBBFAC,GC,TXP,

## 2019-11-01 PROCEDURE — 87102 FUNGUS ISOLATION CULTURE: CPT | Mod: TXP

## 2019-11-01 PROCEDURE — 99215 OFFICE O/P EST HI 40 MIN: CPT | Mod: 25,S$PBB,GC,NTX

## 2019-11-01 PROCEDURE — 88112 CYTOPATH CELL ENHANCE TECH: CPT | Mod: TXP | Performed by: PATHOLOGY

## 2019-11-01 PROCEDURE — 94729 DIFFUSING CAPACITY: CPT | Mod: PBBFAC,NTX | Performed by: INTERNAL MEDICINE

## 2019-11-01 PROCEDURE — 94010 BREATHING CAPACITY TEST: ICD-10-PCS | Mod: 26,S$PBB,NTX, | Performed by: INTERNAL MEDICINE

## 2019-11-01 PROCEDURE — 99214 OFFICE O/P EST MOD 30 MIN: CPT | Mod: PBBFAC,TXP

## 2019-11-01 PROCEDURE — 94727 GAS DIL/WSHOT DETER LNG VOL: CPT | Mod: PBBFAC,NTX | Performed by: INTERNAL MEDICINE

## 2019-11-01 PROCEDURE — 94010 BREATHING CAPACITY TEST: CPT | Mod: PBBFAC,NTX | Performed by: INTERNAL MEDICINE

## 2019-11-01 PROCEDURE — 94729 PR C02/MEMBANE DIFFUSE CAPACITY: ICD-10-PCS | Mod: 26,S$PBB,NTX, | Performed by: INTERNAL MEDICINE

## 2019-11-01 PROCEDURE — 99999 PR PBB SHADOW E&M-EST. PATIENT-LVL IV: CPT | Mod: PBBFAC,GC,TXP,

## 2019-11-01 PROCEDURE — 94729 DIFFUSING CAPACITY: CPT | Mod: 26,S$PBB,NTX, | Performed by: INTERNAL MEDICINE

## 2019-11-01 PROCEDURE — 94727 PR PULM FUNCTION TEST BY GAS: ICD-10-PCS | Mod: 26,S$PBB,NTX, | Performed by: INTERNAL MEDICINE

## 2019-11-01 PROCEDURE — 88112 CYTOLOGY SPECIMEN- MEDICAL CYTOLOGY (FLUID/WASH/BRUSH): ICD-10-PCS | Mod: 26,NTX,, | Performed by: PATHOLOGY

## 2019-11-01 PROCEDURE — 88112 CYTOPATH CELL ENHANCE TECH: CPT | Mod: 26,NTX,, | Performed by: PATHOLOGY

## 2019-11-01 PROCEDURE — 88305 CYTOLOGY SPECIMEN- MEDICAL CYTOLOGY (FLUID/WASH/BRUSH): ICD-10-PCS | Mod: 26,NTX,, | Performed by: PATHOLOGY

## 2019-11-01 PROCEDURE — 71250 CT THORAX DX C-: CPT | Mod: 26,TXP,, | Performed by: RADIOLOGY

## 2019-11-01 PROCEDURE — 87116 MYCOBACTERIA CULTURE: CPT | Mod: TXP

## 2019-11-01 PROCEDURE — 84157 ASSAY OF PROTEIN OTHER: CPT | Mod: TXP

## 2019-11-01 PROCEDURE — 94010 BREATHING CAPACITY TEST: CPT | Mod: 26,S$PBB,NTX, | Performed by: INTERNAL MEDICINE

## 2019-11-01 PROCEDURE — 89051 BODY FLUID CELL COUNT: CPT | Mod: TXP

## 2019-11-01 PROCEDURE — 87075 CULTR BACTERIA EXCEPT BLOOD: CPT | Mod: TXP

## 2019-11-01 PROCEDURE — 83615 LACTATE (LD) (LDH) ENZYME: CPT | Mod: TXP

## 2019-11-01 NOTE — PROGRESS NOTES
"Subjective:       Patient ID: Vega Brownlee is a 59 y.o. male.    Chief Complaint: Shortness of Breath    HPI   Mr Brownlee is a 59 year old male with past medical history of ESRD (HD TThS), DM, HTN, who is currently following with kidney transplant services for evaluation.  He had an abnormal x-ray and was referred to pulmonary for further evaluation. The film on 8/14 was significant for right lung opacification suggestive of pleural effusion.    Tobacco use - smoked cigarettes 40 years around a pack a day but now is not smoking every day.  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    Today he is very hypertensive and was unable to do his 6MWT as a consequence.  PFTs were performed.  He has a large simple right pleural effusion that is identified both on most recent chest x-ray and bedside ultrasound.  He is not having symptoms of infection like fever, chills, night sweats, etc.  He is also not having weight loss.  He is complaining of rash to his back that he thinks is associated with a BP medicine.      Review of Systems   Constitutional: Negative for fever, chills, activity change, appetite change, fatigue, night sweats and weakness.   HENT: Negative for postnasal drip, rhinorrhea, sinus pressure, sore throat, voice change and congestion.    Respiratory: Positive for shortness of breath, orthopnea and pleurisy. Negative for apnea, snoring, cough, hemoptysis, sputum production, choking, chest tightness, wheezing, previous hospitialization due to pulmonary problems, asthma nighttime symptoms, dyspnea on extertion, use of rescue inhaler, somnolence and Paroxysmal Nocturnal Dyspnea.    Cardiovascular: Negative for chest pain, palpitations and leg swelling.   Gastrointestinal: Negative for nausea, vomiting, abdominal pain and abdominal " "distention.   Neurological: Negative for dizziness.   Psychiatric/Behavioral: Negative for confusion.         Objective:         Vitals:    11/01/19 1356   BP: (!) 180/90   BP Location: Right arm   Patient Position: Sitting   Pulse: (!) 59   SpO2: 96%   Weight: 64.9 kg (143 lb)   Height: 5' 8" (1.727 m)       Physical Exam   Constitutional: He is oriented to person, place, and time. He appears well-developed and well-nourished. He appears not cachectic. No distress.   HENT:   Head: Normocephalic.   Mouth/Throat: Mallampati Score: IV.   Neck: Normal range of motion. Neck supple. No JVD present.   Cardiovascular: Normal rate, regular rhythm, normal heart sounds and intact distal pulses. Exam reveals no gallop and no friction rub.   No murmur heard.  Pulmonary/Chest: Normal expansion and effort normal. No respiratory distress. He has decreased breath sounds. He has no wheezes. He has no rhonchi. He has no rales. Chest wall is dull to percussion. He exhibits no tenderness.   Decreased breath sounds to RLL Positive for egophony. Negative for tactile fremitus.   Abdominal: Soft. Bowel sounds are normal.   Musculoskeletal: Normal range of motion. He exhibits no edema or deformity.   Neurological: He is alert and oriented to person, place, and time.   Skin: Skin is warm and dry.   Psychiatric: He has a normal mood and affect. His behavior is normal. Judgment and thought content normal.     Personal Diagnostic Review  FEV1/FVC - 74%  FEV1 44% (1.46L)  TLC 42%  DLCO 31%    No flowsheet data found.      Assessment:       1. Pleural effusion on right    2. Abnormal findings on diagnostic imaging of lung    3. Pustular rash    4. S/P thoracentesis    5. Organ transplant candidate        Outpatient Encounter Medications as of 11/1/2019   Medication Sig Dispense Refill    aspirin (ECOTRIN) 81 MG EC tablet Take 81 mg by mouth.      atorvastatin (LIPITOR) 40 MG tablet Take 1 tablet (40 mg total) by mouth every evening. 90 tablet 3 " "   BASAGLAR KWIKPEN U-100 INSULIN glargine 100 units/mL (3mL) SubQ pen INJECT 7 UNITS SUBCUTANEOUSLY EVERY EVENING 15 mL 2    blood sugar diagnostic Strp 1 each by Misc.(Non-Drug; Combo Route) route as directed. 100 each 0    calcium acetate (PHOSLO) 667 mg capsule Take 667 mg by mouth 3 (three) times daily with meals.       calcium carbonate (TUMS ULTRA) 1,177 mg Chew Take 2 tablets by mouth 3 (three) times daily with meals.       carvedilol (COREG) 12.5 MG tablet TAKE ONE TABLET BY MOUTH TWICE DAILY 180 tablet 1    carvedilol (COREG) 12.5 MG tablet Take 1 tablet (12.5 mg total) by mouth 2 (two) times daily. 180 tablet 1    cloNIDine (CATAPRES) 0.2 MG tablet Take 1 tablet (0.2 mg total) by mouth 3 (three) times daily. 270 tablet 2    DUREZOL 0.05 % Drop ophthalmic solution       fluticasone (FLONASE) 50 mcg/actuation nasal spray 1 spray by Each Nare route once daily. 16 g 2    FOLIC ACID/VIT BCOMP,C (JAM-YAQUELIN ORAL) Take 1 tablet by mouth once daily.      furosemide (LASIX) 80 MG tablet TAKE ONE TABLET BY MOUTH ONCE DAILY 90 tablet 1    hydrALAZINE (APRESOLINE) 100 MG tablet Take 1.5 tablets (150 mg total) by mouth every 12 (twelve) hours. 270 tablet 3    isosorbide mononitrate (IMDUR) 120 MG 24 hr tablet Take 2 tablets (240 mg total) by mouth once daily. 180 tablet 3    lancets (ACCU-CHEK SOFTCLIX LANCETS) Misc 1 application by Misc.(Non-Drug; Combo Route) route 4 (four) times daily before meals and nightly. 120 each 0    pen needle, diabetic 31 gauge x 3/16" Ndle BD Ultra Fine Mini pen needles (5mm x 31g) - dispense 100 100 each 1    RENVELA 800 mg Tab Take 800 mg by mouth 3 (three) times daily with meals.       blood-glucose meter (FREESTYLE SYSTEM KIT) kit Use as instructed 1 each 0     No facility-administered encounter medications on file as of 11/1/2019.      Orders Placed This Encounter   Procedures    Aerobic culture           Standing Status:   Future     Standing Expiration Date:   " 12/30/2020    Gram stain           Standing Status:   Future     Standing Expiration Date:   12/30/2020    CULTURE, FLUID  (AEROBIC)    CULTURE, ANAEROBE    AFB Culture & Smear    CULTURE, FUNGUS    WBC & Diff,Body Fluid Pleural Fluid, Right     Order Specific Question:   Specimen Source     Answer:   Pleural Fluid, Right    Protein, Peritoneal, Pleural Fluid or JAYSON Drainage, In-House Pleural Fluid, Right     Order Specific Question:   Specimen Source     Answer:   Pleural Fluid, Right    LDH, Peritoneal, Pleural Fluid or JAYSON Drainage, In-House Pleural Fluid, Right     Order Specific Question:   Specimen Source     Answer:   Pleural Fluid, Right    Ambulatory Referral to Dermatology     Referral Priority:   Routine     Referral Type:   Consultation     Referral Reason:   Specialty Services Required     Requested Specialty:   Dermatology     Number of Visits Requested:   1       Plan:       1) restrictive lung disease with reduced DLCO  2) right pleural effusion  3) ESRD and kidney transplant candidate  4) HTN  5) pustular rash on back    - s/p thoracentesis today in clinic.  Fluid sent for cultures, protein, ldh, afb, fungal studies, cytology  - CT today after thoracentesis pending  - continue HD and consider adjusting dry weight with nephrology.  Pleural fluid was clear in nature and likely transudative (preleminary and awaiting studies for confirmation).   - needs better BP control and will follow up with PCP.  - referral to dermatology for rash on back as it is quite severe and distressing to the patient.    RTC 3 months or sooner PRN    Ryan Keen, MD [facility] PGY5  [phone]

## 2019-11-02 NOTE — PROCEDURES
Vega Brownlee is a 59 y.o.  male patient, who presents for a 6 minute walk test ordered by Rodolfo Do MD.  The diagnosis is Shortness of Breath; Pleural Effusion.  The patient's BMI is 21.8 kg/m2.    Test Results:    The test was not attempted (elevated BP).  Prior to walking, the patient reported:  No complaints.      11/01/2019     O2 Sat % Supplemental Oxygen Heart Rate Blood Pressure Josef Scale   Pre-exercise  (Resting) 93 % Room Air 67 bpm 212/130 mmHg 0     Performing nurse/tech:  George HARRINGTON      CLINICAL INTERPRETATION:  Testing was not performed due to significant hypertension.

## 2019-11-05 LAB
BACTERIA FLD AEROBE CULT: NO GROWTH
GRAM STN SPEC: NORMAL
GRAM STN SPEC: NORMAL

## 2019-11-08 LAB — BACTERIA SPEC ANAEROBE CULT: NORMAL

## 2019-11-15 ENCOUNTER — TELEPHONE (OUTPATIENT)
Dept: PULMONOLOGY | Facility: CLINIC | Age: 59
End: 2019-11-15

## 2019-11-15 DIAGNOSIS — R91.8 ABNORMAL FINDINGS ON DIAGNOSTIC IMAGING OF LUNG: Primary | ICD-10-CM

## 2019-11-15 DIAGNOSIS — J90 PLEURAL EFFUSION: ICD-10-CM

## 2019-11-15 NOTE — TELEPHONE ENCOUNTER
I tried reaching Mr. Brownlee today regarding the findings on his CT thorax.  I received his voicemail and his mailbox was full.  He needs a follow up CT thorax in 6 months which I will order today.  I will try calling another day for further discussion.  Follow up already established.    Ryan Keen, MD [facility] PGY5  [phone]

## 2019-11-25 RX ORDER — CALCIUM ACETATE 667 MG/1
667 CAPSULE ORAL
Qty: 270 CAPSULE | Refills: 1 | Status: SHIPPED | OUTPATIENT
Start: 2019-11-25 | End: 2020-01-13

## 2019-11-25 NOTE — TELEPHONE ENCOUNTER
----- Message from Jeanette Castaneda sent at 11/25/2019  8:26 AM CST -----  Contact: Vega [phone]  Type: RX Refill Request    Who Called: Vega     Refill or New Rx:refill     RX Name and Strength: calcium acetate (PHOSLO) 667 mg capsule    Is this a 30 day or 90 day RX:30 day     [place] with phone [place] [facility] [place] [address]    Would the patient rather a call back or a response via My [facility]? Call back    Best Call Back Number:[phone]

## 2019-12-03 LAB — FUNGUS SPEC CULT: NORMAL

## 2019-12-12 DIAGNOSIS — I1A.0 RESISTANT HYPERTENSION: ICD-10-CM

## 2019-12-12 RX ORDER — FUROSEMIDE 80 MG/1
80 TABLET ORAL DAILY
Qty: 90 TABLET | Refills: 1 | Status: SHIPPED | OUTPATIENT
Start: 2019-12-12 | End: 2020-05-18

## 2019-12-12 NOTE — TELEPHONE ENCOUNTER
----- Message from [place] sent at 12/12/2019  9:59 AM CST -----  Contact: Yesenia Benitez   Type: RX Refill Request    Who Called: Yesenia Benitez     Have you contacted your pharmacy: Yes     Refill or New Rx: Refill     RX Name and Strength: furosemide (LASIX) 80 MG tablet    How is the patient currently taking it? (ex. 1XDay):    Is this a 30 day or 90 day RX: 90    Preferred Pharmacy with phone number:   [facility] [address] [address]  Phone: [phone] Fax: [phone]      Local or Mail Order: Local     Ordering Provider: Dr. Perez     Would the patient rather a call back or a response via My [facility]? Call back     Best Call Back Number:[phone]

## 2020-01-03 LAB
ACID FAST MOD KINY STN SPEC: NORMAL
MYCOBACTERIUM SPEC QL CULT: NORMAL

## 2020-01-10 ENCOUNTER — TELEPHONE (OUTPATIENT)
Dept: TRANSPLANT | Facility: CLINIC | Age: 60
End: 2020-01-10

## 2020-01-10 ENCOUNTER — TELEPHONE (OUTPATIENT)
Dept: PULMONOLOGY | Facility: CLINIC | Age: 60
End: 2020-01-10

## 2020-01-10 NOTE — TELEPHONE ENCOUNTER
----- Message from Roopa Medina sent at 1/10/2020 10:40 AM CST -----  Contact: patient  Please call above patient at [phone] patient need appointment in may on a Monday or Wednesday waiting on a call from the nurse thanks.

## 2020-01-10 NOTE — TELEPHONE ENCOUNTER
Attempted to contact patient but unfortunately, patient mailbox is to full to leave patient a message on his voicemail.

## 2020-01-13 RX ORDER — CALCIUM ACETATE 667 MG/1
CAPSULE ORAL
Qty: 270 CAPSULE | Refills: 1 | Status: SHIPPED | OUTPATIENT
Start: 2020-01-13 | End: 2022-01-01 | Stop reason: SDUPTHER

## 2020-01-27 NOTE — TELEPHONE ENCOUNTER
----- Message from Richard Capps sent at 1/27/2020  7:32 AM CST -----  Contact: Daughter: [phone]  Type: RX Refill Request    Who Called: Daughter    Have you contacted [facility]    Refill or New Rx:Refill    RX Name and Strength:Losartan      Preferred Pharmacy with phone number:[facility]    Local or Mail Order:Local    Ordering Provider:Dr. Peerz        [facility] Call Back Number [phone]

## 2020-02-06 ENCOUNTER — TELEPHONE (OUTPATIENT)
Dept: PULMONOLOGY | Facility: CLINIC | Age: 60
End: 2020-02-06

## 2020-02-06 NOTE — TELEPHONE ENCOUNTER
Spoke with patient daughter, informed her that I have received her message. Patient CT Scan appointment has been rescheduled to 3/13/2020 for 2:45 pm prior to visit with Dr Do on 3/13/2020 for 3:30 pm. Patient daughter verbalized that she understand and excepted patient appointment.

## 2020-02-06 NOTE — TELEPHONE ENCOUNTER
----- Message from Roopa Medina sent at 2/6/2020 12:11 PM CST -----  Contact: patient daughter  [phone]-Please call above patient daughter  need to speak with the nurse to reschedule both appointments for above patient thanks.please leave message on voice mail in school per patient daughter.

## 2020-03-12 ENCOUNTER — PATIENT OUTREACH (OUTPATIENT)
Dept: ADMINISTRATIVE | Facility: OTHER | Age: 60
End: 2020-03-12

## 2020-03-12 DIAGNOSIS — E11.22 DIABETES MELLITUS WITH ESRD (END-STAGE RENAL DISEASE): Primary | Chronic | ICD-10-CM

## 2020-03-12 DIAGNOSIS — N18.6 DIABETES MELLITUS WITH ESRD (END-STAGE RENAL DISEASE): Primary | Chronic | ICD-10-CM

## 2020-03-12 NOTE — LETTER
March 12, 2020        Fercho Hankins MD  [address]  [address]  [place] [facility] [address]             [facility]  [address] [facility] [address]  Phone: [phone]   Patient: Vega Brownlee   MR Number: [id number]   YOB: 1960           Dear Dr. Hankins:    Vega Brownlee is a patient of Dr. Ko Perez (PCP) at [facility]. While reviewing his chart, it has come to our attention that there is an outstanding lab/procedure. Please help keep our Health Maintenance records as accurate and as up to date as possible by supplying the following:     Diabetic Eye Exam                                                                             Please fax to [facility] @ [phone].    Thank you for your assistance in our patient's healthcare.     Sincerely,     Sapna Macdonald MA  Panel Care Coordinator  [facility]

## 2020-03-12 NOTE — PROGRESS NOTES
Chart reviewed.   Requested updates from Care Everywhere.  Immunizations reconciled.    updated.  Efaxed Dr. Hankins for DM eye exam.

## 2020-03-12 NOTE — PROGRESS NOTES
Dr Benny Vaz updated on patient status and patient transfer to ICU for hypotension and SOB, as well as MIGUE. Transplant Recipient Adult Psychosocial Assessment    Vega Brownlee  [address] [address]    Telephone Information:   Mobile [phone]   Home  [phone] (home)  Work  There is no work phone number on file.  E-mail  No e-mail address on record    Sex: male  YOB: 1960  Age: 56 y.o.    Encounter Date: 2017  [place] Citizen: no has permanent residence card; wife is [place] citizen  Primary Language: English or Polish   Needed: no    Emergency Contact:  Name: Marie Brownlee  Relationship: wife  Address: same as above  Phone Numbers:  n/a (home), [phone] (work), [phone] (mobile)    Family/Social Support:   Number of dependents/: none  Marital history:  once to current wife for 26 yrs.  Two adult children who are in college:  Eli Brownlee, age 23 (Pharmacy School in [place]) and Jeffrey Brownlee, age 20 ([employer]).  Other family dynamics: Mother is living at age 84, has brain tumor and in nursing home in [facility], father  at age 87.  Pt has three sisters and one brother, two of whom live in ; one sister, age 61 living in [place] and another in [place].      Household Composition:    Pt and wife, Marie Brownlee (age not disclosed) live in their home in [place], [place]  Do you and your caregivers have access to reliable transportation? yes  PRIMARY CAREGIVER: Marie Brownlee will be primary caregiver, phone number [phone].      provided in-depth information to patient and caregiver regarding pre- and post-transplant caregiver role.   strongly encourages patient and caregiver to have concrete plan regarding post-transplant care giving, including back-up caregiver(s) to ensure care giving needs are met as needed.    Patient and Caregiver states understanding all aspects of caregiver role/commitment and is able/willing/committed to being caregiver to the fullest extent necessary.    Patient and Caregiver verbalizes understanding of the education provided today and  caregiver responsibilities.         remains available. Patient and Caregiver agree to contact  in a timely manner if concerns arise.      Able to take time off work without financial concerns: yes.     Additional Significant Others who will Assist with Transplant:  Name: Darrel Astudillo [phone]  Age: 46  City: NO State: [place]  Relationship: friend  Does person drive? yes    Living Will: no  Healthcare Power of : no  Advance Directives on file: <<no information> per medical record.  Verbally reviewed LW/HCPA information.   provided patient with copy of LW/HCPA documents and provided education on completion of forms.    Living Donors: N/A Education and resource information given to patient.    Highest Education Level: Associate/Bachelor Degree  Reading Ability: college  Reports difficulty with: seeing due to diabetes related eye changes.  Pt states he is very eager to get treatment for this completed so he can have better vision.  Learns Best By:  demonstration     Status: no  VA Benefits: no     Working for Income: No  If no, reason not working: Disability  Patient is disabled.  Prior to disability, patient  was employed as owner of [employer] ..    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    Monthly Income:  Other: $NOT DISCLOSED  Able to afford all costs now and if transplanted, including medications: yes  Patient and Caregiver verbalizes understanding of personal responsibilities related to transplant costs and the importance of having a financial plan to ensure that patients transplant costs are fully covered.       provided fundraising information/education. Patient and Caregiververbalizes understanding.   remains available.    Insurance:   Payor/Plan Subscr  Sex Relation Sub. Ins. ID Effective Group Num   1. MEDICARE - [place]*  TREY KHAN 1960 Male  [id number] 11/1/16                                    [address]   2. BLUE CROSS BL* TREY KHAN 1960 Male  [id number] 3/1/17 [id number]                                   [address] [address]     Primary Insurance (for UNOS reporting): Public Insurance - Medicare FFS (Fee For Service)  Secondary Insurance (for UNOS reporting): Private Insurance  Patient and Caregiver verbalizes clear understanding that patient may experience difficulty obtaining and/or be denied insurance coverage post-surgery. This includes and is not limited to disability insurance, life insurance, health insurance, burial insurance, long term care insurance, and other insurances.      Patient and Caregiver also reports understanding that future health concerns related to or unrelated to transplantation may not be covered by patient's insurance.  Resources and information provided and reviewed.     Patient and Caregiver provides verbal permission to release any necessary information to outside resources for patient care and discharge planning.  Resources and information provided are reviewed.      Dialysis Adherence: Patient reports good adherence with dialysis treatments. Compliance check:  Dialysis unit reports in the past three months pt has had 0 no shows, 0 AMAs, labs no concerns; , transportation no concerns and family support no concerns.  Reported by LORIE Baldwin Nurse    Infusion Service: patient utilizing? no  Home Health: patient utilizing? no  DME: no  Pulmonary/Cardiac Rehab: denies   ADLS: Pt states ability to independently accomplish all adls..      Adherence:  Adherence education and counseling provided.     Per History Section:  Past Medical History:   Diagnosis Date    Anemia     Cataract     CHF (congestive heart failure)     Diabetes mellitus     Diabetes mellitus, type 2     Disorder of kidney and ureter     Hyperlipidemia     Hypertension      Social History   Substance Use Topics     Smoking status: Former Smoker     Packs/day: 1.00     Start date: 4/26/1982     Quit date: 9/26/2016    Smokeless tobacco: Not on file    Alcohol use No     History   Drug Use No     History   Sexual Activity    Sexual activity: Not on file       Per Today's Psychosocial:  Tobacco: Pt reports he smoked 1ppd for 40 yrs and quit last September (2016).  Alcohol: none, patient denies any use.  Illicit Drugs/Non-prescribed Medications: none, patient denies any use.    Patient and Caregiver states clear understanding of the potential impact of substance use as it relates to transplant candidacy and is aware of possible random substance screening.  Substance abstinence/cessation counseling, education and resources provided and reviewed.     Arrests/DWI/Treatment/Rehab: patient denies    Psychiatric History:    Mental Health: Pt denies any mental health concerns.  Psychiatrist/Counselor: none  Medications:  none  Suicide/Homicide Issues: Pt denies current or past suicidal/homicidal ideation.   Safety at home: Pt denies any home safety concerns.      Knowledge: Patient and Caregiver states having clear understanding and realistic expectations regarding the potential risks and potential benefits of organ transplantation and organ donation and agrees to discuss with health care team members and support system members, as well as to utilize available resources and express questions and/or concerns in order to further facilitate the pt informed decision-making.  Resources and information provided and reviewed.    Patient and Caregiver is aware of [facility] affiliation and/or partnership with agencies in home health care, LTAC, SNF, [facility], and other hospitals and clinics.    Understanding: Patient and Caregiver reports having a clear understanding of the many lifetime commitments involved with being a transplant recipient, including costs, compliance, medications, lab work, procedures, appointments, concrete and financial  planning, preparedness, timely and appropriate communication of concerns, abstinence (ETOH, tobacco, illicit non-prescribed drugs), adherence to all health care team recommendations, support system and caregiver involvement, appropriate and timely resource utilization and follow-through, mental health counseling as needed/recommended, and patient and caregiver responsibilities.  Social Service Handbook, resources and detailed educational information provided and reviewed.  Educational information provided.    Patient and Caregiver also reports current and expected compliance with health care regime and states having a clear understanding of the importance of compliance.      Patient and Caregiver reports a clear understanding that risks and benefits may be involved with organ transplantation and with organ donation.       Patient and Caregiver also reports clear understanding that psychosocial risk factors may affect patient, and include but are not limited to feelings of depression, generalized anxiety, anxiety regarding dependence on others, post traumatic stress disorder, feelings of guilt and other emotional and/or mental concerns, and/or exacerbation of existing mental health concerns.  Detailed resources provided and discussed.      Patient and Caregiver agrees to access appropriate resources in a timely manner as needed and/or as recommended, and to communicate concerns appropriately.  Patient and Caregiver also reports a clear understanding of treatment options available.     Patient and Caregiver received education in a group setting.   reviewed education, provided additional information, and answered questions.    Feelings or Concerns: Pt denies concerns; wife states the wait time is a concern for her.    Coping: Pt states hecopes by exercising, watching comedies, praying and attending the [employer] in [place].    Goals: return to work and spend time with family.  Patient referred  to Vocational Rehabilitation.    Interview Behavior: Patient and Caregiver presents as alert and oriented x 4, pleasant, good eye contact, well groomed, recall good, concentration/judgement good, average intelligence, calm, communicative, cooperative and asking and answering questions appropriately. Pt was accompanied by his wife who appeared to be supportive of pt's pursuit of transplant.         Transplant Social Work - Candidacy  Assessment/Plan:     Psychosocial Suitability: Patient presents as an acceptable candidate for kidney transplant at this time. Based on psychosocial risk factors, patient presents as low risk, due to absence of significant psychosocial risk factors.    Recommendations/Additional Comments: recommended fundraising and identification of additional back up caregivers.    Bernie Lawler LCSW

## 2020-03-13 ENCOUNTER — OFFICE VISIT (OUTPATIENT)
Dept: PULMONOLOGY | Facility: CLINIC | Age: 60
End: 2020-03-13
Payer: MEDICARE

## 2020-03-13 ENCOUNTER — HOSPITAL ENCOUNTER (OUTPATIENT)
Dept: RADIOLOGY | Facility: HOSPITAL | Age: 60
Discharge: HOME | End: 2020-03-13
Payer: MEDICARE

## 2020-03-13 VITALS
WEIGHT: 142.19 LBS | SYSTOLIC BLOOD PRESSURE: 140 MMHG | HEIGHT: 68 IN | BODY MASS INDEX: 21.55 KG/M2 | DIASTOLIC BLOOD PRESSURE: 80 MMHG | TEMPERATURE: 98 F | HEART RATE: 53 BPM | OXYGEN SATURATION: 92 %

## 2020-03-13 DIAGNOSIS — J94.8 PLEURAL EFFUSION, TRANSUDATE: Primary | ICD-10-CM

## 2020-03-13 DIAGNOSIS — R91.1 PULMONARY NODULE: ICD-10-CM

## 2020-03-13 DIAGNOSIS — J90 PLEURAL EFFUSION: ICD-10-CM

## 2020-03-13 DIAGNOSIS — R91.8 ABNORMAL FINDINGS ON DIAGNOSTIC IMAGING OF LUNG: ICD-10-CM

## 2020-03-13 DIAGNOSIS — N18.6 ESRD (END STAGE RENAL DISEASE) ON DIALYSIS: ICD-10-CM

## 2020-03-13 DIAGNOSIS — Z72.0 TOBACCO ABUSE: Chronic | ICD-10-CM

## 2020-03-13 DIAGNOSIS — Z99.2 ESRD (END STAGE RENAL DISEASE) ON DIALYSIS: ICD-10-CM

## 2020-03-13 DIAGNOSIS — N18.6 DIABETES MELLITUS WITH ESRD (END-STAGE RENAL DISEASE): Chronic | ICD-10-CM

## 2020-03-13 DIAGNOSIS — E11.22 DIABETES MELLITUS WITH ESRD (END-STAGE RENAL DISEASE): Chronic | ICD-10-CM

## 2020-03-13 PROCEDURE — 71250 CT THORAX DX C-: CPT | Mod: TC,NTX

## 2020-03-13 PROCEDURE — 99214 PR OFFICE/OUTPT VISIT, EST, LEVL IV, 30-39 MIN: ICD-10-PCS | Mod: S$PBB,GC,NTX, | Performed by: EMERGENCY MEDICINE

## 2020-03-13 PROCEDURE — 99214 OFFICE O/P EST MOD 30 MIN: CPT | Mod: PBBFAC,25,TXP

## 2020-03-13 PROCEDURE — 99999 PR PBB SHADOW E&M-EST. PATIENT-LVL IV: CPT | Mod: PBBFAC,TXP,,

## 2020-03-13 PROCEDURE — 71250 CT THORAX DX C-: CPT | Mod: 26,NTX,, | Performed by: RADIOLOGY

## 2020-03-13 PROCEDURE — 99214 OFFICE O/P EST MOD 30 MIN: CPT | Mod: S$PBB,GC,NTX, | Performed by: EMERGENCY MEDICINE

## 2020-03-13 PROCEDURE — 71250 CT CHEST WITHOUT CONTRAST: ICD-10-PCS | Mod: 26,NTX,, | Performed by: RADIOLOGY

## 2020-03-13 PROCEDURE — 99999 PR PBB SHADOW E&M-EST. PATIENT-LVL IV: ICD-10-PCS | Mod: PBBFAC,TXP,,

## 2020-03-13 RX ORDER — LOSARTAN POTASSIUM 50 MG/1
TABLET ORAL
Status: ON HOLD | COMMUNITY
Start: 2020-02-24 | End: 2022-01-01 | Stop reason: HOSPADM

## 2020-03-13 NOTE — PROGRESS NOTES
"Subjective:       Patient ID: Vega Brownlee is a 59 y.o. male.    Chief Complaint: Pleural Effusion    HPI   Mr Brownlee is a 59 year old male with past medical history of ESRD (HD TThS), DM, HTN, who is currently following with kidney transplant services for evaluation.  He had an abnormal x-ray and was referred to pulmonary for further evaluation. The film on 8/14 was significant for right lung opacification suggestive of pleural effusion.  This was tapped in clinic on 11/2019, and fluid found to be transudate.  Plan was to increase fluid removal with HD.  CT performed following thoracentesis showed right patchy ground glass consolidative opacities in middle and lower lobe, like from recent compression of pleural effusion.  Also left lower lobe mosaic attenuation.  Also 0.6cm soft tissue nodule in superior segment of left lower lobe, to be followed up on CT thorax today before clinic.    Today he is still short of breath, but at his baseline.  He is able to play a round of golf and continue with his daily activities.  He had his repeat CT thorax which re-demonstrates pleural effusion, with pleural fluid burden greater than last time (albiet, the last CT was performed the same day as his last thoracentesis).       Tobacco use - smoked cigarettes 40 years around a pack a day but now is not smoking every day.  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 Now recurrent pleural effusion on right side, transudate in nature.       Review of Systems   Constitutional: Positive for activity change and fatigue. Negative for fever, chills, appetite change, night sweats and weakness.   HENT: Negative for nosebleeds, postnasal drip and congestion.    Respiratory: Positive for cough, shortness of breath and dyspnea on extertion. Negative for sputum " "production, choking, wheezing, previous hospitialization due to pulmonary problems, pleurisy, use of rescue inhaler and somnolence.    Cardiovascular: Positive for leg swelling. Negative for chest pain.   Musculoskeletal: Negative for arthralgias.   Gastrointestinal: Negative for nausea, vomiting, abdominal pain and abdominal distention.   Psychiatric/Behavioral: Negative for confusion.       Objective:       Vitals:    03/13/20 1616   BP: (!) 140/80   BP Location: Left arm   Patient Position: Sitting   Pulse: (!) 53   Temp: 97.9 °F (36.6 °C)   TempSrc: Oral   SpO2: (!) 92%   Weight: 64.5 kg (142 lb 3.2 oz)   Height: 5' 8" (1.727 m)         Physical Exam   Constitutional: He is oriented to person, place, and time. He appears well-developed.   Chronically ill in appearance.   HENT:   Head: Normocephalic.   Nose: Nose normal.   Mouth/Throat: Mallampati Score: III.   Neck: Normal range of motion. Neck supple. No JVD present.   Cardiovascular:   Murmur heard.  Bradycardic, systolic murmur present   Pulmonary/Chest: No respiratory distress. He has decreased breath sounds. He has no wheezes. Chest wall is dull to percussion. He exhibits no tenderness.   Decreased right lung breath sounds.  + eegophony.  LLL basilar crackles present. Positive for egophony.   Abdominal: Soft. Bowel sounds are normal. There is no hepatosplenomegaly.   Neurological: He is alert and oriented to person, place, and time.   Skin: Skin is warm and dry.   Psychiatric: He has a normal mood and affect. His behavior is normal. Judgment and thought content normal.     Personal Diagnostic Review  11/1/2019  FEV1/FVC - 74%  FEV1 44% (1.46L)  TLC 42%  DLCO 31%    No flowsheet data found.      Assessment:       1. Pleural effusion, transudate    2. Pulmonary nodule    3. Tobacco abuse    4. Diabetes mellitus with ESRD (end-stage renal disease)    5. ESRD (end stage renal disease) on dialysis        Outpatient Encounter Medications as of 3/13/2020 " "  Medication Sig Dispense Refill    aspirin (ECOTRIN) 81 MG EC tablet Take 81 mg by mouth.      atorvastatin (LIPITOR) 40 MG tablet Take 1 tablet (40 mg total) by mouth every evening. 90 tablet 3    BASAGLAR KWIKPEN U-100 INSULIN glargine 100 units/mL (3mL) SubQ pen INJECT 7 UNITS SUBCUTANEOUSLY EVERY EVENING 15 mL 2    blood sugar diagnostic Strp 1 each by Misc.(Non-Drug; Combo Route) route as directed. 100 each 0    calcium acetate (PHOSLO) 667 mg capsule TAKE ONE CAPSULE BY MOUTH THREE TIMES DAILY WITH MEALS 270 capsule 1    calcium carbonate (TUMS ULTRA) 1,177 mg Chew Take 2 tablets by mouth 3 (three) times daily with meals.       carvedilol (COREG) 12.5 MG tablet TAKE ONE TABLET BY MOUTH TWICE DAILY 180 tablet 1    carvedilol (COREG) 12.5 MG tablet Take 1 tablet (12.5 mg total) by mouth 2 (two) times daily. 180 tablet 1    cloNIDine (CATAPRES) 0.2 MG tablet Take 1 tablet (0.2 mg total) by mouth 3 (three) times daily. 270 tablet 2    DUREZOL 0.05 % Drop ophthalmic solution       fluticasone (FLONASE) 50 mcg/actuation nasal spray 1 spray by Each Nare route once daily. 16 g 2    FOLIC ACID/VIT BCOMP,C (JAM-YAQUELIN ORAL) Take 1 tablet by mouth once daily.      furosemide (LASIX) 80 MG tablet Take 1 tablet (80 mg total) by mouth once daily. 90 tablet 1    hydrALAZINE (APRESOLINE) 100 MG tablet Take 1.5 tablets (150 mg total) by mouth every 12 (twelve) hours. 270 tablet 3    isosorbide mononitrate (IMDUR) 120 MG 24 hr tablet Take 2 tablets (240 mg total) by mouth once daily. 180 tablet 3    lancets (ACCU-CHEK SOFTCLIX LANCETS) Misc 1 application by Misc.(Non-Drug; Combo Route) route 4 (four) times daily before meals and nightly. 120 each 0    losartan (COZAAR) 50 MG tablet       pen needle, diabetic 31 gauge x 3/16" Ndle BD Ultra Fine Mini pen needles (5mm x 31g) - dispense 100 100 each 1    RENVELA 800 mg Tab Take 800 mg by mouth 3 (three) times daily with meals.       blood-glucose meter " ([employer] SYSTEM KIT) kit Use as instructed 1 each 0     No facility-administered encounter medications on file as of 3/13/2020.      No orders of the defined types were placed in this encounter.      Plan:       1) restrictive lung disease with reduced DLCO  2) right pleural effusion - s/p thoracentesis 11/2019, transudative and likely related to ESRD and volume status.  3) ESRD and kidney transplant candidate  4) HTN  5) left superior segment 0.6cm pulmonary nodule  6) tobacco abuse.  7) history of left sided pleurodesis for empyema    - patient's CT thorax shows recurrent pleural effusion.  The last CT was performed immediately after a thoracentesis was performed in clinic.  This explains why his pleural fluid is more this study.  He has associated atelectatic lung.  - awaiting formal read on CT, but thus far the lung nodule appears relatively unchanged.  - will repeat CT thorax in 12 months given active smoking history.   - counseled on tobacco cessation.  - pleural effusion should be managed by close monitoring of dietary salt/water intake, and aggressive volume removal with HD.  If febrile or new/worsening shortness of breath, he should have repeat thoracentesis.  - close control of HTN will also be imperative.    RTC 6 months or sooner PRN    Rodolfo Do MD  [facility] PGY-5  [phone]

## 2020-04-25 DIAGNOSIS — I10 ESSENTIAL HYPERTENSION: Chronic | ICD-10-CM

## 2020-04-25 RX ORDER — CARVEDILOL 12.5 MG/1
TABLET ORAL
Qty: 180 TABLET | Refills: 0 | Status: SHIPPED | OUTPATIENT
Start: 2020-04-25 | End: 2020-07-15

## 2020-05-07 ENCOUNTER — PATIENT OUTREACH (OUTPATIENT)
Dept: ADMINISTRATIVE | Facility: OTHER | Age: 60
End: 2020-05-07

## 2020-05-08 ENCOUNTER — TELEPHONE (OUTPATIENT)
Dept: DERMATOLOGY | Facility: CLINIC | Age: 60
End: 2020-05-08

## 2020-05-08 NOTE — PROGRESS NOTES
Patient's chart was reviewed.   Requested updates within Care Everywhere.  Immunizations reconciled.    Health Maintenance was updated.

## 2020-05-08 NOTE — TELEPHONE ENCOUNTER
Attempted to get in contact with pt in regards scheduling a virtual visit. Was unable to leave voice message due to mailbox being full.

## 2020-05-17 DIAGNOSIS — I1A.0 RESISTANT HYPERTENSION: ICD-10-CM

## 2020-05-18 RX ORDER — FUROSEMIDE 80 MG/1
TABLET ORAL
Qty: 90 TABLET | Refills: 0 | Status: SHIPPED | OUTPATIENT
Start: 2020-05-18 | End: 2020-09-14

## 2020-05-25 ENCOUNTER — PATIENT OUTREACH (OUTPATIENT)
Dept: ADMINISTRATIVE | Facility: OTHER | Age: 60
End: 2020-05-25

## 2020-05-25 NOTE — LETTER
May 25, 2020        Fercho Hankins MD  [address]  [address]  [facility] [address]             [facility]  [address] [facility] [address]  Phone: [phone]   Patient: Vega Brownlee   MR Number: [id number]   YOB: 1960           Dear Dr. Hankins:    Vega Brownlee is a patient of Dr. Ko Perez (PCP) at [facility]. While reviewing his chart, it has come to our attention that there is an outstanding procedure. Please help keep our Health Maintenance records as accurate and as up to date as possible by supplying the following:     Eye exam                                                                             Please fax to [facility] @ [phone].    Thank you for your assistance in our patient's healthcare.     Sincerely,     Sarah Beth Villalba MA  Panel care Coordinator  [facility]

## 2020-05-25 NOTE — PROGRESS NOTES
Patient's chart was reviewed.   Requested updates within Care Everywhere.  Immunizations reconciled.    Health Maintenance was updated.  Eye exam Requested from Dr. Fercho Hankins.

## 2020-06-23 DIAGNOSIS — Z76.82 ORGAN TRANSPLANT CANDIDATE: Primary | ICD-10-CM

## 2020-08-03 DIAGNOSIS — N18.6 DIABETES MELLITUS WITH ESRD (END-STAGE RENAL DISEASE): Chronic | ICD-10-CM

## 2020-08-03 DIAGNOSIS — E11.22 DIABETES MELLITUS WITH ESRD (END-STAGE RENAL DISEASE): Chronic | ICD-10-CM

## 2020-08-03 DIAGNOSIS — E11.3513 TYPE 2 DIABETES MELLITUS WITH BOTH EYES AFFECTED BY PROLIFERATIVE RETINOPATHY AND MACULAR EDEMA, WITH LONG-TERM CURRENT USE OF INSULIN: ICD-10-CM

## 2020-08-03 DIAGNOSIS — Z79.4 TYPE 2 DIABETES MELLITUS WITH BOTH EYES AFFECTED BY PROLIFERATIVE RETINOPATHY AND MACULAR EDEMA, WITH LONG-TERM CURRENT USE OF INSULIN: ICD-10-CM

## 2020-08-03 RX ORDER — INSULIN GLARGINE 100 [IU]/ML
INJECTION, SOLUTION SUBCUTANEOUS
Qty: 3 ML | Refills: 0 | Status: SHIPPED | OUTPATIENT
Start: 2020-08-03 | End: 2021-02-25

## 2020-08-03 NOTE — TELEPHONE ENCOUNTER
----- Message from Jenni Goldberg sent at 8/3/2020  1:19 PM CDT -----  Regarding: Eli/ Daughter/  [phone]  Type: RX Refill Request    Who Called:       Refill or New Rx:  Refill    RX Name and Strength:  BASAGLAR KWIKPEN U-100 INSULIN glargine 100 units/mL (3mL) SubQ pen    Preferred Pharmacy with phone number:  [id number] [id number]    Local or Mail Order:  Local    Ordering Provider:  BENITO Perez    Would the patient rather a call back or a response via [facility]?   Call back    Best Call Back Number:  [phone]

## 2020-08-19 ENCOUNTER — PES CALL (OUTPATIENT)
Dept: ADMINISTRATIVE | Facility: CLINIC | Age: 60
End: 2020-08-19

## 2020-08-21 ENCOUNTER — TELEPHONE (OUTPATIENT)
Dept: TRANSPLANT | Facility: CLINIC | Age: 60
End: 2020-08-21

## 2020-08-21 ENCOUNTER — OFFICE VISIT (OUTPATIENT)
Dept: CARDIOLOGY | Facility: CLINIC | Age: 60
End: 2020-08-21
Payer: MEDICARE

## 2020-08-21 ENCOUNTER — OFFICE VISIT (OUTPATIENT)
Dept: DERMATOLOGY | Facility: CLINIC | Age: 60
End: 2020-08-21
Payer: MEDICARE

## 2020-08-21 VITALS
BODY MASS INDEX: 21.05 KG/M2 | DIASTOLIC BLOOD PRESSURE: 85 MMHG | HEIGHT: 68 IN | HEART RATE: 62 BPM | WEIGHT: 138.88 LBS | SYSTOLIC BLOOD PRESSURE: 207 MMHG

## 2020-08-21 DIAGNOSIS — Z01.810 PREOP CARDIOVASCULAR EXAM: Primary | ICD-10-CM

## 2020-08-21 DIAGNOSIS — Z76.82 ORGAN TRANSPLANT CANDIDATE: ICD-10-CM

## 2020-08-21 DIAGNOSIS — N18.6 ESRD (END STAGE RENAL DISEASE) ON DIALYSIS: ICD-10-CM

## 2020-08-21 DIAGNOSIS — L40.0 PSORIASIS VULGARIS: Primary | ICD-10-CM

## 2020-08-21 DIAGNOSIS — Z99.2 ESRD (END STAGE RENAL DISEASE) ON DIALYSIS: ICD-10-CM

## 2020-08-21 DIAGNOSIS — I27.20 PULMONARY HYPERTENSION: ICD-10-CM

## 2020-08-21 PROCEDURE — 99213 OFFICE O/P EST LOW 20 MIN: CPT | Mod: PBBFAC,27 | Performed by: DERMATOLOGY

## 2020-08-21 PROCEDURE — 99214 OFFICE O/P EST MOD 30 MIN: CPT | Mod: S$PBB,TXP,, | Performed by: INTERNAL MEDICINE

## 2020-08-21 PROCEDURE — 99999 PR PBB SHADOW E&M-EST. PATIENT-LVL V: CPT | Mod: PBBFAC,TXP,, | Performed by: INTERNAL MEDICINE

## 2020-08-21 PROCEDURE — 99202 OFFICE O/P NEW SF 15 MIN: CPT | Mod: S$PBB,,, | Performed by: DERMATOLOGY

## 2020-08-21 PROCEDURE — 99202 PR OFFICE/OUTPT VISIT, NEW, LEVL II, 15-29 MIN: ICD-10-PCS | Mod: S$PBB,,, | Performed by: DERMATOLOGY

## 2020-08-21 PROCEDURE — 99215 OFFICE O/P EST HI 40 MIN: CPT | Mod: PBBFAC,TXP | Performed by: INTERNAL MEDICINE

## 2020-08-21 PROCEDURE — 99999 PR PBB SHADOW E&M-EST. PATIENT-LVL V: ICD-10-PCS | Mod: PBBFAC,TXP,, | Performed by: INTERNAL MEDICINE

## 2020-08-21 PROCEDURE — 99999 PR PBB SHADOW E&M-EST. PATIENT-LVL III: ICD-10-PCS | Mod: PBBFAC,,, | Performed by: DERMATOLOGY

## 2020-08-21 PROCEDURE — 99999 PR PBB SHADOW E&M-EST. PATIENT-LVL III: CPT | Mod: PBBFAC,,, | Performed by: DERMATOLOGY

## 2020-08-21 PROCEDURE — 99214 PR OFFICE/OUTPT VISIT, EST, LEVL IV, 30-39 MIN: ICD-10-PCS | Mod: S$PBB,TXP,, | Performed by: INTERNAL MEDICINE

## 2020-08-21 RX ORDER — BETAMETHASONE DIPROPIONATE 0.5 MG/G
CREAM TOPICAL 2 TIMES DAILY
Qty: 45 G | Refills: 3 | Status: ON HOLD | OUTPATIENT
Start: 2020-08-21 | End: 2022-01-01 | Stop reason: HOSPADM

## 2020-08-21 RX ORDER — CLOBETASOL PROPIONATE 0.05 G/100ML
SHAMPOO TOPICAL
Qty: 1 BOTTLE | Refills: 3 | Status: ON HOLD | OUTPATIENT
Start: 2020-08-21 | End: 2022-01-01 | Stop reason: HOSPADM

## 2020-08-21 NOTE — PATIENT INSTRUCTIONS
XEROSIS (DRY SKIN)        1. Definition    Xerosis is the term for dry skin.  We all have a natural oil coating over our skin produced by the skin oil glands.  If this oil is removed, the skin becomes dry which can lead to cracking, which can lead to inflammation.  Xerosis is usually a long-term problem that recurs often, especially in the winter.    2. Cause     Long hot baths or showers can remove our natural oil and lead to xerosis.  One should never take more than one bath or shower a day and for no longer than ten minutes.   Use of harsh soaps such as Zest, Dial, and Ivory can worsen and cause xerosis.   Cold winter weather worsens xerosis because the amount of moisture contained in cold air is much less than the amount of moisture in warm air.    3. Treatment     Treatment is intended to restore the natural oil to your skin.  Keep the skin lubricated.     Do not take more than one bath or shower a day.  Use lukewarm water, not hot.  Hot water dries out the skin.     Use a gentle moisturizing soap such as Cetaphil soap, Oil of Olay, Dove, Basis, Ivory moisture care, Restoraderm cleanser.     When toweling dry, dont rub.  Blot the skin so there is still some water left on the skin.  You should apply a moisturizing cream to all of the skin such as Cerave cream, Cetaphil cream, Restoraderm or Eucerin Original Formula cream.   Alpha hydroxyacid lotions, i.e., AmLactin, also work very well for preventing dry skin, but may burn when used on inflamed or reddened skin.     If you like to swim during the winter months, you should not use soap when getting out of the pool.  When you have finished swimming, rinse off the chlorine with cool to warm water.  If this will be the only shower of the day, then you may use Cetaphil or another mild soap to cleanse your skin.  After the shower, apply a moisturizing cream to all of the skin as above.        [address] [place] [address]/ [phone]  [phone] FAX/ [email or web address]

## 2020-08-21 NOTE — PROGRESS NOTES
Subjective:    Patient ID:  Vega Brownlee is a 60 y.o. male who presents for follow-up of Hypertension, Hyperlipidemia, and Shortness of Breath      HPI     The patient is a 60 year old male with ESRD on HD followed by Transplant Service, and seen by Dr Sutton with hypertension and pulmonology for a pleural effusion. While the patient is unclear as to the reason for this visit, it was apparently  Arranged by Transplant Service. Review of an echo a yea ago revealed a PAP of 61, it was 47 in 2016.. He has mild PEREIRA but still smokes. Will repeat echo and refer to [facility] to consider RHC  Conclusion 8/9/19    · Low normal left ventricular systolic function. The estimated ejection fraction is 50%  · Concentric left ventricular hypertrophy.  · Normal LV diastolic function.  · Normal right ventricular systolic function.  · Moderate left atrial enlargement.  · Mild to moderate tricuspid regurgitation.  · The estimated PA systolic pressure is 61 mm Hg  · Pulmonary hypertension present.  · Trivial pericardial effusion.          Lab Results   Component Value Date     (L) 06/09/2017    K 4.9 08/16/2017    CL 95 06/09/2017    CO2 29 06/09/2017    BUN 44 (H) 06/09/2017    CREATININE 7.8 (H) 06/09/2017     (H) 06/09/2017    HGBA1C 6.4 (H) 05/06/2019    MG 2.2 07/27/2016    AST 21 06/09/2017    ALT 18 06/09/2017    ALBUMIN 3.8 06/09/2017    PROT 7.3 06/09/2017    BILITOT 0.5 06/09/2017    WBC 8.78 06/09/2017    HGB 10.4 (L) 06/09/2017    HCT 31.4 (L) 06/09/2017    HCT 11 (LL) 07/26/2016     (H) 06/09/2017     06/09/2017    INR 0.9 04/26/2017    PSA 0.39 08/14/2019    TSH 3.301 07/26/2016         Lab Results   Component Value Date    CHOL 107 (L) 05/06/2019    HDL 28 (L) 05/06/2019    TRIG 68 05/06/2019       Lab Results   Component Value Date    LDLCALC 65.4 05/06/2019       Past Medical History:   Diagnosis Date    A-V fistula     Anemia     Cataract     CHF (congestive heart failure)     Diabetes  mellitus, type 2     Disorder of kidney and ureter     Hyperlipidemia     Hypertension     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 , Disp: , Rfl:     carvediloL (COREG) 12.5 MG tablet, TAKE 1 TABLET BY MOUTH TWICE A DAY, Disp: 180 tablet, Rfl: 0    cloNIDine (CATAPRES) 0.2 MG tablet, TAKE ONE TABLET BY MOUTH THREE TIMES DAILY, Disp: 270 tablet, Rfl: 1    fluticasone (FLONASE) 50 mcg/actuation nasal spray, 1 spray by Each Nare route once daily., Disp: 16 g, Rfl: 2    FOLIC ACID/VIT BCOMP,C (JAM-YAQUELIN ORAL), Take 1 tablet by mouth once daily., Disp: , Rfl:     furosemide (LASIX) 80 MG tablet, TAKE ONE TABLET BY MOUTH EVERY DAY, Disp: 90 tablet, Rfl: 0    hydrALAZINE (APRESOLINE) 100 MG tablet, Take 1.5 tablets (150 mg total) by mouth every 12 (twelve) hours., Disp: 270 tablet, Rfl: 3    insulin (BASAGLAR KWIKPEN U-100 INSULIN) glargine 100 units/mL (3mL) SubQ pen, INJECT 7 UNITS SUBCUTANEOUSLY EVERY EVENING, Disp: 3 mL, Rfl: 0    isosorbide mononitrate (IMDUR) 120 MG 24 hr tablet, TAKE 2 TABLETS BY MOUTH EVERY DAY, Disp: 180 tablet, Rfl: 0    losartan (COZAAR) 50 MG tablet, , Disp: , Rfl:     blood sugar diagnostic Strp, 1 each by Misc.(Non-Drug; Combo Route) route as directed., Disp: 100 each, Rfl: 0    blood-glucose meter (FREESTYLE SYSTEM KIT) kit, Use as instructed, Disp: 1 each, Rfl: 0    DUREZOL 0.05 % Drop ophthalmic solution, , Disp: , Rfl:     lancets (ACCU-CHEK SOFTCLIX LANCETS) Misc, 1  "application by Misc.(Non-Drug; Combo Route) route 4 (four) times daily before meals and nightly., Disp: 120 each, Rfl: 0    pen needle, diabetic 31 gauge x 3/16" Ndle, BD Ultra Fine Mini pen needles (5mm x 31g) - dispense 100, Disp: 100 each, Rfl: 1    RENVELA 800 mg Tab, Take 800 mg by mouth 3 (three) times daily with meals. , Disp: , Rfl:           Review of Systems   Constitution: Negative for decreased appetite, diaphoresis, fever, malaise/fatigue, weight gain and weight loss.   HENT: Negative for congestion, ear discharge, ear pain and nosebleeds.    Eyes: Negative for blurred vision, double vision and visual disturbance.   Cardiovascular: Positive for dyspnea on exertion. Negative for chest pain, claudication, cyanosis, irregular heartbeat, leg swelling, near-syncope, orthopnea, palpitations, paroxysmal nocturnal dyspnea and syncope.   Respiratory: Negative for cough, hemoptysis, shortness of breath, sleep disturbances due to breathing, snoring, sputum production and wheezing.    Endocrine: Negative for polydipsia, polyphagia and polyuria.   Hematologic/Lymphatic: Negative for adenopathy and bleeding problem. Does not bruise/bleed easily.   Skin: Negative for color change, nail changes, poor wound healing and rash.   Musculoskeletal: Negative for muscle cramps and muscle weakness.   Gastrointestinal: Negative for abdominal pain, anorexia, change in bowel habit, hematochezia, nausea and vomiting.   Genitourinary: Negative for dysuria, frequency and hematuria.   Neurological: Negative for brief paralysis, difficulty with concentration, excessive daytime sleepiness, dizziness, focal weakness, headaches, light-headedness, seizures, vertigo and weakness.   Psychiatric/Behavioral: Negative for altered mental status and depression.   Allergic/Immunologic: Negative for persistent infections.        Objective:BP (!) 207/85   Pulse 62   Ht 5' 8" (1.727 m)   Wt 63 kg (138 lb 14.2 oz)   BMI 21.12 kg/m²           "   Physical Exam   Constitutional: He is oriented to person, place, and time. He appears well-developed and well-nourished.   HENT:   Head: Normocephalic.   Right Ear: External ear normal.   Left Ear: External ear normal.   Nose: Nose normal.   Inspection of lips, teeth and gums normal   Eyes: Pupils are equal, round, and reactive to light. EOM are normal. No scleral icterus.   Neck: Normal range of motion. Neck supple. No JVD present. No tracheal deviation present. No thyromegaly present.   Cardiovascular: Normal rate, regular rhythm and intact distal pulses. Exam reveals no gallop and no friction rub.   No murmur heard.  Pulmonary/Chest: Effort normal and breath sounds normal.               Abdominal: Bowel sounds are normal. He exhibits no distension. There is no hepatosplenomegaly. There is no abdominal tenderness. There is no guarding.   Musculoskeletal: Normal range of motion.         General: No tenderness or edema.   Lymphadenopathy:   Palpation of neck and groin lymph nodes normal   Neurological: He is alert and oriented to person, place, and time. No cranial nerve deficit. He exhibits normal muscle tone. Coordination normal.   Skin: Skin is dry.        Palpation of skin normal   Psychiatric: His behavior is normal. Judgment and thought content normal.         Assessment:       1. Preop cardiovascular exam    2. Organ transplant candidate    3. ESRD (end stage renal disease) on dialysis    4. Pulmonary hypertension         Plan:       Vega was seen today for hypertension, hyperlipidemia and shortness of breath.    Diagnoses and all orders for this visit:    Preop cardiovascular exam  -     Ambulatory referral/consult to Transplant, Heart; Future; Expected date: 08/28/2020    Organ transplant candidate    ESRD (end stage renal disease) on dialysis  -     Ambulatory referral/consult to Transplant, Heart; Future; Expected date: 08/28/2020    Pulmonary hypertension  -     Ambulatory referral/consult to  Transplant, Heart; Future; Expected date: 08/28/2020  -     Echo Color Flow Doppler? Yes; Future

## 2020-08-21 NOTE — LETTER
August 21, 2020      Merrill Preston MD  [address]  [facility] [address]           [facility] 3rd [facility]  [address]  [facility] [address]  Phone: [phone]          Patient: Vega Brownlee   MR Number: [id number]   YOB: 1960   Date of Visit: 8/21/2020       Dear Dr. Merrill Preston:    Thank you for referring Vega Brownlee to me for evaluation. Attached you will find relevant portions of my assessment and plan of care.    If you have questions, please do not hesitate to call me. I look forward to following Vega Brownlee along with you.    Sincerely,    Zack Shaw MD    Enclosure  CC:  No Recipients    If you would like to receive this communication electronically, please contact [email or web address] or [phone] to request more information on [employer] Link access.    For providers and/or their staff who would like to refer a patient to [facility], please contact us through our one-stop-shop provider referral line, [facility], at [phone].    If you feel you have received this communication in error or would no longer like to receive these types of communications, please e-mail [email or web address]

## 2020-08-21 NOTE — LETTER
August 21, 2020      Merrill Preston MD  [address]  [facility] [address]           [facility]  [address]  [facility] [address]  Phone: [phone]  Fax: [phone]          Patient: Vega Brownlee   MR Number: [id number]   YOB: 1960   Date of Visit: 8/21/2020       Dear Dr. Merrill Preston:    Thank you for referring Vega Brownlee to me for evaluation. Attached you will find relevant portions of my assessment and plan of care.    If you have questions, please do not hesitate to call me. I look forward to following Vega Brownlee along with you.    Sincerely,    Carla Ying MD    Enclosure  CC:  No Recipients    If you would like to receive this communication electronically, please contact [email or web address] or [phone] to request more information on [employer] Link access.    For providers and/or their staff who would like to refer a patient to [facility], please contact us through our one-stop-shop provider referral line, [facility], at [phone].    If you feel you have received this communication in error or would no longer like to receive these types of communications, please e-mail [email or web address]

## 2020-08-21 NOTE — PROGRESS NOTES
Subjective:       Patient ID:  Vega Brownlee is a 60 y.o. male who presents for   Chief Complaint   Patient presents with    Rash     back,shoulders, scalp     HPI  Pt here today for rashes on lower back, shoulder, and scalp. Started dialysis 5 yrs ago, and rash started around the same time. States after fluid was drained from his lung, the rash on his shoulder disappeared, but it came back in the same spot about a year later. Has tried OTC HC which helps with the itch.    Review of Systems   Constitutional: Negative for fever and chills.   Skin: Positive for itching and rash.        Objective:    Physical Exam   Constitutional: He appears well-developed and well-nourished. No distress.   Neurological: He is alert and oriented to person, place, and time. He is not disoriented.   Psychiatric: He has a normal mood and affect.   Skin:   Areas Examined (abnormalities noted in diagram):   Head / Face Inspection Performed  Neck Inspection Performed  Chest / Axilla Inspection Performed  Abdomen Inspection Performed  Genitals / Buttocks / Groin Inspection Performed  Back Inspection Performed  RUE Inspected  LUE Inspection Performed                   Diagram Legend     Erythematous scaling macule/papule c/w actinic keratosis       Vascular papule c/w angioma      Pigmented verrucoid papule/plaque c/w seborrheic keratosis      Yellow umbilicated papule c/w sebaceous hyperplasia      Irregularly shaped tan macule c/w lentigo     1-2 mm smooth white papules consistent with Milia      Movable subcutaneous cyst with punctum c/w epidermal inclusion cyst      Subcutaneous movable cyst c/w pilar cyst      Firm pink to brown papule c/w dermatofibroma      Pedunculated fleshy papule(s) c/w skin tag(s)      Evenly pigmented macule c/w junctional nevus     Mildly variegated pigmented, slightly irregular-bordered macule c/w mildly atypical nevus      Flesh colored to evenly pigmented papule c/w intradermal nevus       Pink pearly  papule/plaque c/w basal cell carcinoma      Erythematous hyperkeratotic cursted plaque c/w SCC      Surgical scar with no sign of skin cancer recurrence      Open and closed comedones      Inflammatory papules and pustules      Verrucoid papule consistent consistent with wart     Erythematous eczematous patches and plaques     Dystrophic onycholytic nail with subungual debris c/w onychomycosis     Umbilicated papule    Erythematous-base heme-crusted tan verrucoid plaque consistent with inflamed seborrheic keratosis     Erythematous Silvery Scaling Plaque c/w Psoriasis     See annotation      Assessment / Plan:        Psoriasis vulgaris  -     clobetasoL (CLOBEX) 0.05 % shampoo; Wash scalp qod. Let sit on affected areas x 5 min prior to rinsing. Avoid getting on face/in eyes.  Dispense: 1 Bottle; Refill: 3  -     betamethasone dipropionate (DIPROLENE) 0.05 % cream; Apply topically 2 (two) times daily. X 1-2 wks then prn flares only  Dispense: 45 g; Refill: 3  Brochure provided    rtc 3-4 months

## 2020-09-02 ENCOUNTER — HOSPITAL ENCOUNTER (OUTPATIENT)
Dept: CARDIOLOGY | Facility: HOSPITAL | Age: 60
Discharge: HOME | End: 2020-09-02
Attending: NURSE PRACTITIONER
Payer: MEDICARE

## 2020-09-02 ENCOUNTER — HOSPITAL ENCOUNTER (OUTPATIENT)
Dept: RADIOLOGY | Facility: HOSPITAL | Age: 60
Discharge: HOME | End: 2020-09-02
Attending: NURSE PRACTITIONER
Payer: MEDICARE

## 2020-09-02 VITALS
SYSTOLIC BLOOD PRESSURE: 160 MMHG | WEIGHT: 139 LBS | BODY MASS INDEX: 21.07 KG/M2 | WEIGHT: 139 LBS | HEART RATE: 60 BPM | HEIGHT: 68 IN | DIASTOLIC BLOOD PRESSURE: 60 MMHG | BODY MASS INDEX: 21.07 KG/M2 | HEIGHT: 68 IN

## 2020-09-02 DIAGNOSIS — Z76.82 ORGAN TRANSPLANT CANDIDATE: ICD-10-CM

## 2020-09-02 LAB
ASCENDING AORTA: 3.54 CM
AV INDEX (PROSTH): 0.59
AV MEAN GRADIENT: 8 MMHG
AV PEAK GRADIENT: 16 MMHG
AV VALVE AREA: 2.29 CM2
AV VELOCITY RATIO: 0.58
BSA FOR ECHO PROCEDURE: 1.74 M2
CV ECHO LV RWT: 0.44 CM
CV PHARM DOSE: 0.4 MG
CV STRESS BASE HR: 53 BPM
DIASTOLIC BLOOD PRESSURE: 62 MMHG
DOP CALC AO PEAK VEL: 2 M/S
DOP CALC AO VTI: 53.97 CM
DOP CALC LVOT AREA: 3.9 CM2
DOP CALC LVOT DIAMETER: 2.23 CM
DOP CALC LVOT PEAK VEL: 1.15 M/S
DOP CALC LVOT STROKE VOLUME: 123.47 CM3
DOP CALCLVOT PEAK VEL VTI: 31.63 CM
E WAVE DECELERATION TIME: 229.95 MSEC
E/A RATIO: 1.33
E/E' RATIO: 26 M/S
ECHO LV POSTERIOR WALL: 1.22 CM (ref 0.6–1.1)
END DIASTOLIC INDEX-HIGH: 170 ML/M2
END SYSTOLIC INDEX-HIGH: 70 ML/M2
FRACTIONAL SHORTENING: 28 % (ref 28–44)
INTERVENTRICULAR SEPTUM: 1.26 CM (ref 0.6–1.1)
LA MAJOR: 6.23 CM
LA MINOR: 6.31 CM
LA WIDTH: 4.87 CM
LEFT ATRIUM SIZE: 4.96 CM
LEFT ATRIUM VOLUME INDEX: 73.5 ML/M2
LEFT ATRIUM VOLUME: 128.73 CM3
LEFT INTERNAL DIMENSION IN SYSTOLE: 3.98 CM (ref 2.1–4)
LEFT VENTRICLE DIASTOLIC VOLUME INDEX: 85.95 ML/M2
LEFT VENTRICLE DIASTOLIC VOLUME: 150.51 ML
LEFT VENTRICLE MASS INDEX: 165 G/M2
LEFT VENTRICLE SYSTOLIC VOLUME INDEX: 39.5 ML/M2
LEFT VENTRICLE SYSTOLIC VOLUME: 69.15 ML
LEFT VENTRICULAR INTERNAL DIMENSION IN DIASTOLE: 5.55 CM (ref 3.5–6)
LEFT VENTRICULAR MASS: 289.16 G
LV LATERAL E/E' RATIO: 26 M/S
LV SEPTAL E/E' RATIO: 26 M/S
MV PEAK A VEL: 1.17 M/S
MV PEAK E VEL: 1.56 M/S
MV STENOSIS PRESSURE HALF TIME: 66.69 MS
MV VALVE AREA P 1/2 METHOD: 3.3 CM2
NUC REST DIASTOLIC VOLUME INDEX: 229
NUC REST EJECTION FRACTION: 71
NUC REST SYSTOLIC VOLUME INDEX: 67
NUC STRESS DIASTOLIC VOLUME INDEX: 231
NUC STRESS EJECTION FRACTION: 71 %
NUC STRESS SYSTOLIC VOLUME INDEX: 67
OHS CV CPX 85 PERCENT MAX PREDICTED HEART RATE MALE: 136
OHS CV CPX MAX PREDICTED HEART RATE: 160
OHS CV CPX PATIENT IS FEMALE: 0
OHS CV CPX PATIENT IS MALE: 1
OHS CV CPX PEAK DIASTOLIC BLOOD PRESSURE: 62 MMHG
OHS CV CPX PEAK HEAR RATE: 54 BPM
OHS CV CPX PEAK RATE PRESSURE PRODUCT: 8100
OHS CV CPX PEAK SYSTOLIC BLOOD PRESSURE: 150 MMHG
OHS CV CPX PERCENT MAX PREDICTED HEART RATE ACHIEVED: 34
OHS CV CPX RATE PRESSURE PRODUCT PRESENTING: 7950
PISA TR MAX VEL: 3.94 M/S
RA MAJOR: 6.08 CM
RA PRESSURE: 15 MMHG
RA WIDTH: 5.01 CM
RETIRED EF AND QEF - SEE NOTES: 51 %
RIGHT VENTRICULAR END-DIASTOLIC DIMENSION: 5.39 CM
SINUS: 3.31 CM
STJ: 2.79 CM
SUMMED DIFFERENCE: 0
SUMMED REST SCORE: 0
SUMMED STRESS SCORE: 0
SYSTOLIC BLOOD PRESSURE: 150 MMHG
TDI LATERAL: 0.06 M/S
TDI SEPTAL: 0.06 M/S
TDI: 0.06 M/S
TR MAX PG: 62 MMHG
TRICUSPID ANNULAR PLANE SYSTOLIC EXCURSION: 2.71 CM
TV REST PULMONARY ARTERY PRESSURE: 77 MMHG

## 2020-09-02 PROCEDURE — 78452 STRESS TEST WITH MYOCARDIAL PERFUSION (CUPID ONLY): ICD-10-PCS | Mod: 26,TXP,, | Performed by: INTERNAL MEDICINE

## 2020-09-02 PROCEDURE — 76770 US EXAM ABDO BACK WALL COMP: CPT | Mod: 26,TXP,, | Performed by: INTERNAL MEDICINE

## 2020-09-02 PROCEDURE — 93306 TTE W/DOPPLER COMPLETE: CPT | Mod: TXP

## 2020-09-02 PROCEDURE — 93016 CV STRESS TEST SUPVJ ONLY: CPT | Mod: TXP,,, | Performed by: INTERNAL MEDICINE

## 2020-09-02 PROCEDURE — 63600175 PHARM REV CODE 636 W HCPCS: Mod: TXP | Performed by: NURSE PRACTITIONER

## 2020-09-02 PROCEDURE — 93306 TTE W/DOPPLER COMPLETE: CPT | Mod: 26,TXP,, | Performed by: INTERNAL MEDICINE

## 2020-09-02 PROCEDURE — 93018 STRESS TEST WITH MYOCARDIAL PERFUSION (CUPID ONLY): ICD-10-PCS | Mod: TXP,,, | Performed by: INTERNAL MEDICINE

## 2020-09-02 PROCEDURE — 76770 US EXAM ABDO BACK WALL COMP: CPT | Mod: TC,TXP

## 2020-09-02 PROCEDURE — 78452 HT MUSCLE IMAGE SPECT MULT: CPT | Mod: 26,TXP,, | Performed by: INTERNAL MEDICINE

## 2020-09-02 PROCEDURE — A9502 STRESS TEST WITH MYOCARDIAL PERFUSION (CUPID ONLY): ICD-10-PCS | Mod: TXP,,, | Performed by: INTERNAL MEDICINE

## 2020-09-02 PROCEDURE — 93017 CV STRESS TEST TRACING ONLY: CPT | Mod: TXP

## 2020-09-02 PROCEDURE — 93306 ECHO (CUPID ONLY): ICD-10-PCS | Mod: 26,TXP,, | Performed by: INTERNAL MEDICINE

## 2020-09-02 PROCEDURE — 76770 US RETROPERITONEAL COMPLETE: ICD-10-PCS | Mod: 26,TXP,, | Performed by: INTERNAL MEDICINE

## 2020-09-02 PROCEDURE — 93018 CV STRESS TEST I&R ONLY: CPT | Mod: TXP,,, | Performed by: INTERNAL MEDICINE

## 2020-09-02 PROCEDURE — A9502 TC99M TETROFOSMIN: HCPCS | Mod: TXP,,, | Performed by: INTERNAL MEDICINE

## 2020-09-02 PROCEDURE — 93016 STRESS TEST WITH MYOCARDIAL PERFUSION (CUPID ONLY): ICD-10-PCS | Mod: TXP,,, | Performed by: INTERNAL MEDICINE

## 2020-09-02 RX ORDER — REGADENOSON 0.08 MG/ML
0.4 INJECTION, SOLUTION INTRAVENOUS
Status: COMPLETED | OUTPATIENT
Start: 2020-09-02 | End: 2020-09-02

## 2020-09-02 RX ADMIN — REGADENOSON 0.4 MG: 0.08 INJECTION, SOLUTION INTRAVENOUS at 08:09

## 2020-09-03 ENCOUNTER — TELEPHONE (OUTPATIENT)
Dept: TRANSPLANT | Facility: CLINIC | Age: 60
End: 2020-09-03

## 2020-09-18 ENCOUNTER — OFFICE VISIT (OUTPATIENT)
Dept: HOME HEALTH SERVICES | Facility: CLINIC | Age: 60
End: 2020-09-18
Payer: MEDICARE

## 2020-09-18 VITALS
SYSTOLIC BLOOD PRESSURE: 143 MMHG | HEIGHT: 68 IN | WEIGHT: 140 LBS | BODY MASS INDEX: 21.22 KG/M2 | HEART RATE: 57 BPM | DIASTOLIC BLOOD PRESSURE: 63 MMHG

## 2020-09-18 DIAGNOSIS — Z99.2 ESRD (END STAGE RENAL DISEASE) ON DIALYSIS: ICD-10-CM

## 2020-09-18 DIAGNOSIS — R60.0 EDEMA OF BOTH FEET: ICD-10-CM

## 2020-09-18 DIAGNOSIS — E44.0 MODERATE PROTEIN MALNUTRITION: Chronic | ICD-10-CM

## 2020-09-18 DIAGNOSIS — E78.2 MIXED HYPERLIPIDEMIA: ICD-10-CM

## 2020-09-18 DIAGNOSIS — I10 ESSENTIAL HYPERTENSION: Chronic | ICD-10-CM

## 2020-09-18 DIAGNOSIS — N18.6 ESRD (END STAGE RENAL DISEASE) ON DIALYSIS: ICD-10-CM

## 2020-09-18 DIAGNOSIS — I77.0 A-V FISTULA: ICD-10-CM

## 2020-09-18 DIAGNOSIS — Z79.4 TYPE 2 DIABETES MELLITUS WITH BOTH EYES AFFECTED BY PROLIFERATIVE RETINOPATHY AND MACULAR EDEMA, WITH LONG-TERM CURRENT USE OF INSULIN: ICD-10-CM

## 2020-09-18 DIAGNOSIS — Z72.0 TOBACCO ABUSE: Chronic | ICD-10-CM

## 2020-09-18 DIAGNOSIS — N18.6 DIABETES MELLITUS WITH ESRD (END-STAGE RENAL DISEASE): Chronic | ICD-10-CM

## 2020-09-18 DIAGNOSIS — E11.3513 TYPE 2 DIABETES MELLITUS WITH BOTH EYES AFFECTED BY PROLIFERATIVE RETINOPATHY AND MACULAR EDEMA, WITH LONG-TERM CURRENT USE OF INSULIN: ICD-10-CM

## 2020-09-18 DIAGNOSIS — Z00.00 ENCOUNTER FOR PREVENTIVE HEALTH EXAMINATION: Primary | ICD-10-CM

## 2020-09-18 DIAGNOSIS — E11.22 DIABETES MELLITUS WITH ESRD (END-STAGE RENAL DISEASE): Chronic | ICD-10-CM

## 2020-09-18 PROCEDURE — G0439 PR MEDICARE ANNUAL WELLNESS SUBSEQUENT VISIT: ICD-10-PCS | Mod: NTX,S$GLB,, | Performed by: NURSE PRACTITIONER

## 2020-09-18 PROCEDURE — G0439 PPPS, SUBSEQ VISIT: HCPCS | Mod: NTX,S$GLB,, | Performed by: NURSE PRACTITIONER

## 2020-09-18 NOTE — PATIENT INSTRUCTIONS
Counseling and Referral of Other Preventative  (Italic type indicates deductible and co-insurance are waived)    Patient Name: Vega Brownlee  Today's Date: 9/18/2020    Health Maintenance       Date Due Completion Date    Pneumococcal Vaccine (Medium Risk) (1 of 1 - PPSV23) 08/20/1979 ---    Shingles Vaccine (1 of 2) 08/20/2010 ---    Hemoglobin A1c 08/06/2019 5/6/2019    Eye Exam 02/08/2020 2/8/2019    Override on 7/14/2016: Done    Lipid Panel 05/06/2020 5/6/2019    Foot Exam 05/15/2020 5/15/2019    Override on 6/14/2018: Done    Influenza Vaccine (1) 08/01/2020 9/13/2016    Colorectal Cancer Screening 08/16/2022 8/16/2017    TETANUS VACCINE 04/26/2027 4/26/2017        No orders of the defined types were placed in this encounter.    The following information is provided to all patients.  This information is to help you find resources for any of the problems found today that may be affecting your health:                Living healthy guide: www.[facility].[place].gov      Understanding Diabetes: www.diabetes.org      Eating healthy: www.cdc.gov/healthyweight      CDC home safety checklist: www.cdc.gov/steadi/patient.html      Agency on Aging: www.goea.[place].gov      Alcoholics anonymous (AA): www.aa.org      Physical Activity: www.mitchell.nih.gov/ra8gvvo      Tobacco use: www.quitwithusla.org

## 2020-09-18 NOTE — PROGRESS NOTES
"  Vega Brownlee presented for a  Medicare AWV and comprehensive Health Risk Assessment today. The following components were reviewed and updated:    · Medical history  · Family History  · Social history  · Allergies and Current Medications  · Health Risk Assessment  · Health Maintenance  · Care Team     ** See Completed Assessments for Annual Wellness Visit within the encounter summary.**         The following assessments were completed:  · Living Situation  · CAGE  · Depression Screening  · Timed Get Up and Go  · Whisper Test  · Cognitive Function Screening  ·   ·   ·   · Nutrition Screening  · ADL Screening  · PAQ Screening        Vitals:    09/18/20 0906   BP: (!) 143/63   Pulse: (!) 57   Weight: 63.5 kg (140 lb)   Height: 5' 8" (1.727 m)     Body mass index is 21.29 kg/m².  Physical Exam  Constitutional:       Appearance: Normal appearance.   HENT:      Head: Normocephalic and atraumatic.      Nose: Nose normal.      Mouth/Throat:      Mouth: Mucous membranes are moist.   Neck:      Musculoskeletal: Normal range of motion.   Cardiovascular:      Rate and Rhythm: Normal rate and regular rhythm.      Heart sounds: Normal heart sounds.   Pulmonary:      Effort: Pulmonary effort is normal. No respiratory distress.      Breath sounds: Normal breath sounds.   Abdominal:      General: Bowel sounds are normal. There is no distension.      Palpations: Abdomen is soft.   Musculoskeletal: Normal range of motion.         General: Swelling (Bilateral feet) present.   Skin:     General: Skin is warm and dry.      Comments: Rt upper arm fistula   Neurological:      General: No focal deficit present.      Mental Status: He is alert and oriented to person, place, and time.   Psychiatric:         Mood and Affect: Mood normal.         Behavior: Behavior normal.               Diagnoses and health risks identified today and associated recommendations/orders:    1. Encounter for preventive health examination  Assessment completed. " Preventive measures reviewed with patient.    2. Type 2 diabetes mellitus with both eyes affected by proliferative retinopathy and macular edema, with long-term current use of insulin  Stable, followed by Optometry.    3. Essential hypertension  Stable, followed by PCP.    4. Mixed hyperlipidemia  Stable, followed by PCP.  - Lipid Panel; Future    5. ESRD (end stage renal disease) on dialysis  Stable, followed by Nephrology.    6. A-V fistula  Stable, followed by Nephrology and Vascula.    7. Diabetes mellitus with ESRD (end-stage renal disease)  Stable, followed by Nephrology.    8. Moderate protein malnutrition  Stable, followed by PCP.    9. Tobacco abuse  Stable, followed by PCP.    Provided Ferrari with a 5-10 year written screening schedule and personal prevention plan. Recommendations were developed using the USPSTF age appropriate recommendations. Education, counseling, and referrals were provided as needed. After Visit Summary printed and given to patient which includes a list of additional screenings\tests needed.    No follow-ups on file.    Joanne Dash NP    I offered to discuss end of life issues, including information on how to make advance directives that the patient could use to name someone who would make medical decisions on their behalf if they became too ill to make themselves.    ___Patient declined  _X_Patient is interested, I provided paper work and offered to discuss.

## 2020-09-19 PROBLEM — I77.0 A-V FISTULA: Status: ACTIVE | Noted: 2020-09-19

## 2020-10-02 RX ORDER — ATORVASTATIN CALCIUM 40 MG/1
40 TABLET, FILM COATED ORAL NIGHTLY
Qty: 90 TABLET | Refills: 3 | Status: SHIPPED | OUTPATIENT
Start: 2020-10-02 | End: 2021-01-01 | Stop reason: SDUPTHER

## 2020-10-02 NOTE — TELEPHONE ENCOUNTER
----- Message from Ebenezer Menard sent at 10/2/2020  2:53 PM CDT -----  Regarding: Refill  Contact: Marie (spouse)      Can the clinic reply in [facility]: no      Please refill the medication(s) listed below. Please call the patient when the prescription(s) is ready for  at this phone number   [phone]      Medication #1 cholesterol (out) patient is currently waiting        Preferred Pharmacy: [facility]/PHARMACY #[id number] - [place]

## 2020-11-04 ENCOUNTER — TELEPHONE (OUTPATIENT)
Dept: TRANSPLANT | Facility: CLINIC | Age: 60
End: 2020-11-04

## 2020-12-03 ENCOUNTER — TELEPHONE (OUTPATIENT)
Dept: TRANSPLANT | Facility: CLINIC | Age: 60
End: 2020-12-03

## 2020-12-11 DIAGNOSIS — Z99.2 ESRD (END STAGE RENAL DISEASE) ON DIALYSIS: ICD-10-CM

## 2020-12-11 DIAGNOSIS — I10 ESSENTIAL HYPERTENSION: Chronic | ICD-10-CM

## 2020-12-11 DIAGNOSIS — N18.6 ESRD (END STAGE RENAL DISEASE) ON DIALYSIS: ICD-10-CM

## 2020-12-11 RX ORDER — ISOSORBIDE MONONITRATE 120 MG/1
240 TABLET, EXTENDED RELEASE ORAL DAILY
Qty: 60 TABLET | Refills: 1 | Status: SHIPPED | OUTPATIENT
Start: 2020-12-11 | End: 2021-02-01

## 2021-01-01 ENCOUNTER — OFFICE VISIT (OUTPATIENT)
Dept: FAMILY MEDICINE | Facility: CLINIC | Age: 61
End: 2021-01-01
Payer: MEDICARE

## 2021-01-01 ENCOUNTER — PATIENT MESSAGE (OUTPATIENT)
Dept: ADMINISTRATIVE | Facility: HOSPITAL | Age: 61
End: 2021-01-01

## 2021-01-01 VITALS
SYSTOLIC BLOOD PRESSURE: 194 MMHG | DIASTOLIC BLOOD PRESSURE: 56 MMHG | BODY MASS INDEX: 21.25 KG/M2 | OXYGEN SATURATION: 84 % | HEIGHT: 68 IN | HEART RATE: 62 BPM | WEIGHT: 140.19 LBS | TEMPERATURE: 98 F

## 2021-01-01 DIAGNOSIS — E11.22 DIABETES MELLITUS WITH ESRD (END-STAGE RENAL DISEASE): Chronic | ICD-10-CM

## 2021-01-01 DIAGNOSIS — E11.3513 TYPE 2 DIABETES MELLITUS WITH BOTH EYES AFFECTED BY PROLIFERATIVE RETINOPATHY AND MACULAR EDEMA, WITH LONG-TERM CURRENT USE OF INSULIN: ICD-10-CM

## 2021-01-01 DIAGNOSIS — N18.6 DIABETES MELLITUS WITH ESRD (END-STAGE RENAL DISEASE): Chronic | ICD-10-CM

## 2021-01-01 DIAGNOSIS — I1A.0 RESISTANT HYPERTENSION: ICD-10-CM

## 2021-01-01 DIAGNOSIS — N18.6 ESRD (END STAGE RENAL DISEASE) ON DIALYSIS: ICD-10-CM

## 2021-01-01 DIAGNOSIS — Z79.4 TYPE 2 DIABETES MELLITUS WITH BOTH EYES AFFECTED BY PROLIFERATIVE RETINOPATHY AND MACULAR EDEMA, WITH LONG-TERM CURRENT USE OF INSULIN: ICD-10-CM

## 2021-01-01 DIAGNOSIS — I10 ESSENTIAL HYPERTENSION: Chronic | ICD-10-CM

## 2021-01-01 DIAGNOSIS — Z99.2 ESRD (END STAGE RENAL DISEASE) ON DIALYSIS: ICD-10-CM

## 2021-01-01 PROCEDURE — 99214 OFFICE O/P EST MOD 30 MIN: CPT | Mod: S$PBB,,, | Performed by: INTERNAL MEDICINE

## 2021-01-01 PROCEDURE — 99214 PR OFFICE/OUTPT VISIT, EST, LEVL IV, 30-39 MIN: ICD-10-PCS | Mod: S$PBB,,, | Performed by: INTERNAL MEDICINE

## 2021-01-01 PROCEDURE — 99999 PR PBB SHADOW E&M-EST. PATIENT-LVL IV: CPT | Mod: PBBFAC,,, | Performed by: INTERNAL MEDICINE

## 2021-01-01 PROCEDURE — 99999 PR PBB SHADOW E&M-EST. PATIENT-LVL IV: ICD-10-PCS | Mod: PBBFAC,,, | Performed by: INTERNAL MEDICINE

## 2021-01-01 PROCEDURE — 99214 OFFICE O/P EST MOD 30 MIN: CPT | Mod: PBBFAC,PN | Performed by: INTERNAL MEDICINE

## 2021-01-01 RX ORDER — INSULIN GLARGINE 100 [IU]/ML
INJECTION, SOLUTION SUBCUTANEOUS
Qty: 3 ML | Refills: 0 | Status: SHIPPED | OUTPATIENT
Start: 2021-01-01 | End: 2021-01-01 | Stop reason: SDUPTHER

## 2021-01-01 RX ORDER — FUROSEMIDE 80 MG/1
80 TABLET ORAL DAILY
Qty: 90 TABLET | Refills: 1 | Status: SHIPPED | OUTPATIENT
Start: 2021-01-01 | End: 2021-01-01

## 2021-01-01 RX ORDER — ATORVASTATIN CALCIUM 40 MG/1
40 TABLET, FILM COATED ORAL NIGHTLY
Qty: 90 TABLET | Refills: 3 | Status: SHIPPED | OUTPATIENT
Start: 2021-01-01 | End: 2022-01-01

## 2021-01-01 RX ORDER — INSULIN GLARGINE 100 [IU]/ML
7 INJECTION, SOLUTION SUBCUTANEOUS DAILY
Qty: 12 ML | Refills: 0 | Status: SHIPPED | OUTPATIENT
Start: 2021-01-01 | End: 2022-01-01

## 2021-01-01 RX ORDER — CARVEDILOL 12.5 MG/1
12.5 TABLET ORAL 2 TIMES DAILY
Qty: 180 TABLET | Refills: 1 | Status: ON HOLD | OUTPATIENT
Start: 2021-01-01 | End: 2022-01-01 | Stop reason: HOSPADM

## 2021-01-01 RX ORDER — ISOSORBIDE MONONITRATE 120 MG/1
TABLET, EXTENDED RELEASE ORAL
Qty: 180 TABLET | Refills: 1 | Status: SHIPPED | OUTPATIENT
Start: 2021-01-01 | End: 2022-01-01

## 2021-01-01 RX ORDER — CLONIDINE HYDROCHLORIDE 0.2 MG/1
0.2 TABLET ORAL 3 TIMES DAILY
Qty: 270 TABLET | Refills: 1 | Status: SHIPPED | OUTPATIENT
Start: 2021-01-01 | End: 2022-01-01

## 2021-01-01 NOTE — DISCHARGE SUMMARY
[facility]  Discharge Note  Short Stay    Admit Date: 6/8/2017    Discharge Date and Time: 6/8/2017       Attending Physician: Michael Mcclure MD     Discharge Provider: Willam Arrington MD    Diagnoses:  Active Hospital Problems    Diagnosis  POA    Cyst of prostate [N42.83]  Yes    Type 2 diabetes mellitus with both eyes affected by proliferative retinopathy and macular edema, with long-term current use of insulin [E11.3513, Z79.4]  Not Applicable    ESRD (end stage renal disease) on dialysis [N18.6, Z99.2]  Not Applicable    Essential hypertension [I10]  Yes     Chronic    Diabetes mellitus with ESRD (end-stage renal disease) [E11.22, N18.6]  Yes     Chronic    Anemia of chronic disease [D63.8]  Yes     Chronic      Resolved Hospital Problems    Diagnosis Date Resolved POA   No resolved problems to display.       Discharged Condition: good    Hospital Course: Patient was admitted for a TRUS biopsy and cystoscopy and tolerated the procedure well with no complications.    Final Diagnoses: Same as principal problem.    Disposition: Home or Self Care    Follow up/Patient Instructions:    Medications:  Reconciled Home Medications:   Current Discharge Medication List      CONTINUE these medications which have NOT CHANGED    Details   aspirin (ECOTRIN) 81 MG EC tablet Take 1 tablet (81 mg total) by mouth once daily.  Refills: 0      atorvastatin (LIPITOR) 40 MG tablet Take 1 tablet (40 mg total) by mouth every evening.  Qty: 90 tablet, Refills: 3      calcium carbonate (TUMS ULTRA) 1,177 mg Chew Take 2 tablets by mouth 3 (three) times daily with meals.       fluticasone (FLONASE) 50 mcg/actuation nasal spray 1 spray by Each Nare route once daily.  Qty: 16 g, Refills: 2    Associated Diagnoses: Non-seasonal allergic rhinitis due to other allergic trigger      FOLIC ACID/VIT BCOMP,C (JAM-YAQUELIN ORAL) Take 1 tablet by mouth once daily.      hydrALAZINE (APRESOLINE) 50 MG tablet TAKE ONE Tablet BY MOUTH  EVERY TWELVE HOURS  Qty: 60 tablet, Refills: 5    Associated Diagnoses: ESRD (end stage renal disease) on dialysis; Essential hypertension      insulin glargine (LANTUS SOLOSTAR) 100 unit/mL (3 mL) InPn pen Inject 7 Units into the skin every evening.  Qty: 3 mL, Refills: 2    Associated Diagnoses: Type 2 diabetes mellitus with chronic kidney disease on chronic dialysis, with long-term current use of insulin      isosorbide mononitrate (IMDUR) 60 MG 24 hr tablet TAKE ONE Tablet BY MOUTH EVERY DAY  Qty: 30 tablet, Refills: 5    Associated Diagnoses: ESRD (end stage renal disease) on dialysis; Essential hypertension      metoprolol tartrate (LOPRESSOR) 25 MG tablet TAKE ONE TABLET BY MOUTH TWICE DAILY  Qty: 60 tablet, Refills: 5    Associated Diagnoses: Chronic systolic heart failure      RENVELA 800 mg Tab Take 800 mg by mouth 3 (three) times daily with meals.       blood sugar diagnostic Strp 1 each by Misc.(Non-Drug; Combo Route) route as directed.  Qty: 100 each, Refills: 0      blood-glucose meter (FREESTYLE SYSTEM KIT) kit Use as instructed  Qty: 1 each, Refills: 0      lancets (ACCU-CHEK SOFTCLIX LANCETS) Misc 1 application by Misc.(Non-Drug; Combo Route) route 4 (four) times daily before meals and nightly.  Qty: 120 each, Refills: 0             Discharge Procedure Orders  Diet general     Activity as tolerated     Call MD for:  temperature >100.4     Call MD for:  persistent nausea and vomiting or diarrhea     Call MD for:  severe uncontrolled pain     Call MD for:  redness, tenderness, or signs of infection (pain, swelling, redness, odor or green/yellow discharge around incision site)     Call MD for:  difficulty breathing or increased cough     Call MD for:  severe persistent headache     Call MD for:  worsening rash     Call MD for:  persistent dizziness, light-headedness, or visual disturbances     Call MD for:  increased confusion or weakness     No dressing needed       Follow-up Information     Michael ALLRED  MD Kami In 2 weeks.    Specialty:  Urology  Why:  post op TRUS bx, cysto  Contact information:  [address]  [facility] [id number] [phone]                   Discharge Procedure Orders (must include Diet, Follow-up, Activity):    Discharge Procedure Orders (must include Diet, Follow-up, Activity)  Diet general     Activity as tolerated     Call MD for:  temperature >100.4     Call MD for:  persistent nausea and vomiting or diarrhea     Call MD for:  severe uncontrolled pain     Call MD for:  redness, tenderness, or signs of infection (pain, swelling, redness, odor or green/yellow discharge around incision site)     Call MD for:  difficulty breathing or increased cough     Call MD for:  severe persistent headache     Call MD for:  worsening rash     Call MD for:  persistent dizziness, light-headedness, or visual disturbances     Call MD for:  increased confusion or weakness     No dressing needed         EOAE (evoked otoacoustic emission)

## 2021-01-04 ENCOUNTER — PATIENT MESSAGE (OUTPATIENT)
Dept: ADMINISTRATIVE | Facility: HOSPITAL | Age: 61
End: 2021-01-04

## 2021-01-08 ENCOUNTER — PATIENT MESSAGE (OUTPATIENT)
Dept: TRANSPLANT | Facility: CLINIC | Age: 61
End: 2021-01-08

## 2021-01-13 ENCOUNTER — OFFICE VISIT (OUTPATIENT)
Dept: TRANSPLANT | Facility: CLINIC | Age: 61
End: 2021-01-13
Payer: MEDICARE

## 2021-01-13 VITALS
HEIGHT: 67 IN | SYSTOLIC BLOOD PRESSURE: 143 MMHG | DIASTOLIC BLOOD PRESSURE: 47 MMHG | TEMPERATURE: 98 F | RESPIRATION RATE: 16 BRPM | WEIGHT: 136.88 LBS | HEART RATE: 56 BPM | BODY MASS INDEX: 21.48 KG/M2 | OXYGEN SATURATION: 91 %

## 2021-01-13 DIAGNOSIS — N18.6 DIABETES MELLITUS WITH ESRD (END-STAGE RENAL DISEASE): Chronic | ICD-10-CM

## 2021-01-13 DIAGNOSIS — N18.6 ESRD (END STAGE RENAL DISEASE) ON DIALYSIS: Primary | ICD-10-CM

## 2021-01-13 DIAGNOSIS — E11.22 DIABETES MELLITUS WITH ESRD (END-STAGE RENAL DISEASE): Chronic | ICD-10-CM

## 2021-01-13 DIAGNOSIS — R09.89 PULMONARY HYPERINFLATION: Chronic | ICD-10-CM

## 2021-01-13 DIAGNOSIS — Z99.2 ESRD (END STAGE RENAL DISEASE) ON DIALYSIS: Primary | ICD-10-CM

## 2021-01-13 DIAGNOSIS — Z76.82 PATIENT ON WAITING LIST FOR KIDNEY TRANSPLANT: Chronic | ICD-10-CM

## 2021-01-13 DIAGNOSIS — I10 ESSENTIAL HYPERTENSION: Chronic | ICD-10-CM

## 2021-01-13 DIAGNOSIS — D63.8 ANEMIA OF CHRONIC DISEASE: Chronic | ICD-10-CM

## 2021-01-13 PROCEDURE — 99215 PR OFFICE/OUTPT VISIT, EST, LEVL V, 40-54 MIN: ICD-10-PCS | Mod: S$PBB,TXP,, | Performed by: NURSE PRACTITIONER

## 2021-01-13 PROCEDURE — 99999 PR PBB SHADOW E&M-EST. PATIENT-LVL IV: ICD-10-PCS | Mod: PBBFAC,TXP,, | Performed by: NURSE PRACTITIONER

## 2021-01-13 PROCEDURE — 99999 PR PBB SHADOW E&M-EST. PATIENT-LVL IV: CPT | Mod: PBBFAC,TXP,, | Performed by: NURSE PRACTITIONER

## 2021-01-13 PROCEDURE — 99215 OFFICE O/P EST HI 40 MIN: CPT | Mod: S$PBB,TXP,, | Performed by: NURSE PRACTITIONER

## 2021-01-13 PROCEDURE — 99214 OFFICE O/P EST MOD 30 MIN: CPT | Mod: PBBFAC,TXP | Performed by: NURSE PRACTITIONER

## 2021-01-19 ENCOUNTER — SOCIAL WORK (OUTPATIENT)
Dept: TRANSPLANT | Facility: CLINIC | Age: 61
End: 2021-01-19

## 2021-01-28 ENCOUNTER — TELEPHONE (OUTPATIENT)
Dept: TRANSPLANT | Facility: CLINIC | Age: 61
End: 2021-01-28

## 2021-02-01 DIAGNOSIS — Z76.82 ORGAN TRANSPLANT CANDIDATE: Primary | ICD-10-CM

## 2021-02-01 DIAGNOSIS — Z99.2 ESRD (END STAGE RENAL DISEASE) ON DIALYSIS: ICD-10-CM

## 2021-02-01 DIAGNOSIS — I10 ESSENTIAL HYPERTENSION: Chronic | ICD-10-CM

## 2021-02-01 DIAGNOSIS — N18.6 ESRD (END STAGE RENAL DISEASE) ON DIALYSIS: ICD-10-CM

## 2021-02-01 RX ORDER — ISOSORBIDE MONONITRATE 120 MG/1
240 TABLET, EXTENDED RELEASE ORAL DAILY
Qty: 60 TABLET | Refills: 0 | Status: SHIPPED | OUTPATIENT
Start: 2021-02-01 | End: 2021-02-26

## 2021-02-02 ENCOUNTER — HOSPITAL ENCOUNTER (INPATIENT)
Facility: HOSPITAL | Age: 61
LOS: 2 days | Discharge: HOME | DRG: 314 | End: 2021-02-04
Attending: EMERGENCY MEDICINE | Admitting: INTERNAL MEDICINE
Payer: MEDICARE

## 2021-02-02 DIAGNOSIS — R09.02 HYPOXIA: ICD-10-CM

## 2021-02-02 DIAGNOSIS — J90 RECURRENT RIGHT PLEURAL EFFUSION: ICD-10-CM

## 2021-02-02 DIAGNOSIS — I10 HYPERTENSION: ICD-10-CM

## 2021-02-02 DIAGNOSIS — I27.20 PULMONARY HTN: ICD-10-CM

## 2021-02-02 DIAGNOSIS — J90 PLEURAL EFFUSION: ICD-10-CM

## 2021-02-02 DIAGNOSIS — I50.32 CHRONIC DIASTOLIC HEART FAILURE: ICD-10-CM

## 2021-02-02 DIAGNOSIS — T82.838A BLEEDING PSEUDOANEURYSM OF RIGHT BRACHIOCEPHALIC AV FISTULA: ICD-10-CM

## 2021-02-02 DIAGNOSIS — T82.858A ARTERIOVENOUS FISTULA STENOSIS, INITIAL ENCOUNTER: Primary | ICD-10-CM

## 2021-02-02 DIAGNOSIS — R07.9 CHEST PAIN: ICD-10-CM

## 2021-02-02 LAB
ABO + RH BLD: NORMAL
ALBUMIN SERPL BCP-MCNC: 3.3 G/DL (ref 3.5–5.2)
ALP SERPL-CCNC: 135 U/L (ref 55–135)
ALT SERPL W/O P-5'-P-CCNC: 13 U/L (ref 10–44)
ANION GAP SERPL CALC-SCNC: 10 MMOL/L (ref 8–16)
AST SERPL-CCNC: 16 U/L (ref 10–40)
BASOPHILS # BLD AUTO: 0.05 K/UL (ref 0–0.2)
BASOPHILS NFR BLD: 0.8 % (ref 0–1.9)
BILIRUB SERPL-MCNC: 1.1 MG/DL (ref 0.1–1)
BLD GP AB SCN CELLS X3 SERPL QL: NORMAL
BUN SERPL-MCNC: 15 MG/DL (ref 6–20)
CALCIUM SERPL-MCNC: 8.7 MG/DL (ref 8.7–10.5)
CHLORIDE SERPL-SCNC: 101 MMOL/L (ref 95–110)
CO2 SERPL-SCNC: 29 MMOL/L (ref 23–29)
CREAT SERPL-MCNC: 2.9 MG/DL (ref 0.5–1.4)
CTP QC/QA: YES
DIFFERENTIAL METHOD: ABNORMAL
EOSINOPHIL # BLD AUTO: 0.2 K/UL (ref 0–0.5)
EOSINOPHIL NFR BLD: 3.2 % (ref 0–8)
ERYTHROCYTE [DISTWIDTH] IN BLOOD BY AUTOMATED COUNT: 15.6 % (ref 11.5–14.5)
EST. GFR  (AFRICAN AMERICAN): 26 ML/MIN/1.73 M^2
EST. GFR  (NON AFRICAN AMERICAN): 22 ML/MIN/1.73 M^2
GLUCOSE SERPL-MCNC: 78 MG/DL (ref 70–110)
HCT VFR BLD AUTO: 25 % (ref 40–54)
HGB BLD-MCNC: 8.2 G/DL (ref 14–18)
IMM GRANULOCYTES # BLD AUTO: 0.03 K/UL (ref 0–0.04)
IMM GRANULOCYTES NFR BLD AUTO: 0.5 % (ref 0–0.5)
INR PPP: 1.1 (ref 0.8–1.2)
LYMPHOCYTES # BLD AUTO: 0.5 K/UL (ref 1–4.8)
LYMPHOCYTES NFR BLD: 6.8 % (ref 18–48)
MCH RBC QN AUTO: 37.3 PG (ref 27–31)
MCHC RBC AUTO-ENTMCNC: 32.8 G/DL (ref 32–36)
MCV RBC AUTO: 114 FL (ref 82–98)
MONOCYTES # BLD AUTO: 0.5 K/UL (ref 0.3–1)
MONOCYTES NFR BLD: 7.7 % (ref 4–15)
NEUTROPHILS # BLD AUTO: 5.3 K/UL (ref 1.8–7.7)
NEUTROPHILS NFR BLD: 81 % (ref 38–73)
NRBC BLD-RTO: 0 /100 WBC
PLATELET # BLD AUTO: 142 K/UL (ref 150–350)
PMV BLD AUTO: 9.4 FL (ref 9.2–12.9)
POCT GLUCOSE: 154 MG/DL (ref 70–110)
POCT GLUCOSE: 83 MG/DL (ref 70–110)
POTASSIUM SERPL-SCNC: 3.7 MMOL/L (ref 3.5–5.1)
PROT SERPL-MCNC: 6.8 G/DL (ref 6–8.4)
PROTHROMBIN TIME: 12.1 SEC (ref 9–12.5)
RBC # BLD AUTO: 2.2 M/UL (ref 4.6–6.2)
SARS-COV-2 RDRP RESP QL NAA+PROBE: NEGATIVE
SODIUM SERPL-SCNC: 140 MMOL/L (ref 136–145)
WBC # BLD AUTO: 6.59 K/UL (ref 3.9–12.7)

## 2021-02-02 PROCEDURE — U0002 COVID-19 LAB TEST NON-CDC: HCPCS | Mod: NTX | Performed by: EMERGENCY MEDICINE

## 2021-02-02 PROCEDURE — 25000003 PHARM REV CODE 250: Mod: NTX | Performed by: INTERNAL MEDICINE

## 2021-02-02 PROCEDURE — 93005 ELECTROCARDIOGRAM TRACING: CPT | Mod: NTX

## 2021-02-02 PROCEDURE — 85025 COMPLETE CBC W/AUTO DIFF WBC: CPT | Mod: NTX

## 2021-02-02 PROCEDURE — 80053 COMPREHEN METABOLIC PANEL: CPT | Mod: NTX

## 2021-02-02 PROCEDURE — 99222 PR INITIAL HOSPITAL CARE,LEVL II: ICD-10-PCS | Mod: NTX,,, | Performed by: SURGERY

## 2021-02-02 PROCEDURE — 93010 EKG 12-LEAD: ICD-10-PCS | Mod: NTX,,, | Performed by: INTERNAL MEDICINE

## 2021-02-02 PROCEDURE — 85610 PROTHROMBIN TIME: CPT | Mod: NTX

## 2021-02-02 PROCEDURE — 99284 EMERGENCY DEPT VISIT MOD MDM: CPT | Mod: 25,NTX

## 2021-02-02 PROCEDURE — 63600175 PHARM REV CODE 636 W HCPCS: Mod: NTX | Performed by: INTERNAL MEDICINE

## 2021-02-02 PROCEDURE — 96374 THER/PROPH/DIAG INJ IV PUSH: CPT | Mod: NTX

## 2021-02-02 PROCEDURE — 82962 GLUCOSE BLOOD TEST: CPT | Mod: 59,NTX

## 2021-02-02 PROCEDURE — 21400001 HC TELEMETRY ROOM: Mod: NTX

## 2021-02-02 PROCEDURE — 99222 1ST HOSP IP/OBS MODERATE 55: CPT | Mod: NTX,,, | Performed by: SURGERY

## 2021-02-02 PROCEDURE — 93010 ELECTROCARDIOGRAM REPORT: CPT | Mod: NTX,,, | Performed by: INTERNAL MEDICINE

## 2021-02-02 PROCEDURE — 86900 BLOOD TYPING SEROLOGIC ABO: CPT | Mod: NTX

## 2021-02-02 RX ORDER — IBUPROFEN 200 MG
16 TABLET ORAL
Status: DISCONTINUED | OUTPATIENT
Start: 2021-02-02 | End: 2021-02-04 | Stop reason: HOSPADM

## 2021-02-02 RX ORDER — CLONIDINE HYDROCHLORIDE 0.1 MG/1
0.2 TABLET ORAL 3 TIMES DAILY
Status: DISCONTINUED | OUTPATIENT
Start: 2021-02-02 | End: 2021-02-04 | Stop reason: HOSPADM

## 2021-02-02 RX ORDER — SODIUM CHLORIDE 0.9 % (FLUSH) 0.9 %
10 SYRINGE (ML) INJECTION
Status: DISCONTINUED | OUTPATIENT
Start: 2021-02-02 | End: 2021-02-04 | Stop reason: HOSPADM

## 2021-02-02 RX ORDER — ATORVASTATIN CALCIUM 40 MG/1
40 TABLET, FILM COATED ORAL NIGHTLY
Status: DISCONTINUED | OUTPATIENT
Start: 2021-02-02 | End: 2021-02-04 | Stop reason: HOSPADM

## 2021-02-02 RX ORDER — CALCIUM ACETATE 667 MG/1
667 CAPSULE ORAL
Status: DISCONTINUED | OUTPATIENT
Start: 2021-02-02 | End: 2021-02-04 | Stop reason: HOSPADM

## 2021-02-02 RX ORDER — HYDRALAZINE HYDROCHLORIDE 20 MG/ML
10 INJECTION INTRAMUSCULAR; INTRAVENOUS
Status: COMPLETED | OUTPATIENT
Start: 2021-02-02 | End: 2021-02-02

## 2021-02-02 RX ORDER — GLUCAGON 1 MG
1 KIT INJECTION
Status: DISCONTINUED | OUTPATIENT
Start: 2021-02-02 | End: 2021-02-04 | Stop reason: HOSPADM

## 2021-02-02 RX ORDER — FUROSEMIDE 40 MG/1
80 TABLET ORAL DAILY
Status: DISCONTINUED | OUTPATIENT
Start: 2021-02-03 | End: 2021-02-04 | Stop reason: HOSPADM

## 2021-02-02 RX ORDER — ISOSORBIDE MONONITRATE 30 MG/1
240 TABLET, EXTENDED RELEASE ORAL DAILY
Status: DISCONTINUED | OUTPATIENT
Start: 2021-02-03 | End: 2021-02-04 | Stop reason: HOSPADM

## 2021-02-02 RX ORDER — IBUPROFEN 200 MG
24 TABLET ORAL
Status: DISCONTINUED | OUTPATIENT
Start: 2021-02-02 | End: 2021-02-04 | Stop reason: HOSPADM

## 2021-02-02 RX ORDER — HYDRALAZINE HYDROCHLORIDE 25 MG/1
100 TABLET, FILM COATED ORAL EVERY 12 HOURS
Status: DISCONTINUED | OUTPATIENT
Start: 2021-02-02 | End: 2021-02-04 | Stop reason: HOSPADM

## 2021-02-02 RX ORDER — INSULIN ASPART 100 [IU]/ML
0-5 INJECTION, SOLUTION INTRAVENOUS; SUBCUTANEOUS
Status: DISCONTINUED | OUTPATIENT
Start: 2021-02-02 | End: 2021-02-04 | Stop reason: HOSPADM

## 2021-02-02 RX ADMIN — ATORVASTATIN CALCIUM 40 MG: 40 TABLET, FILM COATED ORAL at 08:02

## 2021-02-02 RX ADMIN — HYDRALAZINE HYDROCHLORIDE 10 MG: 20 INJECTION INTRAMUSCULAR; INTRAVENOUS at 05:02

## 2021-02-02 RX ADMIN — CLONIDINE HYDROCHLORIDE 0.2 MG: 0.1 TABLET ORAL at 08:02

## 2021-02-02 RX ADMIN — HYDRALAZINE HYDROCHLORIDE 100 MG: 25 TABLET ORAL at 08:02

## 2021-02-02 RX ADMIN — CLONIDINE HYDROCHLORIDE 0.2 MG: 0.1 TABLET ORAL at 05:02

## 2021-02-03 PROBLEM — I27.20 PULMONARY HTN: Status: ACTIVE | Noted: 2021-02-03

## 2021-02-03 LAB
ALBUMIN SERPL BCP-MCNC: 2.9 G/DL (ref 3.5–5.2)
ALP SERPL-CCNC: 122 U/L (ref 55–135)
ALT SERPL W/O P-5'-P-CCNC: 14 U/L (ref 10–44)
ANION GAP SERPL CALC-SCNC: 11 MMOL/L (ref 8–16)
APTT BLDCRRT: 29.8 SEC (ref 21–32)
AST SERPL-CCNC: 18 U/L (ref 10–40)
BASOPHILS # BLD AUTO: 0.05 K/UL (ref 0–0.2)
BASOPHILS NFR BLD: 0.8 % (ref 0–1.9)
BILIRUB SERPL-MCNC: 0.9 MG/DL (ref 0.1–1)
BNP SERPL-MCNC: 3223 PG/ML (ref 0–99)
BUN SERPL-MCNC: 27 MG/DL (ref 6–20)
CALCIUM SERPL-MCNC: 7.9 MG/DL (ref 8.7–10.5)
CHLORIDE SERPL-SCNC: 101 MMOL/L (ref 95–110)
CO2 SERPL-SCNC: 29 MMOL/L (ref 23–29)
CREAT SERPL-MCNC: 4 MG/DL (ref 0.5–1.4)
DIFFERENTIAL METHOD: ABNORMAL
EOSINOPHIL # BLD AUTO: 0.2 K/UL (ref 0–0.5)
EOSINOPHIL NFR BLD: 2.7 % (ref 0–8)
ERYTHROCYTE [DISTWIDTH] IN BLOOD BY AUTOMATED COUNT: 15.6 % (ref 11.5–14.5)
EST. GFR  (AFRICAN AMERICAN): 18 ML/MIN/1.73 M^2
EST. GFR  (NON AFRICAN AMERICAN): 15 ML/MIN/1.73 M^2
GLUCOSE SERPL-MCNC: 88 MG/DL (ref 70–110)
HCT VFR BLD AUTO: 22.7 % (ref 40–54)
HGB BLD-MCNC: 7.2 G/DL (ref 14–18)
IMM GRANULOCYTES # BLD AUTO: 0.02 K/UL (ref 0–0.04)
IMM GRANULOCYTES NFR BLD AUTO: 0.3 % (ref 0–0.5)
INR PPP: 1.2 (ref 0.8–1.2)
LYMPHOCYTES # BLD AUTO: 0.6 K/UL (ref 1–4.8)
LYMPHOCYTES NFR BLD: 9.4 % (ref 18–48)
MCH RBC QN AUTO: 36.4 PG (ref 27–31)
MCHC RBC AUTO-ENTMCNC: 31.7 G/DL (ref 32–36)
MCV RBC AUTO: 115 FL (ref 82–98)
MONOCYTES # BLD AUTO: 0.6 K/UL (ref 0.3–1)
MONOCYTES NFR BLD: 9 % (ref 4–15)
NEUTROPHILS # BLD AUTO: 4.9 K/UL (ref 1.8–7.7)
NEUTROPHILS NFR BLD: 77.8 % (ref 38–73)
NRBC BLD-RTO: 0 /100 WBC
PHOSPHATE SERPL-MCNC: 3.5 MG/DL (ref 2.7–4.5)
PLATELET # BLD AUTO: 120 K/UL (ref 150–350)
PMV BLD AUTO: 9.1 FL (ref 9.2–12.9)
POCT GLUCOSE: 133 MG/DL (ref 70–110)
POCT GLUCOSE: 187 MG/DL (ref 70–110)
POCT GLUCOSE: 247 MG/DL (ref 70–110)
POCT GLUCOSE: 77 MG/DL (ref 70–110)
POTASSIUM SERPL-SCNC: 4.3 MMOL/L (ref 3.5–5.1)
PROT SERPL-MCNC: 6 G/DL (ref 6–8.4)
PROTHROMBIN TIME: 12.2 SEC (ref 9–12.5)
RBC # BLD AUTO: 1.98 M/UL (ref 4.6–6.2)
SODIUM SERPL-SCNC: 141 MMOL/L (ref 136–145)
WBC # BLD AUTO: 6.25 K/UL (ref 3.9–12.7)

## 2021-02-03 PROCEDURE — 85610 PROTHROMBIN TIME: CPT | Mod: NTX

## 2021-02-03 PROCEDURE — 99222 1ST HOSP IP/OBS MODERATE 55: CPT | Mod: NTX,,, | Performed by: INTERNAL MEDICINE

## 2021-02-03 PROCEDURE — 25000003 PHARM REV CODE 250: Mod: NTX | Performed by: INTERNAL MEDICINE

## 2021-02-03 PROCEDURE — 80053 COMPREHEN METABOLIC PANEL: CPT | Mod: NTX

## 2021-02-03 PROCEDURE — 27000221 HC OXYGEN, UP TO 24 HOURS: Mod: NTX

## 2021-02-03 PROCEDURE — 94761 N-INVAS EAR/PLS OXIMETRY MLT: CPT | Mod: NTX

## 2021-02-03 PROCEDURE — 21400001 HC TELEMETRY ROOM: Mod: NTX

## 2021-02-03 PROCEDURE — 36415 COLL VENOUS BLD VENIPUNCTURE: CPT | Mod: NTX

## 2021-02-03 PROCEDURE — 99222 PR INITIAL HOSPITAL CARE,LEVL II: ICD-10-PCS | Mod: NTX,,, | Performed by: INTERNAL MEDICINE

## 2021-02-03 PROCEDURE — 85025 COMPLETE CBC W/AUTO DIFF WBC: CPT | Mod: NTX

## 2021-02-03 PROCEDURE — 83880 ASSAY OF NATRIURETIC PEPTIDE: CPT | Mod: NTX

## 2021-02-03 PROCEDURE — 63600175 PHARM REV CODE 636 W HCPCS: Mod: NTX | Performed by: INTERNAL MEDICINE

## 2021-02-03 PROCEDURE — 85730 THROMBOPLASTIN TIME PARTIAL: CPT | Mod: NTX

## 2021-02-03 PROCEDURE — 84100 ASSAY OF PHOSPHORUS: CPT | Mod: NTX

## 2021-02-03 RX ADMIN — CALCIUM ACETATE 667 MG: 667 CAPSULE ORAL at 08:02

## 2021-02-03 RX ADMIN — ATORVASTATIN CALCIUM 40 MG: 40 TABLET, FILM COATED ORAL at 08:02

## 2021-02-03 RX ADMIN — HYDRALAZINE HYDROCHLORIDE 100 MG: 25 TABLET ORAL at 08:02

## 2021-02-03 RX ADMIN — INSULIN ASPART 1 UNITS: 100 INJECTION, SOLUTION INTRAVENOUS; SUBCUTANEOUS at 08:02

## 2021-02-03 RX ADMIN — CLONIDINE HYDROCHLORIDE 0.2 MG: 0.1 TABLET ORAL at 08:02

## 2021-02-03 RX ADMIN — CALCIUM ACETATE 667 MG: 667 CAPSULE ORAL at 12:02

## 2021-02-03 RX ADMIN — ISOSORBIDE MONONITRATE 240 MG: 30 TABLET, EXTENDED RELEASE ORAL at 08:02

## 2021-02-03 RX ADMIN — EPOETIN ALFA-EPBX 10000 UNITS: 10000 INJECTION, SOLUTION INTRAVENOUS; SUBCUTANEOUS at 05:02

## 2021-02-03 RX ADMIN — CALCIUM ACETATE 667 MG: 667 CAPSULE ORAL at 05:02

## 2021-02-03 RX ADMIN — FUROSEMIDE 80 MG: 40 TABLET ORAL at 08:02

## 2021-02-03 RX ADMIN — NEPHROCAP 1 CAPSULE: 1 CAP ORAL at 04:02

## 2021-02-03 RX ADMIN — CLONIDINE HYDROCHLORIDE 0.2 MG: 0.1 TABLET ORAL at 04:02

## 2021-02-04 ENCOUNTER — TELEPHONE (OUTPATIENT)
Dept: TRANSPLANT | Facility: CLINIC | Age: 61
End: 2021-02-04

## 2021-02-04 VITALS
RESPIRATION RATE: 16 BRPM | HEIGHT: 68 IN | SYSTOLIC BLOOD PRESSURE: 169 MMHG | OXYGEN SATURATION: 91 % | DIASTOLIC BLOOD PRESSURE: 77 MMHG | BODY MASS INDEX: 21.37 KG/M2 | HEART RATE: 54 BPM | WEIGHT: 141 LBS | TEMPERATURE: 99 F

## 2021-02-04 LAB
ALBUMIN SERPL BCP-MCNC: 3 G/DL (ref 3.5–5.2)
ALP SERPL-CCNC: 126 U/L (ref 55–135)
ALT SERPL W/O P-5'-P-CCNC: 15 U/L (ref 10–44)
ANION GAP SERPL CALC-SCNC: 13 MMOL/L (ref 8–16)
AST SERPL-CCNC: 17 U/L (ref 10–40)
BILIRUB SERPL-MCNC: 0.9 MG/DL (ref 0.1–1)
BUN SERPL-MCNC: 48 MG/DL (ref 6–20)
CALCIUM SERPL-MCNC: 7.7 MG/DL (ref 8.7–10.5)
CHLORIDE SERPL-SCNC: 99 MMOL/L (ref 95–110)
CO2 SERPL-SCNC: 25 MMOL/L (ref 23–29)
CREAT SERPL-MCNC: 6 MG/DL (ref 0.5–1.4)
EST. GFR  (AFRICAN AMERICAN): 11 ML/MIN/1.73 M^2
EST. GFR  (NON AFRICAN AMERICAN): 9 ML/MIN/1.73 M^2
GLUCOSE SERPL-MCNC: 127 MG/DL (ref 70–110)
LDH SERPL L TO P-CCNC: 186 U/L (ref 110–260)
PHOSPHATE SERPL-MCNC: 4.8 MG/DL (ref 2.7–4.5)
POCT GLUCOSE: 132 MG/DL (ref 70–110)
POCT GLUCOSE: 235 MG/DL (ref 70–110)
POTASSIUM SERPL-SCNC: 4.9 MMOL/L (ref 3.5–5.1)
PROT SERPL-MCNC: 6 G/DL (ref 6–8.4)
PROT SERPL-MCNC: 6 G/DL (ref 6–8.4)
SODIUM SERPL-SCNC: 137 MMOL/L (ref 136–145)

## 2021-02-04 PROCEDURE — 88112 CYTOPATH CELL ENHANCE TECH: CPT | Mod: NTX | Performed by: PATHOLOGY

## 2021-02-04 PROCEDURE — 80053 COMPREHEN METABOLIC PANEL: CPT | Mod: NTX

## 2021-02-04 PROCEDURE — 27201247 HC HEMODIALYSIS, SET-UP & CANCEL

## 2021-02-04 PROCEDURE — 84100 ASSAY OF PHOSPHORUS: CPT | Mod: NTX

## 2021-02-04 PROCEDURE — 25000003 PHARM REV CODE 250: Mod: NTX | Performed by: INTERNAL MEDICINE

## 2021-02-04 PROCEDURE — 88112 CYTOPATH CELL ENHANCE TECH: CPT | Mod: 26,NTX,, | Performed by: PATHOLOGY

## 2021-02-04 PROCEDURE — 88305 TISSUE EXAM BY PATHOLOGIST: ICD-10-PCS | Mod: 26,NTX,, | Performed by: PATHOLOGY

## 2021-02-04 PROCEDURE — 36415 COLL VENOUS BLD VENIPUNCTURE: CPT | Mod: NTX

## 2021-02-04 PROCEDURE — 99000 SPECIMEN HANDLING OFFICE-LAB: CPT | Mod: NTX

## 2021-02-04 PROCEDURE — 83615 LACTATE (LD) (LDH) ENZYME: CPT | Mod: NTX

## 2021-02-04 PROCEDURE — 88305 TISSUE EXAM BY PATHOLOGIST: CPT | Mod: 26,NTX,, | Performed by: PATHOLOGY

## 2021-02-04 PROCEDURE — 88305 TISSUE EXAM BY PATHOLOGIST: CPT | Mod: NTX | Performed by: PATHOLOGY

## 2021-02-04 PROCEDURE — 88112 PR  CYTOPATH, CELL ENHANCE TECH: ICD-10-PCS | Mod: 26,NTX,, | Performed by: PATHOLOGY

## 2021-02-04 PROCEDURE — 83615 LACTATE (LD) (LDH) ENZYME: CPT | Mod: 91,NTX

## 2021-02-04 PROCEDURE — 84155 ASSAY OF PROTEIN SERUM: CPT | Mod: NTX

## 2021-02-04 RX ORDER — SODIUM CHLORIDE 9 MG/ML
INJECTION, SOLUTION INTRAVENOUS
Status: CANCELLED | OUTPATIENT
Start: 2021-02-04

## 2021-02-04 RX ORDER — MUPIROCIN 20 MG/G
OINTMENT TOPICAL 2 TIMES DAILY
Status: DISCONTINUED | OUTPATIENT
Start: 2021-02-04 | End: 2021-02-04 | Stop reason: HOSPADM

## 2021-02-04 RX ORDER — SODIUM CHLORIDE 9 MG/ML
INJECTION, SOLUTION INTRAVENOUS ONCE
Status: CANCELLED | OUTPATIENT
Start: 2021-02-04 | End: 2021-02-04

## 2021-02-04 RX ADMIN — HYDRALAZINE HYDROCHLORIDE 100 MG: 25 TABLET ORAL at 10:02

## 2021-02-04 RX ADMIN — FUROSEMIDE 80 MG: 40 TABLET ORAL at 10:02

## 2021-02-04 RX ADMIN — CALCIUM ACETATE 667 MG: 667 CAPSULE ORAL at 12:02

## 2021-02-04 RX ADMIN — CLONIDINE HYDROCHLORIDE 0.2 MG: 0.1 TABLET ORAL at 06:02

## 2021-02-04 RX ADMIN — CALCIUM ACETATE 667 MG: 667 CAPSULE ORAL at 06:02

## 2021-02-04 RX ADMIN — CLONIDINE HYDROCHLORIDE 0.2 MG: 0.1 TABLET ORAL at 10:02

## 2021-02-04 RX ADMIN — NEPHROCAP 1 CAPSULE: 1 CAP ORAL at 10:02

## 2021-02-04 RX ADMIN — ISOSORBIDE MONONITRATE 240 MG: 30 TABLET, EXTENDED RELEASE ORAL at 10:02

## 2021-02-05 ENCOUNTER — TELEPHONE (OUTPATIENT)
Dept: TRANSPLANT | Facility: CLINIC | Age: 61
End: 2021-02-05

## 2021-02-05 LAB
BODY FLUID SOURCE, LDH: NORMAL
LDH FLD L TO P-CCNC: 244 U/L

## 2021-02-08 LAB — FINAL PATHOLOGIC DIAGNOSIS: NORMAL

## 2021-02-12 ENCOUNTER — COMMITTEE REVIEW (OUTPATIENT)
Dept: TRANSPLANT | Facility: CLINIC | Age: 61
End: 2021-02-12

## 2021-02-12 ENCOUNTER — TELEPHONE (OUTPATIENT)
Dept: TRANSPLANT | Facility: CLINIC | Age: 61
End: 2021-02-12

## 2021-03-08 ENCOUNTER — EXTERNAL HOSPITAL ADMISSION (OUTPATIENT)
Dept: ADMINISTRATIVE | Facility: CLINIC | Age: 61
End: 2021-03-08

## 2021-03-08 ENCOUNTER — PATIENT OUTREACH (OUTPATIENT)
Dept: ADMINISTRATIVE | Facility: CLINIC | Age: 61
End: 2021-03-08

## 2021-03-08 ENCOUNTER — TELEPHONE (OUTPATIENT)
Dept: FAMILY MEDICINE | Facility: CLINIC | Age: 61
End: 2021-03-08

## 2021-03-22 ENCOUNTER — TELEPHONE (OUTPATIENT)
Dept: FAMILY MEDICINE | Facility: CLINIC | Age: 61
End: 2021-03-22

## 2021-03-23 ENCOUNTER — PATIENT MESSAGE (OUTPATIENT)
Dept: FAMILY MEDICINE | Facility: CLINIC | Age: 61
End: 2021-03-23

## 2021-03-24 DIAGNOSIS — Z99.2 ESRD (END STAGE RENAL DISEASE) ON DIALYSIS: ICD-10-CM

## 2021-03-24 DIAGNOSIS — I10 ESSENTIAL HYPERTENSION: Chronic | ICD-10-CM

## 2021-03-24 DIAGNOSIS — N18.6 ESRD (END STAGE RENAL DISEASE) ON DIALYSIS: ICD-10-CM

## 2021-03-24 DIAGNOSIS — I1A.0 RESISTANT HYPERTENSION: ICD-10-CM

## 2021-03-25 RX ORDER — FUROSEMIDE 80 MG/1
80 TABLET ORAL DAILY
Qty: 30 TABLET | Refills: 0 | Status: SHIPPED | OUTPATIENT
Start: 2021-03-25 | End: 2021-03-26 | Stop reason: SDUPTHER

## 2021-03-25 RX ORDER — ISOSORBIDE MONONITRATE 120 MG/1
TABLET, EXTENDED RELEASE ORAL
Qty: 60 TABLET | Refills: 0 | Status: SHIPPED | OUTPATIENT
Start: 2021-03-25 | End: 2021-03-26 | Stop reason: SDUPTHER

## 2021-03-26 DIAGNOSIS — I10 ESSENTIAL HYPERTENSION: Chronic | ICD-10-CM

## 2021-03-26 DIAGNOSIS — Z99.2 ESRD (END STAGE RENAL DISEASE) ON DIALYSIS: ICD-10-CM

## 2021-03-26 DIAGNOSIS — I1A.0 RESISTANT HYPERTENSION: ICD-10-CM

## 2021-03-26 DIAGNOSIS — N18.6 ESRD (END STAGE RENAL DISEASE) ON DIALYSIS: ICD-10-CM

## 2021-03-28 RX ORDER — FUROSEMIDE 80 MG/1
80 TABLET ORAL DAILY
Qty: 90 TABLET | Refills: 0 | Status: SHIPPED | OUTPATIENT
Start: 2021-03-28 | End: 2021-01-01 | Stop reason: SDUPTHER

## 2021-03-28 RX ORDER — ISOSORBIDE MONONITRATE 120 MG/1
TABLET, EXTENDED RELEASE ORAL
Qty: 180 TABLET | Refills: 0 | Status: SHIPPED | OUTPATIENT
Start: 2021-03-28 | End: 2021-01-01 | Stop reason: SDUPTHER

## 2021-04-06 ENCOUNTER — PATIENT MESSAGE (OUTPATIENT)
Dept: ADMINISTRATIVE | Facility: HOSPITAL | Age: 61
End: 2021-04-06

## 2021-05-04 LAB — HBA1C MFR BLD: 8.1 % (ref 4–6)

## 2021-05-19 ENCOUNTER — PATIENT OUTREACH (OUTPATIENT)
Dept: ADMINISTRATIVE | Facility: HOSPITAL | Age: 61
End: 2021-05-19

## 2021-07-07 ENCOUNTER — PATIENT MESSAGE (OUTPATIENT)
Dept: ADMINISTRATIVE | Facility: HOSPITAL | Age: 61
End: 2021-07-07

## 2021-08-09 NOTE — PATIENT INSTRUCTIONS
Transrectal Ultrasound and Biopsy  A transrectal ultrasound is an imaging test. It uses sound waves to create pictures of a mans prostate gland. Your prostate gland is in front of your rectum. For this test, a special probe (transducer) is placed directly into your rectum. During the test, tissue samples (a biopsy) may also be taken. The test is done by a specially trained technologist called a sonographer.    Getting ready for your test  · You may be asked to clear your bowel before the test. This may be done by injecting liquid into your rectum (an enema). Or it can be done by drinking a special liquid.  · You may be asked not to eat or drink anything after midnight the night before the test.  · Tell your health care provider about any medicines, herbs, or supplements you are taking. This includes any over-the-counter medicines such as aspirin or ibuprofen.  · Answer any questions your provider has about your medical history. This will help your provider tailor the test to your health needs.  During your test  · You may be asked to change into a gown. You will then lie on your side on an exam table, with your knees bent.  · The test is done with a hand-held probe. This is a short, slender emma. It has a sterile, disposable cover. It is also greased (lubricated) with some gel. It is then gently placed inside your rectum.  · You will feel pressure from the probe. If you feel pain, let your provider know.  · If a biopsy is taken, it is done using a small probe with a very tiny needle on the end. This needle enters your prostate and removes several tiny samples of tissue. These samples are then sent to a lab to be examined. Any mild pain from the biopsy is usually minor.   After your test  Before leaving, you may need to wait for a short time while the images are reviewed. In most cases, you can go back to your normal routine after the test. If you had a biopsy, you may see some blood in your urine, sperm, or stool  CC:  Aniceto Shepherd is here today for CPX.    Medications: medications verified and updated  Refills needed today?  YES  Denies known Latex allergy or symptoms of Latex sensitivity.  Patient would like communication of their results via:      Cell Phone:   Telephone Information:   Mobile [phone]     Okay to leave a message containing results? Yes  Tobacco history: verified    Health Maintenance Due   Topic Date Due   • Shingles Vaccine (1 of 2) Never done   • Pneumococcal Vaccine 65+ (2 of 2 - PPSV23) 05/02/2019   • Medicare Wellness Visit  Never done   • Depression Screening  08/07/2021     Patient is due for topics as listed above but is not proceeding with Immunization(s) Pneumococcal and Shingles and MWV (Medicare Wellness Visit) at this time. Education provided for Immunization(s) Pneumococcal and Shingles..    [facility] status addressed. Patient Active.  No known drug allergies.              for a day or so. This is normal. Your health care provider will let you know when your test results are ready.  In some cases, a diagnosis cant be made from the tissue sample that was taken. If this happens, your provider will talk with you about whether you may need another biopsy. Or you may need a different procedure.  When to call your health care provider  Call your provider if you have:  · Very bloody urine or stool  · A fever lasting 24 to 48 hours  · Any other symptoms that your provider asks you to report, based on your medical condition   Date Last Reviewed: 6/19/2015  © 8168-8228 [employer]. [address] [place] [address], [place] [address]. All rights reserved. This information is not intended as a substitute for professional medical care. Always follow your healthcare professional's instructions.

## 2022-01-01 ENCOUNTER — TELEPHONE (OUTPATIENT)
Dept: FAMILY MEDICINE | Facility: CLINIC | Age: 62
End: 2022-01-01
Payer: MEDICARE

## 2022-01-01 ENCOUNTER — HOSPITAL ENCOUNTER (OUTPATIENT)
Dept: RADIOLOGY | Facility: HOSPITAL | Age: 62
Discharge: HOME | End: 2022-09-14
Attending: STUDENT IN AN ORGANIZED HEALTH CARE EDUCATION/TRAINING PROGRAM
Payer: MEDICARE

## 2022-01-01 ENCOUNTER — PATIENT MESSAGE (OUTPATIENT)
Dept: ADMINISTRATIVE | Facility: HOSPITAL | Age: 62
End: 2022-01-01
Payer: MEDICARE

## 2022-01-01 ENCOUNTER — ANESTHESIA EVENT (OUTPATIENT)
Dept: SURGERY | Facility: HOSPITAL | Age: 62
DRG: 163 | End: 2022-01-01
Payer: MEDICARE

## 2022-01-01 ENCOUNTER — ANESTHESIA (OUTPATIENT)
Dept: SURGERY | Facility: HOSPITAL | Age: 62
DRG: 163 | End: 2022-01-01
Payer: MEDICARE

## 2022-01-01 ENCOUNTER — HOSPITAL ENCOUNTER (INPATIENT)
Facility: HOSPITAL | Age: 62
LOS: 25 days | Discharge: NURSING, REHAB OR LONG-TERM CARE | DRG: 163 | End: 2022-09-09
Attending: EMERGENCY MEDICINE | Admitting: INTERNAL MEDICINE
Payer: MEDICARE

## 2022-01-01 ENCOUNTER — OFFICE VISIT (OUTPATIENT)
Dept: PULMONOLOGY | Facility: CLINIC | Age: 62
DRG: 163 | End: 2022-01-01
Payer: MEDICARE

## 2022-01-01 ENCOUNTER — OFFICE VISIT (OUTPATIENT)
Dept: FAMILY MEDICINE | Facility: CLINIC | Age: 62
End: 2022-01-01
Payer: MEDICARE

## 2022-01-01 VITALS
TEMPERATURE: 98 F | BODY MASS INDEX: 22.49 KG/M2 | OXYGEN SATURATION: 80 % | SYSTOLIC BLOOD PRESSURE: 182 MMHG | HEART RATE: 68 BPM | WEIGHT: 148.38 LBS | DIASTOLIC BLOOD PRESSURE: 60 MMHG | HEIGHT: 68 IN

## 2022-01-01 VITALS
TEMPERATURE: 99 F | HEIGHT: 68 IN | WEIGHT: 135.94 LBS | DIASTOLIC BLOOD PRESSURE: 66 MMHG | HEART RATE: 92 BPM | SYSTOLIC BLOOD PRESSURE: 173 MMHG | BODY MASS INDEX: 20.6 KG/M2 | OXYGEN SATURATION: 68 %

## 2022-01-01 VITALS
WEIGHT: 128.13 LBS | RESPIRATION RATE: 17 BRPM | BODY MASS INDEX: 19.42 KG/M2 | OXYGEN SATURATION: 93 % | HEIGHT: 68 IN | TEMPERATURE: 97 F | HEART RATE: 68 BPM | DIASTOLIC BLOOD PRESSURE: 79 MMHG | SYSTOLIC BLOOD PRESSURE: 164 MMHG

## 2022-01-01 DIAGNOSIS — J96.11 CHRONIC RESPIRATORY FAILURE WITH HYPOXIA: ICD-10-CM

## 2022-01-01 DIAGNOSIS — J90 RECURRENT RIGHT PLEURAL EFFUSION: ICD-10-CM

## 2022-01-01 DIAGNOSIS — I1A.0 RESISTANT HYPERTENSION: ICD-10-CM

## 2022-01-01 DIAGNOSIS — R07.9 CHEST PAIN: ICD-10-CM

## 2022-01-01 DIAGNOSIS — J90 RECURRENT PLEURAL EFFUSION ON RIGHT: ICD-10-CM

## 2022-01-01 DIAGNOSIS — N18.6 ESRD (END STAGE RENAL DISEASE): ICD-10-CM

## 2022-01-01 DIAGNOSIS — N18.6 DIABETES MELLITUS WITH ESRD (END-STAGE RENAL DISEASE): Chronic | ICD-10-CM

## 2022-01-01 DIAGNOSIS — E11.22 DIABETES MELLITUS WITH ESRD (END-STAGE RENAL DISEASE): Chronic | ICD-10-CM

## 2022-01-01 DIAGNOSIS — J90 RECURRENT RIGHT PLEURAL EFFUSION: Primary | ICD-10-CM

## 2022-01-01 DIAGNOSIS — Z99.2 ESRD (END STAGE RENAL DISEASE) ON DIALYSIS: ICD-10-CM

## 2022-01-01 DIAGNOSIS — N18.6 DIABETES MELLITUS WITH ESRD (END-STAGE RENAL DISEASE): ICD-10-CM

## 2022-01-01 DIAGNOSIS — R91.1 PULMONARY NODULE: ICD-10-CM

## 2022-01-01 DIAGNOSIS — E87.5 HYPERKALEMIA: ICD-10-CM

## 2022-01-01 DIAGNOSIS — Z79.4 TYPE 2 DIABETES MELLITUS WITH BOTH EYES AFFECTED BY PROLIFERATIVE RETINOPATHY AND MACULAR EDEMA, WITH LONG-TERM CURRENT USE OF INSULIN: ICD-10-CM

## 2022-01-01 DIAGNOSIS — R91.1 PULMONARY NODULE: Primary | ICD-10-CM

## 2022-01-01 DIAGNOSIS — I27.20 PULMONARY HTN: ICD-10-CM

## 2022-01-01 DIAGNOSIS — J30.89 NON-SEASONAL ALLERGIC RHINITIS DUE TO OTHER ALLERGIC TRIGGER: ICD-10-CM

## 2022-01-01 DIAGNOSIS — G47.00 INSOMNIA, UNSPECIFIED TYPE: ICD-10-CM

## 2022-01-01 DIAGNOSIS — E11.22 DIABETES MELLITUS WITH ESRD (END-STAGE RENAL DISEASE): ICD-10-CM

## 2022-01-01 DIAGNOSIS — N18.6 ESRD (END STAGE RENAL DISEASE) ON DIALYSIS: ICD-10-CM

## 2022-01-01 DIAGNOSIS — R09.02 HYPOXIA: ICD-10-CM

## 2022-01-01 DIAGNOSIS — I10 ESSENTIAL HYPERTENSION: Chronic | ICD-10-CM

## 2022-01-01 DIAGNOSIS — D72.829 LEUKOCYTOSIS, UNSPECIFIED TYPE: ICD-10-CM

## 2022-01-01 DIAGNOSIS — R50.9 FEVER, UNSPECIFIED FEVER CAUSE: ICD-10-CM

## 2022-01-01 DIAGNOSIS — E11.3513 TYPE 2 DIABETES MELLITUS WITH BOTH EYES AFFECTED BY PROLIFERATIVE RETINOPATHY AND MACULAR EDEMA, WITH LONG-TERM CURRENT USE OF INSULIN: ICD-10-CM

## 2022-01-01 DIAGNOSIS — J90 PLEURAL EFFUSION: Primary | ICD-10-CM

## 2022-01-01 LAB
ABO + RH BLD: NORMAL
ALBUMIN FLD-MCNC: 1.4 G/DL
ALBUMIN SERPL BCP-MCNC: 1.9 G/DL (ref 3.5–5.2)
ALBUMIN SERPL BCP-MCNC: 2 G/DL (ref 3.5–5.2)
ALBUMIN SERPL BCP-MCNC: 2.1 G/DL (ref 3.5–5.2)
ALBUMIN SERPL BCP-MCNC: 2.2 G/DL (ref 3.5–5.2)
ALBUMIN SERPL BCP-MCNC: 2.2 G/DL (ref 3.5–5.2)
ALBUMIN SERPL BCP-MCNC: 2.3 G/DL (ref 3.5–5.2)
ALBUMIN SERPL BCP-MCNC: 2.4 G/DL (ref 3.5–5.2)
ALBUMIN SERPL BCP-MCNC: 2.5 G/DL (ref 3.5–5.2)
ALBUMIN SERPL BCP-MCNC: 2.6 G/DL (ref 3.5–5.2)
ALBUMIN SERPL BCP-MCNC: 2.6 G/DL (ref 3.5–5.2)
ALBUMIN SERPL BCP-MCNC: 2.8 G/DL (ref 3.5–5.2)
ALBUMIN SERPL BCP-MCNC: 2.9 G/DL (ref 3.5–5.2)
ALBUMIN SERPL BCP-MCNC: 3 G/DL (ref 3.5–5.2)
ALLENS TEST: ABNORMAL
ALLENS TEST: ABNORMAL
ALP SERPL-CCNC: 127 U/L (ref 55–135)
ALP SERPL-CCNC: 134 U/L (ref 55–135)
ALP SERPL-CCNC: 137 U/L (ref 55–135)
ALP SERPL-CCNC: 139 U/L (ref 55–135)
ALP SERPL-CCNC: 140 U/L (ref 55–135)
ALP SERPL-CCNC: 141 U/L (ref 55–135)
ALP SERPL-CCNC: 144 U/L (ref 55–135)
ALP SERPL-CCNC: 153 U/L (ref 55–135)
ALP SERPL-CCNC: 156 U/L (ref 55–135)
ALP SERPL-CCNC: 157 U/L (ref 55–135)
ALP SERPL-CCNC: 158 U/L (ref 55–135)
ALP SERPL-CCNC: 159 U/L (ref 55–135)
ALP SERPL-CCNC: 160 U/L (ref 55–135)
ALP SERPL-CCNC: 161 U/L (ref 55–135)
ALP SERPL-CCNC: 162 U/L (ref 55–135)
ALP SERPL-CCNC: 164 U/L (ref 55–135)
ALP SERPL-CCNC: 167 U/L (ref 55–135)
ALP SERPL-CCNC: 169 U/L (ref 55–135)
ALP SERPL-CCNC: 175 U/L (ref 55–135)
ALP SERPL-CCNC: 184 U/L (ref 55–135)
ALP SERPL-CCNC: 186 U/L (ref 55–135)
ALP SERPL-CCNC: 192 U/L (ref 55–135)
ALP SERPL-CCNC: 192 U/L (ref 55–135)
ALP SERPL-CCNC: 196 U/L (ref 55–135)
ALT SERPL W/O P-5'-P-CCNC: 10 U/L (ref 10–44)
ALT SERPL W/O P-5'-P-CCNC: 12 U/L (ref 10–44)
ALT SERPL W/O P-5'-P-CCNC: 5 U/L (ref 10–44)
ALT SERPL W/O P-5'-P-CCNC: 5 U/L (ref 10–44)
ALT SERPL W/O P-5'-P-CCNC: 7 U/L (ref 10–44)
ALT SERPL W/O P-5'-P-CCNC: 8 U/L (ref 10–44)
ALT SERPL W/O P-5'-P-CCNC: 8 U/L (ref 10–44)
ALT SERPL W/O P-5'-P-CCNC: 9 U/L (ref 10–44)
ALT SERPL W/O P-5'-P-CCNC: <5 U/L (ref 10–44)
AMYLASE, BODY FLUID: <5 U/L
ANION GAP SERPL CALC-SCNC: 10 MMOL/L (ref 8–16)
ANION GAP SERPL CALC-SCNC: 11 MMOL/L (ref 8–16)
ANION GAP SERPL CALC-SCNC: 13 MMOL/L (ref 8–16)
ANION GAP SERPL CALC-SCNC: 13 MMOL/L (ref 8–16)
ANION GAP SERPL CALC-SCNC: 4 MMOL/L (ref 8–16)
ANION GAP SERPL CALC-SCNC: 6 MMOL/L (ref 8–16)
ANION GAP SERPL CALC-SCNC: 6 MMOL/L (ref 8–16)
ANION GAP SERPL CALC-SCNC: 7 MMOL/L (ref 8–16)
ANION GAP SERPL CALC-SCNC: 7 MMOL/L (ref 8–16)
ANION GAP SERPL CALC-SCNC: 8 MMOL/L (ref 8–16)
ANION GAP SERPL CALC-SCNC: 9 MMOL/L (ref 8–16)
ANISOCYTOSIS BLD QL SMEAR: SLIGHT
ANISOCYTOSIS BLD QL SMEAR: SLIGHT
APPEARANCE FLD: NORMAL
APTT BLDCRRT: 24.4 SEC (ref 21–32)
AST SERPL-CCNC: 14 U/L (ref 10–40)
AST SERPL-CCNC: 15 U/L (ref 10–40)
AST SERPL-CCNC: 16 U/L (ref 10–40)
AST SERPL-CCNC: 17 U/L (ref 10–40)
AST SERPL-CCNC: 18 U/L (ref 10–40)
AST SERPL-CCNC: 19 U/L (ref 10–40)
AST SERPL-CCNC: 20 U/L (ref 10–40)
AST SERPL-CCNC: 20 U/L (ref 10–40)
AST SERPL-CCNC: 21 U/L (ref 10–40)
AST SERPL-CCNC: 21 U/L (ref 10–40)
AST SERPL-CCNC: 22 U/L (ref 10–40)
AST SERPL-CCNC: 22 U/L (ref 10–40)
AST SERPL-CCNC: 23 U/L (ref 10–40)
AST SERPL-CCNC: 23 U/L (ref 10–40)
AST SERPL-CCNC: 24 U/L (ref 10–40)
AST SERPL-CCNC: 24 U/L (ref 10–40)
AST SERPL-CCNC: 25 U/L (ref 10–40)
AST SERPL-CCNC: 25 U/L (ref 10–40)
AST SERPL-CCNC: 26 U/L (ref 10–40)
AST SERPL-CCNC: 27 U/L (ref 10–40)
AST SERPL-CCNC: 29 U/L (ref 10–40)
BACTERIA BLD CULT: NORMAL
BACTERIA SPEC AEROBE CULT: NO GROWTH
BACTERIA SPEC AEROBE CULT: NO GROWTH
BACTERIA SPEC ANAEROBE CULT: NORMAL
BACTERIA SPEC ANAEROBE CULT: NORMAL
BASOPHILS # BLD AUTO: 0.04 K/UL (ref 0–0.2)
BASOPHILS # BLD AUTO: 0.05 K/UL (ref 0–0.2)
BASOPHILS # BLD AUTO: 0.06 K/UL (ref 0–0.2)
BASOPHILS # BLD AUTO: 0.07 K/UL (ref 0–0.2)
BASOPHILS # BLD AUTO: 0.08 K/UL (ref 0–0.2)
BASOPHILS # BLD AUTO: 0.1 K/UL (ref 0–0.2)
BASOPHILS # BLD AUTO: 0.11 K/UL (ref 0–0.2)
BASOPHILS # BLD AUTO: 0.12 K/UL (ref 0–0.2)
BASOPHILS # BLD AUTO: 0.12 K/UL (ref 0–0.2)
BASOPHILS NFR BLD: 0.4 % (ref 0–1.9)
BASOPHILS NFR BLD: 0.5 % (ref 0–1.9)
BASOPHILS NFR BLD: 0.6 % (ref 0–1.9)
BASOPHILS NFR BLD: 0.7 % (ref 0–1.9)
BASOPHILS NFR BLD: 0.8 % (ref 0–1.9)
BASOPHILS NFR BLD: 0.8 % (ref 0–1.9)
BASOPHILS NFR BLD: 0.9 % (ref 0–1.9)
BASOPHILS NFR BLD: 1 % (ref 0–1.9)
BASOPHILS NFR BLD: 1.1 % (ref 0–1.9)
BASOPHILS NFR BLD: 1.1 % (ref 0–1.9)
BASOPHILS NFR BLD: 1.2 % (ref 0–1.9)
BASOPHILS NFR BLD: 1.2 % (ref 0–1.9)
BASOPHILS NFR BLD: 1.3 % (ref 0–1.9)
BILIRUB DIRECT SERPL-MCNC: 0.7 MG/DL (ref 0.1–0.3)
BILIRUB SERPL-MCNC: 0.7 MG/DL (ref 0.1–1)
BILIRUB SERPL-MCNC: 0.9 MG/DL (ref 0.1–1)
BILIRUB SERPL-MCNC: 1 MG/DL (ref 0.1–1)
BILIRUB SERPL-MCNC: 1.1 MG/DL (ref 0.1–1)
BILIRUB SERPL-MCNC: 1.2 MG/DL (ref 0.1–1)
BILIRUB SERPL-MCNC: 1.3 MG/DL (ref 0.1–1)
BILIRUB SERPL-MCNC: 1.3 MG/DL (ref 0.1–1)
BILIRUB SERPL-MCNC: 1.5 MG/DL (ref 0.1–1)
BLD GP AB SCN CELLS X3 SERPL QL: NORMAL
BLD PROD TYP BPU: NORMAL
BLOOD UNIT EXPIRATION DATE: NORMAL
BLOOD UNIT TYPE CODE: 5100
BLOOD UNIT TYPE: NORMAL
BODY FLD TYPE: NORMAL
BODY FLUID SOURCE AMYLASE: NORMAL
BODY FLUID SOURCE, LDH: NORMAL
BUN SERPL-MCNC: 10 MG/DL (ref 8–23)
BUN SERPL-MCNC: 11 MG/DL (ref 8–23)
BUN SERPL-MCNC: 12 MG/DL (ref 8–23)
BUN SERPL-MCNC: 14 MG/DL (ref 8–23)
BUN SERPL-MCNC: 15 MG/DL (ref 8–23)
BUN SERPL-MCNC: 18 MG/DL (ref 8–23)
BUN SERPL-MCNC: 18 MG/DL (ref 8–23)
BUN SERPL-MCNC: 19 MG/DL (ref 8–23)
BUN SERPL-MCNC: 20 MG/DL (ref 8–23)
BUN SERPL-MCNC: 21 MG/DL (ref 8–23)
BUN SERPL-MCNC: 22 MG/DL (ref 8–23)
BUN SERPL-MCNC: 24 MG/DL (ref 8–23)
BUN SERPL-MCNC: 25 MG/DL (ref 8–23)
BUN SERPL-MCNC: 26 MG/DL (ref 8–23)
BUN SERPL-MCNC: 26 MG/DL (ref 8–23)
BUN SERPL-MCNC: 28 MG/DL (ref 8–23)
BUN SERPL-MCNC: 28 MG/DL (ref 8–23)
BUN SERPL-MCNC: 30 MG/DL (ref 8–23)
BUN SERPL-MCNC: 31 MG/DL (ref 8–23)
BUN SERPL-MCNC: 32 MG/DL (ref 8–23)
BUN SERPL-MCNC: 33 MG/DL (ref 8–23)
BUN SERPL-MCNC: 34 MG/DL (ref 8–23)
BUN SERPL-MCNC: 36 MG/DL (ref 8–23)
BUN SERPL-MCNC: 38 MG/DL (ref 8–23)
BUN SERPL-MCNC: 42 MG/DL (ref 8–23)
CALCIUM SERPL-MCNC: 7.1 MG/DL (ref 8.7–10.5)
CALCIUM SERPL-MCNC: 7.2 MG/DL (ref 8.7–10.5)
CALCIUM SERPL-MCNC: 7.3 MG/DL (ref 8.7–10.5)
CALCIUM SERPL-MCNC: 7.4 MG/DL (ref 8.7–10.5)
CALCIUM SERPL-MCNC: 7.4 MG/DL (ref 8.7–10.5)
CALCIUM SERPL-MCNC: 7.5 MG/DL (ref 8.7–10.5)
CALCIUM SERPL-MCNC: 7.6 MG/DL (ref 8.7–10.5)
CALCIUM SERPL-MCNC: 7.6 MG/DL (ref 8.7–10.5)
CALCIUM SERPL-MCNC: 7.7 MG/DL (ref 8.7–10.5)
CALCIUM SERPL-MCNC: 7.7 MG/DL (ref 8.7–10.5)
CALCIUM SERPL-MCNC: 7.9 MG/DL (ref 8.7–10.5)
CALCIUM SERPL-MCNC: 8 MG/DL (ref 8.7–10.5)
CALCIUM SERPL-MCNC: 8.1 MG/DL (ref 8.7–10.5)
CALCIUM SERPL-MCNC: 8.3 MG/DL (ref 8.7–10.5)
CALCIUM SERPL-MCNC: 8.3 MG/DL (ref 8.7–10.5)
CALCIUM SERPL-MCNC: 8.5 MG/DL (ref 8.7–10.5)
CALCIUM SERPL-MCNC: 8.5 MG/DL (ref 8.7–10.5)
CALCIUM SERPL-MCNC: 8.6 MG/DL (ref 8.7–10.5)
CHLORIDE SERPL-SCNC: 100 MMOL/L (ref 95–110)
CHLORIDE SERPL-SCNC: 101 MMOL/L (ref 95–110)
CHLORIDE SERPL-SCNC: 102 MMOL/L (ref 95–110)
CHLORIDE SERPL-SCNC: 103 MMOL/L (ref 95–110)
CHLORIDE SERPL-SCNC: 104 MMOL/L (ref 95–110)
CHLORIDE SERPL-SCNC: 104 MMOL/L (ref 95–110)
CHLORIDE SERPL-SCNC: 105 MMOL/L (ref 95–110)
CHLORIDE SERPL-SCNC: 105 MMOL/L (ref 95–110)
CHLORIDE SERPL-SCNC: 106 MMOL/L (ref 95–110)
CHLORIDE SERPL-SCNC: 98 MMOL/L (ref 95–110)
CHLORIDE SERPL-SCNC: 99 MMOL/L (ref 95–110)
CHLORIDE SERPL-SCNC: 99 MMOL/L (ref 95–110)
CHOLEST FLD-MCNC: 54 MG/DL
CHOLEST SERPL-MCNC: 89 MG/DL (ref 120–199)
CO2 SERPL-SCNC: 21 MMOL/L (ref 23–29)
CO2 SERPL-SCNC: 22 MMOL/L (ref 23–29)
CO2 SERPL-SCNC: 22 MMOL/L (ref 23–29)
CO2 SERPL-SCNC: 23 MMOL/L (ref 23–29)
CO2 SERPL-SCNC: 24 MMOL/L (ref 23–29)
CO2 SERPL-SCNC: 25 MMOL/L (ref 23–29)
CO2 SERPL-SCNC: 26 MMOL/L (ref 23–29)
CO2 SERPL-SCNC: 27 MMOL/L (ref 23–29)
CO2 SERPL-SCNC: 27 MMOL/L (ref 23–29)
CO2 SERPL-SCNC: 28 MMOL/L (ref 23–29)
CO2 SERPL-SCNC: 28 MMOL/L (ref 23–29)
CO2 SERPL-SCNC: 29 MMOL/L (ref 23–29)
CO2 SERPL-SCNC: 30 MMOL/L (ref 23–29)
CODING SYSTEM: NORMAL
COLOR FLD: NORMAL
CREAT SERPL-MCNC: 1.7 MG/DL (ref 0.5–1.4)
CREAT SERPL-MCNC: 2 MG/DL (ref 0.5–1.4)
CREAT SERPL-MCNC: 2.2 MG/DL (ref 0.5–1.4)
CREAT SERPL-MCNC: 2.3 MG/DL (ref 0.5–1.4)
CREAT SERPL-MCNC: 2.8 MG/DL (ref 0.5–1.4)
CREAT SERPL-MCNC: 2.9 MG/DL (ref 0.5–1.4)
CREAT SERPL-MCNC: 3 MG/DL (ref 0.5–1.4)
CREAT SERPL-MCNC: 3.1 MG/DL (ref 0.5–1.4)
CREAT SERPL-MCNC: 3.2 MG/DL (ref 0.5–1.4)
CREAT SERPL-MCNC: 3.5 MG/DL (ref 0.5–1.4)
CREAT SERPL-MCNC: 3.6 MG/DL (ref 0.5–1.4)
CREAT SERPL-MCNC: 3.6 MG/DL (ref 0.5–1.4)
CREAT SERPL-MCNC: 3.7 MG/DL (ref 0.5–1.4)
CREAT SERPL-MCNC: 3.8 MG/DL (ref 0.5–1.4)
CREAT SERPL-MCNC: 3.9 MG/DL (ref 0.5–1.4)
CREAT SERPL-MCNC: 4 MG/DL (ref 0.5–1.4)
CREAT SERPL-MCNC: 4.1 MG/DL (ref 0.5–1.4)
CREAT SERPL-MCNC: 4.3 MG/DL (ref 0.5–1.4)
CREAT SERPL-MCNC: 4.3 MG/DL (ref 0.5–1.4)
CREAT SERPL-MCNC: 4.4 MG/DL (ref 0.5–1.4)
CREAT SERPL-MCNC: 4.4 MG/DL (ref 0.5–1.4)
CREAT SERPL-MCNC: 4.5 MG/DL (ref 0.5–1.4)
CREAT SERPL-MCNC: 4.8 MG/DL (ref 0.5–1.4)
CREAT SERPL-MCNC: 4.9 MG/DL (ref 0.5–1.4)
CREAT SERPL-MCNC: 4.9 MG/DL (ref 0.5–1.4)
CREAT SERPL-MCNC: 5.2 MG/DL (ref 0.5–1.4)
CREAT SERPL-MCNC: 5.2 MG/DL (ref 0.5–1.4)
CTP QC/QA: YES
DELSYS: ABNORMAL
DIFFERENTIAL METHOD: ABNORMAL
DISPENSE STATUS: NORMAL
EOSINOPHIL # BLD AUTO: 0 K/UL (ref 0–0.5)
EOSINOPHIL # BLD AUTO: 0.1 K/UL (ref 0–0.5)
EOSINOPHIL # BLD AUTO: 0.2 K/UL (ref 0–0.5)
EOSINOPHIL # BLD AUTO: 0.3 K/UL (ref 0–0.5)
EOSINOPHIL # BLD AUTO: 0.4 K/UL (ref 0–0.5)
EOSINOPHIL NFR BLD: 0.3 % (ref 0–8)
EOSINOPHIL NFR BLD: 0.9 % (ref 0–8)
EOSINOPHIL NFR BLD: 0.9 % (ref 0–8)
EOSINOPHIL NFR BLD: 1 % (ref 0–8)
EOSINOPHIL NFR BLD: 1 % (ref 0–8)
EOSINOPHIL NFR BLD: 1.2 % (ref 0–8)
EOSINOPHIL NFR BLD: 1.3 % (ref 0–8)
EOSINOPHIL NFR BLD: 1.4 % (ref 0–8)
EOSINOPHIL NFR BLD: 1.5 % (ref 0–8)
EOSINOPHIL NFR BLD: 1.6 % (ref 0–8)
EOSINOPHIL NFR BLD: 1.7 % (ref 0–8)
EOSINOPHIL NFR BLD: 1.8 % (ref 0–8)
EOSINOPHIL NFR BLD: 2 % (ref 0–8)
EOSINOPHIL NFR BLD: 2 % (ref 0–8)
EOSINOPHIL NFR BLD: 2.1 % (ref 0–8)
EOSINOPHIL NFR BLD: 2.2 % (ref 0–8)
EOSINOPHIL NFR BLD: 2.2 % (ref 0–8)
EOSINOPHIL NFR BLD: 2.3 % (ref 0–8)
EOSINOPHIL NFR BLD: 2.4 % (ref 0–8)
EOSINOPHIL NFR BLD: 2.5 % (ref 0–8)
EOSINOPHIL NFR BLD: 3.4 % (ref 0–8)
EOSINOPHIL NFR BLD: 3.4 % (ref 0–8)
EOSINOPHIL NFR BLD: 3.7 % (ref 0–8)
EOSINOPHIL NFR BLD: 3.8 % (ref 0–8)
EOSINOPHIL NFR BLD: 3.8 % (ref 0–8)
EOSINOPHIL NFR BLD: 4.3 % (ref 0–8)
EOSINOPHIL NFR BLD: 4.3 % (ref 0–8)
ERYTHROCYTE [DISTWIDTH] IN BLOOD BY AUTOMATED COUNT: 16.6 % (ref 11.5–14.5)
ERYTHROCYTE [DISTWIDTH] IN BLOOD BY AUTOMATED COUNT: 16.8 % (ref 11.5–14.5)
ERYTHROCYTE [DISTWIDTH] IN BLOOD BY AUTOMATED COUNT: 16.9 % (ref 11.5–14.5)
ERYTHROCYTE [DISTWIDTH] IN BLOOD BY AUTOMATED COUNT: 17.1 % (ref 11.5–14.5)
ERYTHROCYTE [DISTWIDTH] IN BLOOD BY AUTOMATED COUNT: 17.1 % (ref 11.5–14.5)
ERYTHROCYTE [DISTWIDTH] IN BLOOD BY AUTOMATED COUNT: 19 % (ref 11.5–14.5)
ERYTHROCYTE [DISTWIDTH] IN BLOOD BY AUTOMATED COUNT: 19.2 % (ref 11.5–14.5)
ERYTHROCYTE [DISTWIDTH] IN BLOOD BY AUTOMATED COUNT: 19.3 % (ref 11.5–14.5)
ERYTHROCYTE [DISTWIDTH] IN BLOOD BY AUTOMATED COUNT: 19.4 % (ref 11.5–14.5)
ERYTHROCYTE [DISTWIDTH] IN BLOOD BY AUTOMATED COUNT: 19.6 % (ref 11.5–14.5)
ERYTHROCYTE [DISTWIDTH] IN BLOOD BY AUTOMATED COUNT: 19.9 % (ref 11.5–14.5)
ERYTHROCYTE [DISTWIDTH] IN BLOOD BY AUTOMATED COUNT: 19.9 % (ref 11.5–14.5)
ERYTHROCYTE [DISTWIDTH] IN BLOOD BY AUTOMATED COUNT: 20.2 % (ref 11.5–14.5)
ERYTHROCYTE [DISTWIDTH] IN BLOOD BY AUTOMATED COUNT: 20.3 % (ref 11.5–14.5)
ERYTHROCYTE [DISTWIDTH] IN BLOOD BY AUTOMATED COUNT: 20.3 % (ref 11.5–14.5)
ERYTHROCYTE [DISTWIDTH] IN BLOOD BY AUTOMATED COUNT: 20.4 % (ref 11.5–14.5)
ERYTHROCYTE [DISTWIDTH] IN BLOOD BY AUTOMATED COUNT: 20.5 % (ref 11.5–14.5)
ERYTHROCYTE [DISTWIDTH] IN BLOOD BY AUTOMATED COUNT: 20.6 % (ref 11.5–14.5)
ERYTHROCYTE [DISTWIDTH] IN BLOOD BY AUTOMATED COUNT: 20.7 % (ref 11.5–14.5)
ERYTHROCYTE [DISTWIDTH] IN BLOOD BY AUTOMATED COUNT: 20.7 % (ref 11.5–14.5)
ERYTHROCYTE [DISTWIDTH] IN BLOOD BY AUTOMATED COUNT: 20.9 % (ref 11.5–14.5)
ERYTHROCYTE [DISTWIDTH] IN BLOOD BY AUTOMATED COUNT: 21 % (ref 11.5–14.5)
ERYTHROCYTE [DISTWIDTH] IN BLOOD BY AUTOMATED COUNT: 21.2 % (ref 11.5–14.5)
ERYTHROCYTE [DISTWIDTH] IN BLOOD BY AUTOMATED COUNT: 21.4 % (ref 11.5–14.5)
ERYTHROCYTE [DISTWIDTH] IN BLOOD BY AUTOMATED COUNT: 21.5 % (ref 11.5–14.5)
ERYTHROCYTE [DISTWIDTH] IN BLOOD BY AUTOMATED COUNT: 21.7 % (ref 11.5–14.5)
ERYTHROCYTE [DISTWIDTH] IN BLOOD BY AUTOMATED COUNT: 21.8 % (ref 11.5–14.5)
ERYTHROCYTE [DISTWIDTH] IN BLOOD BY AUTOMATED COUNT: 22 % (ref 11.5–14.5)
ERYTHROCYTE [DISTWIDTH] IN BLOOD BY AUTOMATED COUNT: 22.1 % (ref 11.5–14.5)
ERYTHROCYTE [DISTWIDTH] IN BLOOD BY AUTOMATED COUNT: 22.1 % (ref 11.5–14.5)
ERYTHROCYTE [DISTWIDTH] IN BLOOD BY AUTOMATED COUNT: 22.4 % (ref 11.5–14.5)
ERYTHROCYTE [DISTWIDTH] IN BLOOD BY AUTOMATED COUNT: 22.4 % (ref 11.5–14.5)
EST. GFR  (NO RACE VARIABLE): 11.8 ML/MIN/1.73 M^2
EST. GFR  (NO RACE VARIABLE): 11.8 ML/MIN/1.73 M^2
EST. GFR  (NO RACE VARIABLE): 12.7 ML/MIN/1.73 M^2
EST. GFR  (NO RACE VARIABLE): 13 ML/MIN/1.73 M^2
EST. GFR  (NO RACE VARIABLE): 13 ML/MIN/1.73 M^2
EST. GFR  (NO RACE VARIABLE): 14 ML/MIN/1.73 M^2
EST. GFR  (NO RACE VARIABLE): 14 ML/MIN/1.73 M^2
EST. GFR  (NO RACE VARIABLE): 14.4 ML/MIN/1.73 M^2
EST. GFR  (NO RACE VARIABLE): 14.8 ML/MIN/1.73 M^2
EST. GFR  (NO RACE VARIABLE): 14.8 ML/MIN/1.73 M^2
EST. GFR  (NO RACE VARIABLE): 15.7 ML/MIN/1.73 M^2
EST. GFR  (NO RACE VARIABLE): 16.1 ML/MIN/1.73 M^2
EST. GFR  (NO RACE VARIABLE): 16.6 ML/MIN/1.73 M^2
EST. GFR  (NO RACE VARIABLE): 17.1 ML/MIN/1.73 M^2
EST. GFR  (NO RACE VARIABLE): 17.8 ML/MIN/1.73 M^2
EST. GFR  (NO RACE VARIABLE): 18.3 ML/MIN/1.73 M^2
EST. GFR  (NO RACE VARIABLE): 18.3 ML/MIN/1.73 M^2
EST. GFR  (NO RACE VARIABLE): 18.9 ML/MIN/1.73 M^2
EST. GFR  (NO RACE VARIABLE): 21.1 ML/MIN/1.73 M^2
EST. GFR  (NO RACE VARIABLE): 21.9 ML/MIN/1.73 M^2
EST. GFR  (NO RACE VARIABLE): 22.8 ML/MIN/1.73 M^2
EST. GFR  (NO RACE VARIABLE): 23.7 ML/MIN/1.73 M^2
EST. GFR  (NO RACE VARIABLE): 24.7 ML/MIN/1.73 M^2
EST. GFR  (NO RACE VARIABLE): 31.3 ML/MIN/1.73 M^2
EST. GFR  (NO RACE VARIABLE): 33 ML/MIN/1.73 M^2
EST. GFR  (NO RACE VARIABLE): 37 ML/MIN/1.73 M^2
EST. GFR  (NO RACE VARIABLE): 45 ML/MIN/1.73 M^2
ESTIMATED AVG GLUCOSE: 114 MG/DL (ref 68–131)
FERRITIN SERPL-MCNC: 1751 NG/ML (ref 20–300)
FINAL PATHOLOGIC DIAGNOSIS: NORMAL
FINAL PATHOLOGIC DIAGNOSIS: NORMAL
FLOW: 3
GLUCOSE FLD-MCNC: 26 MG/DL
GLUCOSE SERPL-MCNC: 102 MG/DL (ref 70–110)
GLUCOSE SERPL-MCNC: 117 MG/DL (ref 70–110)
GLUCOSE SERPL-MCNC: 122 MG/DL (ref 70–110)
GLUCOSE SERPL-MCNC: 123 MG/DL (ref 70–110)
GLUCOSE SERPL-MCNC: 127 MG/DL (ref 70–110)
GLUCOSE SERPL-MCNC: 128 MG/DL (ref 70–110)
GLUCOSE SERPL-MCNC: 132 MG/DL (ref 70–110)
GLUCOSE SERPL-MCNC: 138 MG/DL (ref 70–110)
GLUCOSE SERPL-MCNC: 141 MG/DL (ref 70–110)
GLUCOSE SERPL-MCNC: 147 MG/DL (ref 70–110)
GLUCOSE SERPL-MCNC: 152 MG/DL (ref 70–110)
GLUCOSE SERPL-MCNC: 154 MG/DL (ref 70–110)
GLUCOSE SERPL-MCNC: 155 MG/DL (ref 70–110)
GLUCOSE SERPL-MCNC: 155 MG/DL (ref 70–110)
GLUCOSE SERPL-MCNC: 164 MG/DL (ref 70–110)
GLUCOSE SERPL-MCNC: 168 MG/DL (ref 70–110)
GLUCOSE SERPL-MCNC: 170 MG/DL (ref 70–110)
GLUCOSE SERPL-MCNC: 175 MG/DL (ref 70–110)
GLUCOSE SERPL-MCNC: 176 MG/DL (ref 70–110)
GLUCOSE SERPL-MCNC: 176 MG/DL (ref 70–110)
GLUCOSE SERPL-MCNC: 184 MG/DL (ref 70–110)
GLUCOSE SERPL-MCNC: 184 MG/DL (ref 70–110)
GLUCOSE SERPL-MCNC: 185 MG/DL (ref 70–110)
GLUCOSE SERPL-MCNC: 186 MG/DL (ref 70–110)
GLUCOSE SERPL-MCNC: 210 MG/DL (ref 70–110)
GLUCOSE SERPL-MCNC: 76 MG/DL (ref 70–110)
GLUCOSE SERPL-MCNC: 88 MG/DL (ref 70–110)
GRAM STN SPEC: NORMAL
GROSS: NORMAL
HAPTOGLOB SERPL-MCNC: 130 MG/DL (ref 30–250)
HBA1C MFR BLD: 5.6 % (ref 4–5.6)
HBV SURFACE AG SERPL QL IA: NEGATIVE
HCO3 UR-SCNC: 25.7 MMOL/L (ref 24–28)
HCO3 UR-SCNC: 32.5 MMOL/L (ref 24–28)
HCT VFR BLD AUTO: 18 % (ref 40–54)
HCT VFR BLD AUTO: 21.3 % (ref 40–54)
HCT VFR BLD AUTO: 21.8 % (ref 40–54)
HCT VFR BLD AUTO: 22.4 % (ref 40–54)
HCT VFR BLD AUTO: 22.5 % (ref 40–54)
HCT VFR BLD AUTO: 22.9 % (ref 40–54)
HCT VFR BLD AUTO: 23.1 % (ref 40–54)
HCT VFR BLD AUTO: 23.2 % (ref 40–54)
HCT VFR BLD AUTO: 23.3 % (ref 40–54)
HCT VFR BLD AUTO: 23.3 % (ref 40–54)
HCT VFR BLD AUTO: 23.4 % (ref 40–54)
HCT VFR BLD AUTO: 23.7 % (ref 40–54)
HCT VFR BLD AUTO: 23.9 % (ref 40–54)
HCT VFR BLD AUTO: 24.3 % (ref 40–54)
HCT VFR BLD AUTO: 24.4 % (ref 40–54)
HCT VFR BLD AUTO: 24.6 % (ref 40–54)
HCT VFR BLD AUTO: 24.8 % (ref 40–54)
HCT VFR BLD AUTO: 25 % (ref 40–54)
HCT VFR BLD AUTO: 25.1 % (ref 40–54)
HCT VFR BLD AUTO: 25.2 % (ref 40–54)
HCT VFR BLD AUTO: 25.2 % (ref 40–54)
HCT VFR BLD AUTO: 25.8 % (ref 40–54)
HCT VFR BLD AUTO: 26.3 % (ref 40–54)
HCT VFR BLD AUTO: 26.3 % (ref 40–54)
HCT VFR BLD AUTO: 26.7 % (ref 40–54)
HCT VFR BLD AUTO: 29 % (ref 40–54)
HCT VFR BLD AUTO: 31 % (ref 40–54)
HCT VFR BLD AUTO: 32.4 % (ref 40–54)
HCT VFR BLD AUTO: 33.2 % (ref 40–54)
HCT VFR BLD CALC: 22 %PCV (ref 36–54)
HGB BLD-MCNC: 10 G/DL (ref 14–18)
HGB BLD-MCNC: 10.5 G/DL (ref 14–18)
HGB BLD-MCNC: 5.7 G/DL (ref 14–18)
HGB BLD-MCNC: 6.9 G/DL (ref 14–18)
HGB BLD-MCNC: 7 G/DL (ref 14–18)
HGB BLD-MCNC: 7.1 G/DL (ref 14–18)
HGB BLD-MCNC: 7.2 G/DL (ref 14–18)
HGB BLD-MCNC: 7.2 G/DL (ref 14–18)
HGB BLD-MCNC: 7.3 G/DL (ref 14–18)
HGB BLD-MCNC: 7.4 G/DL (ref 14–18)
HGB BLD-MCNC: 7.5 G/DL (ref 14–18)
HGB BLD-MCNC: 7.6 G/DL (ref 14–18)
HGB BLD-MCNC: 7.6 G/DL (ref 14–18)
HGB BLD-MCNC: 7.7 G/DL (ref 14–18)
HGB BLD-MCNC: 7.8 G/DL (ref 14–18)
HGB BLD-MCNC: 7.9 G/DL (ref 14–18)
HGB BLD-MCNC: 8 G/DL (ref 14–18)
HGB BLD-MCNC: 8 G/DL (ref 14–18)
HGB BLD-MCNC: 8.1 G/DL (ref 14–18)
HGB BLD-MCNC: 8.2 G/DL (ref 14–18)
HGB BLD-MCNC: 8.4 G/DL (ref 14–18)
HGB BLD-MCNC: 8.4 G/DL (ref 14–18)
HGB BLD-MCNC: 8.5 G/DL (ref 14–18)
HGB BLD-MCNC: 8.5 G/DL (ref 14–18)
HGB BLD-MCNC: 8.7 G/DL (ref 14–18)
HGB BLD-MCNC: 9 G/DL (ref 14–18)
HGB BLD-MCNC: 9.1 G/DL (ref 14–18)
HGB BLD-MCNC: 9.9 G/DL (ref 14–18)
HYPOCHROMIA BLD QL SMEAR: ABNORMAL
HYPOCHROMIA BLD QL SMEAR: ABNORMAL
IMM GRANULOCYTES # BLD AUTO: 0.01 K/UL (ref 0–0.04)
IMM GRANULOCYTES # BLD AUTO: 0.02 K/UL (ref 0–0.04)
IMM GRANULOCYTES # BLD AUTO: 0.03 K/UL (ref 0–0.04)
IMM GRANULOCYTES # BLD AUTO: 0.04 K/UL (ref 0–0.04)
IMM GRANULOCYTES # BLD AUTO: 0.05 K/UL (ref 0–0.04)
IMM GRANULOCYTES # BLD AUTO: 0.06 K/UL (ref 0–0.04)
IMM GRANULOCYTES # BLD AUTO: 0.07 K/UL (ref 0–0.04)
IMM GRANULOCYTES # BLD AUTO: 0.07 K/UL (ref 0–0.04)
IMM GRANULOCYTES # BLD AUTO: 0.08 K/UL (ref 0–0.04)
IMM GRANULOCYTES # BLD AUTO: 0.09 K/UL (ref 0–0.04)
IMM GRANULOCYTES # BLD AUTO: 0.1 K/UL (ref 0–0.04)
IMM GRANULOCYTES # BLD AUTO: ABNORMAL K/UL (ref 0–0.04)
IMM GRANULOCYTES NFR BLD AUTO: 0.2 % (ref 0–0.5)
IMM GRANULOCYTES NFR BLD AUTO: 0.2 % (ref 0–0.5)
IMM GRANULOCYTES NFR BLD AUTO: 0.3 % (ref 0–0.5)
IMM GRANULOCYTES NFR BLD AUTO: 0.4 % (ref 0–0.5)
IMM GRANULOCYTES NFR BLD AUTO: 0.5 % (ref 0–0.5)
IMM GRANULOCYTES NFR BLD AUTO: 0.6 % (ref 0–0.5)
IMM GRANULOCYTES NFR BLD AUTO: 0.9 % (ref 0–0.5)
IMM GRANULOCYTES NFR BLD AUTO: 1 % (ref 0–0.5)
IMM GRANULOCYTES NFR BLD AUTO: ABNORMAL % (ref 0–0.5)
INR PPP: 1 (ref 0.8–1.2)
INR PPP: 1.1 (ref 0.8–1.2)
IRON SERPL-MCNC: 41 UG/DL (ref 45–160)
LACTATE SERPL-SCNC: 0.7 MMOL/L (ref 0.5–2.2)
LACTATE SERPL-SCNC: 0.8 MMOL/L (ref 0.5–2.2)
LACTATE SERPL-SCNC: 0.9 MMOL/L (ref 0.5–2.2)
LDH FLD L TO P-CCNC: 1974 U/L
LDH SERPL L TO P-CCNC: 168 U/L (ref 110–260)
LDH SERPL L TO P-CCNC: 254 U/L (ref 110–260)
LYMPHOCYTES # BLD AUTO: 0.4 K/UL (ref 1–4.8)
LYMPHOCYTES # BLD AUTO: 0.5 K/UL (ref 1–4.8)
LYMPHOCYTES # BLD AUTO: 0.6 K/UL (ref 1–4.8)
LYMPHOCYTES # BLD AUTO: 0.7 K/UL (ref 1–4.8)
LYMPHOCYTES # BLD AUTO: 0.8 K/UL (ref 1–4.8)
LYMPHOCYTES # BLD AUTO: 0.9 K/UL (ref 1–4.8)
LYMPHOCYTES NFR BLD: 3.9 % (ref 18–48)
LYMPHOCYTES NFR BLD: 4 % (ref 18–48)
LYMPHOCYTES NFR BLD: 4.2 % (ref 18–48)
LYMPHOCYTES NFR BLD: 4.4 % (ref 18–48)
LYMPHOCYTES NFR BLD: 4.4 % (ref 18–48)
LYMPHOCYTES NFR BLD: 4.8 % (ref 18–48)
LYMPHOCYTES NFR BLD: 4.9 % (ref 18–48)
LYMPHOCYTES NFR BLD: 5.2 % (ref 18–48)
LYMPHOCYTES NFR BLD: 5.4 % (ref 18–48)
LYMPHOCYTES NFR BLD: 5.4 % (ref 18–48)
LYMPHOCYTES NFR BLD: 5.5 % (ref 18–48)
LYMPHOCYTES NFR BLD: 5.5 % (ref 18–48)
LYMPHOCYTES NFR BLD: 5.9 % (ref 18–48)
LYMPHOCYTES NFR BLD: 6 % (ref 18–48)
LYMPHOCYTES NFR BLD: 6 % (ref 18–48)
LYMPHOCYTES NFR BLD: 6.1 % (ref 18–48)
LYMPHOCYTES NFR BLD: 6.1 % (ref 18–48)
LYMPHOCYTES NFR BLD: 6.2 % (ref 18–48)
LYMPHOCYTES NFR BLD: 6.2 % (ref 18–48)
LYMPHOCYTES NFR BLD: 6.4 % (ref 18–48)
LYMPHOCYTES NFR BLD: 6.5 % (ref 18–48)
LYMPHOCYTES NFR BLD: 6.7 % (ref 18–48)
LYMPHOCYTES NFR BLD: 6.9 % (ref 18–48)
LYMPHOCYTES NFR BLD: 7.1 % (ref 18–48)
LYMPHOCYTES NFR BLD: 7.4 % (ref 18–48)
LYMPHOCYTES NFR BLD: 7.6 % (ref 18–48)
LYMPHOCYTES NFR BLD: 7.6 % (ref 18–48)
LYMPHOCYTES NFR BLD: 7.8 % (ref 18–48)
LYMPHOCYTES NFR BLD: 8 % (ref 18–48)
LYMPHOCYTES NFR BLD: 8.2 % (ref 18–48)
LYMPHOCYTES NFR BLD: 8.6 % (ref 18–48)
LYMPHOCYTES NFR BLD: 8.8 % (ref 18–48)
LYMPHOCYTES NFR FLD MANUAL: 4 %
Lab: NORMAL
Lab: NORMAL
MAGNESIUM SERPL-MCNC: 1.9 MG/DL (ref 1.6–2.6)
MAGNESIUM SERPL-MCNC: 2 MG/DL (ref 1.6–2.6)
MAGNESIUM SERPL-MCNC: 2 MG/DL (ref 1.6–2.6)
MAGNESIUM SERPL-MCNC: 2.1 MG/DL (ref 1.6–2.6)
MAGNESIUM SERPL-MCNC: 2.2 MG/DL (ref 1.6–2.6)
MAGNESIUM SERPL-MCNC: 2.3 MG/DL (ref 1.6–2.6)
MAGNESIUM SERPL-MCNC: 2.5 MG/DL (ref 1.6–2.6)
MAGNESIUM SERPL-MCNC: 2.5 MG/DL (ref 1.6–2.6)
MAGNESIUM SERPL-MCNC: 2.9 MG/DL (ref 1.6–2.6)
MAGNESIUM SERPL-MCNC: 2.9 MG/DL (ref 1.6–2.6)
MAGNESIUM SERPL-MCNC: 3 MG/DL (ref 1.6–2.6)
MCH RBC QN AUTO: 30.5 PG (ref 27–31)
MCH RBC QN AUTO: 30.5 PG (ref 27–31)
MCH RBC QN AUTO: 30.6 PG (ref 27–31)
MCH RBC QN AUTO: 31 PG (ref 27–31)
MCH RBC QN AUTO: 31 PG (ref 27–31)
MCH RBC QN AUTO: 31.1 PG (ref 27–31)
MCH RBC QN AUTO: 31.1 PG (ref 27–31)
MCH RBC QN AUTO: 31.2 PG (ref 27–31)
MCH RBC QN AUTO: 31.2 PG (ref 27–31)
MCH RBC QN AUTO: 31.3 PG (ref 27–31)
MCH RBC QN AUTO: 31.3 PG (ref 27–31)
MCH RBC QN AUTO: 31.5 PG (ref 27–31)
MCH RBC QN AUTO: 31.5 PG (ref 27–31)
MCH RBC QN AUTO: 31.6 PG (ref 27–31)
MCH RBC QN AUTO: 31.6 PG (ref 27–31)
MCH RBC QN AUTO: 31.7 PG (ref 27–31)
MCH RBC QN AUTO: 31.8 PG (ref 27–31)
MCH RBC QN AUTO: 31.9 PG (ref 27–31)
MCH RBC QN AUTO: 31.9 PG (ref 27–31)
MCH RBC QN AUTO: 32 PG (ref 27–31)
MCH RBC QN AUTO: 32 PG (ref 27–31)
MCH RBC QN AUTO: 32.1 PG (ref 27–31)
MCH RBC QN AUTO: 32.6 PG (ref 27–31)
MCH RBC QN AUTO: 32.9 PG (ref 27–31)
MCH RBC QN AUTO: 33.7 PG (ref 27–31)
MCH RBC QN AUTO: 34.1 PG (ref 27–31)
MCH RBC QN AUTO: 34.4 PG (ref 27–31)
MCH RBC QN AUTO: 35 PG (ref 27–31)
MCHC RBC AUTO-ENTMCNC: 30.5 G/DL (ref 32–36)
MCHC RBC AUTO-ENTMCNC: 30.6 G/DL (ref 32–36)
MCHC RBC AUTO-ENTMCNC: 31.3 G/DL (ref 32–36)
MCHC RBC AUTO-ENTMCNC: 31.4 G/DL (ref 32–36)
MCHC RBC AUTO-ENTMCNC: 31.5 G/DL (ref 32–36)
MCHC RBC AUTO-ENTMCNC: 31.6 G/DL (ref 32–36)
MCHC RBC AUTO-ENTMCNC: 31.7 G/DL (ref 32–36)
MCHC RBC AUTO-ENTMCNC: 31.8 G/DL (ref 32–36)
MCHC RBC AUTO-ENTMCNC: 31.8 G/DL (ref 32–36)
MCHC RBC AUTO-ENTMCNC: 31.9 G/DL (ref 32–36)
MCHC RBC AUTO-ENTMCNC: 32 G/DL (ref 32–36)
MCHC RBC AUTO-ENTMCNC: 32.1 G/DL (ref 32–36)
MCHC RBC AUTO-ENTMCNC: 32.2 G/DL (ref 32–36)
MCHC RBC AUTO-ENTMCNC: 32.3 G/DL (ref 32–36)
MCHC RBC AUTO-ENTMCNC: 32.3 G/DL (ref 32–36)
MCHC RBC AUTO-ENTMCNC: 32.4 G/DL (ref 32–36)
MCHC RBC AUTO-ENTMCNC: 32.4 G/DL (ref 32–36)
MCHC RBC AUTO-ENTMCNC: 32.5 G/DL (ref 32–36)
MCHC RBC AUTO-ENTMCNC: 32.5 G/DL (ref 32–36)
MCHC RBC AUTO-ENTMCNC: 32.6 G/DL (ref 32–36)
MCHC RBC AUTO-ENTMCNC: 32.6 G/DL (ref 32–36)
MCHC RBC AUTO-ENTMCNC: 32.9 G/DL (ref 32–36)
MCHC RBC AUTO-ENTMCNC: 32.9 G/DL (ref 32–36)
MCHC RBC AUTO-ENTMCNC: 33.6 G/DL (ref 32–36)
MCV RBC AUTO: 100 FL (ref 82–98)
MCV RBC AUTO: 100 FL (ref 82–98)
MCV RBC AUTO: 101 FL (ref 82–98)
MCV RBC AUTO: 102 FL (ref 82–98)
MCV RBC AUTO: 104 FL (ref 82–98)
MCV RBC AUTO: 104 FL (ref 82–98)
MCV RBC AUTO: 106 FL (ref 82–98)
MCV RBC AUTO: 107 FL (ref 82–98)
MCV RBC AUTO: 107 FL (ref 82–98)
MCV RBC AUTO: 108 FL (ref 82–98)
MCV RBC AUTO: 111 FL (ref 82–98)
MCV RBC AUTO: 92 FL (ref 82–98)
MCV RBC AUTO: 94 FL (ref 82–98)
MCV RBC AUTO: 96 FL (ref 82–98)
MCV RBC AUTO: 97 FL (ref 82–98)
MCV RBC AUTO: 97 FL (ref 82–98)
MCV RBC AUTO: 98 FL (ref 82–98)
MCV RBC AUTO: 99 FL (ref 82–98)
MODE: ABNORMAL
MONOCYTES # BLD AUTO: 0.5 K/UL (ref 0.3–1)
MONOCYTES # BLD AUTO: 0.7 K/UL (ref 0.3–1)
MONOCYTES # BLD AUTO: 0.8 K/UL (ref 0.3–1)
MONOCYTES # BLD AUTO: 0.9 K/UL (ref 0.3–1)
MONOCYTES # BLD AUTO: 1 K/UL (ref 0.3–1)
MONOCYTES # BLD AUTO: 1.1 K/UL (ref 0.3–1)
MONOCYTES # BLD AUTO: 1.2 K/UL (ref 0.3–1)
MONOCYTES NFR BLD: 10.1 % (ref 4–15)
MONOCYTES NFR BLD: 10.2 % (ref 4–15)
MONOCYTES NFR BLD: 10.2 % (ref 4–15)
MONOCYTES NFR BLD: 10.3 % (ref 4–15)
MONOCYTES NFR BLD: 10.4 % (ref 4–15)
MONOCYTES NFR BLD: 10.5 % (ref 4–15)
MONOCYTES NFR BLD: 10.5 % (ref 4–15)
MONOCYTES NFR BLD: 10.6 % (ref 4–15)
MONOCYTES NFR BLD: 10.9 % (ref 4–15)
MONOCYTES NFR BLD: 11.2 % (ref 4–15)
MONOCYTES NFR BLD: 12 % (ref 4–15)
MONOCYTES NFR BLD: 4.7 % (ref 4–15)
MONOCYTES NFR BLD: 6 % (ref 4–15)
MONOCYTES NFR BLD: 7.2 % (ref 4–15)
MONOCYTES NFR BLD: 7.5 % (ref 4–15)
MONOCYTES NFR BLD: 7.6 % (ref 4–15)
MONOCYTES NFR BLD: 8.1 % (ref 4–15)
MONOCYTES NFR BLD: 8.1 % (ref 4–15)
MONOCYTES NFR BLD: 8.4 % (ref 4–15)
MONOCYTES NFR BLD: 8.5 % (ref 4–15)
MONOCYTES NFR BLD: 8.7 % (ref 4–15)
MONOCYTES NFR BLD: 8.7 % (ref 4–15)
MONOCYTES NFR BLD: 8.9 % (ref 4–15)
MONOCYTES NFR BLD: 8.9 % (ref 4–15)
MONOCYTES NFR BLD: 9 % (ref 4–15)
MONOCYTES NFR BLD: 9 % (ref 4–15)
MONOCYTES NFR BLD: 9.1 % (ref 4–15)
MONOCYTES NFR BLD: 9.2 % (ref 4–15)
MONOCYTES NFR BLD: 9.4 % (ref 4–15)
MONOCYTES NFR BLD: 9.4 % (ref 4–15)
MONOCYTES NFR BLD: 9.5 % (ref 4–15)
MONOCYTES NFR BLD: 9.7 % (ref 4–15)
MONOS+MACROS NFR FLD MANUAL: 3 %
NEUTROPHILS # BLD AUTO: 10.6 K/UL (ref 1.8–7.7)
NEUTROPHILS # BLD AUTO: 11.7 K/UL (ref 1.8–7.7)
NEUTROPHILS # BLD AUTO: 13.2 K/UL (ref 1.8–7.7)
NEUTROPHILS # BLD AUTO: 4.7 K/UL (ref 1.8–7.7)
NEUTROPHILS # BLD AUTO: 4.9 K/UL (ref 1.8–7.7)
NEUTROPHILS # BLD AUTO: 5 K/UL (ref 1.8–7.7)
NEUTROPHILS # BLD AUTO: 5.3 K/UL (ref 1.8–7.7)
NEUTROPHILS # BLD AUTO: 5.9 K/UL (ref 1.8–7.7)
NEUTROPHILS # BLD AUTO: 6 K/UL (ref 1.8–7.7)
NEUTROPHILS # BLD AUTO: 6.4 K/UL (ref 1.8–7.7)
NEUTROPHILS # BLD AUTO: 6.5 K/UL (ref 1.8–7.7)
NEUTROPHILS # BLD AUTO: 6.8 K/UL (ref 1.8–7.7)
NEUTROPHILS # BLD AUTO: 6.8 K/UL (ref 1.8–7.7)
NEUTROPHILS # BLD AUTO: 6.9 K/UL (ref 1.8–7.7)
NEUTROPHILS # BLD AUTO: 7.1 K/UL (ref 1.8–7.7)
NEUTROPHILS # BLD AUTO: 7.4 K/UL (ref 1.8–7.7)
NEUTROPHILS # BLD AUTO: 7.4 K/UL (ref 1.8–7.7)
NEUTROPHILS # BLD AUTO: 7.6 K/UL (ref 1.8–7.7)
NEUTROPHILS # BLD AUTO: 7.6 K/UL (ref 1.8–7.7)
NEUTROPHILS # BLD AUTO: 7.8 K/UL (ref 1.8–7.7)
NEUTROPHILS # BLD AUTO: 8 K/UL (ref 1.8–7.7)
NEUTROPHILS # BLD AUTO: 8.1 K/UL (ref 1.8–7.7)
NEUTROPHILS # BLD AUTO: 8.3 K/UL (ref 1.8–7.7)
NEUTROPHILS # BLD AUTO: 8.5 K/UL (ref 1.8–7.7)
NEUTROPHILS # BLD AUTO: 8.6 K/UL (ref 1.8–7.7)
NEUTROPHILS # BLD AUTO: 8.7 K/UL (ref 1.8–7.7)
NEUTROPHILS # BLD AUTO: 8.9 K/UL (ref 1.8–7.7)
NEUTROPHILS # BLD AUTO: 8.9 K/UL (ref 1.8–7.7)
NEUTROPHILS # BLD AUTO: 9.3 K/UL (ref 1.8–7.7)
NEUTROPHILS # BLD AUTO: 9.8 K/UL (ref 1.8–7.7)
NEUTROPHILS NFR BLD: 75.4 % (ref 38–73)
NEUTROPHILS NFR BLD: 75.9 % (ref 38–73)
NEUTROPHILS NFR BLD: 75.9 % (ref 38–73)
NEUTROPHILS NFR BLD: 76.7 % (ref 38–73)
NEUTROPHILS NFR BLD: 76.8 % (ref 38–73)
NEUTROPHILS NFR BLD: 77.2 % (ref 38–73)
NEUTROPHILS NFR BLD: 78.2 % (ref 38–73)
NEUTROPHILS NFR BLD: 78.3 % (ref 38–73)
NEUTROPHILS NFR BLD: 78.7 % (ref 38–73)
NEUTROPHILS NFR BLD: 79.1 % (ref 38–73)
NEUTROPHILS NFR BLD: 79.3 % (ref 38–73)
NEUTROPHILS NFR BLD: 79.9 % (ref 38–73)
NEUTROPHILS NFR BLD: 80.1 % (ref 38–73)
NEUTROPHILS NFR BLD: 80.8 % (ref 38–73)
NEUTROPHILS NFR BLD: 81 % (ref 38–73)
NEUTROPHILS NFR BLD: 81.3 % (ref 38–73)
NEUTROPHILS NFR BLD: 81.4 % (ref 38–73)
NEUTROPHILS NFR BLD: 81.8 % (ref 38–73)
NEUTROPHILS NFR BLD: 81.9 % (ref 38–73)
NEUTROPHILS NFR BLD: 82.1 % (ref 38–73)
NEUTROPHILS NFR BLD: 82.5 % (ref 38–73)
NEUTROPHILS NFR BLD: 82.6 % (ref 38–73)
NEUTROPHILS NFR BLD: 82.7 % (ref 38–73)
NEUTROPHILS NFR BLD: 82.9 % (ref 38–73)
NEUTROPHILS NFR BLD: 83.4 % (ref 38–73)
NEUTROPHILS NFR BLD: 83.5 % (ref 38–73)
NEUTROPHILS NFR BLD: 83.5 % (ref 38–73)
NEUTROPHILS NFR BLD: 83.7 % (ref 38–73)
NEUTROPHILS NFR BLD: 84.2 % (ref 38–73)
NEUTROPHILS NFR BLD: 85 % (ref 38–73)
NEUTROPHILS NFR BLD: 85.2 % (ref 38–73)
NEUTROPHILS NFR BLD: 86.4 % (ref 38–73)
NEUTROPHILS NFR BLD: 86.9 % (ref 38–73)
NEUTROPHILS NFR BLD: 89.2 % (ref 38–73)
NEUTROPHILS NFR FLD MANUAL: 93 %
NRBC BLD-RTO: 0 /100 WBC
NRBC BLD-RTO: 1 /100 WBC
NUM UNITS TRANS PACKED RBC: NORMAL
OVALOCYTES BLD QL SMEAR: ABNORMAL
PCO2 BLDA: 51 MMHG (ref 35–45)
PCO2 BLDA: 58.2 MMHG (ref 35–45)
PH SMN: 7.31 [PH] (ref 7.35–7.45)
PH SMN: 7.35 [PH] (ref 7.35–7.45)
PHOSPHATE SERPL-MCNC: 1.6 MG/DL (ref 2.7–4.5)
PHOSPHATE SERPL-MCNC: 1.6 MG/DL (ref 2.7–4.5)
PHOSPHATE SERPL-MCNC: 1.7 MG/DL (ref 2.7–4.5)
PHOSPHATE SERPL-MCNC: 2 MG/DL (ref 2.7–4.5)
PHOSPHATE SERPL-MCNC: 2.2 MG/DL (ref 2.7–4.5)
PHOSPHATE SERPL-MCNC: 2.3 MG/DL (ref 2.7–4.5)
PHOSPHATE SERPL-MCNC: 2.5 MG/DL (ref 2.7–4.5)
PHOSPHATE SERPL-MCNC: 2.6 MG/DL (ref 2.7–4.5)
PHOSPHATE SERPL-MCNC: 2.7 MG/DL (ref 2.7–4.5)
PHOSPHATE SERPL-MCNC: 2.7 MG/DL (ref 2.7–4.5)
PHOSPHATE SERPL-MCNC: 2.8 MG/DL (ref 2.7–4.5)
PHOSPHATE SERPL-MCNC: 2.8 MG/DL (ref 2.7–4.5)
PHOSPHATE SERPL-MCNC: 3 MG/DL (ref 2.7–4.5)
PHOSPHATE SERPL-MCNC: 3.1 MG/DL (ref 2.7–4.5)
PHOSPHATE SERPL-MCNC: 3.1 MG/DL (ref 2.7–4.5)
PHOSPHATE SERPL-MCNC: 3.2 MG/DL (ref 2.7–4.5)
PHOSPHATE SERPL-MCNC: 3.2 MG/DL (ref 2.7–4.5)
PHOSPHATE SERPL-MCNC: 3.3 MG/DL (ref 2.7–4.5)
PHOSPHATE SERPL-MCNC: 3.4 MG/DL (ref 2.7–4.5)
PHOSPHATE SERPL-MCNC: 3.9 MG/DL (ref 2.7–4.5)
PHOSPHATE SERPL-MCNC: 4.4 MG/DL (ref 2.7–4.5)
PHOSPHATE SERPL-MCNC: 4.6 MG/DL (ref 2.7–4.5)
PHOSPHATE SERPL-MCNC: 4.6 MG/DL (ref 2.7–4.5)
PHOSPHATE SERPL-MCNC: <1 MG/DL (ref 2.7–4.5)
PLATELET # BLD AUTO: 129 K/UL (ref 150–450)
PLATELET # BLD AUTO: 133 K/UL (ref 150–450)
PLATELET # BLD AUTO: 135 K/UL (ref 150–450)
PLATELET # BLD AUTO: 141 K/UL (ref 150–450)
PLATELET # BLD AUTO: 144 K/UL (ref 150–450)
PLATELET # BLD AUTO: 144 K/UL (ref 150–450)
PLATELET # BLD AUTO: 147 K/UL (ref 150–450)
PLATELET # BLD AUTO: 148 K/UL (ref 150–450)
PLATELET # BLD AUTO: 148 K/UL (ref 150–450)
PLATELET # BLD AUTO: 152 K/UL (ref 150–450)
PLATELET # BLD AUTO: 154 K/UL (ref 150–450)
PLATELET # BLD AUTO: 157 K/UL (ref 150–450)
PLATELET # BLD AUTO: 157 K/UL (ref 150–450)
PLATELET # BLD AUTO: 162 K/UL (ref 150–450)
PLATELET # BLD AUTO: 164 K/UL (ref 150–450)
PLATELET # BLD AUTO: 164 K/UL (ref 150–450)
PLATELET # BLD AUTO: 167 K/UL (ref 150–450)
PLATELET # BLD AUTO: 170 K/UL (ref 150–450)
PLATELET # BLD AUTO: 173 K/UL (ref 150–450)
PLATELET # BLD AUTO: 181 K/UL (ref 150–450)
PLATELET # BLD AUTO: 182 K/UL (ref 150–450)
PLATELET # BLD AUTO: 216 K/UL (ref 150–450)
PLATELET # BLD AUTO: 220 K/UL (ref 150–450)
PLATELET # BLD AUTO: 221 K/UL (ref 150–450)
PLATELET # BLD AUTO: 221 K/UL (ref 150–450)
PLATELET # BLD AUTO: 222 K/UL (ref 150–450)
PLATELET # BLD AUTO: 223 K/UL (ref 150–450)
PLATELET # BLD AUTO: 223 K/UL (ref 150–450)
PLATELET # BLD AUTO: 228 K/UL (ref 150–450)
PLATELET # BLD AUTO: 231 K/UL (ref 150–450)
PLATELET # BLD AUTO: 236 K/UL (ref 150–450)
PLATELET # BLD AUTO: 238 K/UL (ref 150–450)
PLATELET # BLD AUTO: 241 K/UL (ref 150–450)
PLATELET # BLD AUTO: 65 K/UL (ref 150–450)
PLATELET BLD QL SMEAR: ABNORMAL
PLATELET BLD QL SMEAR: ABNORMAL
PMV BLD AUTO: 10.6 FL (ref 9.2–12.9)
PMV BLD AUTO: 8.9 FL (ref 9.2–12.9)
PMV BLD AUTO: 9 FL (ref 9.2–12.9)
PMV BLD AUTO: 9.1 FL (ref 9.2–12.9)
PMV BLD AUTO: 9.2 FL (ref 9.2–12.9)
PMV BLD AUTO: 9.3 FL (ref 9.2–12.9)
PMV BLD AUTO: 9.4 FL (ref 9.2–12.9)
PMV BLD AUTO: 9.5 FL (ref 9.2–12.9)
PMV BLD AUTO: 9.6 FL (ref 9.2–12.9)
PMV BLD AUTO: 9.7 FL (ref 9.2–12.9)
PMV BLD AUTO: 9.8 FL (ref 9.2–12.9)
PMV BLD AUTO: 9.8 FL (ref 9.2–12.9)
PO2 BLDA: 40 MMHG (ref 40–60)
PO2 BLDA: 44 MMHG (ref 80–100)
POC BE: -1 MMOL/L
POC BE: 7 MMOL/L
POC IONIZED CALCIUM: 1.13 MMOL/L (ref 1.06–1.42)
POC SATURATED O2: 72 % (ref 95–100)
POC SATURATED O2: 75 % (ref 95–100)
POC TCO2: 27 MMOL/L (ref 23–27)
POC TCO2: 34 MMOL/L (ref 24–29)
POCT GLUCOSE: 104 MG/DL (ref 70–110)
POCT GLUCOSE: 105 MG/DL (ref 70–110)
POCT GLUCOSE: 107 MG/DL (ref 70–110)
POCT GLUCOSE: 110 MG/DL (ref 70–110)
POCT GLUCOSE: 111 MG/DL (ref 70–110)
POCT GLUCOSE: 111 MG/DL (ref 70–110)
POCT GLUCOSE: 114 MG/DL (ref 70–110)
POCT GLUCOSE: 118 MG/DL (ref 70–110)
POCT GLUCOSE: 119 MG/DL (ref 70–110)
POCT GLUCOSE: 120 MG/DL (ref 70–110)
POCT GLUCOSE: 122 MG/DL (ref 70–110)
POCT GLUCOSE: 124 MG/DL (ref 70–110)
POCT GLUCOSE: 125 MG/DL (ref 70–110)
POCT GLUCOSE: 128 MG/DL (ref 70–110)
POCT GLUCOSE: 129 MG/DL (ref 70–110)
POCT GLUCOSE: 131 MG/DL (ref 70–110)
POCT GLUCOSE: 132 MG/DL (ref 70–110)
POCT GLUCOSE: 133 MG/DL (ref 70–110)
POCT GLUCOSE: 135 MG/DL (ref 70–110)
POCT GLUCOSE: 138 MG/DL (ref 70–110)
POCT GLUCOSE: 140 MG/DL (ref 70–110)
POCT GLUCOSE: 140 MG/DL (ref 70–110)
POCT GLUCOSE: 141 MG/DL (ref 70–110)
POCT GLUCOSE: 141 MG/DL (ref 70–110)
POCT GLUCOSE: 145 MG/DL (ref 70–110)
POCT GLUCOSE: 146 MG/DL (ref 70–110)
POCT GLUCOSE: 148 MG/DL (ref 70–110)
POCT GLUCOSE: 148 MG/DL (ref 70–110)
POCT GLUCOSE: 150 MG/DL (ref 70–110)
POCT GLUCOSE: 151 MG/DL (ref 70–110)
POCT GLUCOSE: 152 MG/DL (ref 70–110)
POCT GLUCOSE: 153 MG/DL (ref 70–110)
POCT GLUCOSE: 153 MG/DL (ref 70–110)
POCT GLUCOSE: 158 MG/DL (ref 70–110)
POCT GLUCOSE: 158 MG/DL (ref 70–110)
POCT GLUCOSE: 159 MG/DL (ref 70–110)
POCT GLUCOSE: 159 MG/DL (ref 70–110)
POCT GLUCOSE: 160 MG/DL (ref 70–110)
POCT GLUCOSE: 162 MG/DL (ref 70–110)
POCT GLUCOSE: 162 MG/DL (ref 70–110)
POCT GLUCOSE: 163 MG/DL (ref 70–110)
POCT GLUCOSE: 165 MG/DL (ref 70–110)
POCT GLUCOSE: 167 MG/DL (ref 70–110)
POCT GLUCOSE: 167 MG/DL (ref 70–110)
POCT GLUCOSE: 168 MG/DL (ref 70–110)
POCT GLUCOSE: 168 MG/DL (ref 70–110)
POCT GLUCOSE: 174 MG/DL (ref 70–110)
POCT GLUCOSE: 175 MG/DL (ref 70–110)
POCT GLUCOSE: 176 MG/DL (ref 70–110)
POCT GLUCOSE: 178 MG/DL (ref 70–110)
POCT GLUCOSE: 181 MG/DL (ref 70–110)
POCT GLUCOSE: 181 MG/DL (ref 70–110)
POCT GLUCOSE: 185 MG/DL (ref 70–110)
POCT GLUCOSE: 186 MG/DL (ref 70–110)
POCT GLUCOSE: 186 MG/DL (ref 70–110)
POCT GLUCOSE: 187 MG/DL (ref 70–110)
POCT GLUCOSE: 189 MG/DL (ref 70–110)
POCT GLUCOSE: 189 MG/DL (ref 70–110)
POCT GLUCOSE: 191 MG/DL (ref 70–110)
POCT GLUCOSE: 192 MG/DL (ref 70–110)
POCT GLUCOSE: 192 MG/DL (ref 70–110)
POCT GLUCOSE: 193 MG/DL (ref 70–110)
POCT GLUCOSE: 196 MG/DL (ref 70–110)
POCT GLUCOSE: 201 MG/DL (ref 70–110)
POCT GLUCOSE: 203 MG/DL (ref 70–110)
POCT GLUCOSE: 204 MG/DL (ref 70–110)
POCT GLUCOSE: 204 MG/DL (ref 70–110)
POCT GLUCOSE: 205 MG/DL (ref 70–110)
POCT GLUCOSE: 211 MG/DL (ref 70–110)
POCT GLUCOSE: 211 MG/DL (ref 70–110)
POCT GLUCOSE: 214 MG/DL (ref 70–110)
POCT GLUCOSE: 215 MG/DL (ref 70–110)
POCT GLUCOSE: 216 MG/DL (ref 70–110)
POCT GLUCOSE: 222 MG/DL (ref 70–110)
POCT GLUCOSE: 223 MG/DL (ref 70–110)
POCT GLUCOSE: 226 MG/DL (ref 70–110)
POCT GLUCOSE: 239 MG/DL (ref 70–110)
POCT GLUCOSE: 41 MG/DL (ref 70–110)
POCT GLUCOSE: 60 MG/DL (ref 70–110)
POCT GLUCOSE: 74 MG/DL (ref 70–110)
POCT GLUCOSE: 77 MG/DL (ref 70–110)
POCT GLUCOSE: 80 MG/DL (ref 70–110)
POCT GLUCOSE: 88 MG/DL (ref 70–110)
POCT GLUCOSE: 95 MG/DL (ref 70–110)
POCT GLUCOSE: 96 MG/DL (ref 70–110)
POCT GLUCOSE: 99 MG/DL (ref 70–110)
POCT GLUCOSE: 99 MG/DL (ref 70–110)
POIKILOCYTOSIS BLD QL SMEAR: SLIGHT
POIKILOCYTOSIS BLD QL SMEAR: SLIGHT
POLYCHROMASIA BLD QL SMEAR: ABNORMAL
POLYCHROMASIA BLD QL SMEAR: ABNORMAL
POTASSIUM BLD-SCNC: 5.7 MMOL/L (ref 3.5–5.1)
POTASSIUM SERPL-SCNC: 3.4 MMOL/L (ref 3.5–5.1)
POTASSIUM SERPL-SCNC: 3.6 MMOL/L (ref 3.5–5.1)
POTASSIUM SERPL-SCNC: 3.7 MMOL/L (ref 3.5–5.1)
POTASSIUM SERPL-SCNC: 3.7 MMOL/L (ref 3.5–5.1)
POTASSIUM SERPL-SCNC: 3.8 MMOL/L (ref 3.5–5.1)
POTASSIUM SERPL-SCNC: 3.9 MMOL/L (ref 3.5–5.1)
POTASSIUM SERPL-SCNC: 4 MMOL/L (ref 3.5–5.1)
POTASSIUM SERPL-SCNC: 4 MMOL/L (ref 3.5–5.1)
POTASSIUM SERPL-SCNC: 4.2 MMOL/L (ref 3.5–5.1)
POTASSIUM SERPL-SCNC: 4.3 MMOL/L (ref 3.5–5.1)
POTASSIUM SERPL-SCNC: 4.4 MMOL/L (ref 3.5–5.1)
POTASSIUM SERPL-SCNC: 4.5 MMOL/L (ref 3.5–5.1)
POTASSIUM SERPL-SCNC: 4.7 MMOL/L (ref 3.5–5.1)
POTASSIUM SERPL-SCNC: 5.1 MMOL/L (ref 3.5–5.1)
POTASSIUM SERPL-SCNC: 5.1 MMOL/L (ref 3.5–5.1)
POTASSIUM SERPL-SCNC: 5.2 MMOL/L (ref 3.5–5.1)
POTASSIUM SERPL-SCNC: 5.2 MMOL/L (ref 3.5–5.1)
POTASSIUM SERPL-SCNC: 5.4 MMOL/L (ref 3.5–5.1)
POTASSIUM SERPL-SCNC: 5.4 MMOL/L (ref 3.5–5.1)
POTASSIUM SERPL-SCNC: 5.5 MMOL/L (ref 3.5–5.1)
POTASSIUM SERPL-SCNC: 5.5 MMOL/L (ref 3.5–5.1)
POTASSIUM SERPL-SCNC: 5.8 MMOL/L (ref 3.5–5.1)
POTASSIUM SERPL-SCNC: 5.8 MMOL/L (ref 3.5–5.1)
PROCALCITONIN SERPL IA-MCNC: 1.19 NG/ML
PROT FLD-MCNC: 4.5 G/DL
PROT SERPL-MCNC: 5 G/DL (ref 6–8.4)
PROT SERPL-MCNC: 5 G/DL (ref 6–8.4)
PROT SERPL-MCNC: 5.1 G/DL (ref 6–8.4)
PROT SERPL-MCNC: 5.2 G/DL (ref 6–8.4)
PROT SERPL-MCNC: 5.2 G/DL (ref 6–8.4)
PROT SERPL-MCNC: 5.3 G/DL (ref 6–8.4)
PROT SERPL-MCNC: 5.4 G/DL (ref 6–8.4)
PROT SERPL-MCNC: 5.5 G/DL (ref 6–8.4)
PROT SERPL-MCNC: 5.6 G/DL (ref 6–8.4)
PROT SERPL-MCNC: 5.7 G/DL (ref 6–8.4)
PROT SERPL-MCNC: 5.8 G/DL (ref 6–8.4)
PROT SERPL-MCNC: 5.8 G/DL (ref 6–8.4)
PROT SERPL-MCNC: 5.9 G/DL (ref 6–8.4)
PROT SERPL-MCNC: 6.5 G/DL (ref 6–8.4)
PROT SERPL-MCNC: 6.8 G/DL (ref 6–8.4)
PROT SERPL-MCNC: 7 G/DL (ref 6–8.4)
PROTHROMBIN TIME: 10.4 SEC (ref 9–12.5)
PROTHROMBIN TIME: 12 SEC (ref 9–12.5)
RBC # BLD AUTO: 1.67 M/UL (ref 4.6–6.2)
RBC # BLD AUTO: 2.21 M/UL (ref 4.6–6.2)
RBC # BLD AUTO: 2.21 M/UL (ref 4.6–6.2)
RBC # BLD AUTO: 2.22 M/UL (ref 4.6–6.2)
RBC # BLD AUTO: 2.22 M/UL (ref 4.6–6.2)
RBC # BLD AUTO: 2.26 M/UL (ref 4.6–6.2)
RBC # BLD AUTO: 2.29 M/UL (ref 4.6–6.2)
RBC # BLD AUTO: 2.31 M/UL (ref 4.6–6.2)
RBC # BLD AUTO: 2.33 M/UL (ref 4.6–6.2)
RBC # BLD AUTO: 2.38 M/UL (ref 4.6–6.2)
RBC # BLD AUTO: 2.42 M/UL (ref 4.6–6.2)
RBC # BLD AUTO: 2.43 M/UL (ref 4.6–6.2)
RBC # BLD AUTO: 2.43 M/UL (ref 4.6–6.2)
RBC # BLD AUTO: 2.44 M/UL (ref 4.6–6.2)
RBC # BLD AUTO: 2.46 M/UL (ref 4.6–6.2)
RBC # BLD AUTO: 2.48 M/UL (ref 4.6–6.2)
RBC # BLD AUTO: 2.48 M/UL (ref 4.6–6.2)
RBC # BLD AUTO: 2.49 M/UL (ref 4.6–6.2)
RBC # BLD AUTO: 2.49 M/UL (ref 4.6–6.2)
RBC # BLD AUTO: 2.52 M/UL (ref 4.6–6.2)
RBC # BLD AUTO: 2.53 M/UL (ref 4.6–6.2)
RBC # BLD AUTO: 2.54 M/UL (ref 4.6–6.2)
RBC # BLD AUTO: 2.6 M/UL (ref 4.6–6.2)
RBC # BLD AUTO: 2.62 M/UL (ref 4.6–6.2)
RBC # BLD AUTO: 2.63 M/UL (ref 4.6–6.2)
RBC # BLD AUTO: 2.66 M/UL (ref 4.6–6.2)
RBC # BLD AUTO: 2.67 M/UL (ref 4.6–6.2)
RBC # BLD AUTO: 2.71 M/UL (ref 4.6–6.2)
RBC # BLD AUTO: 2.88 M/UL (ref 4.6–6.2)
RBC # BLD AUTO: 2.91 M/UL (ref 4.6–6.2)
RBC # BLD AUTO: 3.04 M/UL (ref 4.6–6.2)
RBC # BLD AUTO: 3.12 M/UL (ref 4.6–6.2)
SAMPLE: ABNORMAL
SAMPLE: ABNORMAL
SARS-COV-2 RDRP RESP QL NAA+PROBE: NEGATIVE
SARS-COV-2 RNA RESP QL NAA+PROBE: NOT DETECTED
SATURATED IRON: 27 % (ref 20–50)
SITE: ABNORMAL
SITE: ABNORMAL
SODIUM BLD-SCNC: 135 MMOL/L (ref 136–145)
SODIUM SERPL-SCNC: 132 MMOL/L (ref 136–145)
SODIUM SERPL-SCNC: 133 MMOL/L (ref 136–145)
SODIUM SERPL-SCNC: 134 MMOL/L (ref 136–145)
SODIUM SERPL-SCNC: 135 MMOL/L (ref 136–145)
SODIUM SERPL-SCNC: 136 MMOL/L (ref 136–145)
SODIUM SERPL-SCNC: 137 MMOL/L (ref 136–145)
SODIUM SERPL-SCNC: 137 MMOL/L (ref 136–145)
SODIUM SERPL-SCNC: 138 MMOL/L (ref 136–145)
SODIUM SERPL-SCNC: 139 MMOL/L (ref 136–145)
SPECIMEN SOURCE: NORMAL
TOTAL IRON BINDING CAPACITY: 154 UG/DL (ref 250–450)
TRANSFERRIN SERPL-MCNC: 104 MG/DL (ref 200–375)
VANCOMYCIN SERPL-MCNC: 14.1 UG/ML
VANCOMYCIN SERPL-MCNC: 18.8 UG/ML
VANCOMYCIN SERPL-MCNC: 19.8 UG/ML
VANCOMYCIN SERPL-MCNC: 19.9 UG/ML
VANCOMYCIN SERPL-MCNC: 20.4 UG/ML
VANCOMYCIN SERPL-MCNC: 26.8 UG/ML
VANCOMYCIN SERPL-MCNC: <1.4 UG/ML
WBC # BLD AUTO: 10.26 K/UL (ref 3.9–12.7)
WBC # BLD AUTO: 10.29 K/UL (ref 3.9–12.7)
WBC # BLD AUTO: 10.39 K/UL (ref 3.9–12.7)
WBC # BLD AUTO: 10.41 K/UL (ref 3.9–12.7)
WBC # BLD AUTO: 10.62 K/UL (ref 3.9–12.7)
WBC # BLD AUTO: 10.63 K/UL (ref 3.9–12.7)
WBC # BLD AUTO: 10.87 K/UL (ref 3.9–12.7)
WBC # BLD AUTO: 10.89 K/UL (ref 3.9–12.7)
WBC # BLD AUTO: 10.96 K/UL (ref 3.9–12.7)
WBC # BLD AUTO: 11.01 K/UL (ref 3.9–12.7)
WBC # BLD AUTO: 11.76 K/UL (ref 3.9–12.7)
WBC # BLD AUTO: 12.71 K/UL (ref 3.9–12.7)
WBC # BLD AUTO: 13.88 K/UL (ref 3.9–12.7)
WBC # BLD AUTO: 15.24 K/UL (ref 3.9–12.7)
WBC # BLD AUTO: 6.13 K/UL (ref 3.9–12.7)
WBC # BLD AUTO: 6.14 K/UL (ref 3.9–12.7)
WBC # BLD AUTO: 6.22 K/UL (ref 3.9–12.7)
WBC # BLD AUTO: 6.4 K/UL (ref 3.9–12.7)
WBC # BLD AUTO: 6.96 K/UL (ref 3.9–12.7)
WBC # BLD AUTO: 7.68 K/UL (ref 3.9–12.7)
WBC # BLD AUTO: 7.82 K/UL (ref 3.9–12.7)
WBC # BLD AUTO: 7.82 K/UL (ref 3.9–12.7)
WBC # BLD AUTO: 8.06 K/UL (ref 3.9–12.7)
WBC # BLD AUTO: 8.25 K/UL (ref 3.9–12.7)
WBC # BLD AUTO: 8.27 K/UL (ref 3.9–12.7)
WBC # BLD AUTO: 8.5 K/UL (ref 3.9–12.7)
WBC # BLD AUTO: 8.65 K/UL (ref 3.9–12.7)
WBC # BLD AUTO: 9.12 K/UL (ref 3.9–12.7)
WBC # BLD AUTO: 9.23 K/UL (ref 3.9–12.7)
WBC # BLD AUTO: 9.24 K/UL (ref 3.9–12.7)
WBC # BLD AUTO: 9.45 K/UL (ref 3.9–12.7)
WBC # BLD AUTO: 9.72 K/UL (ref 3.9–12.7)
WBC # BLD AUTO: 9.88 K/UL (ref 3.9–12.7)
WBC # BLD AUTO: 9.96 K/UL (ref 3.9–12.7)
WBC # FLD: 429 /CU MM

## 2022-01-01 PROCEDURE — 99233 SBSQ HOSP IP/OBS HIGH 50: CPT | Mod: ,,, | Performed by: NURSE PRACTITIONER

## 2022-01-01 PROCEDURE — 20600001 HC STEP DOWN PRIVATE ROOM

## 2022-01-01 PROCEDURE — 63600175 PHARM REV CODE 636 W HCPCS: Performed by: NURSE ANESTHETIST, CERTIFIED REGISTERED

## 2022-01-01 PROCEDURE — 99232 SBSQ HOSP IP/OBS MODERATE 35: CPT | Mod: ,,, | Performed by: STUDENT IN AN ORGANIZED HEALTH CARE EDUCATION/TRAINING PROGRAM

## 2022-01-01 PROCEDURE — 90935 HEMODIALYSIS ONE EVALUATION: CPT | Mod: ,,, | Performed by: NURSE PRACTITIONER

## 2022-01-01 PROCEDURE — 85025 COMPLETE CBC W/AUTO DIFF WBC: CPT | Performed by: STUDENT IN AN ORGANIZED HEALTH CARE EDUCATION/TRAINING PROGRAM

## 2022-01-01 PROCEDURE — 80100014 HC HEMODIALYSIS 1:1

## 2022-01-01 PROCEDURE — 99900035 HC TECH TIME PER 15 MIN (STAT)

## 2022-01-01 PROCEDURE — U0002 COVID-19 LAB TEST NON-CDC: HCPCS | Performed by: INTERNAL MEDICINE

## 2022-01-01 PROCEDURE — 84100 ASSAY OF PHOSPHORUS: CPT | Performed by: STUDENT IN AN ORGANIZED HEALTH CARE EDUCATION/TRAINING PROGRAM

## 2022-01-01 PROCEDURE — 93005 ELECTROCARDIOGRAM TRACING: CPT

## 2022-01-01 PROCEDURE — 36415 COLL VENOUS BLD VENIPUNCTURE: CPT | Performed by: STUDENT IN AN ORGANIZED HEALTH CARE EDUCATION/TRAINING PROGRAM

## 2022-01-01 PROCEDURE — 25000003 PHARM REV CODE 250: Performed by: STUDENT IN AN ORGANIZED HEALTH CARE EDUCATION/TRAINING PROGRAM

## 2022-01-01 PROCEDURE — 27000221 HC OXYGEN, UP TO 24 HOURS

## 2022-01-01 PROCEDURE — 25000242 PHARM REV CODE 250 ALT 637 W/ HCPCS: Performed by: STUDENT IN AN ORGANIZED HEALTH CARE EDUCATION/TRAINING PROGRAM

## 2022-01-01 PROCEDURE — 97535 SELF CARE MNGMENT TRAINING: CPT

## 2022-01-01 PROCEDURE — 83735 ASSAY OF MAGNESIUM: CPT | Performed by: STUDENT IN AN ORGANIZED HEALTH CARE EDUCATION/TRAINING PROGRAM

## 2022-01-01 PROCEDURE — 88112 CYTOPATH CELL ENHANCE TECH: CPT | Performed by: PATHOLOGY

## 2022-01-01 PROCEDURE — 36620 ARTERIAL: ICD-10-PCS | Mod: 59,,, | Performed by: ANESTHESIOLOGY

## 2022-01-01 PROCEDURE — 99232 SBSQ HOSP IP/OBS MODERATE 35: CPT | Mod: ,,, | Performed by: NURSE PRACTITIONER

## 2022-01-01 PROCEDURE — 94640 AIRWAY INHALATION TREATMENT: CPT

## 2022-01-01 PROCEDURE — 36600 WITHDRAWAL OF ARTERIAL BLOOD: CPT

## 2022-01-01 PROCEDURE — 85025 COMPLETE CBC W/AUTO DIFF WBC: CPT | Mod: 91 | Performed by: STUDENT IN AN ORGANIZED HEALTH CARE EDUCATION/TRAINING PROGRAM

## 2022-01-01 PROCEDURE — 94761 N-INVAS EAR/PLS OXIMETRY MLT: CPT

## 2022-01-01 PROCEDURE — 80053 COMPREHEN METABOLIC PANEL: CPT | Performed by: STUDENT IN AN ORGANIZED HEALTH CARE EDUCATION/TRAINING PROGRAM

## 2022-01-01 PROCEDURE — 25000003 PHARM REV CODE 250: Performed by: INTERNAL MEDICINE

## 2022-01-01 PROCEDURE — 63600175 PHARM REV CODE 636 W HCPCS: Performed by: STUDENT IN AN ORGANIZED HEALTH CARE EDUCATION/TRAINING PROGRAM

## 2022-01-01 PROCEDURE — 27100171 HC OXYGEN HIGH FLOW UP TO 24 HOURS

## 2022-01-01 PROCEDURE — 99999 PR PBB SHADOW E&M-EST. PATIENT-LVL V: ICD-10-PCS | Mod: PBBFAC,,, | Performed by: INTERNAL MEDICINE

## 2022-01-01 PROCEDURE — 99233 SBSQ HOSP IP/OBS HIGH 50: CPT | Mod: ,,, | Performed by: STUDENT IN AN ORGANIZED HEALTH CARE EDUCATION/TRAINING PROGRAM

## 2022-01-01 PROCEDURE — 80202 ASSAY OF VANCOMYCIN: CPT | Performed by: STUDENT IN AN ORGANIZED HEALTH CARE EDUCATION/TRAINING PROGRAM

## 2022-01-01 PROCEDURE — 25000003 PHARM REV CODE 250: Performed by: NURSE PRACTITIONER

## 2022-01-01 PROCEDURE — 99223 PR INITIAL HOSPITAL CARE,LEVL III: ICD-10-PCS | Mod: 25,,, | Performed by: NURSE PRACTITIONER

## 2022-01-01 PROCEDURE — 90935 HEMODIALYSIS ONE EVALUATION: CPT

## 2022-01-01 PROCEDURE — 20000000 HC ICU ROOM

## 2022-01-01 PROCEDURE — 83735 ASSAY OF MAGNESIUM: CPT | Performed by: INTERNAL MEDICINE

## 2022-01-01 PROCEDURE — 99233 PR SUBSEQUENT HOSPITAL CARE,LEVL III: ICD-10-PCS | Mod: ,,, | Performed by: NURSE PRACTITIONER

## 2022-01-01 PROCEDURE — 85014 HEMATOCRIT: CPT

## 2022-01-01 PROCEDURE — 99214 PR OFFICE/OUTPT VISIT, EST, LEVL IV, 30-39 MIN: ICD-10-PCS | Mod: S$PBB,,, | Performed by: INTERNAL MEDICINE

## 2022-01-01 PROCEDURE — 99232 PR SUBSEQUENT HOSPITAL CARE,LEVL II: ICD-10-PCS | Mod: GC,,, | Performed by: INTERNAL MEDICINE

## 2022-01-01 PROCEDURE — 99215 OFFICE O/P EST HI 40 MIN: CPT | Mod: S$PBB,,, | Performed by: INTERNAL MEDICINE

## 2022-01-01 PROCEDURE — 99900031 HC PATIENT EDUCATION (STAT)

## 2022-01-01 PROCEDURE — 85730 THROMBOPLASTIN TIME PARTIAL: CPT | Performed by: STUDENT IN AN ORGANIZED HEALTH CARE EDUCATION/TRAINING PROGRAM

## 2022-01-01 PROCEDURE — 97116 GAIT TRAINING THERAPY: CPT

## 2022-01-01 PROCEDURE — 90935 PR HEMODIALYSIS, ONE EVALUATION: ICD-10-PCS | Mod: ,,, | Performed by: NURSE PRACTITIONER

## 2022-01-01 PROCEDURE — 87205 SMEAR GRAM STAIN: CPT | Performed by: STUDENT IN AN ORGANIZED HEALTH CARE EDUCATION/TRAINING PROGRAM

## 2022-01-01 PROCEDURE — 99222 1ST HOSP IP/OBS MODERATE 55: CPT | Mod: ,,, | Performed by: NURSE PRACTITIONER

## 2022-01-01 PROCEDURE — 85025 COMPLETE CBC W/AUTO DIFF WBC: CPT | Mod: 91 | Performed by: HOSPITALIST

## 2022-01-01 PROCEDURE — 83605 ASSAY OF LACTIC ACID: CPT | Performed by: STUDENT IN AN ORGANIZED HEALTH CARE EDUCATION/TRAINING PROGRAM

## 2022-01-01 PROCEDURE — 88305 TISSUE EXAM BY PATHOLOGIST: CPT | Performed by: PATHOLOGY

## 2022-01-01 PROCEDURE — 84075 ASSAY ALKALINE PHOSPHATASE: CPT | Performed by: STUDENT IN AN ORGANIZED HEALTH CARE EDUCATION/TRAINING PROGRAM

## 2022-01-01 PROCEDURE — 88305 TISSUE EXAM BY PATHOLOGIST: CPT | Mod: 59 | Performed by: PATHOLOGY

## 2022-01-01 PROCEDURE — 85025 COMPLETE CBC W/AUTO DIFF WBC: CPT | Performed by: INTERNAL MEDICINE

## 2022-01-01 PROCEDURE — 82042 OTHER SOURCE ALBUMIN QUAN EA: CPT | Performed by: STUDENT IN AN ORGANIZED HEALTH CARE EDUCATION/TRAINING PROGRAM

## 2022-01-01 PROCEDURE — 99233 SBSQ HOSP IP/OBS HIGH 50: CPT | Mod: 95,,, | Performed by: INTERNAL MEDICINE

## 2022-01-01 PROCEDURE — 88305 TISSUE EXAM BY PATHOLOGIST: CPT | Mod: 26,,, | Performed by: PATHOLOGY

## 2022-01-01 PROCEDURE — 99233 PR SUBSEQUENT HOSPITAL CARE,LEVL III: ICD-10-PCS | Mod: ,,, | Performed by: STUDENT IN AN ORGANIZED HEALTH CARE EDUCATION/TRAINING PROGRAM

## 2022-01-01 PROCEDURE — 80202 ASSAY OF VANCOMYCIN: CPT | Performed by: INTERNAL MEDICINE

## 2022-01-01 PROCEDURE — 99232 PR SUBSEQUENT HOSPITAL CARE,LEVL II: ICD-10-PCS | Mod: ,,, | Performed by: STUDENT IN AN ORGANIZED HEALTH CARE EDUCATION/TRAINING PROGRAM

## 2022-01-01 PROCEDURE — 37000009 HC ANESTHESIA EA ADD 15 MINS: Performed by: THORACIC SURGERY (CARDIOTHORACIC VASCULAR SURGERY)

## 2022-01-01 PROCEDURE — 94799 UNLISTED PULMONARY SVC/PX: CPT

## 2022-01-01 PROCEDURE — 80053 COMPREHEN METABOLIC PANEL: CPT | Performed by: EMERGENCY MEDICINE

## 2022-01-01 PROCEDURE — 84295 ASSAY OF SERUM SODIUM: CPT

## 2022-01-01 PROCEDURE — 99223 1ST HOSP IP/OBS HIGH 75: CPT | Mod: GC,,, | Performed by: INTERNAL MEDICINE

## 2022-01-01 PROCEDURE — 31622 PR BRONCHOSCOPY,DIAGNOSTIC: ICD-10-PCS | Mod: 51,GC,, | Performed by: THORACIC SURGERY (CARDIOTHORACIC VASCULAR SURGERY)

## 2022-01-01 PROCEDURE — 36430 TRANSFUSION BLD/BLD COMPNT: CPT

## 2022-01-01 PROCEDURE — 63600175 PHARM REV CODE 636 W HCPCS: Performed by: NURSE PRACTITIONER

## 2022-01-01 PROCEDURE — 25000003 PHARM REV CODE 250: Performed by: NURSE ANESTHETIST, CERTIFIED REGISTERED

## 2022-01-01 PROCEDURE — 63600175 PHARM REV CODE 636 W HCPCS: Mod: JG | Performed by: THORACIC SURGERY (CARDIOTHORACIC VASCULAR SURGERY)

## 2022-01-01 PROCEDURE — 84311 SPECTROPHOTOMETRY: CPT | Performed by: STUDENT IN AN ORGANIZED HEALTH CARE EDUCATION/TRAINING PROGRAM

## 2022-01-01 PROCEDURE — D9220A PRA ANESTHESIA: Mod: ANES,,, | Performed by: ANESTHESIOLOGY

## 2022-01-01 PROCEDURE — 97530 THERAPEUTIC ACTIVITIES: CPT

## 2022-01-01 PROCEDURE — 80069 RENAL FUNCTION PANEL: CPT | Performed by: STUDENT IN AN ORGANIZED HEALTH CARE EDUCATION/TRAINING PROGRAM

## 2022-01-01 PROCEDURE — 71000033 HC RECOVERY, INTIAL HOUR: Performed by: THORACIC SURGERY (CARDIOTHORACIC VASCULAR SURGERY)

## 2022-01-01 PROCEDURE — 85018 HEMOGLOBIN: CPT | Performed by: STUDENT IN AN ORGANIZED HEALTH CARE EDUCATION/TRAINING PROGRAM

## 2022-01-01 PROCEDURE — C1729 CATH, DRAINAGE: HCPCS | Performed by: THORACIC SURGERY (CARDIOTHORACIC VASCULAR SURGERY)

## 2022-01-01 PROCEDURE — 86920 COMPATIBILITY TEST SPIN: CPT | Performed by: THORACIC SURGERY (CARDIOTHORACIC VASCULAR SURGERY)

## 2022-01-01 PROCEDURE — 99233 PR SUBSEQUENT HOSPITAL CARE,LEVL III: ICD-10-PCS | Mod: 95,,, | Performed by: INTERNAL MEDICINE

## 2022-01-01 PROCEDURE — D9220A PRA ANESTHESIA: ICD-10-PCS | Mod: ANES,,, | Performed by: ANESTHESIOLOGY

## 2022-01-01 PROCEDURE — 80100016 HC MAINTENANCE HEMODIALYSIS

## 2022-01-01 PROCEDURE — 99232 PR SUBSEQUENT HOSPITAL CARE,LEVL II: ICD-10-PCS | Mod: ,,, | Performed by: NURSE PRACTITIONER

## 2022-01-01 PROCEDURE — 82728 ASSAY OF FERRITIN: CPT | Performed by: STUDENT IN AN ORGANIZED HEALTH CARE EDUCATION/TRAINING PROGRAM

## 2022-01-01 PROCEDURE — 87040 BLOOD CULTURE FOR BACTERIA: CPT | Performed by: NURSE PRACTITIONER

## 2022-01-01 PROCEDURE — 99232 SBSQ HOSP IP/OBS MODERATE 35: CPT | Mod: GC,,, | Performed by: INTERNAL MEDICINE

## 2022-01-01 PROCEDURE — 99214 OFFICE O/P EST MOD 30 MIN: CPT | Mod: S$PBB,,, | Performed by: INTERNAL MEDICINE

## 2022-01-01 PROCEDURE — 90935 PR HEMODIALYSIS, ONE EVALUATION: ICD-10-PCS | Mod: ,,, | Performed by: INTERNAL MEDICINE

## 2022-01-01 PROCEDURE — 99222 PR INITIAL HOSPITAL CARE,LEVL II: ICD-10-PCS | Mod: ,,, | Performed by: NURSE PRACTITIONER

## 2022-01-01 PROCEDURE — 63600175 PHARM REV CODE 636 W HCPCS: Performed by: THORACIC SURGERY (CARDIOTHORACIC VASCULAR SURGERY)

## 2022-01-01 PROCEDURE — 99999 PR PBB SHADOW E&M-EST. PATIENT-LVL V: CPT | Mod: PBBFAC,,, | Performed by: INTERNAL MEDICINE

## 2022-01-01 PROCEDURE — 87206 SMEAR FLUORESCENT/ACID STAI: CPT | Performed by: STUDENT IN AN ORGANIZED HEALTH CARE EDUCATION/TRAINING PROGRAM

## 2022-01-01 PROCEDURE — 85610 PROTHROMBIN TIME: CPT | Performed by: STUDENT IN AN ORGANIZED HEALTH CARE EDUCATION/TRAINING PROGRAM

## 2022-01-01 PROCEDURE — 63600175 PHARM REV CODE 636 W HCPCS: Performed by: INTERNAL MEDICINE

## 2022-01-01 PROCEDURE — 90935 HEMODIALYSIS ONE EVALUATION: CPT | Mod: ,,, | Performed by: INTERNAL MEDICINE

## 2022-01-01 PROCEDURE — 99239 HOSP IP/OBS DSCHRG MGMT >30: CPT | Mod: 95,,, | Performed by: INTERNAL MEDICINE

## 2022-01-01 PROCEDURE — P9016 RBC LEUKOCYTES REDUCED: HCPCS | Performed by: STUDENT IN AN ORGANIZED HEALTH CARE EDUCATION/TRAINING PROGRAM

## 2022-01-01 PROCEDURE — 63700000 PHARM REV CODE 250 ALT 637 W/O HCPCS: Performed by: STUDENT IN AN ORGANIZED HEALTH CARE EDUCATION/TRAINING PROGRAM

## 2022-01-01 PROCEDURE — 31622 DX BRONCHOSCOPE/WASH: CPT | Mod: 51,GC,, | Performed by: THORACIC SURGERY (CARDIOTHORACIC VASCULAR SURGERY)

## 2022-01-01 PROCEDURE — 85018 HEMOGLOBIN: CPT | Mod: 91 | Performed by: STUDENT IN AN ORGANIZED HEALTH CARE EDUCATION/TRAINING PROGRAM

## 2022-01-01 PROCEDURE — 32652 PR THORACOSCOPY SURG TOT PULM DECORT: ICD-10-PCS | Mod: RT,GC,, | Performed by: THORACIC SURGERY (CARDIOTHORACIC VASCULAR SURGERY)

## 2022-01-01 PROCEDURE — 87070 CULTURE OTHR SPECIMN AEROBIC: CPT | Mod: 59 | Performed by: STUDENT IN AN ORGANIZED HEALTH CARE EDUCATION/TRAINING PROGRAM

## 2022-01-01 PROCEDURE — 63700000 PHARM REV CODE 250 ALT 637 W/O HCPCS: Performed by: INTERNAL MEDICINE

## 2022-01-01 PROCEDURE — 97165 OT EVAL LOW COMPLEX 30 MIN: CPT

## 2022-01-01 PROCEDURE — 25000003 PHARM REV CODE 250

## 2022-01-01 PROCEDURE — 82465 ASSAY BLD/SERUM CHOLESTEROL: CPT | Performed by: STUDENT IN AN ORGANIZED HEALTH CARE EDUCATION/TRAINING PROGRAM

## 2022-01-01 PROCEDURE — 82330 ASSAY OF CALCIUM: CPT

## 2022-01-01 PROCEDURE — 88112 CYTOPATH CELL ENHANCE TECH: CPT | Mod: 26,,, | Performed by: PATHOLOGY

## 2022-01-01 PROCEDURE — 87075 CULTR BACTERIA EXCEPT BLOOD: CPT | Mod: 59 | Performed by: STUDENT IN AN ORGANIZED HEALTH CARE EDUCATION/TRAINING PROGRAM

## 2022-01-01 PROCEDURE — 87176 TISSUE HOMOGENIZATION CULTR: CPT | Performed by: STUDENT IN AN ORGANIZED HEALTH CARE EDUCATION/TRAINING PROGRAM

## 2022-01-01 PROCEDURE — 36415 COLL VENOUS BLD VENIPUNCTURE: CPT | Performed by: NURSE PRACTITIONER

## 2022-01-01 PROCEDURE — 80053 COMPREHEN METABOLIC PANEL: CPT | Performed by: INTERNAL MEDICINE

## 2022-01-01 PROCEDURE — 86850 RBC ANTIBODY SCREEN: CPT | Performed by: STUDENT IN AN ORGANIZED HEALTH CARE EDUCATION/TRAINING PROGRAM

## 2022-01-01 PROCEDURE — 88305 TISSUE EXAM BY PATHOLOGIST: ICD-10-PCS | Mod: 26,,, | Performed by: PATHOLOGY

## 2022-01-01 PROCEDURE — U0003 INFECTIOUS AGENT DETECTION BY NUCLEIC ACID (DNA OR RNA); SEVERE ACUTE RESPIRATORY SYNDROME CORONAVIRUS 2 (SARS-COV-2) (CORONAVIRUS DISEASE [COVID-19]), AMPLIFIED PROBE TECHNIQUE, MAKING USE OF HIGH THROUGHPUT TECHNOLOGIES AS DESCRIBED BY CMS-2020-01-R: HCPCS | Performed by: INTERNAL MEDICINE

## 2022-01-01 PROCEDURE — 82150 ASSAY OF AMYLASE: CPT | Performed by: STUDENT IN AN ORGANIZED HEALTH CARE EDUCATION/TRAINING PROGRAM

## 2022-01-01 PROCEDURE — 83036 HEMOGLOBIN GLYCOSYLATED A1C: CPT | Performed by: INTERNAL MEDICINE

## 2022-01-01 PROCEDURE — 87340 HEPATITIS B SURFACE AG IA: CPT | Performed by: STUDENT IN AN ORGANIZED HEALTH CARE EDUCATION/TRAINING PROGRAM

## 2022-01-01 PROCEDURE — 86901 BLOOD TYPING SEROLOGIC RH(D): CPT | Performed by: STUDENT IN AN ORGANIZED HEALTH CARE EDUCATION/TRAINING PROGRAM

## 2022-01-01 PROCEDURE — 25000003 PHARM REV CODE 250: Performed by: EMERGENCY MEDICINE

## 2022-01-01 PROCEDURE — 27201037 HC PRESSURE MONITORING SET UP

## 2022-01-01 PROCEDURE — 63600175 PHARM REV CODE 636 W HCPCS: Performed by: ANESTHESIOLOGY

## 2022-01-01 PROCEDURE — 86920 COMPATIBILITY TEST SPIN: CPT | Performed by: STUDENT IN AN ORGANIZED HEALTH CARE EDUCATION/TRAINING PROGRAM

## 2022-01-01 PROCEDURE — 99239 PR HOSPITAL DISCHARGE DAY,>30 MIN: ICD-10-PCS | Mod: 95,,, | Performed by: INTERNAL MEDICINE

## 2022-01-01 PROCEDURE — 99215 PR OFFICE/OUTPT VISIT, EST, LEVL V, 40-54 MIN: ICD-10-PCS | Mod: S$PBB,,, | Performed by: INTERNAL MEDICINE

## 2022-01-01 PROCEDURE — 99233 SBSQ HOSP IP/OBS HIGH 50: CPT | Mod: GC,,, | Performed by: INTERNAL MEDICINE

## 2022-01-01 PROCEDURE — 82962 GLUCOSE BLOOD TEST: CPT

## 2022-01-01 PROCEDURE — 36415 COLL VENOUS BLD VENIPUNCTURE: CPT | Performed by: INTERNAL MEDICINE

## 2022-01-01 PROCEDURE — 83605 ASSAY OF LACTIC ACID: CPT | Performed by: NURSE PRACTITIONER

## 2022-01-01 PROCEDURE — 84157 ASSAY OF PROTEIN OTHER: CPT | Performed by: STUDENT IN AN ORGANIZED HEALTH CARE EDUCATION/TRAINING PROGRAM

## 2022-01-01 PROCEDURE — 99223 1ST HOSP IP/OBS HIGH 75: CPT | Mod: 25,,, | Performed by: NURSE PRACTITIONER

## 2022-01-01 PROCEDURE — 99215 OFFICE O/P EST HI 40 MIN: CPT | Mod: PBBFAC | Performed by: INTERNAL MEDICINE

## 2022-01-01 PROCEDURE — 71000016 HC POSTOP RECOV ADDL HR: Performed by: THORACIC SURGERY (CARDIOTHORACIC VASCULAR SURGERY)

## 2022-01-01 PROCEDURE — 88112 PR  CYTOPATH, CELL ENHANCE TECH: ICD-10-PCS | Mod: 26,,, | Performed by: PATHOLOGY

## 2022-01-01 PROCEDURE — 82803 BLOOD GASES ANY COMBINATION: CPT

## 2022-01-01 PROCEDURE — 36000710: Performed by: THORACIC SURGERY (CARDIOTHORACIC VASCULAR SURGERY)

## 2022-01-01 PROCEDURE — 36620 INSERTION CATHETER ARTERY: CPT | Mod: 59,,, | Performed by: ANESTHESIOLOGY

## 2022-01-01 PROCEDURE — 32652 THORACOSCOPY REM TOTL CORTEX: CPT | Mod: RT,GC,, | Performed by: THORACIC SURGERY (CARDIOTHORACIC VASCULAR SURGERY)

## 2022-01-01 PROCEDURE — 37000008 HC ANESTHESIA 1ST 15 MINUTES: Performed by: THORACIC SURGERY (CARDIOTHORACIC VASCULAR SURGERY)

## 2022-01-01 PROCEDURE — 84132 ASSAY OF SERUM POTASSIUM: CPT

## 2022-01-01 PROCEDURE — 97162 PT EVAL MOD COMPLEX 30 MIN: CPT

## 2022-01-01 PROCEDURE — 25000003 PHARM REV CODE 250: Performed by: THORACIC SURGERY (CARDIOTHORACIC VASCULAR SURGERY)

## 2022-01-01 PROCEDURE — 83615 LACTATE (LD) (LDH) ENZYME: CPT | Mod: 91 | Performed by: STUDENT IN AN ORGANIZED HEALTH CARE EDUCATION/TRAINING PROGRAM

## 2022-01-01 PROCEDURE — 99223 PR INITIAL HOSPITAL CARE,LEVL III: ICD-10-PCS | Mod: GC,,, | Performed by: INTERNAL MEDICINE

## 2022-01-01 PROCEDURE — 93010 ELECTROCARDIOGRAM REPORT: CPT | Mod: ,,, | Performed by: INTERNAL MEDICINE

## 2022-01-01 PROCEDURE — 87102 FUNGUS ISOLATION CULTURE: CPT | Performed by: STUDENT IN AN ORGANIZED HEALTH CARE EDUCATION/TRAINING PROGRAM

## 2022-01-01 PROCEDURE — 86901 BLOOD TYPING SEROLOGIC RH(D): CPT | Performed by: THORACIC SURGERY (CARDIOTHORACIC VASCULAR SURGERY)

## 2022-01-01 PROCEDURE — 82945 GLUCOSE OTHER FLUID: CPT | Performed by: STUDENT IN AN ORGANIZED HEALTH CARE EDUCATION/TRAINING PROGRAM

## 2022-01-01 PROCEDURE — 36415 COLL VENOUS BLD VENIPUNCTURE: CPT | Performed by: HOSPITALIST

## 2022-01-01 PROCEDURE — 83010 ASSAY OF HAPTOGLOBIN QUANT: CPT | Performed by: STUDENT IN AN ORGANIZED HEALTH CARE EDUCATION/TRAINING PROGRAM

## 2022-01-01 PROCEDURE — 99285 EMERGENCY DEPT VISIT HI MDM: CPT | Mod: 25

## 2022-01-01 PROCEDURE — 25000003 PHARM REV CODE 250: Performed by: HOSPITALIST

## 2022-01-01 PROCEDURE — D9220A PRA ANESTHESIA: Mod: CRNA,,, | Performed by: NURSE ANESTHETIST, CERTIFIED REGISTERED

## 2022-01-01 PROCEDURE — 83615 LACTATE (LD) (LDH) ENZYME: CPT | Performed by: STUDENT IN AN ORGANIZED HEALTH CARE EDUCATION/TRAINING PROGRAM

## 2022-01-01 PROCEDURE — 99233 PR SUBSEQUENT HOSPITAL CARE,LEVL III: ICD-10-PCS | Mod: GC,,, | Performed by: INTERNAL MEDICINE

## 2022-01-01 PROCEDURE — 85025 COMPLETE CBC W/AUTO DIFF WBC: CPT | Performed by: EMERGENCY MEDICINE

## 2022-01-01 PROCEDURE — 83605 ASSAY OF LACTIC ACID: CPT | Performed by: HOSPITALIST

## 2022-01-01 PROCEDURE — 36000711: Performed by: THORACIC SURGERY (CARDIOTHORACIC VASCULAR SURGERY)

## 2022-01-01 PROCEDURE — U0005 INFEC AGEN DETEC AMPLI PROBE: HCPCS | Performed by: INTERNAL MEDICINE

## 2022-01-01 PROCEDURE — 93010 EKG 12-LEAD: ICD-10-PCS | Mod: ,,, | Performed by: INTERNAL MEDICINE

## 2022-01-01 PROCEDURE — 84145 PROCALCITONIN (PCT): CPT | Performed by: STUDENT IN AN ORGANIZED HEALTH CARE EDUCATION/TRAINING PROGRAM

## 2022-01-01 PROCEDURE — 80048 BASIC METABOLIC PNL TOTAL CA: CPT | Mod: XB | Performed by: HOSPITALIST

## 2022-01-01 PROCEDURE — 82962 GLUCOSE BLOOD TEST: CPT | Performed by: THORACIC SURGERY (CARDIOTHORACIC VASCULAR SURGERY)

## 2022-01-01 PROCEDURE — 84100 ASSAY OF PHOSPHORUS: CPT | Performed by: INTERNAL MEDICINE

## 2022-01-01 PROCEDURE — 84466 ASSAY OF TRANSFERRIN: CPT | Performed by: STUDENT IN AN ORGANIZED HEALTH CARE EDUCATION/TRAINING PROGRAM

## 2022-01-01 PROCEDURE — D9220A PRA ANESTHESIA: ICD-10-PCS | Mod: CRNA,,, | Performed by: NURSE ANESTHETIST, CERTIFIED REGISTERED

## 2022-01-01 PROCEDURE — 85610 PROTHROMBIN TIME: CPT | Performed by: EMERGENCY MEDICINE

## 2022-01-01 PROCEDURE — 87116 MYCOBACTERIA CULTURE: CPT | Performed by: STUDENT IN AN ORGANIZED HEALTH CARE EDUCATION/TRAINING PROGRAM

## 2022-01-01 PROCEDURE — 87040 BLOOD CULTURE FOR BACTERIA: CPT | Performed by: STUDENT IN AN ORGANIZED HEALTH CARE EDUCATION/TRAINING PROGRAM

## 2022-01-01 PROCEDURE — 89051 BODY FLUID CELL COUNT: CPT | Performed by: STUDENT IN AN ORGANIZED HEALTH CARE EDUCATION/TRAINING PROGRAM

## 2022-01-01 PROCEDURE — 80053 COMPREHEN METABOLIC PANEL: CPT | Mod: 91 | Performed by: STUDENT IN AN ORGANIZED HEALTH CARE EDUCATION/TRAINING PROGRAM

## 2022-01-01 PROCEDURE — 83735 ASSAY OF MAGNESIUM: CPT | Mod: 91 | Performed by: HOSPITALIST

## 2022-01-01 PROCEDURE — 99215 OFFICE O/P EST HI 40 MIN: CPT | Mod: PBBFAC,PN | Performed by: INTERNAL MEDICINE

## 2022-01-01 PROCEDURE — 71000015 HC POSTOP RECOV 1ST HR: Performed by: THORACIC SURGERY (CARDIOTHORACIC VASCULAR SURGERY)

## 2022-01-01 RX ORDER — HYDROMORPHONE HYDROCHLORIDE 1 MG/ML
0.2 INJECTION, SOLUTION INTRAMUSCULAR; INTRAVENOUS; SUBCUTANEOUS EVERY 5 MIN PRN
Status: DISCONTINUED | OUTPATIENT
Start: 2022-01-01 | End: 2022-01-01

## 2022-01-01 RX ORDER — NIFEDIPINE 60 MG/1
60 TABLET, EXTENDED RELEASE ORAL EVERY 12 HOURS
Status: DISCONTINUED | OUTPATIENT
Start: 2022-01-01 | End: 2022-01-01

## 2022-01-01 RX ORDER — TRAZODONE HYDROCHLORIDE 50 MG/1
50 TABLET ORAL NIGHTLY
Status: DISCONTINUED | OUTPATIENT
Start: 2022-01-01 | End: 2022-01-01

## 2022-01-01 RX ORDER — SODIUM CHLORIDE 0.9 % (FLUSH) 0.9 %
10 SYRINGE (ML) INJECTION EVERY 12 HOURS PRN
Status: DISCONTINUED | OUTPATIENT
Start: 2022-01-01 | End: 2022-01-01 | Stop reason: HOSPADM

## 2022-01-01 RX ORDER — FLUCONAZOLE 100 MG/1
100 TABLET ORAL DAILY
Qty: 5 TABLET | Refills: 0 | Status: SHIPPED | OUTPATIENT
Start: 2022-01-01 | End: 2022-09-15

## 2022-01-01 RX ORDER — CARVEDILOL 12.5 MG/1
12.5 TABLET ORAL 2 TIMES DAILY
Status: DISCONTINUED | OUTPATIENT
Start: 2022-01-01 | End: 2022-01-01

## 2022-01-01 RX ORDER — DIPHENHYDRAMINE HCL 25 MG
25 CAPSULE ORAL NIGHTLY
Status: DISCONTINUED | OUTPATIENT
Start: 2022-01-01 | End: 2022-01-01 | Stop reason: HOSPADM

## 2022-01-01 RX ORDER — SODIUM CHLORIDE 9 MG/ML
INJECTION, SOLUTION INTRAVENOUS ONCE
Status: DISCONTINUED | OUTPATIENT
Start: 2022-01-01 | End: 2022-01-01

## 2022-01-01 RX ORDER — FUROSEMIDE 80 MG/1
80 TABLET ORAL DAILY
Qty: 90 TABLET | Refills: 0 | Status: SHIPPED | OUTPATIENT
Start: 2022-01-01 | End: 2022-01-01

## 2022-01-01 RX ORDER — FENTANYL CITRATE 50 UG/ML
INJECTION, SOLUTION INTRAMUSCULAR; INTRAVENOUS
Status: DISCONTINUED | OUTPATIENT
Start: 2022-01-01 | End: 2022-01-01

## 2022-01-01 RX ORDER — SODIUM CHLORIDE 9 MG/ML
INJECTION, SOLUTION INTRAVENOUS ONCE
Status: CANCELLED | OUTPATIENT
Start: 2022-01-01 | End: 2022-01-01

## 2022-01-01 RX ORDER — FUROSEMIDE 80 MG/1
80 TABLET ORAL 2 TIMES DAILY
COMMUNITY
End: 2022-01-01 | Stop reason: SDUPTHER

## 2022-01-01 RX ORDER — PANTOPRAZOLE SODIUM 40 MG/1
40 TABLET, DELAYED RELEASE ORAL DAILY
Status: DISCONTINUED | OUTPATIENT
Start: 2022-01-01 | End: 2022-01-01 | Stop reason: HOSPADM

## 2022-01-01 RX ORDER — FUROSEMIDE 80 MG/1
80 TABLET ORAL 2 TIMES DAILY
Qty: 180 TABLET | Refills: 1 | Status: SHIPPED | OUTPATIENT
Start: 2022-01-01 | End: 2022-01-01

## 2022-01-01 RX ORDER — ROCURONIUM BROMIDE 10 MG/ML
INJECTION, SOLUTION INTRAVENOUS
Status: DISCONTINUED | OUTPATIENT
Start: 2022-01-01 | End: 2022-01-01

## 2022-01-01 RX ORDER — TRAZODONE HYDROCHLORIDE 50 MG/1
50 TABLET ORAL NIGHTLY PRN
Qty: 90 TABLET | Refills: 0 | Status: SHIPPED | OUTPATIENT
Start: 2022-01-01 | End: 2022-01-01

## 2022-01-01 RX ORDER — LIDOCAINE HYDROCHLORIDE AND EPINEPHRINE 10; 10 MG/ML; UG/ML
10 INJECTION, SOLUTION INFILTRATION; PERINEURAL ONCE
Status: DISCONTINUED | OUTPATIENT
Start: 2022-01-01 | End: 2022-01-01

## 2022-01-01 RX ORDER — LIDOCAINE HYDROCHLORIDE 10 MG/ML
INJECTION, SOLUTION INTRAVENOUS
Status: DISCONTINUED | OUTPATIENT
Start: 2022-01-01 | End: 2022-01-01

## 2022-01-01 RX ORDER — LIDOCAINE HYDROCHLORIDE 10 MG/ML
10 INJECTION INFILTRATION; PERINEURAL ONCE
Status: COMPLETED | OUTPATIENT
Start: 2022-01-01 | End: 2022-01-01

## 2022-01-01 RX ORDER — VANCOMYCIN HYDROCHLORIDE 500 MG/10ML
500 INJECTION, POWDER, LYOPHILIZED, FOR SOLUTION INTRAVENOUS ONCE
Qty: 1 EACH | Refills: 0 | Status: SHIPPED | OUTPATIENT
Start: 2022-01-01 | End: 2022-01-01

## 2022-01-01 RX ORDER — CALCIUM ACETATE 667 MG/1
667 CAPSULE ORAL
Status: DISCONTINUED | OUTPATIENT
Start: 2022-01-01 | End: 2022-01-01

## 2022-01-01 RX ORDER — CLONIDINE HYDROCHLORIDE 0.2 MG/1
0.2 TABLET ORAL 2 TIMES DAILY
Status: DISCONTINUED | OUTPATIENT
Start: 2022-01-01 | End: 2022-01-01

## 2022-01-01 RX ORDER — LABETALOL HCL 20 MG/4 ML
20 SYRINGE (ML) INTRAVENOUS EVERY 6 HOURS PRN
Status: DISCONTINUED | OUTPATIENT
Start: 2022-01-01 | End: 2022-01-01 | Stop reason: HOSPADM

## 2022-01-01 RX ORDER — INSULIN GLARGINE 100 [IU]/ML
INJECTION, SOLUTION SUBCUTANEOUS
Qty: 12 ML | Refills: 0 | Status: SHIPPED | OUTPATIENT
Start: 2022-01-01 | End: 2022-01-01

## 2022-01-01 RX ORDER — DIPHENHYDRAMINE HCL 25 MG
25 CAPSULE ORAL NIGHTLY
Refills: 0
Start: 2022-01-01

## 2022-01-01 RX ORDER — LIDOCAINE HYDROCHLORIDE 10 MG/ML
10 INJECTION INFILTRATION; PERINEURAL ONCE
Status: DISCONTINUED | OUTPATIENT
Start: 2022-01-01 | End: 2022-01-01 | Stop reason: HOSPADM

## 2022-01-01 RX ORDER — ISOSORBIDE MONONITRATE 60 MG/1
60 TABLET, EXTENDED RELEASE ORAL DAILY
Status: DISCONTINUED | OUTPATIENT
Start: 2022-01-01 | End: 2022-01-01

## 2022-01-01 RX ORDER — LIDOCAINE HYDROCHLORIDE 10 MG/ML
INJECTION INFILTRATION; PERINEURAL
Status: COMPLETED
Start: 2022-01-01 | End: 2022-01-01

## 2022-01-01 RX ORDER — ATORVASTATIN CALCIUM 20 MG/1
40 TABLET, FILM COATED ORAL NIGHTLY
Status: DISCONTINUED | OUTPATIENT
Start: 2022-01-01 | End: 2022-01-01 | Stop reason: HOSPADM

## 2022-01-01 RX ORDER — CEFAZOLIN SODIUM 1 G/3ML
INJECTION, POWDER, FOR SOLUTION INTRAMUSCULAR; INTRAVENOUS
Status: DISCONTINUED | OUTPATIENT
Start: 2022-01-01 | End: 2022-01-01

## 2022-01-01 RX ORDER — SODIUM CHLORIDE 9 MG/ML
INJECTION, SOLUTION INTRAVENOUS
Status: CANCELLED | OUTPATIENT
Start: 2022-01-01

## 2022-01-01 RX ORDER — LOSARTAN POTASSIUM 50 MG/1
50 TABLET ORAL DAILY
Status: DISCONTINUED | OUTPATIENT
Start: 2022-01-01 | End: 2022-01-01

## 2022-01-01 RX ORDER — AMOXICILLIN 250 MG
1 CAPSULE ORAL 2 TIMES DAILY
Start: 2022-01-01

## 2022-01-01 RX ORDER — BUPIVACAINE HYDROCHLORIDE AND EPINEPHRINE 5; 5 MG/ML; UG/ML
INJECTION, SOLUTION EPIDURAL; INTRACAUDAL; PERINEURAL
Status: DISCONTINUED | OUTPATIENT
Start: 2022-01-01 | End: 2022-01-01 | Stop reason: HOSPADM

## 2022-01-01 RX ORDER — ONDANSETRON 2 MG/ML
4 INJECTION INTRAMUSCULAR; INTRAVENOUS DAILY PRN
Status: COMPLETED | OUTPATIENT
Start: 2022-01-01 | End: 2022-01-01

## 2022-01-01 RX ORDER — IBUPROFEN 200 MG
16 TABLET ORAL
Status: DISCONTINUED | OUTPATIENT
Start: 2022-01-01 | End: 2022-01-01 | Stop reason: HOSPADM

## 2022-01-01 RX ORDER — FLUCONAZOLE 200 MG/1
200 TABLET ORAL DAILY
Status: COMPLETED | OUTPATIENT
Start: 2022-01-01 | End: 2022-01-01

## 2022-01-01 RX ORDER — INSULIN ASPART 100 [IU]/ML
1-10 INJECTION, SOLUTION INTRAVENOUS; SUBCUTANEOUS EVERY 4 HOURS PRN
Status: DISCONTINUED | OUTPATIENT
Start: 2022-01-01 | End: 2022-01-01

## 2022-01-01 RX ORDER — INSULIN ASPART 100 [IU]/ML
1-10 INJECTION, SOLUTION INTRAVENOUS; SUBCUTANEOUS EVERY 6 HOURS PRN
Status: DISCONTINUED | OUTPATIENT
Start: 2022-01-01 | End: 2022-01-01

## 2022-01-01 RX ORDER — NIFEDIPINE 60 MG/1
60 TABLET, EXTENDED RELEASE ORAL DAILY
Status: DISCONTINUED | OUTPATIENT
Start: 2022-01-01 | End: 2022-01-01

## 2022-01-01 RX ORDER — HYDROCODONE BITARTRATE AND ACETAMINOPHEN 5; 325 MG/1; MG/1
1 TABLET ORAL EVERY 6 HOURS PRN
Status: DISCONTINUED | OUTPATIENT
Start: 2022-01-01 | End: 2022-01-01

## 2022-01-01 RX ORDER — TALC
6 POWDER (GRAM) TOPICAL NIGHTLY PRN
Status: DISCONTINUED | OUTPATIENT
Start: 2022-01-01 | End: 2022-01-01

## 2022-01-01 RX ORDER — IPRATROPIUM BROMIDE AND ALBUTEROL SULFATE 2.5; .5 MG/3ML; MG/3ML
3 SOLUTION RESPIRATORY (INHALATION) EVERY 6 HOURS
Status: DISCONTINUED | OUTPATIENT
Start: 2022-01-01 | End: 2022-01-01 | Stop reason: HOSPADM

## 2022-01-01 RX ORDER — PANTOPRAZOLE SODIUM 40 MG/1
40 TABLET, DELAYED RELEASE ORAL DAILY
Qty: 30 TABLET | Refills: 11
Start: 2022-01-01 | End: 2023-09-09

## 2022-01-01 RX ORDER — HYDRALAZINE HYDROCHLORIDE 50 MG/1
100 TABLET, FILM COATED ORAL EVERY 12 HOURS
Status: DISCONTINUED | OUTPATIENT
Start: 2022-01-01 | End: 2022-01-01

## 2022-01-01 RX ORDER — SODIUM CHLORIDE 9 MG/ML
INJECTION, SOLUTION INTRAVENOUS ONCE
Status: COMPLETED | OUTPATIENT
Start: 2022-01-01 | End: 2022-01-01

## 2022-01-01 RX ORDER — FUROSEMIDE 80 MG/1
TABLET ORAL
Qty: 90 TABLET | Refills: 1 | OUTPATIENT
Start: 2022-01-01

## 2022-01-01 RX ORDER — CEFEPIME HYDROCHLORIDE 1 G/50ML
1 INJECTION, SOLUTION INTRAVENOUS
Status: DISCONTINUED | OUTPATIENT
Start: 2022-01-01 | End: 2022-01-01

## 2022-01-01 RX ORDER — CEFEPIME HYDROCHLORIDE 1 G/50ML
1 INJECTION, SOLUTION INTRAVENOUS DAILY
Qty: 50 ML | Refills: 0
Start: 2022-01-01 | End: 2022-01-01

## 2022-01-01 RX ORDER — ONDANSETRON 2 MG/ML
INJECTION INTRAMUSCULAR; INTRAVENOUS
Status: DISCONTINUED | OUTPATIENT
Start: 2022-01-01 | End: 2022-01-01

## 2022-01-01 RX ORDER — CARVEDILOL 3.12 MG/1
3.12 TABLET ORAL 2 TIMES DAILY
Qty: 60 TABLET | Refills: 11
Start: 2022-01-01 | End: 2023-09-09

## 2022-01-01 RX ORDER — CARVEDILOL 3.12 MG/1
3.12 TABLET ORAL 2 TIMES DAILY
Status: DISCONTINUED | OUTPATIENT
Start: 2022-01-01 | End: 2022-01-01 | Stop reason: HOSPADM

## 2022-01-01 RX ORDER — MIDAZOLAM HYDROCHLORIDE 1 MG/ML
INJECTION, SOLUTION INTRAMUSCULAR; INTRAVENOUS
Status: DISCONTINUED | OUTPATIENT
Start: 2022-01-01 | End: 2022-01-01

## 2022-01-01 RX ORDER — HYDROXYZINE HYDROCHLORIDE 25 MG/1
25 TABLET, FILM COATED ORAL 3 TIMES DAILY PRN
Status: DISCONTINUED | OUTPATIENT
Start: 2022-01-01 | End: 2022-01-01 | Stop reason: HOSPADM

## 2022-01-01 RX ORDER — ONDANSETRON 2 MG/ML
4 INJECTION INTRAMUSCULAR; INTRAVENOUS ONCE AS NEEDED
Status: COMPLETED | OUTPATIENT
Start: 2022-01-01 | End: 2022-01-01

## 2022-01-01 RX ORDER — HYDRALAZINE HYDROCHLORIDE 20 MG/ML
10 INJECTION INTRAMUSCULAR; INTRAVENOUS EVERY 6 HOURS PRN
Status: DISCONTINUED | OUTPATIENT
Start: 2022-01-01 | End: 2022-01-01 | Stop reason: HOSPADM

## 2022-01-01 RX ORDER — HYDROCODONE BITARTRATE AND ACETAMINOPHEN 500; 5 MG/1; MG/1
TABLET ORAL
Status: DISCONTINUED | OUTPATIENT
Start: 2022-01-01 | End: 2022-01-01 | Stop reason: HOSPADM

## 2022-01-01 RX ORDER — CEFEPIME HYDROCHLORIDE 1 G/50ML
1 INJECTION, SOLUTION INTRAVENOUS
Status: DISCONTINUED | OUTPATIENT
Start: 2022-01-01 | End: 2022-01-01 | Stop reason: HOSPADM

## 2022-01-01 RX ORDER — FLUTICASONE PROPIONATE 50 MCG
1 SPRAY, SUSPENSION (ML) NASAL DAILY PRN
Qty: 16 G | Refills: 2
Start: 2022-01-01

## 2022-01-01 RX ORDER — VANCOMYCIN HCL IN 5 % DEXTROSE 1G/250ML
1000 PLASTIC BAG, INJECTION (ML) INTRAVENOUS ONCE
Status: DISCONTINUED | OUTPATIENT
Start: 2022-01-01 | End: 2022-01-01

## 2022-01-01 RX ORDER — HYDROMORPHONE HYDROCHLORIDE 1 MG/ML
1 INJECTION, SOLUTION INTRAMUSCULAR; INTRAVENOUS; SUBCUTANEOUS EVERY 6 HOURS PRN
Status: DISCONTINUED | OUTPATIENT
Start: 2022-01-01 | End: 2022-01-01 | Stop reason: HOSPADM

## 2022-01-01 RX ORDER — NALOXONE HCL 0.4 MG/ML
0.4 VIAL (ML) INJECTION ONCE
Status: DISCONTINUED | OUTPATIENT
Start: 2022-01-01 | End: 2022-01-01 | Stop reason: HOSPADM

## 2022-01-01 RX ORDER — INSULIN ASPART 100 [IU]/ML
0-5 INJECTION, SOLUTION INTRAVENOUS; SUBCUTANEOUS
Status: DISCONTINUED | OUTPATIENT
Start: 2022-01-01 | End: 2022-01-01 | Stop reason: HOSPADM

## 2022-01-01 RX ORDER — IPRATROPIUM BROMIDE AND ALBUTEROL SULFATE 2.5; .5 MG/3ML; MG/3ML
3 SOLUTION RESPIRATORY (INHALATION)
Qty: 75 ML | Refills: 0
Start: 2022-01-01 | End: 2023-09-09

## 2022-01-01 RX ORDER — AZITHROMYCIN 250 MG/1
500 TABLET, FILM COATED ORAL DAILY
Status: DISCONTINUED | OUTPATIENT
Start: 2022-01-01 | End: 2022-01-01

## 2022-01-01 RX ORDER — CLONIDINE HYDROCHLORIDE 0.2 MG/1
TABLET ORAL
Qty: 270 TABLET | Refills: 1 | Status: SHIPPED | OUTPATIENT
Start: 2022-01-01 | End: 2022-01-01

## 2022-01-01 RX ORDER — VANCOMYCIN HCL IN 5 % DEXTROSE 1G/250ML
1000 PLASTIC BAG, INJECTION (ML) INTRAVENOUS ONCE
Status: COMPLETED | OUTPATIENT
Start: 2022-01-01 | End: 2022-01-01

## 2022-01-01 RX ORDER — GLUCAGON 1 MG
1 KIT INJECTION
Status: DISCONTINUED | OUTPATIENT
Start: 2022-01-01 | End: 2022-01-01

## 2022-01-01 RX ORDER — HEPARIN SODIUM 5000 [USP'U]/ML
5000 INJECTION, SOLUTION INTRAVENOUS; SUBCUTANEOUS EVERY 8 HOURS
Status: DISCONTINUED | OUTPATIENT
Start: 2022-01-01 | End: 2022-01-01

## 2022-01-01 RX ORDER — LABETALOL HCL 20 MG/4 ML
20 SYRINGE (ML) INTRAVENOUS EVERY 6 HOURS PRN
Status: DISCONTINUED | OUTPATIENT
Start: 2022-01-01 | End: 2022-01-01

## 2022-01-01 RX ORDER — SODIUM,POTASSIUM PHOSPHATES 280-250MG
2 POWDER IN PACKET (EA) ORAL 3 TIMES DAILY
Status: COMPLETED | OUTPATIENT
Start: 2022-01-01 | End: 2022-01-01

## 2022-01-01 RX ORDER — FENTANYL CITRATE 50 UG/ML
25 INJECTION, SOLUTION INTRAMUSCULAR; INTRAVENOUS EVERY 5 MIN PRN
Status: DISCONTINUED | OUTPATIENT
Start: 2022-01-01 | End: 2022-01-01

## 2022-01-01 RX ORDER — HYDROCODONE BITARTRATE AND ACETAMINOPHEN 500; 5 MG/1; MG/1
TABLET ORAL
Status: DISCONTINUED | OUTPATIENT
Start: 2022-01-01 | End: 2022-01-01 | Stop reason: SDUPTHER

## 2022-01-01 RX ORDER — AMOXICILLIN 250 MG
1 CAPSULE ORAL 2 TIMES DAILY
Status: DISCONTINUED | OUTPATIENT
Start: 2022-01-01 | End: 2022-01-01 | Stop reason: HOSPADM

## 2022-01-01 RX ORDER — POLYETHYLENE GLYCOL 3350 17 G/17G
17 POWDER, FOR SOLUTION ORAL 2 TIMES DAILY
Status: DISCONTINUED | OUTPATIENT
Start: 2022-01-01 | End: 2022-01-01

## 2022-01-01 RX ORDER — FLUTICASONE PROPIONATE 50 MCG
1 SPRAY, SUSPENSION (ML) NASAL DAILY
Qty: 16 G | Refills: 2 | Status: ON HOLD | OUTPATIENT
Start: 2022-01-01 | End: 2022-01-01 | Stop reason: SDUPTHER

## 2022-01-01 RX ORDER — ACETAMINOPHEN 325 MG/1
650 TABLET ORAL EVERY 4 HOURS PRN
Status: DISCONTINUED | OUTPATIENT
Start: 2022-01-01 | End: 2022-01-01 | Stop reason: HOSPADM

## 2022-01-01 RX ORDER — MUPIROCIN 20 MG/G
OINTMENT TOPICAL 2 TIMES DAILY
Status: DISPENSED | OUTPATIENT
Start: 2022-01-01 | End: 2022-01-01

## 2022-01-01 RX ORDER — SUCRALFATE 1 G/10ML
1 SUSPENSION ORAL EVERY 6 HOURS
Status: DISCONTINUED | OUTPATIENT
Start: 2022-01-01 | End: 2022-01-01

## 2022-01-01 RX ORDER — NALOXONE HCL 0.4 MG/ML
0.02 VIAL (ML) INJECTION
Status: DISCONTINUED | OUTPATIENT
Start: 2022-01-01 | End: 2022-01-01 | Stop reason: HOSPADM

## 2022-01-01 RX ORDER — FLUCONAZOLE 100 MG/1
100 TABLET ORAL DAILY
Status: DISCONTINUED | OUTPATIENT
Start: 2022-01-01 | End: 2022-01-01 | Stop reason: HOSPADM

## 2022-01-01 RX ORDER — INSULIN ASPART 100 [IU]/ML
0-5 INJECTION, SOLUTION INTRAVENOUS; SUBCUTANEOUS
Refills: 0
Start: 2022-01-01 | End: 2023-09-09

## 2022-01-01 RX ORDER — SODIUM CHLORIDE 0.9 % (FLUSH) 0.9 %
10 SYRINGE (ML) INJECTION
Status: DISCONTINUED | OUTPATIENT
Start: 2022-01-01 | End: 2022-01-01

## 2022-01-01 RX ORDER — EPHEDRINE SULFATE 50 MG/ML
INJECTION, SOLUTION INTRAVENOUS
Status: DISCONTINUED | OUTPATIENT
Start: 2022-01-01 | End: 2022-01-01

## 2022-01-01 RX ORDER — GLUCAGON 1 MG
1 KIT INJECTION
Status: DISCONTINUED | OUTPATIENT
Start: 2022-01-01 | End: 2022-01-01 | Stop reason: HOSPADM

## 2022-01-01 RX ORDER — INSULIN ASPART 100 [IU]/ML
1-10 INJECTION, SOLUTION INTRAVENOUS; SUBCUTANEOUS
Status: DISCONTINUED | OUTPATIENT
Start: 2022-01-01 | End: 2022-01-01

## 2022-01-01 RX ORDER — ONDANSETRON 8 MG/1
8 TABLET, ORALLY DISINTEGRATING ORAL EVERY 8 HOURS PRN
Status: DISCONTINUED | OUTPATIENT
Start: 2022-01-01 | End: 2022-01-01 | Stop reason: HOSPADM

## 2022-01-01 RX ORDER — IBUPROFEN 200 MG
24 TABLET ORAL
Status: DISCONTINUED | OUTPATIENT
Start: 2022-01-01 | End: 2022-01-01

## 2022-01-01 RX ORDER — FUROSEMIDE 80 MG/1
80 TABLET ORAL DAILY
Status: DISCONTINUED | OUTPATIENT
Start: 2022-01-01 | End: 2022-01-01

## 2022-01-01 RX ORDER — POLYETHYLENE GLYCOL 3350 17 G/17G
17 POWDER, FOR SOLUTION ORAL ONCE
Status: COMPLETED | OUTPATIENT
Start: 2022-01-01 | End: 2022-01-01

## 2022-01-01 RX ORDER — ISOSORBIDE MONONITRATE 120 MG/1
TABLET, EXTENDED RELEASE ORAL
Qty: 180 TABLET | Refills: 1 | Status: SHIPPED | OUTPATIENT
Start: 2022-01-01 | End: 2022-01-01

## 2022-01-01 RX ORDER — TALC
6 POWDER (GRAM) TOPICAL NIGHTLY
Status: DISCONTINUED | OUTPATIENT
Start: 2022-01-01 | End: 2022-01-01

## 2022-01-01 RX ORDER — MAG HYDROX/ALUMINUM HYD/SIMETH 200-200-20
30 SUSPENSION, ORAL (FINAL DOSE FORM) ORAL
Status: DISCONTINUED | OUTPATIENT
Start: 2022-01-01 | End: 2022-01-01

## 2022-01-01 RX ORDER — IBUPROFEN 200 MG
16 TABLET ORAL
Status: DISCONTINUED | OUTPATIENT
Start: 2022-01-01 | End: 2022-01-01

## 2022-01-01 RX ORDER — SODIUM CHLORIDE 0.9 % (FLUSH) 0.9 %
3 SYRINGE (ML) INJECTION EVERY 30 MIN PRN
Status: DISCONTINUED | OUTPATIENT
Start: 2022-01-01 | End: 2022-01-01

## 2022-01-01 RX ORDER — ACETAMINOPHEN 325 MG/1
650 TABLET ORAL EVERY 4 HOURS PRN
Status: DISCONTINUED | OUTPATIENT
Start: 2022-01-01 | End: 2022-01-01

## 2022-01-01 RX ORDER — SODIUM CHLORIDE 9 MG/ML
INJECTION, SOLUTION INTRAVENOUS ONCE
Status: DISCONTINUED | OUTPATIENT
Start: 2022-01-01 | End: 2022-01-01 | Stop reason: HOSPADM

## 2022-01-01 RX ORDER — PROPOFOL 10 MG/ML
VIAL (ML) INTRAVENOUS
Status: DISCONTINUED | OUTPATIENT
Start: 2022-01-01 | End: 2022-01-01

## 2022-01-01 RX ORDER — CLONIDINE HYDROCHLORIDE 0.1 MG/1
0.2 TABLET ORAL
Status: COMPLETED | OUTPATIENT
Start: 2022-01-01 | End: 2022-01-01

## 2022-01-01 RX ORDER — IBUPROFEN 200 MG
24 TABLET ORAL
Status: DISCONTINUED | OUTPATIENT
Start: 2022-01-01 | End: 2022-01-01 | Stop reason: HOSPADM

## 2022-01-01 RX ORDER — PEN NEEDLE, DIABETIC 30 GX3/16"
NEEDLE, DISPOSABLE MISCELLANEOUS
Qty: 100 EACH | Refills: 1 | Status: SHIPPED | OUTPATIENT
Start: 2022-01-01

## 2022-01-01 RX ORDER — ASPIRIN 81 MG/1
81 TABLET ORAL DAILY
Status: DISCONTINUED | OUTPATIENT
Start: 2022-01-01 | End: 2022-01-01

## 2022-01-01 RX ADMIN — ATORVASTATIN CALCIUM 40 MG: 40 TABLET, FILM COATED ORAL at 08:08

## 2022-01-01 RX ADMIN — CARVEDILOL 3.12 MG: 3.12 TABLET, FILM COATED ORAL at 09:08

## 2022-01-01 RX ADMIN — CALCIUM ACETATE 667 MG: 667 CAPSULE ORAL at 04:08

## 2022-01-01 RX ADMIN — PANTOPRAZOLE SODIUM 40 MG: 40 TABLET, DELAYED RELEASE ORAL at 08:09

## 2022-01-01 RX ADMIN — IPRATROPIUM BROMIDE AND ALBUTEROL SULFATE 3 ML: 2.5; .5 SOLUTION RESPIRATORY (INHALATION) at 12:08

## 2022-01-01 RX ADMIN — FUROSEMIDE 80 MG: 80 TABLET ORAL at 12:08

## 2022-01-01 RX ADMIN — NIFEDIPINE 90 MG: 60 TABLET, FILM COATED, EXTENDED RELEASE ORAL at 08:08

## 2022-01-01 RX ADMIN — POLYETHYLENE GLYCOL 3350 17 G: 17 POWDER, FOR SOLUTION ORAL at 08:08

## 2022-01-01 RX ADMIN — CARVEDILOL 12.5 MG: 12.5 TABLET, FILM COATED ORAL at 09:08

## 2022-01-01 RX ADMIN — TRAZODONE HYDROCHLORIDE 50 MG: 50 TABLET ORAL at 09:08

## 2022-01-01 RX ADMIN — IPRATROPIUM BROMIDE AND ALBUTEROL SULFATE 3 ML: 2.5; .5 SOLUTION RESPIRATORY (INHALATION) at 07:08

## 2022-01-01 RX ADMIN — HEPARIN SODIUM 5000 UNITS: 5000 INJECTION INTRAVENOUS; SUBCUTANEOUS at 06:08

## 2022-01-01 RX ADMIN — CARVEDILOL 3.12 MG: 3.12 TABLET, FILM COATED ORAL at 09:09

## 2022-01-01 RX ADMIN — IPRATROPIUM BROMIDE AND ALBUTEROL SULFATE 3 ML: 2.5; .5 SOLUTION RESPIRATORY (INHALATION) at 07:09

## 2022-01-01 RX ADMIN — ATORVASTATIN CALCIUM 40 MG: 40 TABLET, FILM COATED ORAL at 09:08

## 2022-01-01 RX ADMIN — Medication 1 APPLICATOR: at 12:09

## 2022-01-01 RX ADMIN — EPHEDRINE SULFATE 10 MG: 50 INJECTION INTRAVENOUS at 11:08

## 2022-01-01 RX ADMIN — CEFEPIME HYDROCHLORIDE 1 G: 1 INJECTION, SOLUTION INTRAVENOUS at 02:09

## 2022-01-01 RX ADMIN — ERYTHROPOIETIN 5000 UNITS: 10000 INJECTION, SOLUTION INTRAVENOUS; SUBCUTANEOUS at 09:08

## 2022-01-01 RX ADMIN — HYDRALAZINE HYDROCHLORIDE 10 MG: 20 INJECTION, SOLUTION INTRAMUSCULAR; INTRAVENOUS at 12:08

## 2022-01-01 RX ADMIN — HYDROXYZINE HYDROCHLORIDE 25 MG: 25 TABLET, FILM COATED ORAL at 04:08

## 2022-01-01 RX ADMIN — LOSARTAN POTASSIUM 50 MG: 50 TABLET, FILM COATED ORAL at 09:08

## 2022-01-01 RX ADMIN — DIPHENHYDRAMINE HYDROCHLORIDE 25 MG: 25 CAPSULE ORAL at 08:09

## 2022-01-01 RX ADMIN — ALUMINUM HYDROXIDE, MAGNESIUM HYDROXIDE, AND SIMETHICONE 30 ML: 200; 200; 20 SUSPENSION ORAL at 09:08

## 2022-01-01 RX ADMIN — ATORVASTATIN CALCIUM 40 MG: 40 TABLET, FILM COATED ORAL at 08:09

## 2022-01-01 RX ADMIN — IPRATROPIUM BROMIDE AND ALBUTEROL SULFATE 3 ML: 2.5; .5 SOLUTION RESPIRATORY (INHALATION) at 01:09

## 2022-01-01 RX ADMIN — FUROSEMIDE 80 MG: 80 TABLET ORAL at 11:08

## 2022-01-01 RX ADMIN — SODIUM ZIRCONIUM CYCLOSILICATE 5 G: 5 POWDER, FOR SUSPENSION ORAL at 04:08

## 2022-01-01 RX ADMIN — CALCIUM ACETATE 667 MG: 667 CAPSULE ORAL at 12:08

## 2022-01-01 RX ADMIN — FUROSEMIDE 80 MG: 80 TABLET ORAL at 09:08

## 2022-01-01 RX ADMIN — POLYETHYLENE GLYCOL 3350 17 G: 17 POWDER, FOR SOLUTION ORAL at 09:08

## 2022-01-01 RX ADMIN — ISOSORBIDE MONONITRATE 60 MG: 60 TABLET, EXTENDED RELEASE ORAL at 11:08

## 2022-01-01 RX ADMIN — PANTOPRAZOLE SODIUM 40 MG: 40 TABLET, DELAYED RELEASE ORAL at 09:09

## 2022-01-01 RX ADMIN — PANTOPRAZOLE SODIUM 40 MG: 40 TABLET, DELAYED RELEASE ORAL at 12:08

## 2022-01-01 RX ADMIN — ALUMINUM HYDROXIDE, MAGNESIUM HYDROXIDE, AND SIMETHICONE 30 ML: 200; 200; 20 SUSPENSION ORAL at 08:08

## 2022-01-01 RX ADMIN — CEFEPIME HYDROCHLORIDE 1 G: 1 INJECTION, SOLUTION INTRAVENOUS at 03:09

## 2022-01-01 RX ADMIN — CARVEDILOL 3.12 MG: 3.12 TABLET, FILM COATED ORAL at 08:09

## 2022-01-01 RX ADMIN — ERYTHROPOIETIN 5000 UNITS: 10000 INJECTION, SOLUTION INTRAVENOUS; SUBCUTANEOUS at 12:09

## 2022-01-01 RX ADMIN — IPRATROPIUM BROMIDE AND ALBUTEROL SULFATE 3 ML: 2.5; .5 SOLUTION RESPIRATORY (INHALATION) at 01:08

## 2022-01-01 RX ADMIN — SODIUM ZIRCONIUM CYCLOSILICATE 5 G: 5 POWDER, FOR SUSPENSION ORAL at 02:08

## 2022-01-01 RX ADMIN — IPRATROPIUM BROMIDE AND ALBUTEROL SULFATE 3 ML: 2.5; .5 SOLUTION RESPIRATORY (INHALATION) at 08:09

## 2022-01-01 RX ADMIN — SUCRALFATE 1 G: 1 SUSPENSION ORAL at 03:08

## 2022-01-01 RX ADMIN — IPRATROPIUM BROMIDE AND ALBUTEROL SULFATE 3 ML: 2.5; .5 SOLUTION RESPIRATORY (INHALATION) at 08:08

## 2022-01-01 RX ADMIN — FUROSEMIDE 80 MG: 80 TABLET ORAL at 10:08

## 2022-01-01 RX ADMIN — ATORVASTATIN CALCIUM 40 MG: 40 TABLET, FILM COATED ORAL at 09:09

## 2022-01-01 RX ADMIN — ROCURONIUM BROMIDE 30 MG: 10 INJECTION, SOLUTION INTRAVENOUS at 09:08

## 2022-01-01 RX ADMIN — HYDRALAZINE HYDROCHLORIDE 100 MG: 50 TABLET ORAL at 11:08

## 2022-01-01 RX ADMIN — SUCRALFATE 1 G: 1 SUSPENSION ORAL at 06:08

## 2022-01-01 RX ADMIN — CEFEPIME HYDROCHLORIDE 1 G: 1 INJECTION, SOLUTION INTRAVENOUS at 09:09

## 2022-01-01 RX ADMIN — MUPIROCIN: 20 OINTMENT TOPICAL at 09:08

## 2022-01-01 RX ADMIN — CALCIUM ACETATE 667 MG: 667 CAPSULE ORAL at 09:08

## 2022-01-01 RX ADMIN — VANCOMYCIN HYDROCHLORIDE 500 MG: 500 INJECTION, POWDER, LYOPHILIZED, FOR SOLUTION INTRAVENOUS at 05:09

## 2022-01-01 RX ADMIN — Medication 16 G: at 11:08

## 2022-01-01 RX ADMIN — Medication 6 MG: at 03:08

## 2022-01-01 RX ADMIN — ONDANSETRON 8 MG: 8 TABLET, ORALLY DISINTEGRATING ORAL at 03:08

## 2022-01-01 RX ADMIN — MIDAZOLAM HYDROCHLORIDE 1 MG: 1 INJECTION, SOLUTION INTRAMUSCULAR; INTRAVENOUS at 09:08

## 2022-01-01 RX ADMIN — SENNOSIDES AND DOCUSATE SODIUM 1 TABLET: 50; 8.6 TABLET ORAL at 08:09

## 2022-01-01 RX ADMIN — INSULIN ASPART 1 UNITS: 100 INJECTION, SOLUTION INTRAVENOUS; SUBCUTANEOUS at 11:08

## 2022-01-01 RX ADMIN — ERYTHROPOIETIN 5000 UNITS: 10000 INJECTION, SOLUTION INTRAVENOUS; SUBCUTANEOUS at 10:08

## 2022-01-01 RX ADMIN — SODIUM ZIRCONIUM CYCLOSILICATE 5 G: 5 POWDER, FOR SUSPENSION ORAL at 09:08

## 2022-01-01 RX ADMIN — HEPARIN SODIUM 5000 UNITS: 5000 INJECTION INTRAVENOUS; SUBCUTANEOUS at 09:08

## 2022-01-01 RX ADMIN — SODIUM ZIRCONIUM CYCLOSILICATE 5 G: 5 POWDER, FOR SUSPENSION ORAL at 03:08

## 2022-01-01 RX ADMIN — ISOSORBIDE MONONITRATE 60 MG: 60 TABLET, EXTENDED RELEASE ORAL at 09:08

## 2022-01-01 RX ADMIN — SODIUM CHLORIDE: 0.9 INJECTION, SOLUTION INTRAVENOUS at 03:08

## 2022-01-01 RX ADMIN — IPRATROPIUM BROMIDE AND ALBUTEROL SULFATE 3 ML: 2.5; .5 SOLUTION RESPIRATORY (INHALATION) at 12:09

## 2022-01-01 RX ADMIN — FENTANYL CITRATE 75 MCG: 50 INJECTION, SOLUTION INTRAMUSCULAR; INTRAVENOUS at 09:08

## 2022-01-01 RX ADMIN — CALCIUM ACETATE 667 MG: 667 CAPSULE ORAL at 03:08

## 2022-01-01 RX ADMIN — CEFTRIAXONE 1 G: 1 INJECTION, SOLUTION INTRAVENOUS at 12:08

## 2022-01-01 RX ADMIN — SENNOSIDES AND DOCUSATE SODIUM 1 TABLET: 50; 8.6 TABLET ORAL at 03:09

## 2022-01-01 RX ADMIN — ERYTHROPOIETIN 5000 UNITS: 10000 INJECTION, SOLUTION INTRAVENOUS; SUBCUTANEOUS at 04:08

## 2022-01-01 RX ADMIN — VANCOMYCIN HYDROCHLORIDE 1000 MG: 1 INJECTION, POWDER, LYOPHILIZED, FOR SOLUTION INTRAVENOUS at 10:09

## 2022-01-01 RX ADMIN — Medication 20 MG: at 04:08

## 2022-01-01 RX ADMIN — DIPHENHYDRAMINE HYDROCHLORIDE 25 MG: 25 CAPSULE ORAL at 09:09

## 2022-01-01 RX ADMIN — FUROSEMIDE 80 MG: 80 TABLET ORAL at 08:08

## 2022-01-01 RX ADMIN — SODIUM CHLORIDE: 0.9 INJECTION, SOLUTION INTRAVENOUS at 08:09

## 2022-01-01 RX ADMIN — GLYCOPYRROLATE 0.2 MG: 0.2 INJECTION, SOLUTION INTRAMUSCULAR; INTRAVITREAL at 10:08

## 2022-01-01 RX ADMIN — VANCOMYCIN HYDROCHLORIDE 500 MG: 500 INJECTION, POWDER, LYOPHILIZED, FOR SOLUTION INTRAVENOUS at 01:09

## 2022-01-01 RX ADMIN — CARVEDILOL 12.5 MG: 12.5 TABLET, FILM COATED ORAL at 10:08

## 2022-01-01 RX ADMIN — SUCRALFATE 1 G: 1 SUSPENSION ORAL at 07:08

## 2022-01-01 RX ADMIN — IPRATROPIUM BROMIDE AND ALBUTEROL SULFATE 3 ML: 2.5; .5 SOLUTION RESPIRATORY (INHALATION) at 09:08

## 2022-01-01 RX ADMIN — PANTOPRAZOLE SODIUM 40 MG: 40 TABLET, DELAYED RELEASE ORAL at 09:08

## 2022-01-01 RX ADMIN — CLONIDINE HYDROCHLORIDE 0.2 MG: 0.1 TABLET ORAL at 04:08

## 2022-01-01 RX ADMIN — DIPHENHYDRAMINE HYDROCHLORIDE 25 MG: 25 CAPSULE ORAL at 12:09

## 2022-01-01 RX ADMIN — CARVEDILOL 12.5 MG: 12.5 TABLET, FILM COATED ORAL at 08:08

## 2022-01-01 RX ADMIN — ONDANSETRON 4 MG: 2 INJECTION INTRAMUSCULAR; INTRAVENOUS at 04:08

## 2022-01-01 RX ADMIN — INSULIN ASPART 2 UNITS: 100 INJECTION, SOLUTION INTRAVENOUS; SUBCUTANEOUS at 05:08

## 2022-01-01 RX ADMIN — SODIUM CHLORIDE: 0.9 INJECTION, SOLUTION INTRAVENOUS at 07:08

## 2022-01-01 RX ADMIN — SUCRALFATE 1 G: 1 SUSPENSION ORAL at 12:08

## 2022-01-01 RX ADMIN — SODIUM ZIRCONIUM CYCLOSILICATE 10 G: 5 POWDER, FOR SUSPENSION ORAL at 09:08

## 2022-01-01 RX ADMIN — ASPIRIN 81 MG: 81 TABLET, COATED ORAL at 09:08

## 2022-01-01 RX ADMIN — CARVEDILOL 3.12 MG: 3.12 TABLET, FILM COATED ORAL at 12:09

## 2022-01-01 RX ADMIN — ROCURONIUM BROMIDE 10 MG: 10 INJECTION, SOLUTION INTRAVENOUS at 11:08

## 2022-01-01 RX ADMIN — LIDOCAINE HYDROCHLORIDE 50 MG: 10 INJECTION, SOLUTION INTRAVENOUS at 09:08

## 2022-01-01 RX ADMIN — IPRATROPIUM BROMIDE AND ALBUTEROL SULFATE 3 ML: 2.5; .5 SOLUTION RESPIRATORY (INHALATION) at 02:09

## 2022-01-01 RX ADMIN — POLYETHYLENE GLYCOL 3350 17 G: 17 POWDER, FOR SOLUTION ORAL at 04:09

## 2022-01-01 RX ADMIN — SODIUM ZIRCONIUM CYCLOSILICATE 5 G: 5 POWDER, FOR SUSPENSION ORAL at 12:08

## 2022-01-01 RX ADMIN — HEPARIN SODIUM 5000 UNITS: 5000 INJECTION INTRAVENOUS; SUBCUTANEOUS at 05:08

## 2022-01-01 RX ADMIN — FLUCONAZOLE 100 MG: 100 TABLET ORAL at 12:09

## 2022-01-01 RX ADMIN — FLUCONAZOLE 200 MG: 200 TABLET ORAL at 03:09

## 2022-01-01 RX ADMIN — CARVEDILOL 3.12 MG: 3.12 TABLET, FILM COATED ORAL at 08:08

## 2022-01-01 RX ADMIN — TRAZODONE HYDROCHLORIDE 50 MG: 50 TABLET ORAL at 10:08

## 2022-01-01 RX ADMIN — IPRATROPIUM BROMIDE AND ALBUTEROL SULFATE 3 ML: 2.5; .5 SOLUTION RESPIRATORY (INHALATION) at 02:08

## 2022-01-01 RX ADMIN — INSULIN ASPART 2 UNITS: 100 INJECTION, SOLUTION INTRAVENOUS; SUBCUTANEOUS at 09:08

## 2022-01-01 RX ADMIN — ROCURONIUM BROMIDE 20 MG: 10 INJECTION, SOLUTION INTRAVENOUS at 10:08

## 2022-01-01 RX ADMIN — ROCURONIUM BROMIDE 30 MG: 10 INJECTION, SOLUTION INTRAVENOUS at 10:08

## 2022-01-01 RX ADMIN — INSULIN ASPART 2 UNITS: 100 INJECTION, SOLUTION INTRAVENOUS; SUBCUTANEOUS at 12:08

## 2022-01-01 RX ADMIN — LIDOCAINE HYDROCHLORIDE 10 ML: 10 INJECTION, SOLUTION INFILTRATION; PERINEURAL at 08:08

## 2022-01-01 RX ADMIN — SODIUM ZIRCONIUM CYCLOSILICATE 5 G: 5 POWDER, FOR SUSPENSION ORAL at 08:08

## 2022-01-01 RX ADMIN — SODIUM ZIRCONIUM CYCLOSILICATE 10 G: 5 POWDER, FOR SUSPENSION ORAL at 08:08

## 2022-01-01 RX ADMIN — ERYTHROPOIETIN 5000 UNITS: 10000 INJECTION, SOLUTION INTRAVENOUS; SUBCUTANEOUS at 05:08

## 2022-01-01 RX ADMIN — ROCURONIUM BROMIDE 20 MG: 10 INJECTION, SOLUTION INTRAVENOUS at 09:08

## 2022-01-01 RX ADMIN — SODIUM ZIRCONIUM CYCLOSILICATE 5 G: 5 POWDER, FOR SUSPENSION ORAL at 12:09

## 2022-01-01 RX ADMIN — CALCIUM ACETATE 667 MG: 667 CAPSULE ORAL at 06:08

## 2022-01-01 RX ADMIN — ASPIRIN 81 MG: 81 TABLET, COATED ORAL at 08:08

## 2022-01-01 RX ADMIN — INSULIN ASPART 2 UNITS: 100 INJECTION, SOLUTION INTRAVENOUS; SUBCUTANEOUS at 08:09

## 2022-01-01 RX ADMIN — MUPIROCIN: 20 OINTMENT TOPICAL at 08:08

## 2022-01-01 RX ADMIN — POTASSIUM & SODIUM PHOSPHATES POWDER PACK 280-160-250 MG 2 PACKET: 280-160-250 PACK at 08:08

## 2022-01-01 RX ADMIN — CEFAZOLIN 2 G: 330 INJECTION, POWDER, FOR SOLUTION INTRAMUSCULAR; INTRAVENOUS at 10:08

## 2022-01-01 RX ADMIN — Medication 6 MG: at 09:08

## 2022-01-01 RX ADMIN — HEPARIN SODIUM 5000 UNITS: 5000 INJECTION INTRAVENOUS; SUBCUTANEOUS at 02:08

## 2022-01-01 RX ADMIN — MUPIROCIN: 20 OINTMENT TOPICAL at 11:08

## 2022-01-01 RX ADMIN — SODIUM ZIRCONIUM CYCLOSILICATE 5 G: 5 POWDER, FOR SUSPENSION ORAL at 11:08

## 2022-01-01 RX ADMIN — ACETAMINOPHEN 650 MG: 325 TABLET ORAL at 09:09

## 2022-01-01 RX ADMIN — PANTOPRAZOLE SODIUM 40 MG: 40 TABLET, DELAYED RELEASE ORAL at 08:08

## 2022-01-01 RX ADMIN — SODIUM PHOSPHATE, MONOBASIC, MONOHYDRATE 15 MMOL: 276; 142 INJECTION, SOLUTION INTRAVENOUS at 02:08

## 2022-01-01 RX ADMIN — CLONIDINE HYDROCHLORIDE 0.2 MG: 0.2 TABLET ORAL at 09:08

## 2022-01-01 RX ADMIN — DIPHENHYDRAMINE HYDROCHLORIDE 25 MG: 25 CAPSULE ORAL at 08:08

## 2022-01-01 RX ADMIN — CALCIUM ACETATE 667 MG: 667 CAPSULE ORAL at 05:08

## 2022-01-01 RX ADMIN — SODIUM CHLORIDE, SODIUM GLUCONATE, SODIUM ACETATE, POTASSIUM CHLORIDE, MAGNESIUM CHLORIDE, SODIUM PHOSPHATE, DIBASIC, AND POTASSIUM PHOSPHATE: .53; .5; .37; .037; .03; .012; .00082 INJECTION, SOLUTION INTRAVENOUS at 09:08

## 2022-01-01 RX ADMIN — PANTOPRAZOLE SODIUM 40 MG: 40 TABLET, DELAYED RELEASE ORAL at 12:09

## 2022-01-01 RX ADMIN — CLONIDINE HYDROCHLORIDE 0.2 MG: 0.2 TABLET ORAL at 08:08

## 2022-01-01 RX ADMIN — VANCOMYCIN HYDROCHLORIDE 500 MG: 500 INJECTION, POWDER, LYOPHILIZED, FOR SOLUTION INTRAVENOUS at 03:09

## 2022-01-01 RX ADMIN — SODIUM ZIRCONIUM CYCLOSILICATE 5 G: 5 POWDER, FOR SUSPENSION ORAL at 10:08

## 2022-01-01 RX ADMIN — POTASSIUM PHOSPHATE, MONOBASIC AND POTASSIUM PHOSPHATE, DIBASIC 15 MMOL: 224; 236 INJECTION, SOLUTION, CONCENTRATE INTRAVENOUS at 10:08

## 2022-01-01 RX ADMIN — SODIUM CHLORIDE: 0.9 INJECTION, SOLUTION INTRAVENOUS at 01:08

## 2022-01-01 RX ADMIN — HYDRALAZINE HYDROCHLORIDE 100 MG: 50 TABLET ORAL at 09:08

## 2022-01-01 RX ADMIN — PANTOPRAZOLE SODIUM 40 MG: 40 TABLET, DELAYED RELEASE ORAL at 10:08

## 2022-01-01 RX ADMIN — NIFEDIPINE 90 MG: 60 TABLET, FILM COATED, EXTENDED RELEASE ORAL at 09:08

## 2022-01-01 RX ADMIN — ASPIRIN 81 MG: 81 TABLET, COATED ORAL at 11:08

## 2022-01-01 RX ADMIN — POLYETHYLENE GLYCOL 3350 17 G: 17 POWDER, FOR SOLUTION ORAL at 10:08

## 2022-01-01 RX ADMIN — ASPIRIN 81 MG: 81 TABLET, COATED ORAL at 04:08

## 2022-01-01 RX ADMIN — CALCIUM ACETATE 667 MG: 667 CAPSULE ORAL at 11:08

## 2022-01-01 RX ADMIN — ALUMINUM HYDROXIDE, MAGNESIUM HYDROXIDE, AND SIMETHICONE 30 ML: 200; 200; 20 SUSPENSION ORAL at 03:08

## 2022-01-01 RX ADMIN — ALUMINUM HYDROXIDE, MAGNESIUM HYDROXIDE, AND SIMETHICONE 30 ML: 200; 200; 20 SUSPENSION ORAL at 04:08

## 2022-01-01 RX ADMIN — INSULIN ASPART 1 UNITS: 100 INJECTION, SOLUTION INTRAVENOUS; SUBCUTANEOUS at 09:09

## 2022-01-01 RX ADMIN — CEFEPIME HYDROCHLORIDE 1 G: 1 INJECTION, SOLUTION INTRAVENOUS at 04:09

## 2022-01-01 RX ADMIN — ERYTHROPOIETIN 5000 UNITS: 10000 INJECTION, SOLUTION INTRAVENOUS; SUBCUTANEOUS at 12:08

## 2022-01-01 RX ADMIN — ONDANSETRON 4 MG: 2 INJECTION INTRAMUSCULAR; INTRAVENOUS at 01:08

## 2022-01-01 RX ADMIN — HYDROCODONE BITARTRATE AND ACETAMINOPHEN 1 TABLET: 5; 325 TABLET ORAL at 09:08

## 2022-01-01 RX ADMIN — AZITHROMYCIN MONOHYDRATE 500 MG: 250 TABLET ORAL at 12:08

## 2022-01-01 RX ADMIN — POLYETHYLENE GLYCOL 3350 17 G: 17 POWDER, FOR SOLUTION ORAL at 12:08

## 2022-01-01 RX ADMIN — ALUMINUM HYDROXIDE, MAGNESIUM HYDROXIDE, AND SIMETHICONE 30 ML: 200; 200; 20 SUSPENSION ORAL at 06:08

## 2022-01-01 RX ADMIN — CLONIDINE HYDROCHLORIDE 0.2 MG: 0.2 TABLET ORAL at 06:08

## 2022-01-01 RX ADMIN — HEPARIN SODIUM 5000 UNITS: 5000 INJECTION INTRAVENOUS; SUBCUTANEOUS at 03:08

## 2022-01-01 RX ADMIN — CALCIUM ACETATE 667 MG: 667 CAPSULE ORAL at 08:08

## 2022-01-01 RX ADMIN — POTASSIUM & SODIUM PHOSPHATES POWDER PACK 280-160-250 MG 2 PACKET: 280-160-250 PACK at 02:08

## 2022-01-01 RX ADMIN — ERYTHROPOIETIN 5000 UNITS: 10000 INJECTION, SOLUTION INTRAVENOUS; SUBCUTANEOUS at 10:09

## 2022-01-01 RX ADMIN — HYDRALAZINE HYDROCHLORIDE 100 MG: 50 TABLET ORAL at 08:08

## 2022-01-01 RX ADMIN — ERYTHROPOIETIN 10000 UNITS: 10000 INJECTION, SOLUTION INTRAVENOUS; SUBCUTANEOUS at 11:09

## 2022-01-01 RX ADMIN — SODIUM CHLORIDE: 0.9 INJECTION, SOLUTION INTRAVENOUS at 09:08

## 2022-01-01 RX ADMIN — PROPOFOL 80 MG: 10 INJECTION, EMULSION INTRAVENOUS at 09:08

## 2022-01-01 RX ADMIN — SODIUM ZIRCONIUM CYCLOSILICATE 10 G: 5 POWDER, FOR SUSPENSION ORAL at 04:08

## 2022-01-01 RX ADMIN — POLYETHYLENE GLYCOL 3350 17 G: 17 POWDER, FOR SOLUTION ORAL at 08:09

## 2022-01-01 RX ADMIN — LOSARTAN POTASSIUM 50 MG: 50 TABLET, FILM COATED ORAL at 11:08

## 2022-01-01 RX ADMIN — NIFEDIPINE 90 MG: 60 TABLET, FILM COATED, EXTENDED RELEASE ORAL at 12:08

## 2022-01-01 RX ADMIN — CARVEDILOL 3.12 MG: 3.12 TABLET, FILM COATED ORAL at 12:08

## 2022-01-01 RX ADMIN — INSULIN ASPART 1 UNITS: 100 INJECTION, SOLUTION INTRAVENOUS; SUBCUTANEOUS at 10:09

## 2022-01-01 RX ADMIN — SUCRALFATE 1 G: 1 SUSPENSION ORAL at 09:08

## 2022-01-01 RX ADMIN — POLYETHYLENE GLYCOL 3350 17 G: 17 POWDER, FOR SOLUTION ORAL at 12:09

## 2022-01-01 RX ADMIN — IPRATROPIUM BROMIDE AND ALBUTEROL SULFATE 3 ML: 2.5; .5 SOLUTION RESPIRATORY (INHALATION) at 09:09

## 2022-01-01 RX ADMIN — EPHEDRINE SULFATE 10 MG: 50 INJECTION INTRAVENOUS at 01:08

## 2022-01-01 RX ADMIN — SUCRALFATE 1 G: 1 SUSPENSION ORAL at 04:08

## 2022-01-01 RX ADMIN — INSULIN ASPART 2 UNITS: 100 INJECTION, SOLUTION INTRAVENOUS; SUBCUTANEOUS at 01:09

## 2022-01-01 RX ADMIN — Medication 20 MG: at 09:08

## 2022-01-01 RX ADMIN — ONDANSETRON 8 MG: 8 TABLET, ORALLY DISINTEGRATING ORAL at 09:08

## 2022-01-01 RX ADMIN — ATORVASTATIN CALCIUM 40 MG: 40 TABLET, FILM COATED ORAL at 12:09

## 2022-01-01 RX ADMIN — TRAZODONE HYDROCHLORIDE 50 MG: 50 TABLET ORAL at 08:08

## 2022-01-01 RX ADMIN — SUGAMMADEX 200 MG: 100 INJECTION, SOLUTION INTRAVENOUS at 01:08

## 2022-01-01 RX ADMIN — INSULIN ASPART 2 UNITS: 100 INJECTION, SOLUTION INTRAVENOUS; SUBCUTANEOUS at 12:09

## 2022-01-01 RX ADMIN — POTASSIUM & SODIUM PHOSPHATES POWDER PACK 280-160-250 MG 2 PACKET: 280-160-250 PACK at 09:08

## 2022-01-01 RX ADMIN — INSULIN ASPART 2 UNITS: 100 INJECTION, SOLUTION INTRAVENOUS; SUBCUTANEOUS at 01:08

## 2022-01-01 RX ADMIN — ERYTHROPOIETIN 5000 UNITS: 3000 INJECTION, SOLUTION INTRAVENOUS; SUBCUTANEOUS at 12:09

## 2022-01-01 RX ADMIN — FENTANYL CITRATE 25 MCG: 50 INJECTION, SOLUTION INTRAMUSCULAR; INTRAVENOUS at 09:08

## 2022-01-01 RX ADMIN — SODIUM ZIRCONIUM CYCLOSILICATE 5 G: 5 POWDER, FOR SUSPENSION ORAL at 06:08

## 2022-01-01 RX ADMIN — ATORVASTATIN CALCIUM 40 MG: 40 TABLET, FILM COATED ORAL at 10:08

## 2022-01-01 RX ADMIN — LIDOCAINE HYDROCHLORIDE 100 MG: 10 INJECTION, SOLUTION INFILTRATION; PERINEURAL at 07:09

## 2022-01-01 RX ADMIN — VANCOMYCIN HYDROCHLORIDE 1000 MG: 1 INJECTION, POWDER, LYOPHILIZED, FOR SOLUTION INTRAVENOUS at 05:09

## 2022-01-01 RX ADMIN — DESMOPRESSIN ACETATE 18.9 MCG: 4 SOLUTION INTRAVENOUS at 01:08

## 2022-01-01 RX ADMIN — HEPARIN SODIUM 5000 UNITS: 5000 INJECTION INTRAVENOUS; SUBCUTANEOUS at 10:08

## 2022-01-01 RX ADMIN — CEFEPIME HYDROCHLORIDE 1 G: 1 INJECTION, SOLUTION INTRAVENOUS at 12:09

## 2022-01-01 RX ADMIN — EPHEDRINE SULFATE 20 MG: 50 INJECTION INTRAVENOUS at 01:08

## 2022-01-01 RX ADMIN — HYDRALAZINE HYDROCHLORIDE 10 MG: 20 INJECTION, SOLUTION INTRAMUSCULAR; INTRAVENOUS at 06:08

## 2022-01-01 RX ADMIN — Medication 24 G: at 11:08

## 2022-01-01 RX ADMIN — SENNOSIDES AND DOCUSATE SODIUM 1 TABLET: 50; 8.6 TABLET ORAL at 12:09

## 2022-01-01 RX ADMIN — CARVEDILOL 12.5 MG: 12.5 TABLET, FILM COATED ORAL at 11:08

## 2022-01-01 RX ADMIN — MUPIROCIN: 20 OINTMENT TOPICAL at 04:08

## 2022-02-25 NOTE — TELEPHONE ENCOUNTER
Care Due:                  Date            Visit Type   Department     Provider  --------------------------------------------------------------------------------                                [facility]                              PRIMARY      MEDICINE/  Last Visit: 10-      CARE ([facility])   INTERNAL MED   oK Perez                              [facility]                              PRIMARY      MEDICINE/  Next Visit: 03-      CARE ([facility])   INTERNAL MED   Ko Perez                                                            Last  Test          Frequency    Reason                     Performed    Due Date  --------------------------------------------------------------------------------    CMP.........  12 months..  BASAGLAR, atorvastatin...  02- 01-    Powered by [employer]. Reference number: [id number].   2/25/2022 11:49:52 AM CST

## 2022-02-25 NOTE — TELEPHONE ENCOUNTER
"----- Message from Re Calabrese sent at 2/25/2022 11:43 AM CST -----  Regarding: wife [phone]  Type: RX Refill Request    Who Called:  wife    Have you contacted your pharmacy: yes    Refill or New Rx: refill    RX Name and Strength:pen needle, diabetic 31 gauge x 3/16" Ndle      Preferred Pharmacy with phone number:   [facility]/pharmacy [place][id number] - [place]  [address]  [address] [address]  Phone: [phone] Fax: [phone]    Local or Mail Order: local      Would the patient rather a call back or a response via My [facility]?  Call back    Best Call Back Number: [phone]            "

## 2022-04-19 NOTE — TELEPHONE ENCOUNTER
Refill Routing Note   Medication(s) are not appropriate for processing by [facility] for the following reason(s):      - Required vitals are abnormal    [facility] action(s):  Defer  Approve Medication-related problems identified: Requires labs     Medication Therapy Plan: Clonidine deferred due to abnormal BP. Isosorbide approved  Medication reconciliation completed: No     Appointments  past 12m or future 3m with PCP    Date Provider   Last Visit   10/20/2021 Ko Perez MD   Next Visit   5/25/2022 Ko Perez MD   ED visits in past 90 days: 0        Note composed:12:13 PM 04/19/2022

## 2022-04-19 NOTE — TELEPHONE ENCOUNTER
Care Due:                  Date            Visit Type   Department     Provider  --------------------------------------------------------------------------------                                [facility]                              PRIMARY      MEDICINE/  Last Visit: 10-      CARE ([facility])   INTERNAL MED   Ko Perez                              [facility]                              PRIMARY      MEDICINE/  Next Visit: 05-      CARE ([facility])   INTERNAL MED   Ko Perez                                                            Last  Test          Frequency    Reason                     Performed    Due Date  --------------------------------------------------------------------------------    HBA1C.......  6 months...  BASAGLAR.................  05-   10-    Lipid Panel.  12 months..  atorvastatin.............  Not Found    Overdue    Powered by [employer]. Reference number: [id number].   4/19/2022 12:34:38 AM CDT

## 2022-04-22 NOTE — TELEPHONE ENCOUNTER
No new care gaps identified.  Powered by [employer] by [employer]. Reference number: [id number].   4/22/2022 12:15:11 PM CDT

## 2022-04-22 NOTE — TELEPHONE ENCOUNTER
----- Message from Therese Pollard sent at 4/22/2022 12:10 PM CDT -----  Type: RX Refill Request    Who Called: self     Have you contacted your pharmacy:     Refill or New Rx: refill     RX Name and Strength: BASAGLAR KWIKPEN U-100 INSULIN glargine 100 units/mL (3mL) SubQ pen    How is the patient currently taking it? (ex. 1XDay):    Is this a 30 day or 90 day RX:    Preferred Pharmacy with phone number:    [facility]/pharmacy #[id number] - [place] - [address] [place] [address]  Phone: [phone] Fax: [phone]

## 2022-05-25 NOTE — Clinical Note
Please fax copy of today's note along with script in my outbox to [facility] for oxygen supplies. thankyou

## 2022-05-25 NOTE — PROGRESS NOTES
"Subjective:       Patient ID: Vega Brownlee is a 61 y.o. male.    Chief Complaint: Follow-up (Recertify for home oxygen, )    Oxygen re-certification    HPI: 62 y/o w/ ESRD DM with recurrent large right pleural effusion presents alone to discuss getting portable oxygen concentrator. He has long history of large right pleural effsion s/p thoracentesis last in Feb 2021 (exudative at that time patient refused vats) no fevers chills. Breathing at baseline. He has home concentrator that he uses at 3LPM via [place]. Has not transportable tanks does use oxygen at HD as well. Has been able to run full times no missed sessions per patient reports. Using 7 units of basal insulin daily denies hypoglycemic symptoms     Review of Systems   Constitutional: Positive for fatigue. Negative for activity change, appetite change, fever and unexpected weight change.   HENT: Negative for ear pain, rhinorrhea and sore throat.    Eyes: Negative for discharge and visual disturbance.   Respiratory: Positive for shortness of breath. Negative for chest tightness and wheezing.    Cardiovascular: Negative for chest pain, palpitations and leg swelling.   Gastrointestinal: Negative for abdominal pain, constipation and diarrhea.   Endocrine: Negative for cold intolerance and heat intolerance.   Genitourinary: Negative for dysuria and hematuria.   Musculoskeletal: Negative for joint swelling and neck stiffness.   Skin: Negative for rash.   Neurological: Negative for dizziness, syncope, weakness and headaches.   Psychiatric/Behavioral: Positive for sleep disturbance. Negative for suicidal ideas.       Objective:     Vitals:    05/25/22 0856   BP: (!) 182/60   BP Location: Left arm   Patient Position: Sitting   BP Method: Medium (Manual)   Pulse: 68   Temp: 97.9 °F (36.6 °C)   TempSrc: Oral   SpO2: (!) 80%   Weight: 67.3 kg (148 lb 5.9 oz)   Height: 5' 8" (1.727 m)     Retesting Oxygen saturation testing 83% on room air at rest     Physical " Exam  Constitutional:       Appearance: He is well-developed. He is ill-appearing.   HENT:      Head: Normocephalic and atraumatic.   Cardiovascular:      Rate and Rhythm: Normal rate and regular rhythm.      Heart sounds: No murmur heard.    No friction rub. No gallop.      Comments: Right upper arm fistula palpable thrill  Pulmonary:      Effort: Pulmonary effort is normal. No respiratory distress.      Comments: No breath sounds entire right side good air movement on left without wheezing no accesory muscl use  Abdominal:      General: Bowel sounds are normal.      Palpations: Abdomen is soft.      Tenderness: There is no abdominal tenderness. There is no guarding or rebound.   Musculoskeletal:         General: No tenderness. Normal range of motion.      Cervical back: Normal range of motion.      Right lower leg: No edema.      Left lower leg: No edema.   Skin:     General: Skin is warm and dry.      Comments: Very dry lower skin without flaking or ulceration to feet   Neurological:      Mental Status: He is alert and oriented to person, place, and time.      Cranial Nerves: No cranial nerve deficit.         Assessment and Plan   1. Recurrent right pleural effusion  Script for portable oxygen concentrator would benefit from repeat thoracentesis to pulmonary for consideration of this   - Ambulatory referral/consult to Pulmonology; Future  - OXYGEN FOR HOME USE    2. ESRD (end stage renal disease) on dialysis  HD per nephrologist    3. Chronic respiratory failure with hypoxia  Qualifies for continuous supplemental oxygen with hypoxia at rest off o2 today  - OXYGEN FOR HOME USE    4. Insomnia, unspecified type  Prn trazodone nightly  - traZODone (DESYREL) 50 MG tablet; Take 1 tablet (50 mg total) by mouth nightly as needed for Insomnia.  Dispense: 90 tablet; Refill: 0

## 2022-06-03 NOTE — TELEPHONE ENCOUNTER
----- Message from Maite Gordon sent at 6/3/2022  1:25 PM CDT -----  Regarding: wife  .Type: RX Refill Request    Who Called:  wife     Have you contacted your pharmacy:    Refill or New Rx refill     RX Name and Strength:furosemide (LASIX) 80 MG tablet        Preferred Pharmacy with phone number: .      [id number] - [place], [place] - [address] AT [facility] [address]  [address]  [place] [address]  Phone: [phone] Fax: [phone]        Local or Mail Order: local     Ordering Provider: Dr Perez     Would the patient rather a call back or a response via My [facility]?  Call     Best Call Back Number: .[phone]

## 2022-06-03 NOTE — TELEPHONE ENCOUNTER
No new care gaps identified.  [facility] Embedded Care Gaps. Reference number: [id number]. 6/03/2022   1:40:26 PM CDT

## 2022-06-05 NOTE — TELEPHONE ENCOUNTER
No new care gaps identified.  [facility] Embedded Care Gaps. Reference number: [id number]. 6/05/2022   3:22:12 PM CDT

## 2022-06-07 NOTE — TELEPHONE ENCOUNTER
Flora BARRON. Previously Denied   Refill Authorization Note   Vega Brownlee  is requesting a refill authorization.  Brief Assessment and Rationale for Refill:  Quick Discontinue  Medication Therapy Plan:  Furosemide 80mg, on 10/20/21 PCP stated there was no reason to continue.    Medication Reconciliation Completed:  No      Comments:     Note composed:1:19 PM 06/07/2022

## 2022-06-15 NOTE — TELEPHONE ENCOUNTER
No new care gaps identified.  [facility] Embedded Care Gaps. Reference number: [id number]. 6/15/2022   11:49:52 AM LACIT

## 2022-06-15 NOTE — TELEPHONE ENCOUNTER
----- Message from Zeenat Huang sent at 6/15/2022 11:21 AM CDT -----  Contact: Patrick Salazar [phone]  Type: Patient Call Back    Who called: Patrick Salazar    What is the request in detail: States patient is still waiting on his carry on oxygen. It's been 3 weeks. Would like to speak to nurse to find out when he's going to be receiving the carry on oxygen. Please call.     Would the patient rather a call back or a response via My [facility]? Call back    Best call back number: [phone]

## 2022-06-15 NOTE — TELEPHONE ENCOUNTER
----- Message from Zeenat Huang sent at 6/15/2022 11:26 AM CDT -----  Contact: Wife Marie [phone]  Type: RX Refill Request    Who Called: Patrick Salazar    Have you contacted your pharmacy: Yes. Waiting on Dr    Refill or New Rx: Refill    RX Name and Strength: fluticasone (FLONASE) 50 mcg/actuation nasal spray    Is this a 30 day or 90 day RX: 90 day with Refills    Preferred Pharmacy with phone number: .  [id number] - [place] - [address]  [address] [place] [address]  Phone: [phone] Fax: [phone]    Local or Mail Order: Local     Would the patient rather a call back or a response via My [facility]? Call back    Best Call Back Number: [phone]    Additional Information: Patient is out of medication. Please call in and call wife once this is done.

## 2022-06-15 NOTE — TELEPHONE ENCOUNTER
Spoke with patients wife. Orders faxed to [facility]. Patients wife given number [phone] to contact. verbalized understanding.

## 2022-06-17 NOTE — TELEPHONE ENCOUNTER
----- Message from Therese Pollard sent at 6/17/2022  3:48 PM CDT -----  Type: Patient Call Back    Who called: Home Health     What is the request in detail: Please give her a call about Pt pulse ox test     Can the clinic reply by [facility]?    Would the patient rather a call back or a response via My [facility]?     Best call back number: [phone]

## 2022-06-17 NOTE — TELEPHONE ENCOUNTER
Ary with HH received the cnn for patient . Needs Dr. Perez to create a addendum  for 5/25 clinical note . Note needs to say stat level 83% room air at rest (just as ). Please advise

## 2022-06-17 NOTE — TELEPHONE ENCOUNTER
Called Ary with HH back and informed Dr. Perez has revised 5/25 not eas requested . Na , lvm detailing .

## 2022-06-17 NOTE — TELEPHONE ENCOUNTER
----- Message from Therese Pollard sent at 6/17/2022  3:48 PM CDT -----  Type: Patient Call Back    Who called: Home Health     What is the request in detail: Please give her a call about Pt pulse ox test     Can the clinic reply by [facility]?    Would the patient rather a call back or a response via My [facility]?     Best call back number: [phone]

## 2022-07-24 NOTE — TELEPHONE ENCOUNTER
No new care gaps identified.  [facility] Embedded Care Gaps. Reference number: [id number]. 7/24/2022   3:24:50 PM CDT

## 2022-07-26 NOTE — TELEPHONE ENCOUNTER
Refill Routing Note   Medication(s) are not appropriate for processing by [facility] for the following reason(s):      - Required laboratory values are outdated  - Required vitals are abnormal    [facility] action(s):  Defer          Medication reconciliation completed: No     Appointments  past 12m or future 3m with PCP    Date Provider   Last Visit   5/25/2022 Ko Perez MD   Next Visit   Visit date not found Ko Perez MD   ED visits in past 90 days: 0        Note composed:8:57 PM 07/25/2022

## 2022-08-08 NOTE — TELEPHONE ENCOUNTER
----- Message from Michel Mcgrath MA sent at 8/8/2022  3:10 PM CDT -----  Type: Patient Call Back    Who called: Marie Brownlee - Wife    What is the request in detail: she is following up on her husbands medication ..     Can the clinic reply by [facility]?no    Would the patient rather a call back or a response via My [facility]? yes    Best call back number: [phone]

## 2022-08-12 PROBLEM — J96.11 CHRONIC RESPIRATORY FAILURE WITH HYPOXIA: Status: ACTIVE | Noted: 2022-01-01

## 2022-08-12 PROBLEM — J90 RECURRENT RIGHT PLEURAL EFFUSION: Status: RESOLVED | Noted: 2021-02-02 | Resolved: 2022-01-01

## 2022-08-12 PROBLEM — J90 RECURRENT PLEURAL EFFUSION ON RIGHT: Status: ACTIVE | Noted: 2020-03-13

## 2022-08-12 NOTE — PROGRESS NOTES
Vega Brownlee  was seen as a follow up.    CHIEF COMPLAINT:  Pleural Effusion      HISTORY OF PRESENT ILLNESS: Vega Brownlee is a 61 y.o. 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  Our first encounter was 8/8/18.  At that time, patient was seen for recurring effusion.  Patient is currently enlisted for kidney pancreas transplantation.  Currently undergoing hemodialysis 3 T-Th-Sat.  Patient presented to pcp office 6/11/18 with cough and subjective fever.  cxr with lll infiltrate.  Patient was started on doxycycline.  Cough is better.  Mainly in morning with whitish phlegm.  No fever/chills.  No chest pain.  No orthopnea.  No pnd.      Patient with smoked 30 pack years.  Quit in 2016.  Walk 20-30 minutes without issue.  Still play golf.  Patient underwent left thoracotomy in 2004 at [facility] for pleural effusion.  Effusion was benign.      Since our last encounter, patient was seen by Dr. Do.  S/p thoracentesis on the right side 11/1/19.  LDH was 123 and protein was 244.  Repeat thoracentesis during hospitalization 2/4/21 with ldh of 244/186.      Patient is here for pulmonary follow up.  No new issue.  Currently on oxygen at home.  But does not carry around due to heavy weight.  68% on RA but recovered to 92% on 2 lpm.  No chest pain.  No fever/chill.  Still play golf 18 holes routinely.        PAST MEDICAL HISTORY:    Active Ambulatory Problems     Diagnosis Date Noted    Essential hypertension 07/26/2016    Diabetes mellitus with ESRD (end-stage renal disease) 07/26/2016    Anemia of chronic disease 07/26/2016    Moderate protein malnutrition 07/26/2016    Tobacco use disorder, moderate, dependence 07/26/2016    Hyperlipidemia 08/04/2016    ESRD  (end stage renal disease) on dialysis 12/14/2016    Type 2 diabetes mellitus with both eyes affected by proliferative retinopathy and macular edema, with long-term current use of insulin 04/28/2017    Cyst of prostate 06/08/2017    Screening for colon cancer 08/16/2017    SOB (shortness of breath)     Pulmonary nodule 03/13/2020    Recurrent pleural effusion on right 03/13/2020    A-V fistula 09/19/2020    Patient on waiting list for kidney transplant 01/13/2021    Pulmonary hyperinflation 01/13/2021    Bleeding pseudoaneurysm of right brachiocephalic AV fistula     Pulmonary HTN 02/03/2021    Chronic respiratory failure with hypoxia 08/12/2022     Resolved Ambulatory Problems     Diagnosis Date Noted    Acute respiratory failure with hypoxia 07/26/2016    On mechanically assisted ventilation 07/26/2016    Acute renal failure 07/26/2016    Hyperkalemia 07/26/2016    CHF exacerbation 07/28/2016    Recurrent right pleural effusion 02/02/2021     Past Medical History:   Diagnosis Date    Anemia     Cataract     CHF (congestive heart failure)     Cigarette smoker     Diabetes mellitus, type 2     Disorder of kidney and ureter     ESRD on hemodialysis     Hypertension     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                 PAST SURGICAL HISTORY:    Past Surgical History:   Procedure Laterality Date    arm fracture Left     COLONOSCOPY N/A 8/16/2017    Procedure: COLONOSCOPY;  Surgeon: Sebastián Lamas MD;  Location: [address];  Service: Endoscopy;  Laterality: N/A;  dialysis pt/potassium level 1st/svn    FINGER AMPUTATION Left     left index finger    LUNG SURGERY      thoracotomy    PORTACATH PLACEMENT  08/2016         FAMILY HISTORY:                Family History   Problem Relation Age of Onset    Diabetes Mother     Memory loss Mother     No Known Problems Father     No Known Problems Sister     No Known Problems Brother      Arthritis Sister     No Known Problems Sister     Melanoma Neg Hx        SOCIAL HISTORY:          Tobacco:   Social History     Tobacco Use   Smoking Status Former Smoker    Packs/day: 0.25    Years: 30.00    Pack years: 7.50    Start date: 4/26/1982   Smokeless Tobacco Former User     alcohol use:    Social History     Substance and Sexual Activity   Alcohol Use No               Occupation:     ALLERGIES:  Review of patient's allergies indicates:  No Known Allergies    CURRENT MEDICATIONS:    Current Outpatient Medications   Medication Sig Dispense Refill    aspirin (ECOTRIN) 81 MG EC tablet Take 81 mg by mouth.      atorvastatin (LIPITOR) 40 MG tablet TAKE 1 TABLET BY MOUTH EVERY DAY IN THE EVENING 90 tablet 3    BASAGLAR KWIKPEN U-100 INSULIN glargine 100 units/mL SubQ pen INJECT 7 UNITS UNDER THE SKIN ONCE DAILY 15 each 1    betamethasone dipropionate (DIPROLENE) 0.05 % cream Apply topically 2 (two) times daily. X 1-2 wks then prn flares only 45 g 3    blood sugar diagnostic Strp 1 each by Misc.(Non-Drug; Combo Route) route as directed. 100 each 0    calcium acetate,phosphat bind, (PHOSLO) 667 mg capsule TAKE ONE CAPSULE BY MOUTH THREE TIMES DAILY WITH MEALS 270 capsule 1    calcium carbonate 470 mg calcium (1,177 mg) Chew Take 2 tablets by mouth 3 (three) times daily with meals.       carvediloL (COREG) 12.5 MG tablet Take 1 tablet (12.5 mg total) by mouth 2 (two) times daily. 180 tablet 1    clobetasoL (CLOBEX) 0.05 % shampoo Wash scalp qod. Let sit on affected areas x 5 min prior to rinsing. Avoid getting on face/in eyes. 1 Bottle 3    cloNIDine (CATAPRES) 0.2 MG tablet TAKE 1 TABLET BY MOUTH THREE TIMES A  tablet 1    DUREZOL 0.05 % Drop ophthalmic solution       fluticasone propionate (FLONASE) 50 mcg/actuation nasal spray 1 spray (50 mcg total) by Each Nostril route once daily. 16 g 2    FOLIC ACID/VIT BCOMP,C (JAM-YAQUELIN ORAL) Take 1 tablet by mouth once daily.    "   furosemide (LASIX) 80 MG tablet TAKE 1 TABLET (80 MG TOTAL) BY MOUTH ONCE DAILY. 90 tablet 0    hydrALAZINE (APRESOLINE) 100 MG tablet TAKE 1 TABLET BY MOUTH EVERY 12 HOURS 180 tablet 3    isosorbide mononitrate (IMDUR) 120 MG 24 hr tablet TAKE 2 TABLETS BY MOUTH ONCE DAILY 180 tablet 1    lancets (ACCU-CHEK SOFTCLIX LANCETS) Misc 1 application by Misc.(Non-Drug; Combo Route) route 4 (four) times daily before meals and nightly. 120 each 0    losartan (COZAAR) 50 MG tablet       pen needle, diabetic 31 gauge x 3/16" Ndle BD Ultra Fine Mini pen needles (5mm x 31g) - dispense 100 100 each 1    RENVELA 800 mg Tab Take 800 mg by mouth 3 (three) times daily with meals.       traZODone (DESYREL) 50 MG tablet TAKE 1 TABLET BY MOUTH NIGHTLY AS NEEDED FOR INSOMNIA. 90 tablet 0    blood-glucose meter (FREESTYLE SYSTEM KIT) kit Use as instructed 1 each 0     No current facility-administered medications for this visit.                  REVIEW OF SYSTEMS:     Pulmonary related symptoms as per HPI.  Gen:  no weight loss, no fever, no night sweat  HEENT:  no visual changes, no sore throat, no hearing loss  CV:  Per hpi  GI:  no melena, no hematochezia, no diarhea, no constipation.  :  no dysuria, no hematuria, no hesistancy, no dribbling  Neuro:  no syncope, no vertigo, no tinitus  Psych:  No homocide or suicide ideation; no depression.  Endocrine:  No heat or cold intolerance.  Sleep:  +mild snoring; no witnessed apnea.  Feeling rested upon awake.  Otherwise, a balance of systems reviewed is negative.          PHYSICAL EXAM:  Vitals:    08/12/22 0807   BP: (!) 173/66   Pulse: 92   Temp: 98.9 °F (37.2 °C)   SpO2: (!) 68%   Weight: 61.7 kg (135 lb 14.6 oz)   Height: 5' 8" (1.727 m)   PainSc: 0-No pain     Body mass index is 20.67 kg/m².     GENERAL:  well develop; no apparent distress  HEENT:  + nasal congestion; no discharge noted; class 4 modified mallampatti.   NECK:  supple; no palpable masses.  CARDIO: regular " rate and rhythm  PULM:  Decreased breath sound on the right; no intercostals retractions; no accessory muscle usage   ABDOMEN:  soft nontender/nondistended.  +bowel sound  EXTREMITIES no cce; left fore arm deformities a/w prior fracure  NEURO:  CN II-XII intact.  5/5 motor in all extremities.  sensation grossly intact   to light touch.  PSYCH:  normal affect.  Alert and oriented x 4    LABS  Pulmonary Functions Testing Results:   PFT 11/1/19 Ratio of 70%; FVC 1.96 L (47%); FEV1 1.2 L (44%); TLC 2.86 L (42%); dlco 8.6 (31%)   ABG none  CXR:  6/15/18 when compared to 6/9/17 blunting of costophrenic angle bilaterally.    CT CHEST:  11/18/19 right effusion; 0.6 cm lll superior segment nodule    Echo 6/23/20  · Concentric left ventricular hypertrophy.  · Normal left ventricular systolic function. The estimated ejection fraction is 55%.  · Moderate right ventricular enlargement.  · Normal right ventricular systolic function.  · Grade II (moderate) left ventricular diastolic dysfunction consistent with pseudonormalization.  · Severe biatrial enlargement.  · Mild tricuspid regurgitation.  · Trivial pericardial effusion.  · The estimated PA systolic pressure is 77 mmHg.  · Elevated central venous pressure (15 mmHg).    .     Pleural fluid:  11/1/19 ldh of 123; protein of 3.7; cytology negative for malignancy (concomittent serum chemistry was not performed)  2/4/21 244/186; cytology negative for malignancy      ASSESSMENT  Problem List Items Addressed This Visit     Chronic respiratory failure with hypoxia    Overview     -pleural effusion + pulmonary htn           Current Assessment & Plan     -encourage oxygen atc, especially with exertion  -will prescribe portable oxygen           ESRD (end stage renal disease) on dialysis    Overview     -HD on T-Th-Sat with Dr. Lowery           Pulmonary HTN    Overview     pasp of 77.  Group 5 with effusion and diastolic effusion with volume overload.             Relevant Orders    OXYGEN  FOR HOME USE    Pulmonary nodule    Overview     0.6 chava nodule.  Will repeat ct.             Relevant Orders    CT Chest Without Contrast    Recurrent pleural effusion on right    Overview     -s/p left thoracotomy  -recurring right effusion s/p thoracentesis x 2.  Chemistry on 2/4/21 suggestive of exudative effusion.  Still may relate to uremia.    -does not VATS to address recurring right effusion due to prior experience with left thoracotomy  -will repeat cxr.               Relevant Orders    OXYGEN FOR HOME USE    RESOLVED: Recurrent right pleural effusion    Relevant Orders    CT Chest Without Contrast           Patient will No follow-ups on file. with md/np.    CC: Send copy of this note to Ko Perez MD     40 minutes of total time spent on the encounter, which includes face to face time and non-face to face time preparing to see the patient (eg, review of tests), Obtaining and/or reviewing separately obtained history, documenting clinical information in the electronic or other health record, independently interpreting results (not separately reported) and communicating results to the patient/family/caregiver, or Care coordination (not separately reported).

## 2022-08-15 NOTE — ED TRIAGE NOTES
Presents with increased SOB and asking for hospital to 'drain the water off my back'. Pt unsure but thinks he got notification by letter to come in for this. Pt is on home oxygen but unsure as to why, states 'it just makes me breathe better'. Pt alert and orientated. Denies chest pain, denies cough.

## 2022-08-15 NOTE — ED NOTES
Pt and his wife asking to go home.  Dr Conti notified.  Speaking with pt and wife by  to notify of patient's condition and plans to admit patient. Also notified pt needs to remain NPO.  Both verbalized understanding.  Dressing right mid back clean dry and intact.

## 2022-08-15 NOTE — ED PROVIDER NOTES
Encounter Date: 8/15/2022    SCRIBE #1 NOTE: I, Linda Rodriguez, am scribing for, and in the presence of,  Kael Cano MD. I have scribed the following portions of the note - Other sections scribed: HPI, ROS, PE.       History     Chief Complaint   Patient presents with    Shortness of Breath     Pt reports was sent to ER for CT. Pt with increased SOB and hx of bilateral plural effusions.      This 61 y.o male, with a medical history of Anemia, Congestive heart failure, Diabetes mellitus, type 2, Disorder of kidney and ureter, ESRD on hemodialysis, Hyperlipidemia, and Hypertension, presents to the ED c/o acute shortness of breath. Pt reports that he received a letter in the mail instructing him to come in to the ED, but he is unsure why. He states that he presently feels fine and denies shortness of breath at this time. Pt is on O2 at home. Of note, pt has an upcoming outpatient CT of his chest scheduled. He denies fever, chills, cough, or any other associated symptoms.    The history is provided by the patient.     Review of patient's allergies indicates:  No Known Allergies  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      Past Surgical History:   Procedure Laterality Date    arm fracture Left     COLONOSCOPY N/A 8/16/2017    Procedure: COLONOSCOPY;  Surgeon: Sebastián Lamas MD;  Location: [facility] ([address] [place] [address]);  Service: Endoscopy;  Laterality: N/A;  dialysis pt/potassium level 1st/svn    FINGER AMPUTATION Left     left index finger    LUNG SURGERY      thoracotomy    PORTACATH PLACEMENT  08/2016     Family History   Problem Relation Age of Onset    Diabetes Mother     Memory loss Mother      No Known Problems Father     No Known Problems Sister     No Known Problems Brother     Arthritis Sister     No Known Problems Sister     Melanoma Neg Hx      Social History     Tobacco Use    Smoking status: Former Smoker     Packs/day: 0.25     Years: 30.00     Pack years: 7.50     Start date: 4/26/1982    Smokeless tobacco: Former User   Substance Use Topics    Alcohol use: No    Drug use: No     Review of Systems   Constitutional: Negative for chills and fever.   HENT: Negative for sore throat.    Eyes: Negative for visual disturbance.   Respiratory: Positive for shortness of breath (presently resolved). Negative for cough.    Cardiovascular: Negative for chest pain.   Gastrointestinal: Negative for nausea.   Genitourinary: Negative for dysuria.   Musculoskeletal: Negative for back pain.   Skin: Negative for rash.   Neurological: Negative for weakness.       Physical Exam     Initial Vitals [08/15/22 1056]   BP Pulse Resp Temp SpO2   (!) 211/82 98 20 98.2 °F (36.8 °C) (!) 84 %      MAP       --         Physical Exam    Nursing note and vitals reviewed.  Constitutional: He appears well-developed and well-nourished. He is not diaphoretic. No distress.   HENT:   Head: Normocephalic and atraumatic.   Mouth/Throat: Oropharynx is clear and moist.   Eyes: EOM are normal. Pupils are equal, round, and reactive to light.   Neck: Neck supple. JVD present.   Cardiovascular: Normal rate and regular rhythm.   Pulmonary/Chest: Breath sounds normal. No respiratory distress.   Decreased breath sounds right   Abdominal: Abdomen is soft. He exhibits distension.   There is abdominal distention present. No tenderness.   Musculoskeletal:         General: Edema present.      Cervical back: Neck supple.      Comments: There is edema present to the right lower extremity.     Neurological: He is alert and oriented to person, place, and time.   Skin: Skin is warm and dry.   Psychiatric: He has a normal mood and affect.          ED Course   Procedures  Labs Reviewed   CBC W/ AUTO DIFFERENTIAL - Abnormal; Notable for the following components:       Result Value    RBC 3.12 (*)     Hemoglobin 10.5 (*)     Hematocrit 33.2 (*)      (*)     MCH 33.7 (*)     MCHC 31.6 (*)     RDW 16.6 (*)     Platelets 144 (*)     Lymph # 0.6 (*)     Gran % 78.3 (*)     Lymph % 8.8 (*)     All other components within normal limits   COMPREHENSIVE METABOLIC PANEL - Abnormal; Notable for the following components:    Potassium 5.2 (*)     CO2 30 (*)     Glucose 184 (*)     BUN 26 (*)     Creatinine 4.4 (*)     Calcium 8.5 (*)     Albumin 3.0 (*)     Total Bilirubin 1.3 (*)     Alkaline Phosphatase 186 (*)     eGFR 14 (*)     All other components within normal limits   POCT GLUCOSE - Abnormal; Notable for the following components:    POCT Glucose 178 (*)     All other components within normal limits   PROTIME-INR   HEMOGLOBIN A1C   SARS-COV-2 RDRP GENE   POCT GLUCOSE   POCT GLUCOSE MONITORING CONTINUOUS     EKG Readings: (Independently Interpreted)   This patient is in a sinus rhythm with a first-degree AV block.  The patient may have a left posterior fascicular block.  There are no significant ST segment or T-wave changes.  There is no evidence of acute myocardial infarction or malignant arrhythmia.     Pt had outpatient CT chest scheduled at 10:45.  Arrived and  Was directed through the ER likely due to some miscommunication and the patient had 84% sats off his home O2. Pt's chief complaint to me is that we sent him a letter telling him to come here at this time. Denies new or worsening SOB. PT appears chronically ill. CT that was ordered performed in the ED. Not read yet but large right sided effusion. Chart review shows this effusion was drained in February. IR consulted. Pt is in IR now. Will turn patient over to Dr. Conti to reassess and provide disposition once IR procedure completed.     This patient returns from interventional radiology.   They were unable to tap the pleural effusion.  The report is that they got 15 cc of blood.  The patient has a CT chest report that raises the suspicion of hemothorax and internal septations in the pleural effusion.   , general surgery was consulted.  He recommended transfer to [facility] for evaluation by thoracic surgery.  [facility] is on diversion.  The patient will be admitted at [facility] with General surgery consulting.  We will try to move this patient to [facility] once the facility comes off of diversion.            Medications   atorvastatin tablet 40 mg (has no administration in time range)   aspirin EC tablet 81 mg (has no administration in time range)   carvediloL tablet 12.5 mg (has no administration in time range)   furosemide tablet 80 mg (has no administration in time range)   hydrALAZINE tablet 100 mg (has no administration in time range)   isosorbide mononitrate 24 hr tablet 60 mg (has no administration in time range)   losartan tablet 50 mg (has no administration in time range)   traZODone tablet 50 mg (has no administration in time range)   glucagon (human recombinant) injection 1 mg (has no administration in time range)   dextrose 10% bolus 125 mL (has no administration in time range)   dextrose 10% bolus 250 mL (has no administration in time range)   insulin aspart U-100 pen 1-10 Units (has no administration in time range)   sodium chloride 0.9% flush 10 mL (has no administration in time range)   naloxone 0.4 mg/mL injection 0.02 mg (has no administration in time range)   glucose chewable tablet 16 g (has no administration in time range)   glucose chewable tablet 24 g (has no administration in time range)   heparin (porcine) injection 5,000 Units (has no administration in time range)   acetaminophen tablet 650 mg (has no administration in time range)   HYDROcodone-acetaminophen 5-325 mg per tablet 1 tablet (has no administration in time range)   ondansetron disintegrating tablet 8  mg (has no administration in time range)   dextrose 10% bolus 125 mL (has no administration in time range)   cloNIDine tablet 0.2 mg (0.2 mg Oral Given 8/15/22 3007)              Scribe Attestation:   Scribe #1: I performed the above scribed service and the documentation accurately describes the services I performed. I attest to the accuracy of the note.                 Clinical Impression:   Final diagnoses:  [J90] Pleural effusion (Primary)  [R09.02] Hypoxia          ED Disposition Condition    Admit               I, Kael Cano, personally performed the services described in this documentation. All medical record entries made by the scribe were at my direction and in my presence. I have reviewed the chart and agree that the record reflects my personal performance and is accurate and complete.     Kael Cano MD  08/15/22 1532       Fercho Conti MD  08/15/22 1918       Fercho Conti MD  08/15/22 6109

## 2022-08-15 NOTE — ED NOTES
Assumed care, patient lying in bed awake, alert. No respiratory distress noted. O2 @ 100% via NC 2L. Denies any needs at this time.

## 2022-08-15 NOTE — ED NOTES
Dressing to right mid back saturated with blood, basketball sized area of blood to sheets.  Pt Denies sob, resp even and unlabored, Pt and bedding changed and new pressure dressing placed. Dr Conti notified

## 2022-08-15 NOTE — ED NOTES
Gauze dressing to paracentesis site saturated with blood and basketball sized area of blood on sheets.  Pt and bedding cleaned. Gauze pressure dressing applied.  Pt denies sob or any c/o pain.  HOB elevated, SR up x 2.  Dr. Conti notified

## 2022-08-15 NOTE — BRIEF OP NOTE
Radiology Post-Procedure Note    Pre Op Diagnosis: Recurrent SOB, PEREIRA and hypoxemia  Post Op Diagnosis: Same    Procedure: 1. US-guided therapeutic right-sided thoracentesis    Procedure performed by: Daniel Petty MD    Written Informed Consent Obtained: Yes  Specimen Removed: YES, 50-cc of moderate thick, old dark blood products  Estimated Blood Loss: none    Findings:   Successful US-guided therapeutic right-sided thoracentesis with local anesthetic only. Patient tolerated the procedure well. No immediate post-procedural complications noted.     Of note, pre-op US reveals an extensively loculated Rt effusion with innumerable isolated pockets with sonographic appearance suggestive of a large hematoma/hemothorax. Only able to remove 50-cc of old, dark blood products also consistent with large Rt hemothorax. Pt would likely required large-bore chest tube placement and instillation of lytics if attempts will be made to resolve the hemothorax vs VATS as clinically indicated.     Immediate post-op CXR to exclude potential for immediate and/or delayed post-procedural complications prior to tentative discharge.    Thank you for considering IR for the care of your patient.     Daniel Petty MD  Interventional Radiology

## 2022-08-15 NOTE — ED NOTES
Returned from IR.  Pt is presented to ED today as instructed by his PMD, pt has had c/o intermittent sob.  Presently pt is AAOx3, resp even and unlabored O2 per NC at 2L, skin warm and dry.

## 2022-08-15 NOTE — CONSULTS
Radiology History & Physical      SUBJECTIVE:     Chief Complaint: Recurrent SOB/PEREIRA    History of Present Illness:  Vega Brownlee is a 61 y.o. male with pertinent PMHx of recurrent SOB/PEREIRA 2/2 recurrent large Rt-sided pleural effusions since 2018 with today's CT revealing recurrent large Rt-sided loculated pleural effusion complicated by current SOB, PEREIRA and hypoxemia requiring intermittent decompression for symptom relief.     A new outpatient IR consult received for US-guided percutaneous Rt posterolateral-approach therapeutic thoracentesis.    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      Past Surgical History:   Procedure Laterality Date    arm fracture Left     COLONOSCOPY N/A 8/16/2017    Procedure: COLONOSCOPY;  Surgeon: Sebastián Lamas MD;  Location: [facility] ([address] [place] [address]);  Service: Endoscopy;  Laterality: N/A;  dialysis pt/potassium level 1st/svn    FINGER AMPUTATION Left     left index finger    LUNG SURGERY      thoracotomy    PORTACATH PLACEMENT  08/2016     Home Meds:   Prior to Admission medications    Medication Sig Start Date End Date Taking? Authorizing Provider   aspirin (ECOTRIN) 81 MG EC tablet Take 81 mg by mouth.    Historical Provider   atorvastatin (LIPITOR) 40 MG tablet TAKE 1 TABLET BY MOUTH EVERY DAY IN THE EVENING 8/4/22   MD ANNABEL Weston U-100 INSULIN glargine 100 units/mL SubQ pen INJECT 7 UNITS UNDER THE SKIN ONCE DAILY 7/20/22   Ko Perez MD   betamethasone dipropionate (DIPROLENE) 0.05 % cream Apply topically 2 (two) times daily. X 1-2 wks then prn flares only 8/21/20   Carla Ying MD   blood sugar diagnostic Strp 1 each by  "Misc.(Non-Drug; Combo Route) route as directed. 8/1/16   Eugenie Pelayo MD   blood-glucose meter (FREESTYLE SYSTEM KIT) kit Use as instructed 8/1/16 1/13/21  Eugenie Pelayo MD   calcium acetate,phosphat bind, (PHOSLO) 667 mg capsule TAKE ONE CAPSULE BY MOUTH THREE TIMES DAILY WITH MEALS 8/4/22   Ko Perez MD   calcium carbonate 470 mg calcium (1,177 mg) Chew Take 2 tablets by mouth 3 (three) times daily with meals.     Historical Provider   carvediloL (COREG) 12.5 MG tablet Take 1 tablet (12.5 mg total) by mouth 2 (two) times daily. 10/20/21   Ko Perez MD   clobetasoL (CLOBEX) 0.05 % shampoo Wash scalp qod. Let sit on affected areas x 5 min prior to rinsing. Avoid getting on face/in eyes. 8/21/20   Carla Ying MD   cloNIDine (CATAPRES) 0.2 MG tablet TAKE 1 TABLET BY MOUTH THREE TIMES A DAY 8/4/22   Ko Perez MD   DUREZOL 0.05 % Drop ophthalmic solution  6/12/17   Historical Provider   fluticasone propionate (FLONASE) 50 mcg/actuation nasal spray 1 spray (50 mcg total) by Each Nostril route once daily. 6/15/22   Ko Perez MD   FOLIC ACID/VIT BCOMP,C (JAM-YAQUELIN ORAL) Take 1 tablet by mouth once daily.    Historical Provider   furosemide (LASIX) 80 MG tablet TAKE 1 TABLET (80 MG TOTAL) BY MOUTH ONCE DAILY. 8/4/22   Ko Perez MD   hydrALAZINE (APRESOLINE) 100 MG tablet TAKE 1 TABLET BY MOUTH EVERY 12 HOURS 10/26/21   Ko Perez MD   isosorbide mononitrate (IMDUR) 120 MG 24 hr tablet TAKE 2 TABLETS BY MOUTH ONCE DAILY 8/4/22   Ko Perez MD   lancets (ACCU-CHEK SOFTCLIX LANCETS) Misc 1 application by Misc.(Non-Drug; Combo Route) route 4 (four) times daily before meals and nightly. 8/1/16   Eugenie Pelayo MD   losartan (COZAAR) 50 MG tablet  2/24/20   Historical Provider   pen needle, diabetic 31 gauge x 3/16" Ndle BD Ultra Fine Mini pen needles (5mm x 31g) - dispense 100 2/26/22   Ko Perez MD   RENVELA 800 mg Tab Take 800 mg by mouth 3 (three) times " daily with meals.  5/23/17   Historical Provider   traZODone (DESYREL) 50 MG tablet TAKE 1 TABLET BY MOUTH NIGHTLY AS NEEDED FOR INSOMNIA. 8/4/22   Ko Perez MD     Anticoagulants/Antiplatelets: aspirin    Allergies: Review of patient's allergies indicates:  No Known Allergies     Sedation History:  no adverse reactions    Review of Systems:   Hematological: no known coagulopathies  Respiratory: positive for - orthopnea and shortness of breath  Cardiovascular: positive for - dyspnea on exertion, orthopnea and shortness of breath  Gastrointestinal: no abdominal pain, change in bowel habits, or black or bloody stools  Genito-Urinary: no dysuria, trouble voiding, or hematuria  Musculoskeletal: negative  Neurological: no TIA or stroke symptoms       OBJECTIVE:     Vital Signs (Most Recent)  Temp: 98.2 °F (36.8 °C) (08/15/22 1056)  Pulse: 67 (08/15/22 1323)  Resp: 18 (08/15/22 1323)  BP: (!) 182/77 (08/15/22 1323)  SpO2: 100 % (08/15/22 1323)    Physical Exam:  General: no acute distress  Mental Status: alert and oriented to person, place and time  HEENT: normocephalic, atraumatic  Chest: +labored breathing  Heart: regular heart rate  Abdomen: nondistended  Extremity: moves all extremities    Laboratory  Lab Results   Component Value Date    INR 1.1 08/15/2022       Lab Results   Component Value Date    WBC 6.40 08/15/2022    HGB 10.5 (L) 08/15/2022    HCT 33.2 (L) 08/15/2022     (H) 08/15/2022     (L) 08/15/2022      Lab Results   Component Value Date     (H) 08/15/2022     08/15/2022    K 5.2 (H) 08/15/2022    CL 98 08/15/2022    CO2 30 (H) 08/15/2022    BUN 26 (H) 08/15/2022    CREATININE 4.4 (H) 08/15/2022    CALCIUM 8.5 (L) 08/15/2022    MG 2.2 07/27/2016    ALT 12 08/15/2022    AST 17 08/15/2022    ALBUMIN 3.0 (L) 08/15/2022    BILITOT 1.3 (H) 08/15/2022    BILIDIR 0.3 04/26/2017     ASSESSMENT/PLAN:     60 y.o. male with pertinent PMHx of recurrent sob/guardado/hypoxemia 2/2  right-sided pleural effusion since 2018 requiring therapeutic decompression.      1. Recurrent SOB, PEREIRA and hypoxemia - Will attempt US-guided therapeutic right-sided thoracentesis with local anesthetic only at approximately 1500 today.     Please place all pertinent fluid studies in epic prior to the procedure to ensure that the studies the ordering provider/team deem appropriate are performed.     Please continue to hold all non-essential anti-platelets/anti-coagulants. No need for NPO.     Risks (including, but not limited to, pain, bleeding, infection, damage to nearby structures, failure to obtain sufficient material for a diagnosis, the need for additional procedures, and death), benefits, and alternatives were discussed with the patient. All questions were answered to the best of my abilities. The patient wishes to proceed with the procedure. Written informed consent was obtained.    Thank you for considering IR for the care of your patient.      Daniel Petty MD  Interventional Radiology

## 2022-08-15 NOTE — ED NOTES
Pt returned from IR. Pt is AAOx3, resp even and unlabored, skin warm and dry.  Pt asking to go home. Instructed ED work up still in progress, verbalized understanding.  A/V shunt to RUE +thrill/bruit.  Pt has small sores to left back of neck and red bruising to left forearm.  Pt states d/t scratching.  Gauze dressing to right mid back thoracentesis site w/ quarter sized area of bright red blood.  Will continue to monitor.  Denies pain or sob. Awaiting disposition.

## 2022-08-16 NOTE — ED NOTES
Pt transported to ICU room 265 per stretcher w/ RN. Pt on cardiac monitor, pulse ox and O2 per NC at 4L, O2 sat 97%.  HOB elevated, SR up x 2. Minimal blood on right posterior back dressing w/o change.  NAD noted. Transferred w/o incident

## 2022-08-16 NOTE — SUBJECTIVE & OBJECTIVE
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        Past Surgical History:   Procedure Laterality Date    arm fracture Left     COLONOSCOPY N/A 8/16/2017    Procedure: COLONOSCOPY;  Surgeon: Sebastián Lamas MD;  Location: [address]);  Service: Endoscopy;  Laterality: N/A;  dialysis pt/potassium level 1st/svn    FINGER AMPUTATION Left     left index finger    LUNG SURGERY      thoracotomy    PORTACATH PLACEMENT  08/2016       Review of patient's allergies indicates:  No Known Allergies    No current facility-administered medications on file prior to encounter.     Current Outpatient Medications on File Prior to Encounter   Medication Sig    aspirin (ECOTRIN) 81 MG EC tablet Take 81 mg by mouth.    atorvastatin (LIPITOR) 40 MG tablet TAKE 1 TABLET BY MOUTH EVERY DAY IN THE EVENING    BASAGLAR KWIKPEN U-100 INSULIN glargine 100 units/mL SubQ pen INJECT 7 UNITS UNDER THE SKIN ONCE DAILY    betamethasone dipropionate (DIPROLENE) 0.05 % cream Apply topically 2 (two) times daily. X 1-2 wks then prn flares only    blood sugar diagnostic Strp 1 each by Misc.(Non-Drug; Combo Route) route as directed.    blood-glucose meter (FREESTYLE SYSTEM KIT) kit Use as instructed    calcium acetate,phosphat bind, (PHOSLO) 667 mg capsule TAKE ONE CAPSULE BY MOUTH THREE TIMES DAILY WITH MEALS    calcium carbonate 470 mg calcium (1,177 mg) Chew Take 2 tablets by mouth 3 (three) times daily with meals.     carvediloL (COREG) 12.5 MG tablet Take 1 tablet (12.5 mg total) by mouth 2 (two) times daily.    clobetasoL (CLOBEX) 0.05 % shampoo Wash scalp qod. Let sit on affected areas x 5 min prior to rinsing. Avoid getting on face/in eyes.  "   cloNIDine (CATAPRES) 0.2 MG tablet TAKE 1 TABLET BY MOUTH THREE TIMES A DAY    DUREZOL 0.05 % Drop ophthalmic solution     fluticasone propionate (FLONASE) 50 mcg/actuation nasal spray 1 spray (50 mcg total) by Each Nostril route once daily.    FOLIC ACID/VIT BCOMP,C (JAM-YAQUELIN ORAL) Take 1 tablet by mouth once daily.    furosemide (LASIX) 80 MG tablet TAKE 1 TABLET (80 MG TOTAL) BY MOUTH ONCE DAILY.    hydrALAZINE (APRESOLINE) 100 MG tablet TAKE 1 TABLET BY MOUTH EVERY 12 HOURS    isosorbide mononitrate (IMDUR) 120 MG 24 hr tablet TAKE 2 TABLETS BY MOUTH ONCE DAILY    lancets (ACCU-CHEK SOFTCLIX LANCETS) Misc 1 application by Misc.(Non-Drug; Combo Route) route 4 (four) times daily before meals and nightly.    losartan (COZAAR) 50 MG tablet     pen needle, diabetic 31 gauge x 3/16" Ndle BD Ultra Fine Mini pen needles (5mm x 31g) - dispense 100    RENVELA 800 mg Tab Take 800 mg by mouth 3 (three) times daily with meals.     traZODone (DESYREL) 50 MG tablet TAKE 1 TABLET BY MOUTH NIGHTLY AS NEEDED FOR INSOMNIA.     Family History       Problem Relation (Age of Onset)    Arthritis Sister    Diabetes Mother    Memory loss Mother    No Known Problems Father, Sister, Brother, Sister          Tobacco Use    Smoking status: Former Smoker     Packs/day: 0.25     Years: 30.00     Pack years: 7.50     Start date: 4/26/1982    Smokeless tobacco: Former User   Substance and Sexual Activity    Alcohol use: No    Drug use: No    Sexual activity: Yes     Partners: Female     Review of Systems   Constitutional: Negative.    HENT: Negative.     Eyes: Negative.    Respiratory: Negative.     Cardiovascular: Negative.    Gastrointestinal: Negative.    Endocrine: Negative.    Genitourinary: Negative.    Musculoskeletal: Negative.    Skin: Negative.    Allergic/Immunologic: Negative.    Neurological: Negative.    Hematological: Negative.    Psychiatric/Behavioral: Negative.     Objective:     Vital Signs (Most Recent):  Temp: 98.6 °F " (37 °C) (08/15/22 1635)  Pulse: 72 (08/15/22 1926)  Resp: 16 (08/15/22 1926)  BP: (!) 160/74 (08/15/22 1926)  SpO2: (!) 94 % (08/15/22 1926)   Vital Signs (24h Range):  Temp:  [98.2 °F (36.8 °C)-98.6 °F (37 °C)] 98.6 °F (37 °C)  Pulse:  [62-98] 72  Resp:  [16-20] 16  SpO2:  [84 %-100 %] 94 %  BP: (160-211)/(72-82) 160/74     Weight: 65.8 kg (145 lb)  Body mass index is 22.05 kg/m².    Physical Exam  Constitutional:       Appearance: Normal appearance.   HENT:      Head: Normocephalic and atraumatic.      Nose: Nose normal.   Eyes:      Extraocular Movements: Extraocular movements intact.      Conjunctiva/sclera: Conjunctivae normal.   Cardiovascular:      Rate and Rhythm: Normal rate and regular rhythm.      Pulses: Normal pulses.      Heart sounds: Normal heart sounds.   Pulmonary:      Effort: Pulmonary effort is normal.      Comments: Decreased breath sounds on right side  Abdominal:      General: Abdomen is flat. There is no distension.      Palpations: Abdomen is soft.   Musculoskeletal:         General: No swelling. Normal range of motion.      Cervical back: Normal range of motion and neck supple.   Skin:     General: Skin is warm and dry.   Neurological:      General: No focal deficit present.      Mental Status: He is alert and oriented to person, place, and time.           Significant Labs: All pertinent labs within the past 24 hours have been reviewed.    Significant Imaging: I have reviewed all pertinent imaging results/findings within the past 24 hours.

## 2022-08-16 NOTE — SUBJECTIVE & OBJECTIVE
Interval History:  NAEON. Denies CP. Hungry.      Review of Systems   Constitutional: Negative.  Negative for activity change, fever and unexpected weight change.   HENT: Negative.  Negative for trouble swallowing and voice change.    Eyes: Negative.  Negative for visual disturbance.   Respiratory:  Positive for shortness of breath. Negative for cough.    Cardiovascular: Negative.  Negative for chest pain, palpitations and leg swelling.   Gastrointestinal: Negative.  Negative for abdominal distention, abdominal pain, blood in stool, constipation, diarrhea, nausea and vomiting.   Endocrine: Negative.  Negative for polyphagia and polyuria.   Genitourinary: Negative.  Negative for difficulty urinating, dysuria and hematuria.   Musculoskeletal: Negative.    Skin:  Positive for wound. Negative for pallor and rash.   Allergic/Immunologic: Negative.  Negative for immunocompromised state.   Neurological: Negative.  Negative for seizures, syncope, facial asymmetry and weakness.   Hematological: Negative.    Psychiatric/Behavioral: Negative.  Negative for agitation, behavioral problems and confusion.    Objective:     Vital Signs (Most Recent):  Temp: 98.7 °F (37.1 °C) (08/16/22 1145)  Pulse: 69 (08/16/22 1145)  Resp: (!) 24 (08/16/22 1145)  BP: (!) 168/71 (08/16/22 1145)  SpO2: 96 % (08/16/22 1145)   Vital Signs (24h Range):  Temp:  [98.5 °F (36.9 °C)-98.9 °F (37.2 °C)] 98.7 °F (37.1 °C)  Pulse:  [60-76] 69  Resp:  [16-49] 24  SpO2:  [94 %-100 %] 96 %  BP: (151-182)/(70-81) 168/71     Weight: 63 kg (138 lb 14.2 oz)  Body mass index is 21.12 kg/m².    Intake/Output Summary (Last 24 hours) at 8/16/2022 1646  Last data filed at 8/16/2022 1500  Gross per 24 hour   Intake 360 ml   Output 2000 ml   Net -1640 ml      Physical Exam  Vitals and nursing note reviewed.   Constitutional:       General: He is not in acute distress.     Appearance: He is well-developed and normal weight. He is ill-appearing. He is not diaphoretic.   HENT:       Head: Normocephalic and atraumatic.      Nose: Nose normal. No congestion.   Eyes:      General: No scleral icterus.     Pupils: Pupils are equal, round, and reactive to light.   Neck:      Thyroid: No thyromegaly.   Cardiovascular:      Rate and Rhythm: Normal rate and regular rhythm.      Heart sounds: No murmur heard.  Pulmonary:      Effort: Pulmonary effort is normal.      Breath sounds: Normal breath sounds. No stridor. No wheezing or rales.   Abdominal:      General: There is no distension.      Palpations: Abdomen is soft.      Tenderness: There is no abdominal tenderness.   Musculoskeletal:         General: No deformity. Normal range of motion.      Cervical back: Normal range of motion. No rigidity.   Lymphadenopathy:      Cervical: No cervical adenopathy.   Skin:     General: Skin is warm.      Capillary Refill: Capillary refill takes less than 2 seconds.      Coloration: Skin is not jaundiced.      Findings: No bruising.   Neurological:      Mental Status: He is alert and oriented to person, place, and time. Mental status is at baseline.      Cranial Nerves: No cranial nerve deficit.      Motor: No weakness.   Psychiatric:         Mood and Affect: Mood normal.         Behavior: Behavior normal.           Recent Results (from the past 24 hour(s))   POCT glucose    Collection Time: 08/15/22  9:18 PM   Result Value Ref Range    POCT Glucose 80 70 - 110 mg/dL   Hemoglobin A1c if not done in past 3 months    Collection Time: 08/15/22  9:19 PM   Result Value Ref Range    Hemoglobin A1C 5.6 4.0 - 5.6 %    Estimated Avg Glucose 114 68 - 131 mg/dL   POCT COVID-19 Rapid Screening    Collection Time: 08/15/22 10:11 PM   Result Value Ref Range    POC Rapid COVID Negative Negative     Acceptable Yes    POCT glucose    Collection Time: 08/16/22 12:09 AM   Result Value Ref Range    POCT Glucose 140 (H) 70 - 110 mg/dL   Comprehensive Metabolic Panel (CMP)    Collection Time: 08/16/22  4:09 AM    Result Value Ref Range    Sodium 135 (L) 136 - 145 mmol/L    Potassium 5.8 (H) 3.5 - 5.1 mmol/L    Chloride 101 95 - 110 mmol/L    CO2 21 (L) 23 - 29 mmol/L    Glucose 102 70 - 110 mg/dL    BUN 31 (H) 8 - 23 mg/dL    Creatinine 4.9 (H) 0.5 - 1.4 mg/dL    Calcium 8.3 (L) 8.7 - 10.5 mg/dL    Total Protein 6.8 6.0 - 8.4 g/dL    Albumin 2.9 (L) 3.5 - 5.2 g/dL    Total Bilirubin 1.2 (H) 0.1 - 1.0 mg/dL    Alkaline Phosphatase 164 (H) 55 - 135 U/L    AST 19 10 - 40 U/L    ALT 7 (L) 10 - 44 U/L    Anion Gap 13 8 - 16 mmol/L    eGFR 13 (A) >60 mL/min/1.73 m^2   Magnesium    Collection Time: 08/16/22  4:09 AM   Result Value Ref Range    Magnesium 2.3 1.6 - 2.6 mg/dL   Phosphorus    Collection Time: 08/16/22  4:09 AM   Result Value Ref Range    Phosphorus 3.2 2.7 - 4.5 mg/dL   CBC with Automated Differential    Collection Time: 08/16/22  4:09 AM   Result Value Ref Range    WBC 6.14 3.90 - 12.70 K/uL    RBC 3.04 (L) 4.60 - 6.20 M/uL    Hemoglobin 9.9 (L) 14.0 - 18.0 g/dL    Hematocrit 32.4 (L) 40.0 - 54.0 %     (H) 82 - 98 fL    MCH 32.6 (H) 27.0 - 31.0 pg    MCHC 30.6 (L) 32.0 - 36.0 g/dL    RDW 16.8 (H) 11.5 - 14.5 %    Platelets 65 (L) 150 - 450 K/uL    MPV 10.6 9.2 - 12.9 fL    Immature Granulocytes CANCELED 0.0 - 0.5 %    Immature Grans (Abs) CANCELED 0.00 - 0.04 K/uL    nRBC 1 (A) 0 /100 WBC    Gran % 85.0 (H) 38.0 - 73.0 %    Lymph % 6.0 (L) 18.0 - 48.0 %    Mono % 6.0 4.0 - 15.0 %    Eosinophil % 2.0 0.0 - 8.0 %    Basophil % 1.0 0.0 - 1.9 %    Differential Method Automated    POCT glucose    Collection Time: 08/16/22  6:22 AM   Result Value Ref Range    POCT Glucose 74 70 - 110 mg/dL   POCT glucose    Collection Time: 08/16/22 11:11 AM   Result Value Ref Range    POCT Glucose 41 (LL) 70 - 110 mg/dL   POCT glucose    Collection Time: 08/16/22 11:40 AM   Result Value Ref Range    POCT Glucose 60 (L) 70 - 110 mg/dL   POCT glucose    Collection Time: 08/16/22 12:11 PM   Result Value Ref Range    POCT Glucose 111  (H) 70 - 110 mg/dL       Microbiology Results (last 7 days)       ** No results found for the last 168 hours. **             Imaging Results              X-Ray Chest AP Portable (Final result)  Result time 08/15/22 17:17:59      Final result by Deni Cho MD (08/15/22 17:17:59)                   Impression:      Status post right-sided thoracentesis.  Mild decrease in right-sided pleural collection.  No postprocedure complication.    Otherwise, stable examination.      Electronically signed by: Deni Cho MD  Date:    08/15/2022  Time:    17:17               Narrative:    EXAMINATION:  XR CHEST AP PORTABLE    CLINICAL HISTORY:  Large right hemothorax s/p Rt thoracentesis;    TECHNIQUE:  Single frontal view of the chest was performed.    COMPARISON:  Chest x-ray dated 02/04/2021 and CT scan of the chest dated 08/15/2022.    FINDINGS:  Monitoring EKG leads are present.  There is a vascular stent in the right upper paramediastinal region.  There are postoperative changes in the right axillary region.    The trachea is unremarkable.  There are calcifications of the aortic knob.  There is stable enlargement of the cardiomediastinal silhouette.  There is slight decrease in the right-sided pleural collection.  There is no pleural effusion on the left.  There is no evidence of a pneumothorax.  There is unchanged appearance of airspace opacities within the bilateral midlung zones.  There is stable appearance of atelectasis in the right lower hemithorax.  There are degenerative changes in the osseous structures.                                       IR Thoracentesis with Imaging (In process)                      CT Chest Without Contrast (Final result)  Result time 08/15/22 14:46:57      Final result by Sharif Stratton MD (08/15/22 14:46:57)                   Impression:      1. Recurrent right pleural effusion, now large and demonstrating mixed internal attenuation and thickening of the visceral and parietal pleura.   Areas of internal hyperattenuation nonspecific, but raise the possibility of hemothorax.  Additionally, suggested septations would be compatible with a chronic collection.  Superimposed infectious or inflammatory process not excluded.  Malignant effusion also not excluded given the recurrent nature of the collection.  Fluid sampling would provide improved characterization.  2. Assessment of the lungs limited due to respiratory motion.  A concerning spiculated nodule in the left upper lobe measuring up to 10 mm appears new since the 03/13/2020 CT.  Additional new 6-7 mm solid nodule in the left upper lobe.  Scattered nonspecific sub 5 mm small nodules in a peribronchovascular and subpleural distribution most notable in the left lower lobe could represent infectious or inflammatory etiology.  Clinical correlation and imaging follow-up to resolution would be recommended.  In light of the patient's extensive smoking history, a three-month follow-up chest CT is recommended.  3. Question mild aneurysmal dilatation of the ascending thoracic aorta to 4 cm transverse dimension.  4. Additional details, as provided in the body of report.      Electronically signed by: Sharif Stratton  Date:    08/15/2022  Time:    14:46               Narrative:    EXAMINATION:  CT CHEST WITHOUT CONTRAST    CLINICAL HISTORY:  peural effusions;    TECHNIQUE:  Low dose axial images, sagittal and coronal reformations were obtained from the thoracic inlet to the lung bases. Contrast was not administered.    COMPARISON:  CT chest 03/18/2020 and prior.    FINDINGS:  Support tubes and lines: None.    Aorta: Question borderline mild aneurysmal dilatation of the ascending thoracic aorta 4 cm transverse dimension.  Left-sided arch.  Atherosclerosis.  Three vessel arch anatomy.  Right subclavian artery stent.    Heart: Heart appears enlarged.    Coronary arteries: Calcifications present    Pericardium: Normal. Pleural effusion measuring up to 15 mm in  thickness.  Simple appearing.    Central pulmonary arteries: Question enlarged main pulmonary artery to 3.8 cm transverse dimension, a nonspecific finding which may be seen in the setting pulmonary arterial hypertension.    Base of neck/thyroid: Unremarkable.    Lymph nodes: Scattered mildly prominent in number, but normally sized and morphologically normal intrathoracic lymph nodes are noted.  Subcarinal node measures up to 9 mm short axis (series 2, image 52) no convincing new or enlarging nodes since that examination., similar to remote study of 03/13/2020.    Esophagus: Unremarkable.    Pleura: Large mixed attenuation right pleural effusion, with questioned internal septations.  Thickening of the visceral and parietal pleura suggested.    Upper abdomen: Unremarkable.    Body wall: Mild degree of body wall edema suggested.    Airways: The trachea and major left-sided central airways appear patent.  Debris is noted within the distal aspect of the right mainstem bronchus, and right lower lobe bronchus.  Additional multifocal intraluminal debris is suggested within the small right lung airways.    Lungs: Assessment of the lungs is limited due to respiratory motion.  Advanced emphysematous changes noted.  Irregular septal thickening is noted in the left lung, most pronounced dependently.  Significant right lung atelectasis associated with large pleural effusion.  Areas of smooth interlobular septal thickening in both lungs could relate to a component of pulmonary vascular congestion/edema.    New spiculated 10 mm nodule left upper lobe (series 4, image 174).  Additional new 6-7 mm solid nodule in the left upper lobe (series 4, image 200).    Nonspecific patchy sub 5 mm small nodules in a peribronchovascular and subpleural distribution, most notable in the basilar segments of the left lower lobe noted.    Calcified granuloma left lower lobe.  Areas of nonspecific superimposed geographic mosaic attenuation could relate  to small vessel and/or small airways disease.    Bones: No definite acute change.  The appearance of the osseous structures would be compatible with renal osteodystrophy in the given clinical context.

## 2022-08-16 NOTE — NURSING
Blood sugar 41. Pt asymptomatic. Treated prn glucose tabs. Repeat BS 60. Admin aditional glucose tabs. recheached out to notify  of results awaiting MD call back. Will repeat BS

## 2022-08-16 NOTE — ED NOTES
Pt fed a sandwich, pudding, francia crackers.  Instructed on NPO after midnight, verbalized understanding.

## 2022-08-16 NOTE — NURSING
[facility]  [place] Summary  Date: 8/16/2022      Prehospitalization: Home  Admit Date / LOS : 8/15/2022/ 1 days    Diagnosis: Recurrent pleural effusion on right    Consults:        Active: Gen Surg       Needed: Vascular     Code Status: Full Code   Advanced Directive: <no information>    LDA:  Lines/Drains/Airways     Central Venous Catheter Line  Duration                Port A Cath Single Lumen right subclavian -- days          Drain  Duration                Hemodialysis AV Fistula Right upper arm -- days          Peripheral Intravenous Line  Duration                Peripheral IV - Single Lumen 08/15/22 1222 20 G Left Antecubital <1 day         Peripheral IV - Single Lumen 08/15/22 2120 20 G Left Antecubital <1 day              Central Lines/Site/Justification:Patient Does Not Have Central Line  Urinary Cath/Order/Justification:Patient Does Not Have Urinary Catheter    Vasopressors/Infusions:        GOALS: Volume/ Hemodynamic: N/A                     RASS: 0  alert and calm    Pain Management: none       Pain Controlled: not applicable     Rhythm: NSR    Respiratory Device: Nasal Cannula                  Most Recent SBT/ SAT: Pass       MOVE Screen: PASS  ICU Liberation: no    VTE Prophylaxis: Pharm  Mobility: Bedrest  Stress Ulcer Prophylaxis: No    Isolation: No active isolations    Dietary:   Current Diet Order   Procedures    Diet NPO      Tolerance: yes  Advancement: no    I & O (24h):    Intake/Output Summary (Last 24 hours) at 8/16/2022 0524  Last data filed at 8/15/2022 1541  Gross per 24 hour   Intake --   Output 50 ml   Net -50 ml        Restraints: No    Significant Dates:  Post Op Date: N/A  Rescue Date: N/A  Imaging/ Diagnostics: 8/15/22    Noteworthy Labs K 5.8, Cr.4.9    COVID Test: (--)  CBC/Anemia Labs: Coags:    Recent Labs   Lab 08/15/22  1217 08/16/22  0409   WBC 6.40 6.14   HGB 10.5* 9.9*   HCT 33.2* 32.4*   * 65*   * 107*   RDW 16.6* 16.8*    Recent Labs    Lab 08/15/22  1217   INR 1.1        Chemistries:   Recent Labs   Lab 08/15/22  1217 08/16/22  0409    135*   K 5.2* 5.8*   CL 98 101   CO2 30* 21*   BUN 26* 31*   CREATININE 4.4* 4.9*   CALCIUM 8.5* 8.3*   PROT 7.0 6.8   BILITOT 1.3* 1.2*   ALKPHOS 186* 164*   ALT 12 7*   AST 17 19   MG  --  2.3   PHOS  --  3.2        Cardiac Enzymes: Ejection Fractions:    No results for input(s): CPK, CPKMB, MB, TROPONINI in the last 72 hours. Nuc Stress EF   Date Value Ref Range Status   09/02/2020 71 % Final     Nuc Rest EF   Date Value Ref Range Status   09/02/2020 71  Final        POCT Glucose: HbA1c:    Recent Labs   Lab 08/15/22  1054 08/15/22  2118   POCTGLUCOSE 178* 80    Hemoglobin A1C   Date Value Ref Range Status   05/04/2021 8.1 (A) 4.0 - 6.0 % Final           ICU LOS 6h  Level of Care: Critical Care    Chart Check: 24 HR Done  Shift Summary/Plan for the shift: Admit from Ed @2300. Right back dressing intact with some bloody drainage noted. Patient's sats drop upon exertion. Right side lung auscultated with decreased course breath sounds noted. Left lung coarse when auscultated. Patient currently on 3 l NC. VSS at present.

## 2022-08-16 NOTE — PROGRESS NOTES
"[facility] Medicine  Progress Note    Patient Name: Vega Brownlee  MRN: [id number]  Patient Class: IP- Inpatient   Admission Date: 8/15/2022  Length of Stay: 1 days  Attending Physician: Ronal David MD  Primary Care Provider: Ko Perez MD        Subjective:     Principal Problem:Recurrent pleural effusion on right        HPI:  Mr. Brownlee is a 61-year-old who presents to the ED after being told to present here.  He states he was having new symptoms was told by his doctor to present to the ED.  He was found to have what looks like is a loculated pleural effusion.  IR attempted to drain this effusion today but was unsuccessful.  They like patient transferred to [facility] however there currently on diversion at this time.  General surgery has been consulted we are awaiting recommendations.  Denies any fevers, chills, shortness of breath, chest pain, abdominal pain.  He states he feels well at this time is not having any shortness of breath      Overview/Hospital Course:  IR was consulted and attempted US-guided therapeutic right-sided thoracentesis. Per IR "US reveals an extensively loculated Rt effusion with innumerable isolated pockets with sonographic appearance suggestive of a large hematoma/hemothorax. Only able to remove 50-cc of old, dark blood products also consistent with large Rt hemothorax. Pt would likely require large-bore chest tube placement and instillation of lytics if attempts will be made to resolve the hemothorax vs VATS."  Patient accepted to [facility], in queue to transfer asap. Stable vitals. Will allow to eat and make NPO again at midnight. Lokelma for K 5.8.       Interval History:  NAEON. Denies CP. Hungry.      Review of Systems   Constitutional: Negative.  Negative for activity change, fever and unexpected weight change.   HENT: Negative.  Negative for trouble swallowing and voice change.    Eyes: Negative.  Negative for visual disturbance.   Respiratory:  " Positive for shortness of breath. Negative for cough.    Cardiovascular: Negative.  Negative for chest pain, palpitations and leg swelling.   Gastrointestinal: Negative.  Negative for abdominal distention, abdominal pain, blood in stool, constipation, diarrhea, nausea and vomiting.   Endocrine: Negative.  Negative for polyphagia and polyuria.   Genitourinary: Negative.  Negative for difficulty urinating, dysuria and hematuria.   Musculoskeletal: Negative.    Skin:  Positive for wound. Negative for pallor and rash.   Allergic/Immunologic: Negative.  Negative for immunocompromised state.   Neurological: Negative.  Negative for seizures, syncope, facial asymmetry and weakness.   Hematological: Negative.    Psychiatric/Behavioral: Negative.  Negative for agitation, behavioral problems and confusion.    Objective:     Vital Signs (Most Recent):  Temp: 98.7 °F (37.1 °C) (08/16/22 1145)  Pulse: 69 (08/16/22 1145)  Resp: (!) 24 (08/16/22 1145)  BP: (!) 168/71 (08/16/22 1145)  SpO2: 96 % (08/16/22 1145)   Vital Signs (24h Range):  Temp:  [98.5 °F (36.9 °C)-98.9 °F (37.2 °C)] 98.7 °F (37.1 °C)  Pulse:  [60-76] 69  Resp:  [16-49] 24  SpO2:  [94 %-100 %] 96 %  BP: (151-182)/(70-81) 168/71     Weight: 63 kg (138 lb 14.2 oz)  Body mass index is 21.12 kg/m².    Intake/Output Summary (Last 24 hours) at 8/16/2022 1646  Last data filed at 8/16/2022 1500  Gross per 24 hour   Intake 360 ml   Output 2000 ml   Net -1640 ml      Physical Exam  Vitals and nursing note reviewed.   Constitutional:       General: He is not in acute distress.     Appearance: He is well-developed and normal weight. He is ill-appearing. He is not diaphoretic.   HENT:      Head: Normocephalic and atraumatic.      Nose: Nose normal. No congestion.   Eyes:      General: No scleral icterus.     Pupils: Pupils are equal, round, and reactive to light.   Neck:      Thyroid: No thyromegaly.   Cardiovascular:      Rate and Rhythm: Normal rate and regular rhythm.       Heart sounds: No murmur heard.  Pulmonary:      Effort: Pulmonary effort is normal.      Breath sounds: Normal breath sounds. No stridor. No wheezing or rales.   Abdominal:      General: There is no distension.      Palpations: Abdomen is soft.      Tenderness: There is no abdominal tenderness.   Musculoskeletal:         General: No deformity. Normal range of motion.      Cervical back: Normal range of motion. No rigidity.   Lymphadenopathy:      Cervical: No cervical adenopathy.   Skin:     General: Skin is warm.      Capillary Refill: Capillary refill takes less than 2 seconds.      Coloration: Skin is not jaundiced.      Findings: No bruising.   Neurological:      Mental Status: He is alert and oriented to person, place, and time. Mental status is at baseline.      Cranial Nerves: No cranial nerve deficit.      Motor: No weakness.   Psychiatric:         Mood and Affect: Mood normal.         Behavior: Behavior normal.           Recent Results (from the past 24 hour(s))   POCT glucose    Collection Time: 08/15/22  9:18 PM   Result Value Ref Range    POCT Glucose 80 70 - 110 mg/dL   Hemoglobin A1c if not done in past 3 months    Collection Time: 08/15/22  9:19 PM   Result Value Ref Range    Hemoglobin A1C 5.6 4.0 - 5.6 %    Estimated Avg Glucose 114 68 - 131 mg/dL   POCT COVID-19 Rapid Screening    Collection Time: 08/15/22 10:11 PM   Result Value Ref Range    POC Rapid COVID Negative Negative     Acceptable Yes    POCT glucose    Collection Time: 08/16/22 12:09 AM   Result Value Ref Range    POCT Glucose 140 (H) 70 - 110 mg/dL   Comprehensive Metabolic Panel (CMP)    Collection Time: 08/16/22  4:09 AM   Result Value Ref Range    Sodium 135 (L) 136 - 145 mmol/L    Potassium 5.8 (H) 3.5 - 5.1 mmol/L    Chloride 101 95 - 110 mmol/L    CO2 21 (L) 23 - 29 mmol/L    Glucose 102 70 - 110 mg/dL    BUN 31 (H) 8 - 23 mg/dL    Creatinine 4.9 (H) 0.5 - 1.4 mg/dL    Calcium 8.3 (L) 8.7 - 10.5 mg/dL    Total  Protein 6.8 6.0 - 8.4 g/dL    Albumin 2.9 (L) 3.5 - 5.2 g/dL    Total Bilirubin 1.2 (H) 0.1 - 1.0 mg/dL    Alkaline Phosphatase 164 (H) 55 - 135 U/L    AST 19 10 - 40 U/L    ALT 7 (L) 10 - 44 U/L    Anion Gap 13 8 - 16 mmol/L    eGFR 13 (A) >60 mL/min/1.73 m^2   Magnesium    Collection Time: 08/16/22  4:09 AM   Result Value Ref Range    Magnesium 2.3 1.6 - 2.6 mg/dL   Phosphorus    Collection Time: 08/16/22  4:09 AM   Result Value Ref Range    Phosphorus 3.2 2.7 - 4.5 mg/dL   CBC with Automated Differential    Collection Time: 08/16/22  4:09 AM   Result Value Ref Range    WBC 6.14 3.90 - 12.70 K/uL    RBC 3.04 (L) 4.60 - 6.20 M/uL    Hemoglobin 9.9 (L) 14.0 - 18.0 g/dL    Hematocrit 32.4 (L) 40.0 - 54.0 %     (H) 82 - 98 fL    MCH 32.6 (H) 27.0 - 31.0 pg    MCHC 30.6 (L) 32.0 - 36.0 g/dL    RDW 16.8 (H) 11.5 - 14.5 %    Platelets 65 (L) 150 - 450 K/uL    MPV 10.6 9.2 - 12.9 fL    Immature Granulocytes CANCELED 0.0 - 0.5 %    Immature Grans (Abs) CANCELED 0.00 - 0.04 K/uL    nRBC 1 (A) 0 /100 WBC    Gran % 85.0 (H) 38.0 - 73.0 %    Lymph % 6.0 (L) 18.0 - 48.0 %    Mono % 6.0 4.0 - 15.0 %    Eosinophil % 2.0 0.0 - 8.0 %    Basophil % 1.0 0.0 - 1.9 %    Differential Method Automated    POCT glucose    Collection Time: 08/16/22  6:22 AM   Result Value Ref Range    POCT Glucose 74 70 - 110 mg/dL   POCT glucose    Collection Time: 08/16/22 11:11 AM   Result Value Ref Range    POCT Glucose 41 (LL) 70 - 110 mg/dL   POCT glucose    Collection Time: 08/16/22 11:40 AM   Result Value Ref Range    POCT Glucose 60 (L) 70 - 110 mg/dL   POCT glucose    Collection Time: 08/16/22 12:11 PM   Result Value Ref Range    POCT Glucose 111 (H) 70 - 110 mg/dL       Microbiology Results (last 7 days)       ** No results found for the last 168 hours. **             Imaging Results              X-Ray Chest AP Portable (Final result)  Result time 08/15/22 17:17:59      Final result by Deni Cho MD (08/15/22 17:17:59)                    Impression:      Status post right-sided thoracentesis.  Mild decrease in right-sided pleural collection.  No postprocedure complication.    Otherwise, stable examination.      Electronically signed by: Deni Cho MD  Date:    08/15/2022  Time:    17:17               Narrative:    EXAMINATION:  XR CHEST AP PORTABLE    CLINICAL HISTORY:  Large right hemothorax s/p Rt thoracentesis;    TECHNIQUE:  Single frontal view of the chest was performed.    COMPARISON:  Chest x-ray dated 02/04/2021 and CT scan of the chest dated 08/15/2022.    FINDINGS:  Monitoring EKG leads are present.  There is a vascular stent in the right upper paramediastinal region.  There are postoperative changes in the right axillary region.    The trachea is unremarkable.  There are calcifications of the aortic knob.  There is stable enlargement of the cardiomediastinal silhouette.  There is slight decrease in the right-sided pleural collection.  There is no pleural effusion on the left.  There is no evidence of a pneumothorax.  There is unchanged appearance of airspace opacities within the bilateral midlung zones.  There is stable appearance of atelectasis in the right lower hemithorax.  There are degenerative changes in the osseous structures.                                       IR Thoracentesis with Imaging (In process)                      CT Chest Without Contrast (Final result)  Result time 08/15/22 14:46:57      Final result by Sharif Stratton MD (08/15/22 14:46:57)                   Impression:      1. Recurrent right pleural effusion, now large and demonstrating mixed internal attenuation and thickening of the visceral and parietal pleura.  Areas of internal hyperattenuation nonspecific, but raise the possibility of hemothorax.  Additionally, suggested septations would be compatible with a chronic collection.  Superimposed infectious or inflammatory process not excluded.  Malignant effusion also not excluded given the recurrent nature  of the collection.  Fluid sampling would provide improved characterization.  2. Assessment of the lungs limited due to respiratory motion.  A concerning spiculated nodule in the left upper lobe measuring up to 10 mm appears new since the 03/13/2020 CT.  Additional new 6-7 mm solid nodule in the left upper lobe.  Scattered nonspecific sub 5 mm small nodules in a peribronchovascular and subpleural distribution most notable in the left lower lobe could represent infectious or inflammatory etiology.  Clinical correlation and imaging follow-up to resolution would be recommended.  In light of the patient's extensive smoking history, a three-month follow-up chest CT is recommended.  3. Question mild aneurysmal dilatation of the ascending thoracic aorta to 4 cm transverse dimension.  4. Additional details, as provided in the body of report.      Electronically signed by: Sharif Stratton  Date:    08/15/2022  Time:    14:46               Narrative:    EXAMINATION:  CT CHEST WITHOUT CONTRAST    CLINICAL HISTORY:  peural effusions;    TECHNIQUE:  Low dose axial images, sagittal and coronal reformations were obtained from the thoracic inlet to the lung bases. Contrast was not administered.    COMPARISON:  CT chest 03/18/2020 and prior.    FINDINGS:  Support tubes and lines: None.    Aorta: Question borderline mild aneurysmal dilatation of the ascending thoracic aorta 4 cm transverse dimension.  Left-sided arch.  Atherosclerosis.  Three vessel arch anatomy.  Right subclavian artery stent.    Heart: Heart appears enlarged.    Coronary arteries: Calcifications present    Pericardium: Normal. Pleural effusion measuring up to 15 mm in thickness.  Simple appearing.    Central pulmonary arteries: Question enlarged main pulmonary artery to 3.8 cm transverse dimension, a nonspecific finding which may be seen in the setting pulmonary arterial hypertension.    Base of neck/thyroid: Unremarkable.    Lymph nodes: Scattered mildly prominent  in number, but normally sized and morphologically normal intrathoracic lymph nodes are noted.  Subcarinal node measures up to 9 mm short axis (series 2, image 52) no convincing new or enlarging nodes since that examination., similar to remote study of 03/13/2020.    Esophagus: Unremarkable.    Pleura: Large mixed attenuation right pleural effusion, with questioned internal septations.  Thickening of the visceral and parietal pleura suggested.    Upper abdomen: Unremarkable.    Body wall: Mild degree of body wall edema suggested.    Airways: The trachea and major left-sided central airways appear patent.  Debris is noted within the distal aspect of the right mainstem bronchus, and right lower lobe bronchus.  Additional multifocal intraluminal debris is suggested within the small right lung airways.    Lungs: Assessment of the lungs is limited due to respiratory motion.  Advanced emphysematous changes noted.  Irregular septal thickening is noted in the left lung, most pronounced dependently.  Significant right lung atelectasis associated with large pleural effusion.  Areas of smooth interlobular septal thickening in both lungs could relate to a component of pulmonary vascular congestion/edema.    New spiculated 10 mm nodule left upper lobe (series 4, image 174).  Additional new 6-7 mm solid nodule in the left upper lobe (series 4, image 200).    Nonspecific patchy sub 5 mm small nodules in a peribronchovascular and subpleural distribution, most notable in the basilar segments of the left lower lobe noted.    Calcified granuloma left lower lobe.  Areas of nonspecific superimposed geographic mosaic attenuation could relate to small vessel and/or small airways disease.    Bones: No definite acute change.  The appearance of the osseous structures would be compatible with renal osteodystrophy in the given clinical context.                                            Assessment/Plan:      * Recurrent pleural effusion on  right  s/p left thoracotomy  recurring right effusion s/p thoracentesis x 2.    Chemistry on 2/4/21 suggestive of exudative effusion.  Still may relate to uremia.    ? VATS to address recurring right effusion due to prior experience with left thoracotomy      · Status post attempted Thora   · Was unable to drain any fluid.  · Surgery consulted  · Accepted to [facility] for CTSx eval      ESRD (end stage renal disease) on dialysis  Consult Nephrology for ESRD and dialysis  Avoid renal toxins  Renally dose medications      Hyperlipidemia  Restart home medications      Anemia of chronic disease  Monitor likely secondary to ESRD      Diabetes mellitus with ESRD (end-stage renal disease)  Sliding scale insulin at basal as needed     Last A1c reviewed-   Lab Results   Component Value Date    HGBA1C 8.1 (A) 05/04/2021     Most recent fingerstick glucose reviewed-   Recent Labs   Lab 08/15/22  1054   POCTGLUCOSE 178*     Current correctional scale  Medium  Maintain anti-hyperglycemic dose as follows-   Antihyperglycemics (From admission, onward)            None        Hold Oral hypoglycemics while patient is in the hospital.    Essential hypertension  Restart home medications        VTE Risk Mitigation (From admission, onward)         Ordered     heparin (porcine) injection 5,000 Units  Every 8 hours         08/15/22 2005     IP VTE HIGH RISK PATIENT  Once         08/15/22 2005     Place sequential compression device  Until discontinued         08/15/22 2005                Discharge Planning   WENDY:      Code Status: Full Code   Is the patient medically ready for discharge?:     Reason for patient still in hospital (select all that apply): Patient trending condition and Treatment  Discharge Plan A: Home with family            Critical care time spent on the evaluation and treatment of severe organ dysfunction, review of pertinent labs and imaging studies, discussions with consulting providers and discussions with patient/family: 0  minutes.      Ronal David MD  Department of Hospital Medicine   [facility] [place] - Intensive Care

## 2022-08-16 NOTE — HPI
"HPI per :  Patient is a 61 y.o. 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 Patient has history of recurrent pleural effusion and was sent to ED for worsening symptoms. IR was consulted and attempted US-guided therapeutic right-sided thoracentesis. Per IR "US reveals an extensively loculated Rt effusion with innumerable isolated pockets with sonographic appearance suggestive of a large hematoma/hemothorax. Only able to remove 50-cc of old, dark blood products also consistent with large Rt hemothorax. Pt would likely require large-bore chest tube placement and instillation of lytics if attempts will be made to resolve the hemothorax vs VATS." Facility seeking transfer for CTS evaluation. Patient is stable on med tele unit only requiring 3L NC with no distress. Stable for transfer.  "

## 2022-08-16 NOTE — ASSESSMENT & PLAN NOTE
Recurrent pleural effusion on right side, status post attempted Thora today.  Was unable to drain any fluid.  Awaiting transfer  [facility]  Surgery consulted, pending recs

## 2022-08-16 NOTE — H&P
[facility] Medicine  History & Physical    Patient Name: Vega Brownlee  MRN: [id number]  Patient Class: Emergency  Admission Date: 8/15/2022  Attending Physician: Darrel Verdin Jr, MD  Primary Care Provider: Ko Perez MD         Patient information was obtained from patient and ER records.     Subjective:     Principal Problem:Recurrent pleural effusion on right    Chief Complaint:   Chief Complaint   Patient presents with    Shortness of Breath     Pt reports was sent to ER for CT. Pt with increased SOB and hx of bilateral plural effusions.         HPI: Mr. Brownlee is a 61-year-old who presents to the ED after being told to present here.  He states he was having new symptoms was told by his doctor to present to the ED.  He was found to have what looks like is a loculated pleural effusion.  IR attempted to drain this effusion today but was unsuccessful.  They like patient transferred to [facility] however there currently on diversion at this time.  General surgery has been consulted we are awaiting recommendations.  Denies any fevers, chills, shortness of breath, chest pain, abdominal pain.  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     Current Outpatient Medications on File Prior to Encounter   Medication Sig    aspirin (ECOTRIN) 81 MG EC tablet Take 81 mg by mouth.    atorvastatin (LIPITOR) 40 MG tablet TAKE 1 TABLET BY MOUTH EVERY DAY IN THE EVENING    BASAGLAR KWIKPEN U-100 INSULIN glargine 100 units/mL SubQ pen INJECT 7 UNITS UNDER THE SKIN ONCE DAILY    betamethasone dipropionate (DIPROLENE) 0.05 % cream Apply topically 2 (two) times daily. X 1-2 wks then prn flares only    blood sugar diagnostic Strp 1 each by Misc.(Non-Drug; Combo Route) route as directed.    blood-glucose meter (FREESTYLE SYSTEM KIT) kit Use as instructed    calcium acetate,phosphat bind, (PHOSLO) 667 mg capsule TAKE ONE CAPSULE BY MOUTH THREE TIMES DAILY WITH MEALS    calcium carbonate 470 mg calcium (1,177 mg) Chew Take 2 tablets by mouth 3 (three) times daily with meals.     carvediloL (COREG) 12.5 MG tablet Take 1 tablet (12.5 mg total) by mouth 2 (two) times daily.    clobetasoL (CLOBEX) 0.05 % shampoo Wash scalp qod. Let sit on affected areas x 5 min prior to rinsing. Avoid getting on face/in eyes.    cloNIDine (CATAPRES) 0.2 MG tablet TAKE 1 TABLET BY MOUTH THREE TIMES A DAY    DUREZOL 0.05 % Drop ophthalmic solution     fluticasone propionate (FLONASE) 50 mcg/actuation nasal spray 1 spray (50 mcg total) by Each Nostril route once daily.    FOLIC ACID/VIT BCOMP,C (JAM-YAQUELIN ORAL) Take 1 tablet by mouth once daily.    furosemide (LASIX) 80 MG tablet TAKE 1 TABLET (80 MG TOTAL) BY MOUTH ONCE DAILY.    hydrALAZINE (APRESOLINE) 100 MG tablet TAKE 1 TABLET BY MOUTH EVERY 12 HOURS    isosorbide mononitrate (IMDUR) 120 MG 24 hr tablet TAKE 2 TABLETS BY MOUTH ONCE DAILY    lancets (ACCU-CHEK SOFTCLIX LANCETS) Misc 1 application by Misc.(Non-Drug; Combo Route) route 4 (four) times  "daily before meals and nightly.    losartan (COZAAR) 50 MG tablet     pen needle, diabetic 31 gauge x 3/16" Ndle BD Ultra Fine Mini pen needles (5mm x 31g) - dispense 100    RENVELA 800 mg Tab Take 800 mg by mouth 3 (three) times daily with meals.     traZODone (DESYREL) 50 MG tablet TAKE 1 TABLET BY MOUTH NIGHTLY AS NEEDED FOR INSOMNIA.     Family History       Problem Relation (Age of Onset)    Arthritis Sister    Diabetes Mother    Memory loss Mother    No Known Problems Father, Sister, Brother, Sister          Tobacco Use    Smoking status: Former Smoker     Packs/day: 0.25     Years: 30.00     Pack years: 7.50     Start date: 4/26/1982    Smokeless tobacco: Former User   Substance and Sexual Activity    Alcohol use: No    Drug use: No    Sexual activity: Yes     Partners: Female     Review of Systems   Constitutional: Negative.    HENT: Negative.     Eyes: Negative.    Respiratory: Negative.     Cardiovascular: Negative.    Gastrointestinal: Negative.    Endocrine: Negative.    Genitourinary: Negative.    Musculoskeletal: Negative.    Skin: Negative.    Allergic/Immunologic: Negative.    Neurological: Negative.    Hematological: Negative.    Psychiatric/Behavioral: Negative.     Objective:     Vital Signs (Most Recent):  Temp: 98.6 °F (37 °C) (08/15/22 1635)  Pulse: 72 (08/15/22 1926)  Resp: 16 (08/15/22 1926)  BP: (!) 160/74 (08/15/22 1926)  SpO2: (!) 94 % (08/15/22 1926)   Vital Signs (24h Range):  Temp:  [98.2 °F (36.8 °C)-98.6 °F (37 °C)] 98.6 °F (37 °C)  Pulse:  [62-98] 72  Resp:  [16-20] 16  SpO2:  [84 %-100 %] 94 %  BP: (160-211)/(72-82) 160/74     Weight: 65.8 kg (145 lb)  Body mass index is 22.05 kg/m².    Physical Exam  Constitutional:       Appearance: Normal appearance.   HENT:      Head: Normocephalic and atraumatic.      Nose: Nose normal.   Eyes:      Extraocular Movements: Extraocular movements intact.      Conjunctiva/sclera: Conjunctivae normal.   Cardiovascular:      Rate and Rhythm: " Normal rate and regular rhythm.      Pulses: Normal pulses.      Heart sounds: Normal heart sounds.   Pulmonary:      Effort: Pulmonary effort is normal.      Comments: Decreased breath sounds on right side  Abdominal:      General: Abdomen is flat. There is no distension.      Palpations: Abdomen is soft.   Musculoskeletal:         General: No swelling. Normal range of motion.      Cervical back: Normal range of motion and neck supple.   Skin:     General: Skin is warm and dry.   Neurological:      General: No focal deficit present.      Mental Status: He is alert and oriented to person, place, and time.           Significant Labs: All pertinent labs within the past 24 hours have been reviewed.    Significant Imaging: I have reviewed all pertinent imaging results/findings within the past 24 hours.    Assessment/Plan:     * Recurrent pleural effusion on right  Recurrent pleural effusion on right side, status post attempted Thora today.  Was unable to drain any fluid.  Awaiting transfer Dr. Cordero El Rito  Surgery consulted, pending recs      ESRD (end stage renal disease) on dialysis  Consult Nephrology for ESRD and dialysis  Avoid renal toxins  Renally dose medications      Hyperlipidemia  Restart home medications      Anemia of chronic disease  Monitor likely secondary to ESRD      Diabetes mellitus with ESRD (end-stage renal disease)  Sliding scale insulin at basal as needed     Last A1c reviewed-   Lab Results   Component Value Date    HGBA1C 8.1 (A) 05/04/2021     Most recent fingerstick glucose reviewed-   Recent Labs   Lab 08/15/22  1054   POCTGLUCOSE 178*     Current correctional scale  Medium  Maintain anti-hyperglycemic dose as follows-   Antihyperglycemics (From admission, onward)            None        Hold Oral hypoglycemics while patient is in the hospital.    Essential hypertension  Restart home medications        VTE Risk Mitigation (From admission, onward)    None             Darrel Verdin Jr,  MD  Department of Hospital Medicine   [facility] [place] Dept

## 2022-08-16 NOTE — CONSULTS
General Surgery Consult Note    Vega Brownlee is a 61 y.o. gentleman with CHF, ESRD on HD, DM, HLD, HTN, and recurrent R pleural effusion since 2018 who presented with shortness of breath. CT scan in the ED revealed a loculated pleural effusion, and he underwent attempted drainage with IR. This was unsuccessful, as there were multiple loculations and recommended evaluation with thoracic surgery at [facility] due to suspected hemothorax and internal septations. At time of evaluation, he has been accepted for [place] [facility], but is waiting on a bed. Afebrile and hemodynamically stable, satting 95+ on 3L NC.     No acute general surgery intervention indicated at this time. Will follow along until able to transfer to [facility].     Javan Schwab MD  [facility] PGY3

## 2022-08-16 NOTE — ASSESSMENT & PLAN NOTE
Sliding scale insulin at basal as needed     Last A1c reviewed-   Lab Results   Component Value Date    HGBA1C 8.1 (A) 05/04/2021     Most recent fingerstick glucose reviewed-   Recent Labs   Lab 08/15/22  1054   POCTGLUCOSE 178*     Current correctional scale  Medium  Maintain anti-hyperglycemic dose as follows-   Antihyperglycemics (From admission, onward)            None        Hold Oral hypoglycemics while patient is in the hospital.

## 2022-08-16 NOTE — HOSPITAL COURSE
"[facility] Medicine Course:  IR was consulted and attempted US-guided therapeutic right-sided thoracentesis. Per IR "US reveals an extensively loculated Rt effusion with innumerable isolated pockets with sonographic appearance suggestive of a large hematoma/hemothorax. Only able to remove 50-cc of old, dark blood products also consistent with large Rt hemothorax. Pt would likely require large-bore chest tube placement and instillation of lytics if attempts will be made to resolve the hemothorax vs VATS."  Patient accepted to [facility], in queue to transfer asap. Stable vitals. Will allow to eat and make NPO again at midnight. Lokelma for K 5.8.     [facility] Medicine Course:  Pt accepted to [facility], Nephrology following for HD needs. Evaluated by thoracic surgery, with placement of PleurX catheter x2 on 8/18. Required 2u pRBC after the procedure, with stable low Hgb since that time. Pleural fluid exudative, with elevated LDH and low glucose. Cultures negative. Cytology pending. Interpretation is difficult with prior manipulation with traumatic thoras in the past. However, with new CT chest findings would certainly benefit from outpatient malignancy eval with follow up CT chest in 3 months and possible bx pending results.     Chest tube removed by CTS on 8/30, tolerated well with same O2 requirements. Leukocytosis noted with elevated procal, BCx obtained and started on azithro/rocephin; leukocytosis worsened on 8/31 with relative hypotension compared to previous (nifedipine held), so abx broadened to vanc and cefepime pending culture results. Overnight and into 8/31, pt noted to have increased O2 requirements up to 6L NC from 2L: CXR without pneumothorax; discussed with CTS, who stated they have no further inpatient recommendations and will follow him in clinic. Patient was able to be weaned to 2L prior to discharge to Rehab.    Discharged to [facility] when medically stable, SW/CM assisted.  "

## 2022-08-16 NOTE — PROGRESS NOTES
Date of Admission:8/15/2022    SUBJECTIVE:resting comfortably in bed    Current Facility-Administered Medications   Medication    acetaminophen tablet 650 mg    aspirin EC tablet 81 mg    atorvastatin tablet 40 mg    carvediloL tablet 12.5 mg    dextrose 10% bolus 125 mL    dextrose 10% bolus 125 mL    dextrose 10% bolus 250 mL    furosemide tablet 80 mg    glucagon (human recombinant) injection 1 mg    glucose chewable tablet 16 g    glucose chewable tablet 24 g    heparin (porcine) injection 5,000 Units    hydrALAZINE tablet 100 mg    HYDROcodone-acetaminophen 5-325 mg per tablet 1 tablet    insulin aspart U-100 pen 1-10 Units    isosorbide mononitrate 24 hr tablet 60 mg    losartan tablet 50 mg    mupirocin 2 % ointment    naloxone 0.4 mg/mL injection 0.02 mg    ondansetron disintegrating tablet 8 mg    sodium chloride 0.9% flush 10 mL    traZODone tablet 50 mg       Wt Readings from Last 3 Encounters:   08/16/22 63 kg (138 lb 14.2 oz)   08/12/22 61.7 kg (135 lb 14.6 oz)   05/25/22 67.3 kg (148 lb 5.9 oz)     Temp Readings from Last 3 Encounters:   08/16/22 98.6 °F (37 °C)   08/12/22 98.9 °F (37.2 °C)   05/25/22 97.9 °F (36.6 °C) (Oral)     BP Readings from Last 3 Encounters:   08/16/22 (!) 161/70   08/12/22 (!) 173/66   05/25/22 (!) 182/60     Pulse Readings from Last 3 Encounters:   08/16/22 60   08/12/22 92   05/25/22 68       Intake/Output Summary (Last 24 hours) at 8/16/2022 0933  Last data filed at 8/16/2022 0600  Gross per 24 hour   Intake 0 ml   Output 50 ml   Net -50 ml       PE:  GEN:wd male in nad  HEENT:ncat,eomi,mm  CVS:s1s2 regular  PULM:decrease bases  ABD:bs ,soft  EXT:avf, no leg edema  NEURO:awake    Recent Labs   Lab 08/16/22  0409      *   K 5.8*      CO2 21*   BUN 31*   CREATININE 4.9*   CALCIUM 8.3*   MG 2.3       Lab Results   Component Value Date    PTH 41.0 09/02/2020    CALCIUM 8.3 (L) 08/16/2022    PHOS 3.2 08/16/2022       Recent Labs   Lab  08/16/22  0409   WBC 6.14   RBC 3.04*   HGB 9.9*   HCT 32.4*   PLT 65*   *   MCH 32.6*   MCHC 30.6*           A/P:  1.esrd. seen on hd. Cont tx TTS. Hd at [facility].  2.anemia 2nd to esrd. Add epo.  3.hyperkalemia. 2k bath.  4.lung mass w/ pleural effusion. Biopsy?  Vats??  5.htn. uf with hd.  6.dm2. following sugars.  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 Cont binders.

## 2022-08-16 NOTE — ASSESSMENT & PLAN NOTE
Consult Nephrology for ESRD and dialysis  Avoid renal toxins  Renally dose medications     nipples normal

## 2022-08-16 NOTE — PLAN OF CARE
[facility] - Intensive Care  Initial Discharge Assessment       Primary Care Provider: Ko Perez MD    Admission Diagnosis: Pleural effusion [J90]  Hypoxia [R09.02]  Chest pain [R07.9]    Admission Date: 8/15/2022  Expected Discharge Date:     Discharge Barriers Identified: None    Payor: MEDICARE / Plan: MEDICARE PART A & B / Product Type: Government /     Extended Emergency Contact Information  Primary Emergency Contact: Eli Brownlee   [place]  Mobile Phone: [phone]  Relation: Daughter  Secondary Emergency Contact: Marie Brownlee  Address: [address] [place] [address]  Mobile Phone: [phone]  Relation: Spouse  Preferred language: Macedonian   needed? Yes    Discharge Plan A: Home with family  Discharge Plan B:  (tbd)      [id number] [place] - [address]  [address] [address]  Phone: [phone] Fax: [phone]    [id number] [place] - [address]   [address] [facility] [address]  Phone: [phone] Fax: [phone]    [id number] [place] - [address] AT [facility] [address]  [address]  [facility] [address]  Phone: [phone] Fax: [phone]      Initial Assessment (most recent)     Adult Discharge Assessment - 08/16/22 1149        Discharge Assessment    Assessment Type Discharge Planning Assessment     Confirmed/corrected address, phone number and insurance Yes     Confirmed Demographics Correct on Facesheet     Source of Information patient     When was your last doctors appointment? --   armin one year ago    Communicated WENDY with patient/caregiver Date not available/Unable to determine     Reason For Admission PE     Lives With spouse     Do you expect to return to your current living situation? Yes     Do you have help at home or someone to help you manage your care at home? Yes     Prior to hospitilization cognitive  status: Alert/Oriented     Current cognitive status: Alert/Oriented     Walking or Climbing Stairs Difficulty none     Dressing/Bathing Difficulty none     Home Layout Able to live on 1st floor     Equipment Currently Used at Home oxygen     Readmission within 30 days? No     Patient currently being followed by outpatient case management? No     Do you currently have service(s) that help you manage your care at home? No     Do you take prescription medications? Yes     Do you have prescription coverage? Yes     Coverage Medicare     Do you have any problems affording any of your prescribed medications? No     Is the patient taking medications as prescribed? yes     Who is going to help you get home at discharge? Wife     How do you get to doctors appointments? family or friend will provide     Are you on dialysis? Yes     [facility]     Do you take coumadin? No     Discharge Plan A Home with family     Discharge Plan B --   tbd    DME Needed Upon Discharge  --   TBD    Discharge Plan discussed with: Patient     Discharge Barriers Identified None        Relationship/Environment    Name(s) of Who Lives With Patient Marie Brownlee, Wife

## 2022-08-16 NOTE — ASSESSMENT & PLAN NOTE
s/p left thoracotomy  recurring right effusion s/p thoracentesis x 2.    Chemistry on 2/4/21 suggestive of exudative effusion.  Still may relate to uremia.    ? VATS to address recurring right effusion due to prior experience with left thoracotomy      · Status post attempted Thora   · Was unable to drain any fluid.  · Surgery consulted  · Accepted to [facility] for CTSx eval

## 2022-08-17 PROBLEM — D69.6 THROMBOCYTOPENIA: Status: ACTIVE | Noted: 2022-01-01

## 2022-08-17 PROBLEM — N18.6 ANEMIA IN ESRD (END-STAGE RENAL DISEASE): Status: ACTIVE | Noted: 2022-01-01

## 2022-08-17 PROBLEM — E83.9 CHRONIC KIDNEY DISEASE-MINERAL AND BONE DISORDER: Status: ACTIVE | Noted: 2022-01-01

## 2022-08-17 PROBLEM — I50.32 CHRONIC DIASTOLIC HEART FAILURE: Status: ACTIVE | Noted: 2022-01-01

## 2022-08-17 PROBLEM — M89.9 CHRONIC KIDNEY DISEASE-MINERAL AND BONE DISORDER: Status: ACTIVE | Noted: 2022-01-01

## 2022-08-17 PROBLEM — D63.1 ANEMIA IN ESRD (END-STAGE RENAL DISEASE): Status: ACTIVE | Noted: 2022-01-01

## 2022-08-17 PROBLEM — N18.9 CHRONIC KIDNEY DISEASE-MINERAL AND BONE DISORDER: Status: ACTIVE | Noted: 2022-01-01

## 2022-08-17 NOTE — CONSULTS
THORACIC SURGERY  Consultation      REASON FOR CONSULT:  Recurrent right pleural effusion     SUBJECTIVE:     HISTORY OF PRESENT ILLNESS:  Vega Brownlee is a 61 y.o. male with PMH CHF (last ECHO 9/20/20 EF 55%, PA pressure 77), home oxygen (2 L), DM2, ESRD on HD (via AVF [facility]ur[facility]), recurrent bilateral pleural effusions (s/p R thoracentesis 2019, 2021), hx of a left thoracotomy (empyema) presents to [facility] from [facility] for evaluation of a suspected loculated right effusion. Patient has been followed by pulmonary on the [facility] ([facility]), last seen on 8/12/22 for a recent productive cough and subjective fevers. He was treated with a course of doxycyline with improvement. He presented to the [facility] ED on 8/15/22 with shortness of breath and admitted. IR thoracentesis 8/15/22 with only 50 cc of serosanginous output.     He was transferred to [facility] for thoracic evaluation for this recurrent loculated effusion.     He is hemodynamically stable on 3L NC. He reports interval improvement in his shortness of breath and reports no discomfort or cough at present. No imaging since thoracentesis on 8/15/22. Last dialysis was 8/16, without issue, via RUE AVF.       MEDICATIONS:  Home Medications:  No current facility-administered medications on file prior to encounter.     Current Outpatient Medications on File Prior to Encounter   Medication Sig Dispense Refill    aspirin (ECOTRIN) 81 MG EC tablet Take 81 mg by mouth.      atorvastatin (LIPITOR) 40 MG tablet TAKE 1 TABLET BY MOUTH EVERY DAY IN THE EVENING 90 tablet 3    BASAGLAR KWIKPEN U-100 INSULIN glargine 100 units/mL SubQ pen INJECT 7 UNITS UNDER THE SKIN ONCE DAILY 15 each 1    betamethasone dipropionate (DIPROLENE) 0.05 % cream Apply topically 2 (two) times daily. X 1-2 wks then prn flares only 45 g 3    blood sugar diagnostic Strp 1 each by Misc.(Non-Drug; Combo Route) route as directed. 100 each 0    blood-glucose meter (FREESTYLE SYSTEM KIT) kit Use as instructed 1  "each 0    calcium acetate,phosphat bind, (PHOSLO) 667 mg capsule TAKE ONE CAPSULE BY MOUTH THREE TIMES DAILY WITH MEALS 270 capsule 1    calcium carbonate 470 mg calcium (1,177 mg) Chew Take 2 tablets by mouth 3 (three) times daily with meals.       carvediloL (COREG) 12.5 MG tablet Take 1 tablet (12.5 mg total) by mouth 2 (two) times daily. 180 tablet 1    clobetasoL (CLOBEX) 0.05 % shampoo Wash scalp qod. Let sit on affected areas x 5 min prior to rinsing. Avoid getting on face/in eyes. 1 Bottle 3    cloNIDine (CATAPRES) 0.2 MG tablet TAKE 1 TABLET BY MOUTH THREE TIMES A  tablet 1    DUREZOL 0.05 % Drop ophthalmic solution       fluticasone propionate (FLONASE) 50 mcg/actuation nasal spray 1 spray (50 mcg total) by Each Nostril route once daily. 16 g 2    FOLIC ACID/VIT BCOMP,C (JAM-YAQUELIN ORAL) Take 1 tablet by mouth once daily.      furosemide (LASIX) 80 MG tablet TAKE 1 TABLET (80 MG TOTAL) BY MOUTH ONCE DAILY. 90 tablet 0    hydrALAZINE (APRESOLINE) 100 MG tablet TAKE 1 TABLET BY MOUTH EVERY 12 HOURS 180 tablet 3    isosorbide mononitrate (IMDUR) 120 MG 24 hr tablet TAKE 2 TABLETS BY MOUTH ONCE DAILY 180 tablet 1    lancets (ACCU-CHEK SOFTCLIX LANCETS) Misc 1 application by Misc.(Non-Drug; Combo Route) route 4 (four) times daily before meals and nightly. 120 each 0    losartan (COZAAR) 50 MG tablet       pen needle, diabetic 31 gauge x 3/16" Ndle BD Ultra Fine Mini pen needles (5mm x 31g) - dispense 100 100 each 1    RENVELA 800 mg Tab Take 800 mg by mouth 3 (three) times daily with meals.       traZODone (DESYREL) 50 MG tablet TAKE 1 TABLET BY MOUTH NIGHTLY AS NEEDED FOR INSOMNIA. 90 tablet 0     Inpatient Medications:   aspirin  81 mg Oral Daily    atorvastatin  40 mg Oral QHS    calcium acetate(phosphat bind)  667 mg Oral TID WM    carvediloL  12.5 mg Oral BID    cloNIDine  0.2 mg Oral BID    epoetin michael (PROCRIT) injection  5,000 Units Intravenous Every Tues, Thurs, Sat    " furosemide  80 mg Oral Daily    heparin (porcine)  5,000 Units Subcutaneous Q8H    hydrALAZINE  100 mg Oral Q12H    isosorbide mononitrate  60 mg Oral Daily    losartan  50 mg Oral Daily    mupirocin   Nasal BID    sodium zirconium cyclosilicate  10 g Oral TID    traZODone  50 mg Oral Nightly     Infusions:  PRN Medications:acetaminophen, dextrose 10%, dextrose 10%, dextrose 10%, glucagon (human recombinant), glucose, glucose, hydrALAZINE, HYDROcodone-acetaminophen, insulin aspart U-100, labetalol, naloxone, ondansetron, sodium chloride 0.9%    ALLERGIES:    Review of patient's allergies indicates:  No Known Allergies    PAST MEDICAL HISTORY:    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        SURGICAL HISTORY:  Past Surgical History:   Procedure Laterality Date    arm fracture Left     COLONOSCOPY N/A 8/16/2017    Procedure: COLONOSCOPY;  Surgeon: Sebastián Lamas MD;  Location: [address]);  Service: Endoscopy;  Laterality: N/A;  dialysis pt/potassium level 1st/svn    FINGER AMPUTATION Left     left index finger    LUNG SURGERY      thoracotomy    PORTACATH PLACEMENT  08/2016       FAMILY HISTORY:  Family History   Problem Relation Age of Onset    Diabetes Mother     Memory loss Mother     No Known Problems Father     No Known Problems Sister     No Known Problems Brother     Arthritis Sister     No Known Problems Sister     Melanoma Neg Hx        SOCIAL HISTORY:  Social History     Tobacco Use    Smoking status: Former Smoker     Packs/day: 0.25     Years: 30.00     Pack years: 7.50     Start date: 4/26/1982    Smokeless tobacco: Former User   Substance Use Topics     Alcohol use: No    Drug use: No        REVIEW OF SYSTEMS:  A 10-point review of systems is negative except for the above mentioned in the HPI.    OBJECTIVE:     Most Recent Vitals:  Temp: 97.1 °F (36.2 °C) (08/17/22 1143)  Pulse: (!) 54 (08/17/22 1143)  Resp: 17 (08/17/22 1143)  BP: (!) 159/70 (08/17/22 1143)  SpO2: 97 % (08/17/22 1143)    24-Hour Vitals:  Temp:  [96.8 °F (36 °C)-98 °F (36.7 °C)]   Pulse:  [54-82]   Resp:  [15-48]   BP: (147-191)/(67-84)   SpO2:  [92 %-100 %]     24-Hour I&O:    Intake/Output Summary (Last 24 hours) at 8/17/2022 1231  Last data filed at 8/17/2022 0800  Gross per 24 hour   Intake 480 ml   Output --   Net 480 ml       PHYSICAL EXAM:  Sitting in bed in no acute distress, awake and appropriate  Head normocephalic, atraumatic.  Respirations unlabored with good inspiratory effort.      On 3 L breathing comfortably, no increased work of breathing or retractions  Heart regular rate and rhythm.  Abdomen soft, nondistended, nontender to palpation.  RUE AVF with palpable thrill      LABORATORY VALUES:  Recent Labs   Lab 08/15/22  1217 08/15/22  2119 08/16/22  0409 08/17/22  0322   WBC 6.40  --  6.14 6.13   HGB 10.5*  --  9.9* 10.0*   HCT 33.2*  --  32.4* 31.0*   *  --  65* 129*   HGBA1C  --  5.6  --   --      Recent Labs   Lab 08/15/22  1217 08/16/22  0409 08/17/22  0322    135* 133*   K 5.2* 5.8* 5.5*   CL 98 101 100   CO2 30* 21* 22*   BUN 26* 31* 33*   CREATININE 4.4* 4.9* 5.2*   * 102 155*   CALCIUM 8.5* 8.3* 8.1*   MG  --  2.3 2.3   PHOS  --  3.2 3.4   AST 17 19 15   ALT 12 7* 7*   ALKPHOS 186* 164* 192*   BILITOT 1.3* 1.2* 1.2*   PROT 7.0 6.8 6.5   ALBUMIN 3.0* 2.9* 2.8*     Recent Labs   Lab 08/15/22  1217   INR 1.1   No results for input(s): PH, PCO2, PO2, HCO3 in the last 72 hours.    DIAGNOSTIC STUDIES:  X-Ray: Reviewed    LABS  Pulmonary Functions Testing Results:   PFT 11/1/19 Ratio of 70%; FVC 1.96 L (47%); FEV1 1.2 L (44%); TLC 2.86 L (42%); dlco 8.6 (31%)    ABG none  CXR:  6/15/18 when compared to 6/9/17 blunting of costophrenic angle bilaterally.    CT CHEST:  11/18/19 right effusion; 0.6 cm lll superior segment nodule     Echo 6/23/20  · Concentric left ventricular hypertrophy.  · Normal left ventricular systolic function. The estimated ejection fraction is 55%.  · Moderate right ventricular enlargement.  · Normal right ventricular systolic function.  · Grade II (moderate) left ventricular diastolic dysfunction consistent with pseudonormalization.  · Severe biatrial enlargement.  · Mild tricuspid regurgitation.  · Trivial pericardial effusion.  · The estimated PA systolic pressure is 77 mmHg.  · Elevated central venous pressure (15 mmHg).     Pleural fluid:  11/1/19 ldh of 123; protein of 3.7; cytology negative for malignancy (concomittent serum chemistry was not performed)  2/4/21 244/186; cytology negative for malignancy    ASSESSMENT:     Vega Brownlee is a 61 y.o. male PMH CHF (last ECHO 9/20/20 EF 55%, PA pressure 77), home oxygen (2 L), DM2, ESRD on HD (via AVF [facility]), recurrent bilateral pleural effusions (s/p R thoracentesis 2019, 2021), hx of a left thoracotomy (empyema) presents to [facility] from [facility] for evaluation of a suspected loculated right effusion. Imaging reviewed, discussed with patient would recommend R VATS, possible pleurex catheter placement.     PLAN:  · R VATS, possible pleurex catheter placement in OR tomorrow 8/18/22  · Informed consent obtained, discussed with patient and then later his wife via phone  · Ok for diet today, NPO at midnight  · COVID vaccinated, no COVID testing indicated  · Agree with rest of care. Nephrology consulted, planning on dialysis today        Margoth Mota MD  Thoracic Surgery

## 2022-08-17 NOTE — ASSESSMENT & PLAN NOTE
- Remains grossly asymptomatic, euvolemic, not in acute exacerbation  - Continue home cardioprudent regimen  - Monitoring on tele

## 2022-08-17 NOTE — PROGRESS NOTES
08/17/22 0751   Vital Signs   Temp 97 °F (36.1 °C)   Temp src Oral   Pulse (!) 58   Heart Rate Source Monitor   Resp 19   SpO2 97 %   BP (!) 180/77   MAP (mmHg) 110   Patient Position Lying

## 2022-08-17 NOTE — NURSING
0146 Reported to wife of pt transfer via phone, gave address, pt room, and telephone number to floor. No further needs at this time

## 2022-08-17 NOTE — PLAN OF CARE
"Patient seen, orders reviewed, consults placed. Patient feeling okay; wanting to know timeline of events.otherwise no new complaints or concerns at this time.    Per  transfer note,   "Patient is a 61 y.o. 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 Patient has history of recurrent pleural effusion and was sent to ED for worsening symptoms. See below chest xray and CT scan of chest. IR was consulted and attempted US-guided therapeutic right-sided thoracentesis. Per IR "US reveals an extensively loculated Rt effusion with innumerable isolated pockets with sonographic appearance suggestive of a large hematoma/hemothorax. Only able to remove 50-cc of old, dark blood products also consistent with large Rt hemothorax. Pt would likely require large-bore chest tube placement and instillation of lytics if attempts will be made to resolve the hemothorax vs VATS." Facility seeking transfer for CTS evaluation. Patient is stable on med tele unit only requiring 3L NC with no distress. Stable for transfer. "  "

## 2022-08-17 NOTE — SUBJECTIVE & OBJECTIVE
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        Past Surgical History:   Procedure Laterality Date    arm fracture Left     COLONOSCOPY N/A 8/16/2017    Procedure: COLONOSCOPY;  Surgeon: Sebastián Lamas MD;  Location: [address]);  Service: Endoscopy;  Laterality: N/A;  dialysis pt/potassium level 1st/svn    FINGER AMPUTATION Left     left index finger    LUNG SURGERY      thoracotomy    PORTACATH PLACEMENT  08/2016       Review of patient's allergies indicates:  No Known Allergies  Current Facility-Administered Medications   Medication Frequency    [START ON 8/18/2022] 0.9%  NaCl infusion Once    acetaminophen tablet 650 mg Q4H PRN    aspirin EC tablet 81 mg Daily    atorvastatin tablet 40 mg QHS    calcium acetate(phosphat bind) capsule 667 mg TID WM    carvediloL tablet 12.5 mg BID    cloNIDine tablet 0.2 mg BID    dextrose 10% bolus 125 mL PRN    dextrose 10% bolus 125 mL PRN    dextrose 10% bolus 250 mL PRN    epoetin michael injection 5,000 Units Every Tues, Thurs, Sat    furosemide tablet 80 mg Daily    glucagon (human recombinant) injection 1 mg PRN    glucose chewable tablet 16 g PRN    glucose chewable tablet 24 g PRN    heparin (porcine) injection 5,000 Units Q8H    hydrALAZINE injection 10 mg Q6H PRN    hydrALAZINE tablet 100 mg Q12H    HYDROcodone-acetaminophen 5-325 mg per tablet 1 tablet Q6H PRN    insulin aspart U-100 pen 1-10 Units Q6H PRN    isosorbide mononitrate 24 hr tablet 60 mg Daily    labetalol 20 mg/4 mL (5 mg/mL) IV syring Q6H PRN    losartan tablet 50 mg Daily    mupirocin 2 % ointment BID    naloxone 0.4 mg/mL injection 0.02 mg PRN    ondansetron disintegrating tablet 8 mg Q8H  PRN    sodium chloride 0.9% flush 10 mL Q12H PRN    sodium zirconium cyclosilicate packet 10 g TID    traZODone tablet 50 mg Nightly     Family History       Problem Relation (Age of Onset)    Arthritis Sister    Diabetes Mother    Memory loss Mother    No Known Problems Father, Sister, Brother, Sister          Tobacco Use    Smoking status: Former Smoker     Packs/day: 0.25     Years: 30.00     Pack years: 7.50     Start date: 4/26/1982    Smokeless tobacco: Former User   Substance and Sexual Activity    Alcohol use: No    Drug use: No    Sexual activity: Yes     Partners: Female     Review of Systems   Constitutional: Negative.    HENT: Negative.     Eyes: Negative.    Respiratory:  Positive for shortness of breath.    Cardiovascular: Negative.    Gastrointestinal: Negative.    Endocrine: Negative.    Genitourinary:  Positive for decreased urine volume.   Musculoskeletal: Negative.    Neurological: Negative.    Hematological: Negative.    Psychiatric/Behavioral: Negative.     Objective:     Vital Signs (Most Recent):  Temp: 97.1 °F (36.2 °C) (08/17/22 1143)  Pulse: (!) 54 (08/17/22 1143)  Resp: 17 (08/17/22 1143)  BP: (!) 159/70 (08/17/22 1143)  SpO2: 97 % (08/17/22 1143)  O2 Device (Oxygen Therapy): nasal cannula (08/17/22 1038)   Vital Signs (24h Range):  Temp:  [96.8 °F (36 °C)-98 °F (36.7 °C)] 97.1 °F (36.2 °C)  Pulse:  [54-82] 54  Resp:  [15-48] 17  SpO2:  [92 %-100 %] 97 %  BP: (147-191)/(67-84) 159/70     Weight: 63 kg (138 lb 14.2 oz) (08/16/22 0000)  Body mass index is 21.12 kg/m².  Body surface area is 1.74 meters squared.    I/O last 3 completed shifts:  In: 480 [P.O.:480]  Out: 2000 [Other:2000]    Physical Exam  Vitals and nursing note reviewed.   HENT:      Head: Normocephalic.      Mouth/Throat:      Pharynx: Oropharynx is clear.   Eyes:      Conjunctiva/sclera: Conjunctivae normal.   Cardiovascular:      Rate and Rhythm: Normal rate.      Pulses: Normal pulses.   Pulmonary:      Effort: Pulmonary  effort is normal.   Abdominal:      Palpations: Abdomen is soft.   Musculoskeletal:      Cervical back: Neck supple.      Right lower leg: No edema.      Left lower leg: No edema.   Neurological:      Mental Status: He is alert and oriented to person, place, and time.       Significant Labs:  CBC:   Recent Labs   Lab 08/17/22 0322   WBC 6.13   RBC 2.91*   HGB 10.0*   HCT 31.0*   *   *   MCH 34.4*   MCHC 32.3     CMP:   Recent Labs   Lab 08/17/22 0322   *   CALCIUM 8.1*   ALBUMIN 2.8*   PROT 6.5   *   K 5.5*   CO2 22*      BUN 33*   CREATININE 5.2*   ALKPHOS 192*   ALT 7*   AST 15   BILITOT 1.2*     All labs within the past 24 hours have been reviewed.

## 2022-08-17 NOTE — CONSULTS
[facility] - Cardiology Stepdown  Nephrology  Consult Note    Patient Name: Vega Brownlee  MRN: [id number]  Admission Date: 8/15/2022  Hospital Length of Stay: 2 days  Attending Provider: Zack Culver MD   Primary Care Physician: Ko Perez MD  Principal Problem:Recurrent pleural effusion on right    Inpatient consult to Nephrology  Consult performed by: Sharon Mancilla DNP  Consult ordered by: Sancho Pearl MD  Reason for consult: ESRD on HD         Subjective:     HPI: Mr. Brownlee is a 61-year-old  with CHF (last ECHO 9/20/20 EF 55%, PA pressure 77),home oxygen (2 L), DM2, ESRD on HD (via AVF [facility]), recurrent bilateral pleural effusions (s/p R thoracentesis 2019, 2021), hx of a left thoracotomy (empyema) presents to [facility] from [facility] for CTS evaluation of a suspected loculated right effusion. Patient has been followed by pulmonary on the [facility], last seen on 8/12/22 for a recent productive cough and subjective fevers. He was treated with a course of doxycyline with improvement. He presented to the [facility] ED on 8/15/22 with shortness of breath and admitted. IR thoracentesis 8/15/22 with only 50 cc of serosanginous output. Last HD 8/16. K 5.5 this am. On 3L NC. Nephrology consulted for ESRD on HD.          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        Past Surgical History:   Procedure Laterality Date    arm fracture Left     COLONOSCOPY N/A 8/16/2017    Procedure: COLONOSCOPY;  Surgeon: Sebastián Lamas MD;  Location: [facility] ([address] [place] [address]);  Service: Endoscopy;  Laterality: N/A;  dialysis pt/potassium level 1st/svn    FINGER AMPUTATION Left     left index finger    LUNG SURGERY       thoracotomy    PORTACATH PLACEMENT  08/2016       Review of patient's allergies indicates:  No Known Allergies  Current Facility-Administered Medications   Medication Frequency    [START ON 8/18/2022] 0.9%  NaCl infusion Once    acetaminophen tablet 650 mg Q4H PRN    aspirin EC tablet 81 mg Daily    atorvastatin tablet 40 mg QHS    calcium acetate(phosphat bind) capsule 667 mg TID WM    carvediloL tablet 12.5 mg BID    cloNIDine tablet 0.2 mg BID    dextrose 10% bolus 125 mL PRN    dextrose 10% bolus 125 mL PRN    dextrose 10% bolus 250 mL PRN    epoetin michael injection 5,000 Units Every Tues, Thurs, Sat    furosemide tablet 80 mg Daily    glucagon (human recombinant) injection 1 mg PRN    glucose chewable tablet 16 g PRN    glucose chewable tablet 24 g PRN    heparin (porcine) injection 5,000 Units Q8H    hydrALAZINE injection 10 mg Q6H PRN    hydrALAZINE tablet 100 mg Q12H    HYDROcodone-acetaminophen 5-325 mg per tablet 1 tablet Q6H PRN    insulin aspart U-100 pen 1-10 Units Q6H PRN    isosorbide mononitrate 24 hr tablet 60 mg Daily    labetalol 20 mg/4 mL (5 mg/mL) IV syring Q6H PRN    losartan tablet 50 mg Daily    mupirocin 2 % ointment BID    naloxone 0.4 mg/mL injection 0.02 mg PRN    ondansetron disintegrating tablet 8 mg Q8H PRN    sodium chloride 0.9% flush 10 mL Q12H PRN    sodium zirconium cyclosilicate packet 10 g TID    traZODone tablet 50 mg Nightly     Family History       Problem Relation (Age of Onset)    Arthritis Sister    Diabetes Mother    Memory loss Mother    No Known Problems Father, Sister, Brother, Sister          Tobacco Use    Smoking status: Former Smoker     Packs/day: 0.25     Years: 30.00     Pack years: 7.50     Start date: 4/26/1982    Smokeless tobacco: Former User   Substance and Sexual Activity    Alcohol use: No    Drug use: No    Sexual activity: Yes     Partners: Female     Review of Systems   Constitutional: Negative.    HENT: Negative.      Eyes: Negative.    Respiratory:  Positive for shortness of breath.    Cardiovascular: Negative.    Gastrointestinal: Negative.    Endocrine: Negative.    Genitourinary:  Positive for decreased urine volume.   Musculoskeletal: Negative.    Neurological: Negative.    Hematological: Negative.    Psychiatric/Behavioral: Negative.     Objective:     Vital Signs (Most Recent):  Temp: 97.1 °F (36.2 °C) (08/17/22 1143)  Pulse: (!) 54 (08/17/22 1143)  Resp: 17 (08/17/22 1143)  BP: (!) 159/70 (08/17/22 1143)  SpO2: 97 % (08/17/22 1143)  O2 Device (Oxygen Therapy): nasal cannula (08/17/22 1038)   Vital Signs (24h Range):  Temp:  [96.8 °F (36 °C)-98 °F (36.7 °C)] 97.1 °F (36.2 °C)  Pulse:  [54-82] 54  Resp:  [15-48] 17  SpO2:  [92 %-100 %] 97 %  BP: (147-191)/(67-84) 159/70     Weight: 63 kg (138 lb 14.2 oz) (08/16/22 0000)  Body mass index is 21.12 kg/m².  Body surface area is 1.74 meters squared.    I/O last 3 completed shifts:  In: 480 [P.O.:480]  Out: 2000 [Other:2000]    Physical Exam  Vitals and nursing note reviewed.   HENT:      Head: Normocephalic.      Mouth/Throat:      Pharynx: Oropharynx is clear.   Eyes:      Conjunctiva/sclera: Conjunctivae normal.   Cardiovascular:      Rate and Rhythm: Normal rate.      Pulses: Normal pulses.   Pulmonary:      Effort: Pulmonary effort is normal.   Abdominal:      Palpations: Abdomen is soft.   Musculoskeletal:      Cervical back: Neck supple.      Right lower leg: No edema.      Left lower leg: No edema.   Neurological:      Mental Status: He is alert and oriented to person, place, and time.       Significant Labs:  CBC:   Recent Labs   Lab 08/17/22  0322   WBC 6.13   RBC 2.91*   HGB 10.0*   HCT 31.0*   *   *   MCH 34.4*   MCHC 32.3     CMP:   Recent Labs   Lab 08/17/22  0322   *   CALCIUM 8.1*   ALBUMIN 2.8*   PROT 6.5   *   K 5.5*   CO2 22*      BUN 33*   CREATININE 5.2*   ALKPHOS 192*   ALT 7*   AST 15   BILITOT 1.2*     All labs within the  past 24 hours have been reviewed.        Assessment/Plan:     Chronic kidney disease-mineral and bone disorder  - continue home phos binder     Anemia in ESRD (end-stage renal disease)  -goal 10-11  hgb 10.0    ESRD (end stage renal disease) on dialysis  ESRD 2/2 diabetic nephropathy and HTN. Hd since  8/2016. Here for CTS evaluation     Nephrology History  iHD Schedule: MWF   Unit/MD: Shane/   Duration: 3.5 hours   UF: 2-3  EDW: 60 kg   Access: MANFRED AVF   Residual Renal Function: minimal   -last HD 8/16    Assessment:     -Hyperkalemia 5.5   - Will provide dialysis for metabolic clearance and volume management 8/17  - agree with lokelma   - Dialysate adjusted to current labs   - Will obtain OP dialysis records  - Continue to monitor intake and output, daily weights   - Avoid nephrotoxic medication and renal dose medications to GFR              Thank you for your consult. I will follow-up with patient. Please contact us if you have any additional questions.    Sharon Mancilla DNP  Nephrology  [facility] - Cardiology Stepdown

## 2022-08-17 NOTE — NURSING
2138 gave report to humberto, pt is being trans to main campus room 321A. No further questions asked.     0111 pt sent to emt via stretcher, pt tolerated transfer well, pt connected to 3L NC, 02 99%. Pressure dressing intact on R lower FA, no signs of bleeding noted. Pt reports no pain at this time. Documents from chart, and face sheet given to emt.

## 2022-08-17 NOTE — ANESTHESIA PREPROCEDURE EVALUATION
[facility]  Anesthesia Pre-Operative Evaluation         Patient Name: Vega Brownlee  YOB: 1960  MRN: [id number]    SUBJECTIVE:     Pre-operative evaluation for Procedure(s) (LRB):  VATS (VIDEO-ASSISTED THORACOSCOPIC SURGERY) (Right)  INSERTION-CATHETER-CHEST (N/A)     08/17/2022    Vega Brownlee is a 61 y.o. male w/ a significant PMHx of HFpEF (EF 55%), HTN, T2DM, ESRD (on HD), Anemia, HLD, Pulm HTN, and recurrent pleural effusions admitted to OSH for acute on chronic hypoxic respiratory failure, transferred to [facility] for CTS evaluation.    Patient now presents for the above procedure(s).    TTE 9/2/2020:  · Concentric left ventricular hypertrophy.  · Normal left ventricular systolic function. The estimated ejection fraction is 55%.  · Moderate right ventricular enlargement.  · Normal right ventricular systolic function.  · Grade II (moderate) left ventricular diastolic dysfunction consistent with pseudonormalization.  · Severe biatrial enlargement.  · Mild tricuspid regurgitation.  · Trivial pericardial effusion.  · The estimated PA systolic pressure is 77 mmHg.  · Elevated central venous pressure (15 mmHg).      LDA:        Port A Cath Single Lumen right subclavian (Active)   Number of days:             Peripheral IV - Single Lumen 08/15/22 1222 20 G Left Antecubital (Active)   Site Assessment Intact;Clean;Dry 08/17/22 0800   Extremity Assessment Distal to IV No abnormal discoloration;No redness;No swelling;No warmth 08/17/22 0800   Line Status Saline locked 08/17/22 0800   Dressing Status Intact 08/17/22 0800   Dressing Intervention Integrity maintained 08/17/22 0800   Dressing Change Due 08/19/22 08/17/22 0800   Site Change Due 08/19/22 08/17/22 0800   Reason Not Rotated Not due 08/17/22 0800   Number of days: 2            Peripheral IV - Single Lumen 08/15/22 2120 20 G Left Wrist (Active)   Site Assessment Clean;Dry;Intact 08/17/22 0800   Extremity Assessment Distal to IV No abnormal  discoloration 08/17/22 0800   Line Status Saline locked 08/17/22 0800   Dressing Status Intact 08/17/22 0800   Dressing Intervention Integrity maintained 08/17/22 0800   Dressing Change Due 08/19/22 08/17/22 0800   Site Change Due 08/19/22 08/17/22 0800   Reason Not Rotated Not due 08/17/22 0156   Number of days: 1            Hemodialysis AV Fistula Right upper arm (Active)   Site Assessment Clean;Intact;Dry;No redness;No swelling 08/17/22 0800   Patency Thrill;Bruit 08/17/22 0800   Status Deaccessed 08/17/22 0800   Flows Good 08/16/22 2310   Dressing Intervention Integrity maintained 08/17/22 0156   Dressing Status Clean;Dry;Intact 08/17/22 0156   Site Condition No complications 08/17/22 0800   Dressing Pressure dressing 08/17/22 0800   Number of days:       Prev airway: None documented    Patient Active Problem List   Diagnosis    Essential hypertension    Diabetes mellitus with ESRD (end-stage renal disease)    Anemia of chronic disease    Moderate protein malnutrition    Tobacco use disorder, moderate, dependence    Hyperlipidemia    ESRD (end stage renal disease) on dialysis    Type 2 diabetes mellitus with both eyes affected by proliferative retinopathy and macular edema, with long-term current use of insulin    Cyst of prostate    Screening for colon cancer    SOB (shortness of breath)    Pulmonary nodule    Recurrent pleural effusion on right    A-V fistula    Patient on waiting list for kidney transplant    Pulmonary hyperinflation    Bleeding pseudoaneurysm of right brachiocephalic AV fistula    Pulmonary HTN    Chronic respiratory failure with hypoxia    Anemia in ESRD (end-stage renal disease)    Chronic kidney disease-mineral and bone disorder       Review of patient's allergies indicates:  No Known Allergies    Current Inpatient Medications:   [START ON 8/18/2022] sodium chloride 0.9%   Intravenous Once    aspirin  81 mg Oral Daily    atorvastatin  40 mg Oral QHS    calcium  acetate(phosphat bind)  667 mg Oral TID     carvediloL  12.5 mg Oral BID    cloNIDine  0.2 mg Oral BID    epoetin michael (PROCRIT) injection  5,000 Units Intravenous Every Tues, Thurs, Sat    furosemide  80 mg Oral Daily    heparin (porcine)  5,000 Units Subcutaneous Q8H    hydrALAZINE  100 mg Oral Q12H    isosorbide mononitrate  60 mg Oral Daily    losartan  50 mg Oral Daily    mupirocin   Nasal BID    sodium zirconium cyclosilicate  10 g Oral TID    traZODone  50 mg Oral Nightly       No current facility-administered medications on file prior to encounter.     Current Outpatient Medications on File Prior to Encounter   Medication Sig Dispense Refill    aspirin (ECOTRIN) 81 MG EC tablet Take 81 mg by mouth.      atorvastatin (LIPITOR) 40 MG tablet TAKE 1 TABLET BY MOUTH EVERY DAY IN THE EVENING 90 tablet 3    BASAGLAR KWIKPEN U-100 INSULIN glargine 100 units/mL SubQ pen INJECT 7 UNITS UNDER THE SKIN ONCE DAILY 15 each 1    betamethasone dipropionate (DIPROLENE) 0.05 % cream Apply topically 2 (two) times daily. X 1-2 wks then prn flares only 45 g 3    blood sugar diagnostic Strp 1 each by Misc.(Non-Drug; Combo Route) route as directed. 100 each 0    blood-glucose meter (FREESTYLE SYSTEM KIT) kit Use as instructed 1 each 0    calcium acetate,phosphat bind, (PHOSLO) 667 mg capsule TAKE ONE CAPSULE BY MOUTH THREE TIMES DAILY WITH MEALS 270 capsule 1    calcium carbonate 470 mg calcium (1,177 mg) Chew Take 2 tablets by mouth 3 (three) times daily with meals.       carvediloL (COREG) 12.5 MG tablet Take 1 tablet (12.5 mg total) by mouth 2 (two) times daily. 180 tablet 1    clobetasoL (CLOBEX) 0.05 % shampoo Wash scalp qod. Let sit on affected areas x 5 min prior to rinsing. Avoid getting on face/in eyes. 1 Bottle 3    cloNIDine (CATAPRES) 0.2 MG tablet TAKE 1 TABLET BY MOUTH THREE TIMES A  tablet 1    DUREZOL 0.05 % Drop ophthalmic solution       fluticasone propionate (FLONASE) 50  "mcg/actuation nasal spray 1 spray (50 mcg total) by Each Nostril route once daily. 16 g 2    FOLIC ACID/VIT BCOMP,C (JAM-YAQUELIN ORAL) Take 1 tablet by mouth once daily.      furosemide (LASIX) 80 MG tablet TAKE 1 TABLET (80 MG TOTAL) BY MOUTH ONCE DAILY. 90 tablet 0    hydrALAZINE (APRESOLINE) 100 MG tablet TAKE 1 TABLET BY MOUTH EVERY 12 HOURS 180 tablet 3    isosorbide mononitrate (IMDUR) 120 MG 24 hr tablet TAKE 2 TABLETS BY MOUTH ONCE DAILY 180 tablet 1    lancets (ACCU-CHEK SOFTCLIX LANCETS) Misc 1 application by Misc.(Non-Drug; Combo Route) route 4 (four) times daily before meals and nightly. 120 each 0    losartan (COZAAR) 50 MG tablet       pen needle, diabetic 31 gauge x 3/16" Ndle BD Ultra Fine Mini pen needles (5mm x 31g) - dispense 100 100 each 1    RENVELA 800 mg Tab Take 800 mg by mouth 3 (three) times daily with meals.       traZODone (DESYREL) 50 MG tablet TAKE 1 TABLET BY MOUTH NIGHTLY AS NEEDED FOR INSOMNIA. 90 tablet 0       Past Surgical History:   Procedure Laterality Date    arm fracture Left     COLONOSCOPY N/A 8/16/2017    Procedure: COLONOSCOPY;  Surgeon: Sebastián Lamas MD;  Location: [address]);  Service: Endoscopy;  Laterality: N/A;  dialysis pt/potassium level 1st/svn    FINGER AMPUTATION Left     left index finger    LUNG SURGERY      thoracotomy    PORTACATH PLACEMENT  08/2016       Social History     Socioeconomic History    Marital status:     Number of children: 2   Tobacco Use    Smoking status: Former Smoker     Packs/day: 0.25     Years: 30.00     Pack years: 7.50     Start date: 4/26/1982    Smokeless tobacco: Former User   Substance and Sexual Activity    Alcohol use: No    Drug use: No    Sexual activity: Yes     Partners: Female       OBJECTIVE:     Vital Signs Range (Last 24H):  Temp:  [36 °C (96.8 °F)-36.7 °C (98 °F)]   Pulse:  [54-82]   Resp:  [15-48]   BP: (147-191)/(67-84)   SpO2:  [92 %-100 %]       Significant Labs:  Lab Results "   Component Value Date    WBC 6.13 08/17/2022    HGB 10.0 (L) 08/17/2022    HCT 31.0 (L) 08/17/2022     (L) 08/17/2022    CHOL 107 (L) 05/06/2019    TRIG 68 05/06/2019    HDL 28 (L) 05/06/2019    ALT 7 (L) 08/17/2022    AST 15 08/17/2022     (L) 08/17/2022    K 5.5 (H) 08/17/2022     08/17/2022    CREATININE 5.2 (H) 08/17/2022    BUN 33 (H) 08/17/2022    CO2 22 (L) 08/17/2022    TSH 3.301 07/26/2016    PSA 0.43 09/02/2020    INR 1.1 08/15/2022    HGBA1C 5.6 08/15/2022       Diagnostic Studies: No relevant studies.    EKG:   Results for orders placed or performed in visit on 08/15/22   EKG 12-lead    Collection Time: 08/15/22  9:36 PM    Narrative    Test Reason : R07.9    Vent. Rate : 068 BPM     Atrial Rate : 068 BPM     P-R Int : 258 ms          QRS Dur : 098 ms      QT Int : 436 ms       P-R-T Axes : -23 180 088 degrees     QTc Int : 463 ms    Sinus rhythm with 1st degree A-V block  Left posterior fascicular block  Inferior infarct ,age undetermined  Abnormal ECG  When compared with ECG of 02-FEB-2021 13:14,  Significant changes have occurred  Confirmed by Darrel Sutton MD ([id number]) on 8/16/2022 10:09:29 PM    Referred By: [email or web address]   SELF           Confirmed By:Darrel Sutton MD       2D ECHO:  TTE:  Results for orders placed or performed during the hospital encounter of 09/02/20   Echo Color Flow Doppler? Yes   Result Value Ref Range    BSA 1.74 m2    TDI SEPTAL 0.06 m/s    LV LATERAL E/E' RATIO 26.00 m/s    LV SEPTAL E/E' RATIO 26.00 m/s    LA WIDTH 4.87 cm    TDI LATERAL 0.06 m/s    LVIDd 5.55 3.5 - 6.0 cm    IVS 1.26 (A) 0.6 - 1.1 cm    Posterior Wall 1.22 (A) 0.6 - 1.1 cm    LVIDs 3.98 2.1 - 4.0 cm    FS 28 28 - 44 %    LA volume 128.73 cm3    Sinus 3.31 cm    STJ 2.79 cm    Ascending aorta 3.54 cm    LV mass 289.16 g    LA size 4.96 cm    RVDD 5.39 cm    TAPSE 2.71 cm    Left Ventricle Relative Wall Thickness 0.44 cm    AV mean gradient 8 mmHg    AV valve area 2.29 cm2    AV Velocity  Ratio 0.58     AV index (prosthetic) 0.59     MV valve area p 1/2 method 3.30 cm2    E/A ratio 1.33     Mean e' 0.06 m/s    E wave deceleration time 229.95 msec    LVOT diameter 2.23 cm    LVOT area 3.9 cm2    LVOT peak pito 1.15 m/s    LVOT peak VTI 31.63 cm    Ao peak pito 2.00 m/s    Ao VTI 53.97 cm    LVOT stroke volume 123.47 cm3    AV peak gradient 16 mmHg    E/E' ratio 26.00 m/s    MV Peak E Pito 1.56 m/s    TR Max Pito 3.94 m/s    MV stenosis pressure 1/2 time 66.69 ms    MV Peak A Pito 1.17 m/s    LV Systolic Volume 69.15 mL    LV Systolic Volume Index 39.5 mL/m2    LV Diastolic Volume 150.51 mL    LV Diastolic Volume Index 85.95 mL/m2    LA Volume Index 73.5 mL/m2    LV Mass Index 165 g/m2    RA Major Axis 6.08 cm    Left Atrium Minor Axis 6.31 cm    Left Atrium Major Axis 6.23 cm    Triscuspid Valve Regurgitation Peak Gradient 62 mmHg    RA Width 5.01 cm    Right Atrial Pressure (from IVC) 15 mmHg    TV rest pulmonary artery pressure 77 mmHg    Narrative    · Concentric left ventricular hypertrophy.  · Normal left ventricular systolic function. The estimated ejection   fraction is 55%.  · Moderate right ventricular enlargement.  · Normal right ventricular systolic function.  · Grade II (moderate) left ventricular diastolic dysfunction consistent   with pseudonormalization.  · Severe biatrial enlargement.  · Mild tricuspid regurgitation.  · Trivial pericardial effusion.  · The estimated PA systolic pressure is 77 mmHg.  · Elevated central venous pressure (15 mmHg).          ZULEIMA:  No results found for this or any previous visit.    ASSESSMENT/PLAN:                                                                                   08/17/2022  Vega Brownlee is a 61 y.o., male.      Pre-op Assessment    I have reviewed the Patient Summary Reports.     I have reviewed the Nursing Notes. I have reviewed the NPO Status.   I have reviewed the Medications.     Review of Systems  Anesthesia Hx:  No problems with previous  Anesthesia Denies Hx of Anesthetic complications  History of prior surgery of interest to airway management or planning: lung surgery. Denies Family Hx of Anesthesia complications.   Denies Personal Hx of Anesthesia complications.   Hematology/Oncology:         -- Anemia:   Cardiovascular:   Exercise tolerance: good Hypertension Denies CAD.    Denies CABG/stent.   Denies Angina. CHF hyperlipidemia  Denies PEREIRA. Plays golf regularly  Denies symptoms  Pulm HTN   Pulmonary:   Denies COPD.  Denies Asthma.  Denies Shortness of breath.  Denies Sleep Apnea. Recurrent pleural effusions- possibly related to kidney disease?  Home O2 but had not been using when out/about due to large tank   Renal/:   Chronic Renal Disease, ESRD, Dialysis    Hepatic/GI:   Denies GERD.    Neurological:  Neurology Normal    Endocrine:   Diabetes, type 2  Denies Obesity / BMI > 30  Psych:   Denies Psychiatric History.          Physical Exam  General: Cachexia, Cooperative, Alert and Oriented    Airway:  Mallampati: IV   Mouth Opening: Normal  TM Distance: Normal  Tongue: Normal  Neck ROM: Normal ROM    Dental:    Chest/Lungs:  Decreased Breath Sounds: right  JVD present      Anesthesia Plan  Type of Anesthesia, risks & benefits discussed:    Anesthesia Type: Gen ETT  Intra-op Monitoring Plan: Standard ASA Monitors and Art Line  Post Op Pain Control Plan: multimodal analgesia and IV/PO Opioids PRN  Induction:  IV  Airway Plan: Direct and Video, Post-Induction  Informed Consent: Informed consent signed with the Patient and all parties understand the risks and agree with anesthesia plan.  All questions answered. Patient consented to blood products? Yes  ASA Score: 4  Day of Surgery Review of History & Physical: H&P Update referred to the surgeon/provider.    Ready For Surgery From Anesthesia Perspective.     .

## 2022-08-17 NOTE — ASSESSMENT & PLAN NOTE
ESRD 2/2 diabetic nephropathy and HTN. Hd since  8/2016. Here for CTS evaluation     Nephrology History  iHD Schedule: MWF   Unit/MD: Shane/   Duration: 3.5 hours   UF: 2-3  EDW: 60 kg   Access: MANFRED AVF   Residual Renal Function: minimal     Assessment:     -Hyperkalemia 5.5   - Will provide dialysis for metabolic clearance and volume management 8/17  - agree with lokelma   - Dialysate adjusted to current labs   - Will obtain OP dialysis records  - Continue to monitor intake and output, daily weights   - Avoid nephrotoxic medication and renal dose medications to GFR

## 2022-08-17 NOTE — ASSESSMENT & PLAN NOTE
- Interval history and physical exam findings as described above  - S/p left thoracotomy  - Recurring right effusion s/p thoracentesis x2  - Chemistry on 2/4/21 suggestive of exudative effusion  - Thoracic surgery consulted, planning to place PleurX on 8/18  - Satting well on 2L NC, no respiratory distress  - Continuing to monitor

## 2022-08-17 NOTE — HPI
Mr. Brownlee is a 61-year-old  with CHF (last ECHO 9/20/20 EF 55%, PA pressure 77),home oxygen (2 L), DM2, ESRD on HD (via AVF TThurSat), recurrent bilateral pleural effusions (s/p R thoracentesis 2019, 2021), hx of a left thoracotomy (empyema) presents to [facility] from [facility] for CTS evaluation of a suspected loculated right effusion. Patient has been followed by pulmonary on the [facility], last seen on 8/12/22 for a recent productive cough and subjective fevers. He was treated with a course of doxycyline with improvement. He presented to the [facility] ED on 8/15/22 with shortness of breath and admitted. IR thoracentesis 8/15/22 with only 50 cc of serosanginous output. Last HD 8/16. K 5.5 this am. On 3L NC. Nephrology consulted for ESRD on HD.

## 2022-08-17 NOTE — NURSING
0143 attempted to call daughter to update on pts transfer, not able to get anyone at this time, left a message and return phone number.

## 2022-08-17 NOTE — ASSESSMENT & PLAN NOTE
- Interval history and physical exam findings as described above  - Nephology following  - Further decision for HD per nephology  - Renally dosing all medications  - Avoid nephrotoxins  - Will continue to monitor on daily labs

## 2022-08-17 NOTE — SUBJECTIVE & OBJECTIVE
Interval History: Pt seen and examined this morning on jeanette BIGGS.  States he is breathing comfortable on 2L NC, denies any acute issues. Evaluated by thoracic surgery today, plans to place PleurX tomorrow. Care plan reviewed. Otherwise, doing well and with no further complaints at this time.        Objective:     Vital Signs (Most Recent):  Temp: 97.1 °F (36.2 °C) (08/17/22 1143)  Pulse: (!) 54 (08/17/22 1143)  Resp: 17 (08/17/22 1143)  BP: (!) 159/70 (08/17/22 1143)  SpO2: 97 % (08/17/22 1143)   Vital Signs (24h Range):  Temp:  [96.8 °F (36 °C)-98 °F (36.7 °C)] 97.1 °F (36.2 °C)  Pulse:  [54-82] 54  Resp:  [15-48] 17  SpO2:  [92 %-100 %] 97 %  BP: (147-191)/(67-84) 159/70     Weight: 63 kg (138 lb 14.2 oz)  Body mass index is 21.12 kg/m².    Intake/Output Summary (Last 24 hours) at 8/17/2022 1335  Last data filed at 8/17/2022 0800  Gross per 24 hour   Intake 360 ml   Output --   Net 360 ml        Physical Exam  Gen: in NAD, appears stated age  Neuro: AAOx4, CN2-12 grossly intact BL; motor, sensory, and strength grossly intact BL  HEENT: NTNC, EOMI, PERRLA, MMM; no thyromegaly or lymphadenopathy; no JVD appreciated  CVS: RRR, no m/r/g; S1/S2 auscultated with no S3 or S4; capillary refill < 2 sec  Resp: coarse breath sounds in all lung fields BL, diminished breath sounds over RML/RLL; no belabored breathing or accessory muscle use appreciated   Abd: BS+ in all 4 quadrants; NTND, soft to palpation; no organomegaly appreciated   Extrem: pulses full, equal, and regular over all 4 extremities; no UE or LE edema BL      Significant Labs: All pertinent labs within the past 24 hours have been reviewed.    Significant Imaging: I have reviewed all pertinent imaging results/findings within the past 24 hours.

## 2022-08-17 NOTE — PLAN OF CARE
Problem: Adult Inpatient Plan of Care  Goal: Plan of Care Review  Outcome: Ongoing, Progressing  Goal: Patient-Specific Goal (Individualized)  Outcome: Ongoing, Progressing  Goal: Absence of Hospital-Acquired Illness or Injury  Outcome: Ongoing, Progressing  Goal: Optimal Comfort and Wellbeing  Outcome: Ongoing, Progressing  Goal: Readiness for Transition of Care  Outcome: Ongoing, Progressing     Problem: Diabetes Comorbidity  Goal: Blood Glucose Level Within Targeted Range  Outcome: Ongoing, Progressing     Problem: Infection  Goal: Absence of Infection Signs and Symptoms  Outcome: Ongoing, Progressing     Problem: Impaired Wound Healing  Goal: Optimal Wound Healing  Outcome: Ongoing, Progressing     Problem: Device-Related Complication Risk (Hemodialysis)  Goal: Safe, Effective Therapy Delivery  Outcome: Ongoing, Progressing     Problem: Hemodynamic Instability (Hemodialysis)  Goal: Effective Tissue Perfusion  Outcome: Ongoing, Progressing     Problem: Infection (Hemodialysis)  Goal: Absence of Infection Signs and Symptoms  Outcome: Ongoing, Progressing

## 2022-08-17 NOTE — ASSESSMENT & PLAN NOTE
- K 5.5 on AM labs, asymptomatic  - Lokelma TID  - Nephrology following, HD to assist with electrolyte anomalies  - Monitoring on tele

## 2022-08-17 NOTE — PROGRESS NOTES
"[facility] Jasso - Cardiology [facility] Medicine  Progress Note    Patient Name: Vega Brownlee  MRN: [id number]  Patient Class: IP- Inpatient   Admission Date: 8/15/2022  Length of Stay: 2 days  Attending Physician: Zack Culver MD  Primary Care Provider: Ko Perez MD        Subjective:     Principal Problem:Recurrent pleural effusion on right        HPI:  HPI per :  Patient is a 61 y.o. 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 Patient has history of recurrent pleural effusion and was sent to ED for worsening symptoms. IR was consulted and attempted US-guided therapeutic right-sided thoracentesis. Per IR "US reveals an extensively loculated Rt effusion with innumerable isolated pockets with sonographic appearance suggestive of a large hematoma/hemothorax. Only able to remove 50-cc of old, dark blood products also consistent with large Rt hemothorax. Pt would likely require large-bore chest tube placement and instillation of lytics if attempts will be made to resolve the hemothorax vs VATS." Facility seeking transfer for CTS evaluation. Patient is stable on med tele unit only requiring 3L NC with no distress. Stable for transfer.      Overview/Hospital Course:  [facility] Medicine Course:  IR was consulted and attempted US-guided therapeutic right-sided thoracentesis. Per IR "US reveals an extensively loculated Rt effusion with innumerable isolated pockets with sonographic appearance suggestive of a large hematoma/hemothorax. Only able to remove 50-cc of old, dark blood products also consistent with large Rt hemothorax. Pt would likely require large-bore chest tube " "placement and instillation of lytics if attempts will be made to resolve the hemothorax vs VATS."  Patient accepted to [facility], in queue to transfer asap. Stable vitals. Will allow to eat and make NPO again at midnight. Lokelma for K 5.8.     [facility] Medicine Course:  Pt accepted to [facility] [place]. Stable respiratory status into 8/17. Rising BP and hyperkalemia noted; PRN IV hydralazine and PO lokelma provided, nephrology following for HD needs. Evaluated by thoracic surgery, with plans to place PleurX catheter on 8/18.      Interval History: Pt seen and examined this morning on jeanette BIGGS.  States he is breathing comfortable on 2L NC, denies any acute issues. Evaluated by thoracic surgery today, plans to place PleurX tomorrow. Care plan reviewed. Otherwise, doing well and with no further complaints at this time.        Objective:     Vital Signs (Most Recent):  Temp: 97.1 °F (36.2 °C) (08/17/22 1143)  Pulse: (!) 54 (08/17/22 1143)  Resp: 17 (08/17/22 1143)  BP: (!) 159/70 (08/17/22 1143)  SpO2: 97 % (08/17/22 1143)   Vital Signs (24h Range):  Temp:  [96.8 °F (36 °C)-98 °F (36.7 °C)] 97.1 °F (36.2 °C)  Pulse:  [54-82] 54  Resp:  [15-48] 17  SpO2:  [92 %-100 %] 97 %  BP: (147-191)/(67-84) 159/70     Weight: 63 kg (138 lb 14.2 oz)  Body mass index is 21.12 kg/m².    Intake/Output Summary (Last 24 hours) at 8/17/2022 1335  Last data filed at 8/17/2022 0800  Gross per 24 hour   Intake 360 ml   Output --   Net 360 ml        Physical Exam  Gen: in NAD, appears stated age  Neuro: AAOx4, CN2-12 grossly intact BL; motor, sensory, and strength grossly intact BL  HEENT: NTNC, EOMI, PERRLA, MMM; no thyromegaly or lymphadenopathy; no JVD appreciated  CVS: RRR, no m/r/g; S1/S2 auscultated with no S3 or S4; capillary refill < 2 sec  Resp: coarse breath sounds in all lung fields BL, diminished breath sounds over RML/RLL; no belabored breathing or accessory muscle use appreciated   Abd: BS+ in all 4 quadrants; NTND, soft to palpation; no " organomegaly appreciated   Extrem: pulses full, equal, and regular over all 4 extremities; no UE or LE edema BL      Significant Labs: All pertinent labs within the past 24 hours have been reviewed.    Significant Imaging: I have reviewed all pertinent imaging results/findings within the past 24 hours.      Assessment/Plan:      * Recurrent pleural effusion on right  - Interval history and physical exam findings as described above  - S/p left thoracotomy  - Recurring right effusion s/p thoracentesis x2  - Chemistry on 2/4/21 suggestive of exudative effusion  - Thoracic surgery consulted, planning to place PleurX on 8/18  - Satting well on 2L NC, no respiratory distress  - Continuing to monitor    ESRD (end stage renal disease) on dialysis  - Interval history and physical exam findings as described above  - Nephology following  - Further decision for HD per nephology  - Renally dosing all medications  - Avoid nephrotoxins  - Will continue to monitor on daily labs    Hyperkalemia  - K 5.5 on AM labs, asymptomatic  - Lokelma TID  - Nephrology following, HD to assist with electrolyte anomalies  - Monitoring on tele    Anemia in ESRD (end-stage renal disease)  - H/H stable near baseline  - Will continue to monitor on daily labs    Chronic diastolic heart failure  - Remains grossly asymptomatic, euvolemic, not in acute exacerbation  - Continue home cardioprudent regimen  - Monitoring on tele    Hypertension associated with ESRD caused by type 2 diabetes mellitus, on dialysis  - Working to optimize BP control   - Continue home cardioprudent regimen  - HD to assist with BP control  - PRN IV hydralazine and/or IV labetalol provided for SBP>180  - Will continue to monitor and further titrate antihypertensives as clinically indicated     Mixed hyperlipidemia associated with type 2 diabetes mellitus  - Continue home statin regimen    Type 2 diabetes mellitus with chronic kidney disease on chronic dialysis, without long-term  current use of insulin  - Working to optimize BG control  - SSI provided for corrective dosing  - DXTs as ordered  - Hypoglycemic protocol in effect  - Diabetic diet provided    Thrombocytopenia  - Per chart review, appears to be a chronic issue  - Monitoring on daily labs    VTE Risk Mitigation (From admission, onward)         Ordered     heparin (porcine) injection 5,000 Units  Every 8 hours         08/15/22 2005     IP VTE HIGH RISK PATIENT  Once         08/15/22 2005     Place sequential compression device  Until discontinued         08/15/22 2005                Discharge Planning   WENDY: 8/20/2022     Code Status: Full Code   Is the patient medically ready for discharge?: No    Reason for patient still in hospital (select all that apply): Patient trending condition  Discharge Plan A: Home with family          Zack Culver MD  Attending Physician  Department of Hospital Medicine  Epic secure chat preferred, or ext. [phone]  8/17/2022

## 2022-08-17 NOTE — ASSESSMENT & PLAN NOTE
- Working to optimize BG control  - SSI provided for corrective dosing  - DXTs as ordered  - Hypoglycemic protocol in effect  - Diabetic diet provided

## 2022-08-17 NOTE — ASSESSMENT & PLAN NOTE
- Working to optimize BP control   - Continue home cardioprudent regimen  - HD to assist with BP control  - PRN IV hydralazine and/or IV labetalol provided for SBP>180  - Will continue to monitor and further titrate antihypertensives as clinically indicated

## 2022-08-18 NOTE — NURSING TRANSFER
Nursing Transfer Note      8/18/2022     Reason patient is being transferred: post-procedure    Transfer To: 306    Transfer via bed    Transfer with to O2, cardiac monitoring    Transported by transport and RN    Medicines sent: none    Any special needs or follow-up needed: routine    Chart send with patient: Yes    Notified: spouse    Patient reassessed at: 1600, 8/18

## 2022-08-18 NOTE — ANESTHESIA PROCEDURE NOTES
Arterial    Diagnosis: pleural effusion    Patient location during procedure: done in OR  Procedure start time: 8/18/2022 12:32 PM  Timeout: 8/18/2022 12:31 PM  Procedure end time: 8/18/2022 12:37 PM    Staffing  Authorizing Provider: RUBEN Crawley MD  Performing Provider: RUBEN Crawley MD    Anesthesiologist was present at the time of the procedure.  Arterial  Skin Prep: chlorhexidine gluconate and isopropyl alcohol  Local Infiltration: none  Orientation: left  Location: radial    Catheter Size: 22 G  Catheter placement by Ultrasound guidance. Heme positive aspiration all ports.   Vessel Caliber: small, patent, compressibility poor  Vascular Doppler:  not done  Needle advanced into vessel with real time Ultrasound guidance.Insertion Attempts: 2  Assessment  Dressing: secured with tape and tegaderm  Patient: Tolerated well

## 2022-08-18 NOTE — NURSING TRANSFER
Nursing Transfer Note      8/18/2022     Reason patient is being transferred: procedure    Transfer To: procedure    Transfer via bed    Transfer with  to O2, cardiac monitoring    Transported by transportation team    Medicines sent: none    Any special needs or follow-up needed: HD    Chart send with patient: Yes    Notified: family

## 2022-08-18 NOTE — OP NOTE
Mike Jasso - Surgery ([facility])  Operative Note    SUMMARY     Surgery Date: 8/18/2022     Surgeon(s) and Role:     * Anita Peraza MD - Primary     * Margoth Mota MD - Resident - Assisting      Pre-op Diagnosis:  Pleural effusion [J90]    Post-op Diagnosis:  Post-Op Diagnosis Codes:     * Pleural effusion [J90]    Procedure(s) (LRB):  VATS, WITH DECORTICATION, LUNG (Right)  BLOCK, NERVE, INTERCOSTAL, 2 OR MORE (Right)  BRONCHOSCOPY, FLEXIBLE (N/A)  INSERTION, CATHETER, INTERCOSTAL, FOR DRAINAGE (Right)    Anesthesia: General    Operative Findings: Organized fibrinous exudates prompting thoracoscopic decortication of right lung. Satisfactory re-expansion of right lung. Placement of an posterior, 24 fr apical chest tube and an anterior 24 fr angled chest tube. Intercostal nerve block. Bronchoscopy without acute, significant findings. Palpable thrill at RUE fistula at termination of case    Estimated Blood Loss: 400 mL    Estimated Blood Loss has been documented.         Specimens:   Specimen (24h ago, onward)             Start     Ordered    08/18/22 1259  Specimen to Pathology, Surgery Pulmonary and Thoracic  Once        Comments: Pre-op Diagnosis: Pleural effusion [J90]Procedure(s):VATS (VIDEO-ASSISTED THORACOSCOPIC SURGERY)INSERTION-CATHETER-CHEST Number of specimens: 2Name of specimens: 1. Pleural debris - perm2. Pleural rind - perm     References:    Click here for ordering Quick Tip   Question Answer Comment   Procedure Type: Pulmonary and Thoracic    Specimen Class: Complex case/Special    Which provider would you like to cc? ANITA PERAZA    Release to patient Immediate        08/18/22 1258                [id number]      Procedure in Detail:  The patient was taken to the operating room and placed supine on the operating table.  Adequate general anesthesia was achieved and the patient was intubated without complication. He was turned to the left lateral decubitus position, padded appropriately and  secured. The right chest was prepped and draped in standard sterile fashion.  Prophylactic intravenous antibiotics were administered. Time-out was performed and all team members were in agreement.    A 1.5 cm incision was made in the 6th intercostal space in the midaxillary line. Subcutaneous tissue was divided with electrocautery and the chest was entered and a port was placed. The chest was visualized, and found to have a thick fibrinous peel trapping the right lung. There was only a small amount of serosanguinous fluid which was evacuated with suction. Another 1.5 cm incision was made in the 5th intercostal space in the anterior axillary line, subcutaneous tissue divided with electrocautery and the chest was entered. Organized clot and exudates were removed with ring forceps and sent for culture. A fibrinous rind covered the right lung. This was peeled away with blunt dissection from the pleural surface using atraumatic lung forceps, suction and ring clamps from the apex down inferiorly. The right lung was gradually freed while the fibrinous rind was dissected off the pleural surface. Another 1.5 cm incision was made in the 4th intercostal space, to further debride the fibrinous exudates. After satisfactory debridement and expansion of the right lung, the chest was irrigated with warmed saline and hemostasis was ensured. An intercostal block was performed, in which 0.5% marcaine solution was injected into each incision.  Under direct visualization, a straight 24 [place] chest tube was placed superiorly towards the apex of the lung, and an angled 24 fr chest tube was placed anteriorly in the right chest.   Each chest tube was secured to the skin with a 0-silk suture. Port incisions were closed in layers with absorbable suture, and sterile dressings were applied.   We then performed a bronchoscopy, in which a flexible bronchoscopy was introduced through the endotracheal tube. The airways were inspected and no mucus  plug or secretions were visualized. No acute pathology identified, and after a saline irrigation the fluid was evacuated and the bronchoscope was removed. All sponge, needle and instrument counts were correct at the end of the case.  The patient tolerated the procedure well.  There were no immediate complications. A palpable thrill was palpated at this right upper extremity fistula. He was extubated in the operating room and taken to the recovery room in stable condition.

## 2022-08-18 NOTE — ASSESSMENT & PLAN NOTE
Vega Brownlee is a 61 y.o. male PMH CHF (last ECHO 9/20/20 EF 55%, PA pressure 77), home oxygen (2 L), DM2, ESRD on HD (via AVF TThurSat), recurrent bilateral pleural effusions (s/p R thoracentesis 2019, 2021), hx of a left thoracotomy (empyema) presents to [facility] for evaluation of a suspected loculated right effusion.     OR today for VATS, possible pleurex catheter placement.     · Informed consent obtained  · NPO since midnight  · COVID vaccinated  · Agree with rest of care. Nephrology consulted, dialysis overnight

## 2022-08-18 NOTE — NURSING
08/18/22 0551   Vital Signs   Pulse (!) 52   Heart Rate Source Monitor   Resp 18   SpO2 100 %   Pulse Oximetry Type Intermittent   Flow (L/min) 3   BP (!) 146/64   MAP (mmHg) 92   BP Location Left arm   BP Method Automatic   Patient Position Lying   Assessments (Pre/Post)   Blood Liters Processed (BLP) 68.4   Level of Consciousness (AVPU) alert   Dialyzer Clearance clear   Pre-Hemodialysis Assessment   Treatment Status Completed        Hemodialysis AV Fistula Right upper arm   No Placement Date or Time found.   Present Prior to Hospital Arrival?: Yes  Location: Right upper arm   Patency Present;Thrill;Bruit   Status Deaccessed   Site Condition No complications   Dressing Pressure dressing   During Hemodialysis Assessment   Blood Volume Processed (Liters) 68.4 L   UF Removed (mL) 2500 mL   Intra-Hemodialysis Comments tx completed.  bloodlines reinfused.   Post-Hemodialysis Assessment   Rinseback Volume (mL) 250 mL   Blood Volume Processed (Liters) 68.4 L   Dialyzer Clearance Clear   Total UF (mL) 2500 mL   Net Fluid Removal 2000   Patient Response to Treatment tolerated well   Post-Hemodialysis Comments tx completed.  2 L removed.      86

## 2022-08-18 NOTE — ASSESSMENT & PLAN NOTE
- Interval history and physical exam findings as described above  - S/p left thoracotomy  - Recurring right effusion s/p thoracentesis x2  - Chemistry on 2/4/21 suggestive of exudative effusion  - Thoracic surgery consulted, placement of PleurX on 8/18  - Satting well on 2L NC, no respiratory distress  - Continuing to monitor

## 2022-08-18 NOTE — PROGRESS NOTES
Thoracic Surgery  Progress Note    Subjective:     Post-Op Info:  Procedure(s) (LRB):  VATS (VIDEO-ASSISTED THORACOSCOPIC SURGERY) (Right)  INSERTION-CATHETER-CHEST (N/A)   Day of Surgery     Interval History: No acute events overnight. 3L NC. Dialysis overnight, net 2L removed.     Medications:  Continuous Infusions:  Scheduled Meds:   sodium chloride 0.9%   Intravenous Once    aspirin  81 mg Oral Daily    atorvastatin  40 mg Oral QHS    calcium acetate(phosphat bind)  667 mg Oral TID WM    carvediloL  12.5 mg Oral BID    cloNIDine  0.2 mg Oral BID    epoetin michael (PROCRIT) injection  5,000 Units Intravenous Every Tues, Thurs, Sat    furosemide  80 mg Oral Daily    heparin (porcine)  5,000 Units Subcutaneous Q8H    hydrALAZINE  100 mg Oral Q12H    isosorbide mononitrate  60 mg Oral Daily    losartan  50 mg Oral Daily    mupirocin   Nasal BID    sodium zirconium cyclosilicate  10 g Oral TID    traZODone  50 mg Oral Nightly     PRN Meds:acetaminophen, dextrose 10%, dextrose 10%, dextrose 10%, glucagon (human recombinant), glucose, glucose, hydrALAZINE, HYDROcodone-acetaminophen, insulin aspart U-100, labetalol, naloxone, ondansetron, sodium chloride 0.9%     Review of patient's allergies indicates:  No Known Allergies  Objective:     Vital Signs (Most Recent):  Temp: 97.3 °F (36.3 °C) (08/18/22 0430)  Pulse: (!) 52 (08/18/22 0551)  Resp: 18 (08/18/22 0551)  BP: (!) 146/64 (08/18/22 0551)  SpO2: 100 % (08/18/22 0551)   Vital Signs (24h Range):  Temp:  [97 °F (36.1 °C)-97.9 °F (36.6 °C)] 97.3 °F (36.3 °C)  Pulse:  [50-64] 52  Resp:  [16-19] 18  SpO2:  [95 %-100 %] 100 %  BP: (128-180)/(58-77) 146/64     Intake/Output - Last 3 Shifts         08/16 0700  08/17 0659 08/17 0700 08/18 0659    P.O. 480 761    Total Intake(mL/kg) 480 (7.6) 761 (12.1)    Urine (mL/kg/hr)  0 (0)    Other 2000 2500    Total Output 2000 2500    Net -0965 -6531          Urine Occurrence 0 x 0 x    Stool Occurrence 1 x              SpO2: 100 %  O2 Device (Oxygen Therapy): nasal cannula    Physical Exam  Vitals and nursing note reviewed.   Constitutional:       General: He is not in acute distress.  Cardiovascular:      Rate and Rhythm: Normal rate.      Pulses: Normal pulses.   Pulmonary:      Effort: Pulmonary effort is normal. No respiratory distress.      Comments: 3L NC  Abdominal:      General: There is no distension.      Palpations: Abdomen is soft.      Tenderness: There is no abdominal tenderness.   Neurological:      Mental Status: He is alert.       Significant Labs:  BMP:   Recent Labs   Lab 08/17/22  0322   *   *   K 5.5*      CO2 22*   BUN 33*   CREATININE 5.2*   CALCIUM 8.1*   MG 2.3     CBC:   Recent Labs   Lab 08/17/22 0322   WBC 6.13   RBC 2.91*   HGB 10.0*   HCT 31.0*   *   *   MCH 34.4*   MCHC 32.3     CMP:   Recent Labs   Lab 08/17/22 0322   *   CALCIUM 8.1*   ALBUMIN 2.8*   PROT 6.5   *   K 5.5*   CO2 22*      BUN 33*   CREATININE 5.2*   ALKPHOS 192*   ALT 7*   AST 15   BILITOT 1.2*       Significant Diagnostics:  CXR: I have reviewed all pertinent results/findings within the past 24 hours    VTE Risk Mitigation (From admission, onward)           Ordered     heparin (porcine) injection 5,000 Units  Every 8 hours         08/15/22 2005     IP VTE HIGH RISK PATIENT  Once         08/15/22 2005     Place sequential compression device  Until discontinued         08/15/22 2005                  Assessment/Plan:     * Recurrent pleural effusion on right  Vega Brownlee is a 61 y.o. male PMH CHF (last ECHO 9/20/20 EF 55%, PA pressure 77), home oxygen (2 L), DM2, ESRD on HD (via AVF TThurSat), recurrent bilateral pleural effusions (s/p R thoracentesis 2019, 2021), hx of a left thoracotomy (empyema) presents to [facility] from [facility] for evaluation of a suspected loculated right effusion.     OR today for VATS, possible pleurex catheter placement.     · Informed consent  obtained  · NPO since midnight  · COVID vaccinated  · Agree with rest of care. Nephrology consulted, dialysis overnight           Margoth Mota MD  Thoracic Surgery

## 2022-08-18 NOTE — PROGRESS NOTES
"Mike Jasso - Cardiology [facility] Medicine  Progress Note    Patient Name: Vega Brownlee  MRN: [id number]  Patient Class: IP- Inpatient   Admission Date: 8/15/2022  Length of Stay: 3 days  Attending Physician: Jacey Duke MD  Primary Care Provider: Ko Perez MD        Subjective:     Principal Problem:Recurrent pleural effusion on right        HPI:  HPI per :  Patient is a 61 y.o. 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 Patient has history of recurrent pleural effusion and was sent to ED for worsening symptoms. IR was consulted and attempted US-guided therapeutic right-sided thoracentesis. Per IR "US reveals an extensively loculated Rt effusion with innumerable isolated pockets with sonographic appearance suggestive of a large hematoma/hemothorax. Only able to remove 50-cc of old, dark blood products also consistent with large Rt hemothorax. Pt would likely require large-bore chest tube placement and instillation of lytics if attempts will be made to resolve the hemothorax vs VATS." Facility seeking transfer for CTS evaluation. Patient is stable on med tele unit only requiring 3L NC with no distress. Stable for transfer.      Overview/Hospital Course:  [facility] Medicine Course:  IR was consulted and attempted US-guided therapeutic right-sided thoracentesis. Per IR "US reveals an extensively loculated Rt effusion with innumerable isolated pockets with sonographic appearance suggestive of a large hematoma/hemothorax. Only able to remove 50-cc of old, dark blood products also consistent with large Rt hemothorax. Pt would likely require large-bore chest tube " "placement and instillation of lytics if attempts will be made to resolve the hemothorax vs VATS."  Patient accepted to [facility], in queue to transfer asap. Stable vitals. Will allow to eat and make NPO again at midnight. Lokelma for K 5.8.     [facility] Hospital Medicine Course:  Pt accepted to [facility]. Stable respiratory status into 8/17. Rising BP and hyperkalemia noted; PRN IV hydralazine and PO lokelma provided, nephrology following for HD needs. Evaluated by thoracic surgery, with plans to place PleurX catheter on 8/18.      Interval History: Patient seen after chest tube placement. He denies any worsening symptoms of shortness of breath, fever. Does some mild pain at the site of the chest tube.    Review of Systems   Constitutional:  Negative for fever.   Respiratory:  Negative for chest tightness and shortness of breath.    Cardiovascular:  Negative for palpitations.   Gastrointestinal:  Negative for abdominal pain and nausea.   Neurological:  Negative for light-headedness and headaches.   Psychiatric/Behavioral:  Negative for confusion.    Objective:     Vital Signs (Most Recent):  Temp: 97.6 °F (36.4 °C) (08/18/22 1645)  Pulse: 61 (08/18/22 1645)  Resp: 18 (08/18/22 1645)  BP: (!) 132/58 (08/18/22 1645)  SpO2: 95 % (08/18/22 1645)   Vital Signs (24h Range):  Temp:  [97.3 °F (36.3 °C)-98.1 °F (36.7 °C)] 97.6 °F (36.4 °C)  Pulse:  [50-61] 61  Resp:  [16-22] 18  SpO2:  [85 %-100 %] 95 %  BP: (110-163)/(51-72) 132/58     Weight: 63 kg (138 lb 14.2 oz)  Body mass index is 21.12 kg/m².    Intake/Output Summary (Last 24 hours) at 8/18/2022 1828  Last data filed at 8/18/2022 1700  Gross per 24 hour   Intake 990 ml   Output 3610 ml   Net -2620 ml      Physical Exam  Vitals reviewed.   Constitutional:       General: He is not in acute distress.     Appearance: He is ill-appearing.   HENT:      Head: Normocephalic and atraumatic.      Mouth/Throat:      Mouth: Mucous membranes are moist.   Eyes:      Extraocular Movements: " Extraocular movements intact.      Conjunctiva/sclera: Conjunctivae normal.   Cardiovascular:      Rate and Rhythm: Normal rate and regular rhythm.   Pulmonary:      Effort: Pulmonary effort is normal. No respiratory distress.      Breath sounds: Normal breath sounds.   Abdominal:      General: Bowel sounds are normal. There is no distension.      Palpations: Abdomen is soft.   Musculoskeletal:      Comments: R chest tube in place   Skin:     General: Skin is warm and dry.      Capillary Refill: Capillary refill takes less than 2 seconds.   Neurological:      Mental Status: He is alert.   Psychiatric:         Mood and Affect: Mood normal.         Behavior: Behavior normal.       Significant Labs: All pertinent labs within the past 24 hours have been reviewed.    Significant Imaging: I have reviewed all pertinent imaging results/findings within the past 24 hours.      Assessment/Plan:      * Recurrent pleural effusion on right  - Interval history and physical exam findings as described above  - S/p left thoracotomy  - Recurring right effusion s/p thoracentesis x2  - Chemistry on 2/4/21 suggestive of exudative effusion  - Thoracic surgery consulted, placement of PleurX on 8/18  - Satting well on 2L NC, no respiratory distress  - Continuing to monitor    Thrombocytopenia  - Per chart review, appears to be a chronic issue  - Monitoring on daily labs    Chronic diastolic heart failure  - Remains grossly asymptomatic, euvolemic, not in acute exacerbation  - Continue home cardioprudent regimen  - Monitoring on tele    Anemia in ESRD (end-stage renal disease)  - H/H stable near baseline  - Will continue to monitor on daily labs    ESRD (end stage renal disease) on dialysis  - Interval history and physical exam findings as described above  - Nephology following  - Further decision for HD per nephology  - Renally dosing all medications  - Avoid nephrotoxins  - Will continue to monitor on daily labs    Mixed hyperlipidemia  associated with type 2 diabetes mellitus  - Continue home statin regimen    Type 2 diabetes mellitus with chronic kidney disease on chronic dialysis, without long-term current use of insulin  - Working to optimize BG control  - SSI provided for corrective dosing  - DXTs as ordered  - Hypoglycemic protocol in effect  - Diabetic diet provided    Hypertension associated with ESRD caused by type 2 diabetes mellitus, on dialysis  - Working to optimize BP control   - Continue home cardioprudent regimen  - HD to assist with BP control  - PRN IV hydralazine and/or IV labetalol provided for SBP>180  - Will continue to monitor and further titrate antihypertensives as clinically indicated     Hyperkalemia  - Lokelma TID  - Nephrology following, HD to assist with electrolyte anomalies  - Monitoring on tele      VTE Risk Mitigation (From admission, onward)         Ordered     heparin (porcine) injection 5,000 Units  Every 8 hours         08/15/22 2005     IP VTE HIGH RISK PATIENT  Once         08/15/22 2005     Place sequential compression device  Until discontinued         08/15/22 2005                Discharge Planning   WENDY: 8/20/2022     Code Status: Full Code   Is the patient medically ready for discharge?: No    Reason for patient still in hospital (select all that apply): Patient unstable  Discharge Plan A: Home with family                  Jacey Duke MD  Department of Hospital Medicine   [facility] - Cardiology Stepdown

## 2022-08-18 NOTE — SUBJECTIVE & OBJECTIVE
Interval History: Patient seen after chest tube placement. He denies any worsening symptoms of shortness of breath, fever. Does some mild pain at the site of the chest tube.    Review of Systems   Constitutional:  Negative for fever.   Respiratory:  Negative for chest tightness and shortness of breath.    Cardiovascular:  Negative for palpitations.   Gastrointestinal:  Negative for abdominal pain and nausea.   Neurological:  Negative for light-headedness and headaches.   Psychiatric/Behavioral:  Negative for confusion.    Objective:     Vital Signs (Most Recent):  Temp: 97.6 °F (36.4 °C) (08/18/22 1645)  Pulse: 61 (08/18/22 1645)  Resp: 18 (08/18/22 1645)  BP: (!) 132/58 (08/18/22 1645)  SpO2: 95 % (08/18/22 1645)   Vital Signs (24h Range):  Temp:  [97.3 °F (36.3 °C)-98.1 °F (36.7 °C)] 97.6 °F (36.4 °C)  Pulse:  [50-61] 61  Resp:  [16-22] 18  SpO2:  [85 %-100 %] 95 %  BP: (110-163)/(51-72) 132/58     Weight: 63 kg (138 lb 14.2 oz)  Body mass index is 21.12 kg/m².    Intake/Output Summary (Last 24 hours) at 8/18/2022 1828  Last data filed at 8/18/2022 1700  Gross per 24 hour   Intake 990 ml   Output 3610 ml   Net -2620 ml      Physical Exam  Vitals reviewed.   Constitutional:       General: He is not in acute distress.     Appearance: He is ill-appearing.   HENT:      Head: Normocephalic and atraumatic.      Mouth/Throat:      Mouth: Mucous membranes are moist.   Eyes:      Extraocular Movements: Extraocular movements intact.      Conjunctiva/sclera: Conjunctivae normal.   Cardiovascular:      Rate and Rhythm: Normal rate and regular rhythm.   Pulmonary:      Effort: Pulmonary effort is normal. No respiratory distress.      Breath sounds: Normal breath sounds.   Abdominal:      General: Bowel sounds are normal. There is no distension.      Palpations: Abdomen is soft.   Musculoskeletal:      Comments: R chest tube in place   Skin:     General: Skin is warm and dry.      Capillary Refill: Capillary refill takes less  than 2 seconds.   Neurological:      Mental Status: He is alert.   Psychiatric:         Mood and Affect: Mood normal.         Behavior: Behavior normal.       Significant Labs: All pertinent labs within the past 24 hours have been reviewed.    Significant Imaging: I have reviewed all pertinent imaging results/findings within the past 24 hours.

## 2022-08-18 NOTE — SUBJECTIVE & OBJECTIVE
Interval History: No acute events overnight. 3L NC. Dialysis overnight, net 2L removed.     Medications:  Continuous Infusions:  Scheduled Meds:   sodium chloride 0.9%   Intravenous Once    aspirin  81 mg Oral Daily    atorvastatin  40 mg Oral QHS    calcium acetate(phosphat bind)  667 mg Oral TID WM    carvediloL  12.5 mg Oral BID    cloNIDine  0.2 mg Oral BID    epoetin michael (PROCRIT) injection  5,000 Units Intravenous Every Tues, Thurs, Sat    furosemide  80 mg Oral Daily    heparin (porcine)  5,000 Units Subcutaneous Q8H    hydrALAZINE  100 mg Oral Q12H    isosorbide mononitrate  60 mg Oral Daily    losartan  50 mg Oral Daily    mupirocin   Nasal BID    sodium zirconium cyclosilicate  10 g Oral TID    traZODone  50 mg Oral Nightly     PRN Meds:acetaminophen, dextrose 10%, dextrose 10%, dextrose 10%, glucagon (human recombinant), glucose, glucose, hydrALAZINE, HYDROcodone-acetaminophen, insulin aspart U-100, labetalol, naloxone, ondansetron, sodium chloride 0.9%     Review of patient's allergies indicates:  No Known Allergies  Objective:     Vital Signs (Most Recent):  Temp: 97.3 °F (36.3 °C) (08/18/22 0430)  Pulse: (!) 52 (08/18/22 0551)  Resp: 18 (08/18/22 0551)  BP: (!) 146/64 (08/18/22 0551)  SpO2: 100 % (08/18/22 0551)   Vital Signs (24h Range):  Temp:  [97 °F (36.1 °C)-97.9 °F (36.6 °C)] 97.3 °F (36.3 °C)  Pulse:  [50-64] 52  Resp:  [16-19] 18  SpO2:  [95 %-100 %] 100 %  BP: (128-180)/(58-77) 146/64     Intake/Output - Last 3 Shifts         08/16 0700  08/17 0659 08/17 0700  08/18 0659    P.O. 480 761    Total Intake(mL/kg) 480 (7.6) 761 (12.1)    Urine (mL/kg/hr)  0 (0)    Other 2000 2500    Total Output 2000 2500    Net -1520 -1739          Urine Occurrence 0 x 0 x    Stool Occurrence 1 x             SpO2: 100 %  O2 Device (Oxygen Therapy): nasal cannula    Physical Exam  Vitals and nursing note reviewed.   Constitutional:       General: He is not in acute distress.  Cardiovascular:       Rate and Rhythm: Normal rate.      Pulses: Normal pulses.   Pulmonary:      Effort: Pulmonary effort is normal. No respiratory distress.      Comments: 3L NC  Abdominal:      General: There is no distension.      Palpations: Abdomen is soft.      Tenderness: There is no abdominal tenderness.   Neurological:      Mental Status: He is alert.       Significant Labs:  BMP:   Recent Labs   Lab 08/17/22 0322   *   *   K 5.5*      CO2 22*   BUN 33*   CREATININE 5.2*   CALCIUM 8.1*   MG 2.3     CBC:   Recent Labs   Lab 08/17/22 0322   WBC 6.13   RBC 2.91*   HGB 10.0*   HCT 31.0*   *   *   MCH 34.4*   MCHC 32.3     CMP:   Recent Labs   Lab 08/17/22 0322   *   CALCIUM 8.1*   ALBUMIN 2.8*   PROT 6.5   *   K 5.5*   CO2 22*      BUN 33*   CREATININE 5.2*   ALKPHOS 192*   ALT 7*   AST 15   BILITOT 1.2*       Significant Diagnostics:  CXR: I have reviewed all pertinent results/findings within the past 24 hours    VTE Risk Mitigation (From admission, onward)           Ordered     heparin (porcine) injection 5,000 Units  Every 8 hours         08/15/22 2005     IP VTE HIGH RISK PATIENT  Once         08/15/22 2005     Place sequential compression device  Until discontinued         08/15/22 2005

## 2022-08-18 NOTE — TRANSFER OF CARE
"Anesthesia Transfer of Care Note    Patient: Vega Brownlee    Procedure(s) Performed: Procedure(s) (LRB):  VATS, WITH DECORTICATION, LUNG (Right)  BLOCK, NERVE, INTERCOSTAL, 2 OR MORE (Right)  BRONCHOSCOPY, FLEXIBLE (N/A)  INSERTION, CATHETER, INTERCOSTAL, FOR DRAINAGE (Right)    Patient location: PACU    Anesthesia Type: general    Transport from OR: Transported from OR on 6-10 L/min O2 by face mask with adequate spontaneous ventilation    Post pain: adequate analgesia    Post assessment: no apparent anesthetic complications and tolerated procedure well    Post vital signs: stable    Level of consciousness: sedated and responds to stimulation    Nausea/Vomiting: no nausea/vomiting    Complications: none    Transfer of care protocol was followed      Last vitals:   Visit Vitals  /55 (BP Location: Left arm, Patient Position: Lying)   Pulse 56   Temp 36.6 °C (97.8 °F) (Oral)   Resp 16   Ht 5' 8" (1.727 m)   Wt 63 kg (138 lb 14.2 oz)   SpO2 100%   BMI 21.12 kg/m²     "

## 2022-08-18 NOTE — ASSESSMENT & PLAN NOTE
- Lokelma TID  - Nephrology following, HD to assist with electrolyte anomalies  - Monitoring on tele

## 2022-08-18 NOTE — PLAN OF CARE
Plan of care discussed with patient.  Patient remains free of falls and trauma. Patient ambulating independently, fall precautions in place. Patient has no complaints of pain. Discussed medications and care. Patient has no questions at this time. Will continue to monitor.       Problem: Adult Inpatient Plan of Care  Goal: Plan of Care Review  Outcome: Ongoing, Progressing  Goal: Patient-Specific Goal (Individualized)  Outcome: Ongoing, Progressing  Goal: Absence of Hospital-Acquired Illness or Injury  Outcome: Ongoing, Progressing  Goal: Optimal Comfort and Wellbeing  Outcome: Ongoing, Progressing  Goal: Readiness for Transition of Care  Outcome: Ongoing, Progressing     Problem: Diabetes Comorbidity  Goal: Blood Glucose Level Within Targeted Range  Outcome: Ongoing, Progressing     Problem: Infection  Goal: Absence of Infection Signs and Symptoms  Outcome: Ongoing, Progressing     Problem: Impaired Wound Healing  Goal: Optimal Wound Healing  Outcome: Ongoing, Progressing     Problem: Device-Related Complication Risk (Hemodialysis)  Goal: Safe, Effective Therapy Delivery  Outcome: Ongoing, Progressing     Problem: Hemodynamic Instability (Hemodialysis)  Goal: Effective Tissue Perfusion  Outcome: Ongoing, Progressing     Problem: Infection (Hemodialysis)  Goal: Absence of Infection Signs and Symptoms  Outcome: Ongoing, Progressing

## 2022-08-18 NOTE — ANESTHESIA PROCEDURE NOTES
Intubation    Date/Time: 8/18/2022 9:52 AM  Performed by: Andreina Lewis CRNA  Authorized by: Charlie Martinez MD     Intubation:     Induction:  Intravenous    Intubated:  Postinduction    Mask Ventilation:  Easy mask    Attempts:  1    Attempted By:  Student    Method of Intubation:  Video laryngoscopy    Blade:  [place] 4    Laryngeal View Grade: Grade I - full view of cords      Difficult Airway Encountered?: No      Complications:  None    Airway Device:  Oral endotracheal tube    Airway Device Size:  7.5    Style/Cuff Inflation:  Cuffed (inflated to minimal occlusive pressure)    Inflation Amount (mL):  8    Tube secured:  23    Secured at:  The lips    Placement Verified By:  Colorimetric ETCO2 device    Complicating Factors:  None    Findings Post-Intubation:  BS equal bilateral and atraumatic/condition of teeth unchanged

## 2022-08-18 NOTE — PLAN OF CARE
Problem: Adult Inpatient Plan of Care  Goal: Plan of Care Review  Outcome: Ongoing, Progressing     Problem: Diabetes Comorbidity  Goal: Blood Glucose Level Within Targeted Range  Outcome: Ongoing, Progressing  Intervention: Monitor and Manage Glycemia  Flowsheets (Taken 8/18/2022 0035)  Glycemic Management: blood glucose monitored     Problem: Infection  Goal: Absence of Infection Signs and Symptoms  Outcome: Ongoing, Progressing  Intervention: Prevent or Manage Infection  Flowsheets (Taken 8/18/2022 0035)  Infection Management: aseptic technique maintained  Isolation Precautions: precautions maintained     Problem: Impaired Wound Healing  Goal: Optimal Wound Healing  Outcome: Ongoing, Progressing  Intervention: Promote Wound Healing  Flowsheets (Taken 8/18/2022 0035)  Sleep/Rest Enhancement: awakenings minimized  Activity Management: Rolling - L1   POC reviewed with patient. NPO @ MD for VATS procedure in the AM. No complaints of pain. 3L NC; no SOB reported. Blood glucose orders maintained. All questions/concerns addressed. Will continue to monitor pt.

## 2022-08-19 NOTE — PHYSICIAN QUERY
PT Name: Vega Brownlee  MR #: [id number]    DOCUMENTATION CLARIFICATION      CDS: Олег Staley RN CCDS               Contact information: [email or web address]     This form is a permanent document in the medical record.      Query Date: August 19, 2022    By submitting this query, we are merely seeking further clarification of documentation. Please utilize your independent clinical judgment when addressing the question(s) below.    The Medical Record contains the following:   Indicators  Supporting Clinical Findings Location in Medical Record    x Anemia documented Anemia in ESRD  8/19/2022 Hospital Medicine progress notes    x H&H  08/15/22 12:17 08/17/22 03:22 08/18/22 16:47 08/19/22 12:58   Hemoglobin 10.5 (L) 10.0 (L) 7.9 (L) 5.7 (LL)   Hematocrit 33.2 (L) 31.0 (L) 25.0 (L) 18.0 (LL)        x BP                    HR -132/DBP 51-62, HR 56-70  8/18-8/19/2022 Vitals    GI bleeding documented      x Acute bleeding (Non GI site) VATS, WITH DECORTICATION, LUNG (Right)  Estimated Blood Loss: 400 mL 8/18/2022 Op report    x Transfusion(s) transfuse for H/H 7/21 8/19/2022 Hospital Medicine progress notes    x Acute/Chronic illness Recurrent pleural effusion on right  Thrombocytopenia  ESRD 8/19/2022 Hospital Medicine progress notes    Treatments      x Other H/H stable near baseline on admission, with drop after pleurex catheter placement 8/18 8/19/2022 Hospital Medicine progress notes     Provider, please clarify the diagnosis of Anemia in ESRD associated with above clinical findings.     [   ] Acute blood loss anemia and Anemia in ESRD   [  x ] Anemia in ESRD only   [   ] Other Hematological Diagnosis (please specify): _________________   [   ] Clinically Undetermined              Please document in your progress notes daily for the duration of treatment, until resolved, and include in your discharge summary.    Form No. [id number]

## 2022-08-19 NOTE — SUBJECTIVE & OBJECTIVE
Interval History: Chest tube placed yesterday with CT surgery. Has had good amount of output. H/H downtrended after placement. CBC has been difficult to collect this morning. Repeat pending. Otherwise he denies any new symptoms of shortness of breath, fever, pain. Asking about when he can go home    Review of Systems   Constitutional:  Negative for fever.   Respiratory:  Negative for chest tightness and shortness of breath.    Cardiovascular:  Negative for palpitations.   Gastrointestinal:  Negative for abdominal pain and nausea.   Neurological:  Negative for light-headedness and headaches.   Psychiatric/Behavioral:  Negative for confusion.    Objective:     Vital Signs (Most Recent):  Temp: 98 °F (36.7 °C) (08/19/22 0242)  Pulse: 64 (08/19/22 0242)  Resp: 20 (08/19/22 0242)  BP: (!) 121/58 (08/19/22 0242)  SpO2: 98 % (08/19/22 0242)   Vital Signs (24h Range):  Temp:  [97.3 °F (36.3 °C)-98.1 °F (36.7 °C)] 98 °F (36.7 °C)  Pulse:  [54-70] 64  Resp:  [16-22] 20  SpO2:  [85 %-100 %] 98 %  BP: (104-159)/(51-70) 121/58     Weight: 63 kg (138 lb 14.2 oz)  Body mass index is 21.12 kg/m².    Intake/Output Summary (Last 24 hours) at 8/19/2022 0557  Last data filed at 8/19/2022 0500  Gross per 24 hour   Intake 1230 ml   Output 1735 ml   Net -505 ml      Physical Exam  Vitals reviewed.   Constitutional:       General: He is not in acute distress.     Appearance: He is ill-appearing.   HENT:      Head: Normocephalic and atraumatic.      Mouth/Throat:      Mouth: Mucous membranes are moist.   Eyes:      Extraocular Movements: Extraocular movements intact.      Conjunctiva/sclera: Conjunctivae normal.   Cardiovascular:      Rate and Rhythm: Normal rate and regular rhythm.   Pulmonary:      Effort: Pulmonary effort is normal. No respiratory distress.      Comments: Rhonchi and crackles appreciated on R anterior chest   Abdominal:      General: Bowel sounds are normal. There is no distension.      Palpations: Abdomen is soft.    Musculoskeletal:      Comments: R chest tube in place   Skin:     General: Skin is warm and dry.      Capillary Refill: Capillary refill takes less than 2 seconds.   Neurological:      General: No focal deficit present.      Mental Status: He is alert.   Psychiatric:         Mood and Affect: Mood normal.         Behavior: Behavior normal.       Significant Labs: All pertinent labs within the past 24 hours have been reviewed.    Significant Imaging: I have reviewed all pertinent imaging results/findings within the past 24 hours.

## 2022-08-19 NOTE — SUBJECTIVE & OBJECTIVE
Interval History: Hgb 5.7, requiring blood transfusions. 98% on 1.5L NC. Denies SOB. HD today     Review of patient's allergies indicates:  No Known Allergies  Current Facility-Administered Medications   Medication Frequency    0.9%  NaCl infusion (for blood administration) Q24H PRN    0.9%  NaCl infusion Once    0.9%  NaCl infusion Once    acetaminophen tablet 650 mg Q4H PRN    albuterol-ipratropium 2.5 mg-0.5 mg/3 mL nebulizer solution 3 mL Q6H    aspirin EC tablet 81 mg Daily    atorvastatin tablet 40 mg QHS    calcium acetate(phosphat bind) capsule 667 mg TID WM    carvediloL tablet 12.5 mg BID    dextrose 10% bolus 125 mL PRN    dextrose 10% bolus 125 mL PRN    dextrose 10% bolus 250 mL PRN    epoetin michael injection 5,000 Units Every Tues, Thurs, Sat    fentaNYL 50 mcg/mL injection 25 mcg Q5 Min PRN    furosemide tablet 80 mg Daily    glucagon (human recombinant) injection 1 mg PRN    glucose chewable tablet 16 g PRN    glucose chewable tablet 24 g PRN    hydrALAZINE injection 10 mg Q6H PRN    HYDROcodone-acetaminophen 5-325 mg per tablet 1 tablet Q6H PRN    HYDROmorphone injection 0.2 mg Q5 Min PRN    insulin aspart U-100 pen 1-10 Units Q6H PRN    labetalol 20 mg/4 mL (5 mg/mL) IV syring Q6H PRN    melatonin tablet 6 mg Nightly PRN    melatonin tablet 6 mg Nightly    mupirocin 2 % ointment BID    naloxone 0.4 mg/mL injection 0.02 mg PRN    ondansetron disintegrating tablet 8 mg Q8H PRN    ondansetron injection 4 mg Daily PRN    ondansetron injection 4 mg Once PRN    sodium chloride 0.9% flush 10 mL Q12H PRN    sodium chloride 0.9% flush 10 mL PRN    sodium zirconium cyclosilicate packet 10 g TID    traZODone tablet 50 mg Nightly       Objective:     Vital Signs (Most Recent):  Temp: 98 °F (36.7 °C) (08/19/22 1119)  Pulse: 63 (08/19/22 1336)  Resp: 16 (08/19/22 1336)  BP: 124/61 (08/19/22 1119)  SpO2: 98 % (08/19/22 1336)  O2 Device (Oxygen Therapy): nasal cannula (08/19/22 1336) Vital Signs (24h Range):  Temp:   [96.4 °F (35.8 °C)-98.1 °F (36.7 °C)] 98 °F (36.7 °C)  Pulse:  [54-70] 63  Resp:  [16-22] 16  SpO2:  [92 %-100 %] 98 %  BP: (104-132)/(51-62) 124/61     Weight: 63 kg (138 lb 14.2 oz) (08/16/22 0000)  Body mass index is 21.12 kg/m².  Body surface area is 1.74 meters squared.    I/O last 3 completed shifts:  In: 1470 [P.O.:720; IV Piggyback:750]  Out: 4235 [Other:2500; Blood:400; Chest Tube:1335]    Physical Exam  Vitals and nursing note reviewed.   Constitutional:       General: He is not in acute distress.  HENT:      Head: Normocephalic.      Mouth/Throat:      Pharynx: Oropharynx is clear.   Eyes:      Conjunctiva/sclera: Conjunctivae normal.   Cardiovascular:      Rate and Rhythm: Normal rate.      Pulses: Normal pulses.   Pulmonary:      Effort: Pulmonary effort is normal. No respiratory distress.      Comments: Chest tubes x 2   Abdominal:      General: There is no distension.      Palpations: Abdomen is soft.      Tenderness: There is no abdominal tenderness.   Musculoskeletal:      Cervical back: Neck supple.      Right lower leg: No edema.      Left lower leg: No edema.   Skin:     General: Skin is dry.   Neurological:      Mental Status: He is alert and oriented to person, place, and time.   Psychiatric:         Mood and Affect: Mood normal.         Behavior: Behavior normal.       Significant Labs:  CBC:   Recent Labs   Lab 08/19/22  1258   WBC 9.88   RBC 1.67*   HGB 5.7*   HCT 18.0*   *   *   MCH 34.1*   MCHC 31.7*     CMP:   Recent Labs   Lab 08/19/22  1039   *   CALCIUM 7.2*   ALBUMIN 2.4*   PROT 5.2*   *   K 5.4*   CO2 24   CL 99   BUN 32*   CREATININE 4.9*   ALKPHOS 127   ALT <5*   AST 19   BILITOT 1.0     All labs within the past 24 hours have been reviewed.

## 2022-08-19 NOTE — PROGRESS NOTES
[facility] - [facility]  Nephrology  Progress Note    Patient Name: Vega Brownlee  MRN: [id number]  Admission Date: 8/15/2022  Hospital Length of Stay: 4 days  Attending Provider: Jacey Duke MD   Primary Care Physician: Ko Perez MD  Principal Problem:Recurrent pleural effusion on right    Subjective:     Interval History: Hgb 5.7, requiring blood transfusions. 98% on 1.5L NC. Denies SOB. HD today     Review of patient's allergies indicates:  No Known Allergies  Current Facility-Administered Medications   Medication Frequency    0.9%  NaCl infusion (for blood administration) Q24H PRN    0.9%  NaCl infusion Once    0.9%  NaCl infusion Once    acetaminophen tablet 650 mg Q4H PRN    albuterol-ipratropium 2.5 mg-0.5 mg/3 mL nebulizer solution 3 mL Q6H    aspirin EC tablet 81 mg Daily    atorvastatin tablet 40 mg QHS    calcium acetate(phosphat bind) capsule 667 mg TID WM    carvediloL tablet 12.5 mg BID    dextrose 10% bolus 125 mL PRN    dextrose 10% bolus 125 mL PRN    dextrose 10% bolus 250 mL PRN    epoetin michael injection 5,000 Units Every Tues, Thurs, Sat    fentaNYL 50 mcg/mL injection 25 mcg Q5 Min PRN    furosemide tablet 80 mg Daily    glucagon (human recombinant) injection 1 mg PRN    glucose chewable tablet 16 g PRN    glucose chewable tablet 24 g PRN    hydrALAZINE injection 10 mg Q6H PRN    HYDROcodone-acetaminophen 5-325 mg per tablet 1 tablet Q6H PRN    HYDROmorphone injection 0.2 mg Q5 Min PRN    insulin aspart U-100 pen 1-10 Units Q6H PRN    labetalol 20 mg/4 mL (5 mg/mL) IV syring Q6H PRN    melatonin tablet 6 mg Nightly PRN    melatonin tablet 6 mg Nightly    mupirocin 2 % ointment BID    naloxone 0.4 mg/mL injection 0.02 mg PRN    ondansetron disintegrating tablet 8 mg Q8H PRN    ondansetron injection 4 mg Daily PRN    ondansetron injection 4 mg Once PRN    sodium chloride 0.9% flush 10 mL Q12H PRN    sodium chloride 0.9% flush 10 mL PRN    sodium  zirconium cyclosilicate packet 10 g TID    traZODone tablet 50 mg Nightly       Objective:     Vital Signs (Most Recent):  Temp: 98 °F (36.7 °C) (08/19/22 1119)  Pulse: 63 (08/19/22 1336)  Resp: 16 (08/19/22 1336)  BP: 124/61 (08/19/22 1119)  SpO2: 98 % (08/19/22 1336)  O2 Device (Oxygen Therapy): nasal cannula (08/19/22 1336) Vital Signs (24h Range):  Temp:  [96.4 °F (35.8 °C)-98.1 °F (36.7 °C)] 98 °F (36.7 °C)  Pulse:  [54-70] 63  Resp:  [16-22] 16  SpO2:  [92 %-100 %] 98 %  BP: (104-132)/(51-62) 124/61     Weight: 63 kg (138 lb 14.2 oz) (08/16/22 0000)  Body mass index is 21.12 kg/m².  Body surface area is 1.74 meters squared.    I/O last 3 completed shifts:  In: 1470 [P.O.:720; IV Piggyback:750]  Out: 4235 [Other:2500; Blood:400; Chest Tube:1335]    Physical Exam  Vitals and nursing note reviewed.   Constitutional:       General: He is not in acute distress.  HENT:      Head: Normocephalic.      Mouth/Throat:      Pharynx: Oropharynx is clear.   Eyes:      Conjunctiva/sclera: Conjunctivae normal.   Cardiovascular:      Rate and Rhythm: Normal rate.      Pulses: Normal pulses.   Pulmonary:      Effort: Pulmonary effort is normal. No respiratory distress.      Comments: Chest tubes x 2   Abdominal:      General: There is no distension.      Palpations: Abdomen is soft.      Tenderness: There is no abdominal tenderness.   Musculoskeletal:      Cervical back: Neck supple.      Right lower leg: No edema.      Left lower leg: No edema.   Skin:     General: Skin is dry.   Neurological:      Mental Status: He is alert and oriented to person, place, and time.   Psychiatric:         Mood and Affect: Mood normal.         Behavior: Behavior normal.       Significant Labs:  CBC:   Recent Labs   Lab 08/19/22  1258   WBC 9.88   RBC 1.67*   HGB 5.7*   HCT 18.0*   *   *   MCH 34.1*   MCHC 31.7*     CMP:   Recent Labs   Lab 08/19/22  1039   *   CALCIUM 7.2*   ALBUMIN 2.4*   PROT 5.2*   *   K 5.4*   CO2  24   CL 99   BUN 32*   CREATININE 4.9*   ALKPHOS 127   ALT <5*   AST 19   BILITOT 1.0     All labs within the past 24 hours have been reviewed.         Assessment/Plan:     * Recurrent pleural effusion on right  -Management per primary     Chronic kidney disease-mineral and bone disorder  - continue home calcium acetate 667 TID with food   Phos 4.4    ESRD (end stage renal disease) on dialysis  ESRD 2/2 diabetic nephropathy and HTN. Hd since  8/2016. Here for CTS evaluation     Nephrology History  iHD Schedule: [facility]   Unit/MD: Shane/   Duration: 3.5 hours   UF: 2-3  EDW: 60 kg   Access: MANFRED AVF   Residual Renal Function: minimal     Assessment:     -Hyperkalemia 5.4, agree with lokelma 10 TID   -Hgb 5.7, requiring blood transfusions, plan for HD (8/19) for metabolic clearance and volume management once Hgb is >7.0  - Dialysate adjusted to current labs   - Will obtain OP dialysis records  - Continue to monitor intake and output, daily weights   - Avoid nephrotoxic medication and renal dose medications to GFR              Thank you for your consult. I will follow-up with patient. Please contact us if you have any additional questions.    Sharon Mancilla, DEBRA  Nephrology  [facility] - [facility] Step[facility]

## 2022-08-19 NOTE — PROGRESS NOTES
Patient chest tube site leaking, dressing changed @2100. Site is currently saturated with blood. Notified MD Morales. Patient VSS; no complaints of pain or SOB. MD will notify pulmonary on call. No new orders at this time, will continue to monitor.

## 2022-08-19 NOTE — PROGRESS NOTES
General surgery on call notified of patient's chest tube site leaking. MD will come to bedside to assess chest tube site.

## 2022-08-19 NOTE — ASSESSMENT & PLAN NOTE
- Improving, continue [facility] TID  - Nephrology following, HD to assist with electrolyte anomalies  - Monitoring on tele

## 2022-08-19 NOTE — PLAN OF CARE
BG monitored. Chest tube on suction. Pt educated on fall risk and remained free from falls/trauma/injury. Denies chest pain, SOB, palpitations, dizziness, pain, or discomfort. Plan of care reviewed with pt, all questions answered. Bed locked in lowest position, call bell within reach, no acute distress noted, will continue to monitor.

## 2022-08-19 NOTE — PROGRESS NOTES
Notified MD Morales of patient BP; ordered to hold PM blood pressure medications (see flowsheets and MAR). Also notified MD that patient is coughing up blood tinged sputum. Will continue to monitor patient.

## 2022-08-19 NOTE — SUBJECTIVE & OBJECTIVE
Interval History: Some leakage from chest tubes overnight, dressing changed this morning. Feeling well, he reports significant improvement in breathing. R CT x2 to suction, SS output    Medications:  Continuous Infusions:  Scheduled Meds:   sodium chloride 0.9%   Intravenous Once    sodium chloride 0.9%   Intravenous Once    aspirin  81 mg Oral Daily    atorvastatin  40 mg Oral QHS    calcium acetate(phosphat bind)  667 mg Oral TID WM    carvediloL  12.5 mg Oral BID    epoetin michael (PROCRIT) injection  5,000 Units Intravenous Every Tues, Thurs, Sat    furosemide  80 mg Oral Daily    mupirocin   Nasal BID    sodium zirconium cyclosilicate  10 g Oral TID    traZODone  50 mg Oral Nightly     PRN Meds:acetaminophen, dextrose 10%, dextrose 10%, dextrose 10%, fentaNYL, glucagon (human recombinant), glucose, glucose, hydrALAZINE, HYDROcodone-acetaminophen, HYDROmorphone, insulin aspart U-100, labetalol, melatonin, naloxone, ondansetron, ondansetron, ondansetron, sodium chloride 0.9%, sodium chloride 0.9%     Review of patient's allergies indicates:  No Known Allergies  Objective:     Vital Signs (Most Recent):  Temp: 96.4 °F (35.8 °C) (08/19/22 0743)  Pulse: 62 (08/19/22 0743)  Resp: 18 (08/19/22 0743)  BP: 123/60 (08/19/22 0743)  SpO2: 99 % (08/19/22 0743) Vital Signs (24h Range):  Temp:  [96.4 °F (35.8 °C)-98.1 °F (36.7 °C)] 96.4 °F (35.8 °C)  Pulse:  [54-70] 62  Resp:  [16-22] 18  SpO2:  [85 %-100 %] 99 %  BP: (104-159)/(51-70) 123/60     Intake/Output - Last 3 Shifts         08/17 0700 08/18 0659 08/18 0700 08/19 0659 08/19 0700 08/20 0659    P.O. 761 480     IV Piggyback  750     Total Intake(mL/kg) 761 (12.1) 1230 (19.5)     Urine (mL/kg/hr) 0 (0) 0 (0)     Other 2500      Blood  400     Chest Tube  1335     Total Output 2500 1735     Net -1739 -505            Urine Occurrence 0 x              SpO2: 99 %  O2 Device (Oxygen Therapy): nasal cannula    Physical Exam  Vitals and nursing note reviewed.    Constitutional:       General: He is not in acute distress.  Cardiovascular:      Rate and Rhythm: Normal rate.      Pulses: Normal pulses.   Pulmonary:      Effort: Pulmonary effort is normal. No respiratory distress.      Comments: 2L NC. R CT (#1) 1065 cc SS output / 24 hrs. R CT (#2) 270 cc S output/ 24 hrs. No air leak. Dressing changed at bedside, site clean/dry without leakage  Abdominal:      General: There is no distension.      Palpations: Abdomen is soft.      Tenderness: There is no abdominal tenderness.   Neurological:      Mental Status: He is alert.       Significant Labs:  BMP:   Recent Labs   Lab 08/18/22  1647   GLU 76      K 4.2      CO2 24   BUN 22   CREATININE 3.7*   CALCIUM 7.3*   MG 2.1     CBC:   Recent Labs   Lab 08/18/22  1647   WBC 10.41   RBC 2.26*   HGB 7.9*   HCT 25.0*   *   *   MCH 35.0*   MCHC 31.6*     CMP:   Recent Labs   Lab 08/18/22  1647   GLU 76   CALCIUM 7.3*   ALBUMIN 2.5*   PROT 5.6*      K 4.2   CO2 24      BUN 22   CREATININE 3.7*   ALKPHOS 144*   ALT 5*   AST 14   BILITOT 1.1*       Significant Diagnostics:  CXR: I have reviewed all pertinent results/findings within the past 24 hours    VTE Risk Mitigation (From admission, onward)           Ordered     IP VTE HIGH RISK PATIENT  Once         08/15/22 2005     Place sequential compression device  Until discontinued         08/15/22 2005

## 2022-08-19 NOTE — PHYSICIAN QUERY
PT Name: Vega Brownlee  MR #: [id number]     DOCUMENTATION CLARIFICATION     CDS: Олег Staley RN CCDS               Contact information: [email or web address]   This form is a permanent document in the medical record.     Query Date: August 19, 2022    By submitting this query, we are merely seeking further clarification of documentation.  Please utilize your independent clinical judgment when addressing the question(s) below.  The Medical Record contains the following   Indicators   Supporting Clinical Findings Location in Medical Record     x SOB, PEREIRA, Wheezing, Productive Cough, Use of Accessory Muscles, etc. acute shortness of breath 8/15/2022 ED provider notes     x RR         ABGs         O2 sat O2 sat 84% on RA, RR 16-32     08/21/22 21:41 08/22/22 02:09   POC PH 7.354 7.310 (L)   POC PCO2 58.2 (H) 51.0 (H)   POC PO2 40 44 (LL)   POC HCO3 32.5 (H) 25.7   POC SATURATED O2 72 (L) 75 (L)   POC Sodium 135 (L)    POC Potassium 5.7 (H)    POC Ionized Calcium 1.13    POC Hematocrit 22 (L)    POC BE 7 -1   POC TCO2 34 (H) 27   Flow 3    Sample VENOUS ARTERIAL    8/15/2022 Vitals    VBG & ABG     x Hypoxia/Hypercapnia Hypoxia 8/15/2022 ED provider notes    BiPAP/Intubation/Mechanical Ventilation       x Supplemental O2 Supplemental O2 ranging from 2-4L 8/15/2022 Vitals     x Home O2, Oxygen Dependence home oxygen (2 L) 8/17/2022 Nephrology consult    Respiratory distress or failure       x Radiology findings Recurrent right pleural effusion, now large and demonstrating mixed internal attenuation and thickening of the visceral and parietal pleura.  Areas of internal hyperattenuation nonspecific, but raise the possibility of hemothorax.  Additionally, suggested septations would be compatible with a chronic collection.  Superimposed infectious or inflammatory process not excluded.  Malignant effusion also not excluded given the recurrent nature of the collection.  Fluid sampling would provide improved characterization.   8/15/2022 CT chest     x Acute/Chronic Illness Recurrent pleural effusion on right  ESRD  Chronic diastolic heart failure 8/15/2022 H&P    8/17/2022 Hospital medicine progress notes     x Treatment US-guided therapeutic right-sided thoracentesis  VATS, WITH DECORTICATION, LUNG  8/15/2022 IR procedure note  8/18/2022 Op note    Other         The noted clinical guidelines are only system guidelines and do not replace the providers clinical judgment.    Provider, please specify the diagnosis or diagnoses associated with above clinical findings.     [    ] Acute and (on) Chronic Respiratory Failure with Hypoxia and Hypercapnia - Hypoxic: pO2 >10 mmHg below baseline or SpO2 < 91% on usual home O2 or O2 ? 2L/min over baseline home O2 and Hypercapnic: pCO2 >10 mmHg over baseline and pH < 7.35 and respiratory symptoms documented   [    ] Acute and (on) Chronic Respiratory Failure with Hypoxia - pO2 >10 mmHg below baseline or SpO2 < 91% on usual home O2 or O2 ? 2L/min over baseline home O2 and respiratory symptoms documented   [    ] Chronic Respiratory Failure with Hypoxia and Hypercapnia - Continuous home oxygen and normal pH with high pCO2 (chronic hypercapnia) (common example: COPD)   [    ] Chronic Respiratory Failure with Hypoxia - Continuous home oxygen use   [    ] Hypoxia Only   [    ] Other Respiratory Diagnosis (please specify): _________________   [ x  ] Clinically Undetermined         Please document in your progress notes daily for the duration of treatment until resolved and include in your discharge summary.       Form No. [id number]

## 2022-08-19 NOTE — PROGRESS NOTES
Patient complaining of blurred vision and feeling as if blood sugar is low. Blood suigar taken, resulted as 95. Patient VSS. /58 (84) HR 64. MD Bermudez notified.

## 2022-08-19 NOTE — PROGRESS NOTES
Thoracic Surgery  Progress Note    Subjective:       Post-Op Info:  Procedure(s) (LRB):  VATS, WITH DECORTICATION, LUNG (Right)  BLOCK, NERVE, INTERCOSTAL, 2 OR MORE (Right)  BRONCHOSCOPY, FLEXIBLE (N/A)  INSERTION, CATHETER, INTERCOSTAL, FOR DRAINAGE (Right)   1 Day Post-Op     Interval History: Some leakage from chest tubes overnight, dressing changed this morning. Feeling well, he reports significant improvement in breathing. R CT x2 to suction, SS output    Medications:  Continuous Infusions:  Scheduled Meds:   sodium chloride 0.9%   Intravenous Once    sodium chloride 0.9%   Intravenous Once    aspirin  81 mg Oral Daily    atorvastatin  40 mg Oral QHS    calcium acetate(phosphat bind)  667 mg Oral TID WM    carvediloL  12.5 mg Oral BID    epoetin michael (PROCRIT) injection  5,000 Units Intravenous Every Tues, Thurs, Sat    furosemide  80 mg Oral Daily    mupirocin   Nasal BID    sodium zirconium cyclosilicate  10 g Oral TID    traZODone  50 mg Oral Nightly     PRN Meds:acetaminophen, dextrose 10%, dextrose 10%, dextrose 10%, fentaNYL, glucagon (human recombinant), glucose, glucose, hydrALAZINE, HYDROcodone-acetaminophen, HYDROmorphone, insulin aspart U-100, labetalol, melatonin, naloxone, ondansetron, ondansetron, ondansetron, sodium chloride 0.9%, sodium chloride 0.9%     Review of patient's allergies indicates:  No Known Allergies  Objective:     Vital Signs (Most Recent):  Temp: 96.4 °F (35.8 °C) (08/19/22 0743)  Pulse: 62 (08/19/22 0743)  Resp: 18 (08/19/22 0743)  BP: 123/60 (08/19/22 0743)  SpO2: 99 % (08/19/22 0743) Vital Signs (24h Range):  Temp:  [96.4 °F (35.8 °C)-98.1 °F (36.7 °C)] 96.4 °F (35.8 °C)  Pulse:  [54-70] 62  Resp:  [16-22] 18  SpO2:  [85 %-100 %] 99 %  BP: (104-159)/(51-70) 123/60     Intake/Output - Last 3 Shifts         08/17 0700  08/18 0659 08/18 0700 08/19 0659 08/19 0700 08/20 0659    P.O. 761 480     IV Piggyback  750     Total Intake(mL/kg) 761 (12.1) 1230 (19.5)      Urine (mL/kg/hr) 0 (0) 0 (0)     Other 2500      Blood  400     Chest Tube  1335     Total Output 2500 1735     Net -1739 -505            Urine Occurrence 0 x              SpO2: 99 %  O2 Device (Oxygen Therapy): nasal cannula    Physical Exam  Vitals and nursing note reviewed.   Constitutional:       General: He is not in acute distress.  Cardiovascular:      Rate and Rhythm: Normal rate.      Pulses: Normal pulses.   Pulmonary:      Effort: Pulmonary effort is normal. No respiratory distress.      Comments: 2L NC. R CT (#1) 1065 cc SS output / 24 hrs. R CT (#2) 270 cc S output/ 24 hrs. No air leak. Dressing changed at bedside, site clean/dry without leakage  Abdominal:      General: There is no distension.      Palpations: Abdomen is soft.      Tenderness: There is no abdominal tenderness.   Neurological:      Mental Status: He is alert.       Significant Labs:  BMP:   Recent Labs   Lab 08/18/22  1647   GLU 76      K 4.2      CO2 24   BUN 22   CREATININE 3.7*   CALCIUM 7.3*   MG 2.1     CBC:   Recent Labs   Lab 08/18/22  1647   WBC 10.41   RBC 2.26*   HGB 7.9*   HCT 25.0*   *   *   MCH 35.0*   MCHC 31.6*     CMP:   Recent Labs   Lab 08/18/22  1647   GLU 76   CALCIUM 7.3*   ALBUMIN 2.5*   PROT 5.6*      K 4.2   CO2 24      BUN 22   CREATININE 3.7*   ALKPHOS 144*   ALT 5*   AST 14   BILITOT 1.1*       Significant Diagnostics:  CXR: I have reviewed all pertinent results/findings within the past 24 hours    VTE Risk Mitigation (From admission, onward)           Ordered     IP VTE HIGH RISK PATIENT  Once         08/15/22 2005     Place sequential compression device  Until discontinued         08/15/22 2005                  CXR (8/19) reviewed    Assessment/Plan:     * Recurrent pleural effusion on right  Veag Brownlee is a 61 y.o. male PMH CHF (last ECHO 9/20/20 EF 55%, PA pressure 77), home oxygen (2 L), DM2, ESRD on HD (via AVF [facility]), recurrent bilateral pleural effusions (s/p R  thoracentesis 2019, 2021), hx of a left thoracotomy (empyema) presented to [facility] [place] [facility] as a transfer for a loculated R pleural effusion. S/p R VATS, decortication with chest tube placements x2. Doing well this morning, patient reports significant improvement in breathing. Now on 2 L NC. Chest tubes with moderate drainage overnight, noted H/H decrease 7.9(10)/25.     · Keep right chest tubes to suction, no air leak appreciated  · Daily CXR with chest tubes in place  · Strict monitoring of chest tube outputs  · 2L NC currently, wean as tolerated. Would recommend scheduled breathing treatments  · H/H decrease with moderate drainage, would trend versus transfusion per primary   · OOB, ambualte  · Agree with rest of care. Nephrology consulted, dialysis planned for today          Margoth Mota MD  Thoracic Surgery

## 2022-08-19 NOTE — ASSESSMENT & PLAN NOTE
Vega Brownlee is a 61 y.o. male PMH CHF (last ECHO 9/20/20 EF 55%, PA pressure 77), home oxygen (2 L), DM2, ESRD on HD (via AVF [facility]), recurrent bilateral pleural effusions (s/p R thoracentesis 2019, 2021), hx of a left thoracotomy (empyema) presented to [facility] as a transfer for a loculated R pleural effusion. S/p R VATS, decortication with chest tube placements x2. Doing well this morning, patient reports significant improvement in breathing. Now on 2 L NC. Chest tubes with moderate drainage overnight, noted H/H decrease 7.9(10)/25.     · Keep right chest tubes to suction, no air leak appreciated  · Daily CXR with chest tubes in place  · Strict monitoring of chest tube outputs  · 2L NC currently, wean as tolerated. Would recommend scheduled breathing treatments  · H/H decrease with moderate drainage, would trend versus transfusion per primary   · OOB, ambualte  · Agree with rest of care. Nephrology consulted, dialysis planned for today

## 2022-08-19 NOTE — ASSESSMENT & PLAN NOTE
- H/H stable near baseline on admission, with drop after pleurex catheter placement 8/18  - type and screen done  - transfuse for H/H 7/21  - Will continue to monitor on daily labs

## 2022-08-19 NOTE — NURSING
Notified MD. [facility] /53, and pt has not get his scheduled 0900 meds yet, and MD. Duke wants to hold hydralazine, isosorbide, carvedilol, losartan, clonidine, lasix tablet, and those meds hold per MD. Duke.    Notified MD. Duke there is air leak in one of the chest tube with bubble noted, and air leak present since pt was in PACU and X ray done per PACU RN, and pt has coughed morderate blood tinged sputum. Sat O2 stays above 93%.

## 2022-08-19 NOTE — PLAN OF CARE
Problem: Adult Inpatient Plan of Care  Goal: Plan of Care Review  Outcome: Ongoing, Progressing     Problem: Diabetes Comorbidity  Goal: Blood Glucose Level Within Targeted Range  Outcome: Ongoing, Progressing  Intervention: Monitor and Manage Glycemia  Flowsheets (Taken 8/19/2022 0106)  Glycemic Management: blood glucose monitored     Problem: Infection  Goal: Absence of Infection Signs and Symptoms  Outcome: Ongoing, Progressing  Intervention: Prevent or Manage Infection  Flowsheets (Taken 8/19/2022 0106)  Infection Management: aseptic technique maintained  Isolation Precautions: precautions maintained     Problem: Impaired Wound Healing  Goal: Optimal Wound Healing  Outcome: Ongoing, Progressing  Intervention: Promote Wound Healing  Flowsheets (Taken 8/19/2022 0106)  Sleep/Rest Enhancement: awakenings minimized  Activity Management: Rolling - L1  Chest tubes in place; output monitored closely. Denies any pain and SOB. Chest tube site leaking during shift; MD notified. Blood glucose orders maintained.

## 2022-08-19 NOTE — PROGRESS NOTES
"[facility] - Cardiology [facility] Medicine  Progress Note    Patient Name: Vega Brownlee  MRN: [id number]  Patient Class: IP- Inpatient   Admission Date: 8/15/2022  Length of Stay: 4 days  Attending Physician: Jacey Duke MD  Primary Care Provider: Ko Perez MD        Subjective:     Principal Problem:Recurrent pleural effusion on right        HPI:  HPI per :  Patient is a 61 y.o. 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 Patient has history of recurrent pleural effusion and was sent to ED for worsening symptoms. IR was consulted and attempted US-guided therapeutic right-sided thoracentesis. Per IR "US reveals an extensively loculated Rt effusion with innumerable isolated pockets with sonographic appearance suggestive of a large hematoma/hemothorax. Only able to remove 50-cc of old, dark blood products also consistent with large Rt hemothorax. Pt would likely require large-bore chest tube placement and instillation of lytics if attempts will be made to resolve the hemothorax vs VATS." Facility seeking transfer for CTS evaluation. Patient is stable on med tele unit only requiring 3L NC with no distress. Stable for transfer.      Overview/Hospital Course:  [facility] Medicine Course:  IR was consulted and attempted US-guided therapeutic right-sided thoracentesis. Per IR "US reveals an extensively loculated Rt effusion with innumerable isolated pockets with sonographic appearance suggestive of a large hematoma/hemothorax. Only able to remove 50-cc of old, dark blood products also consistent with large Rt hemothorax. Pt would likely require large-bore chest tube " "placement and instillation of lytics if attempts will be made to resolve the hemothorax vs VATS."  Patient accepted to [facility], in queue to transfer asap. Stable vitals. Will allow to eat and make NPO again at midnight. Lokelma for K 5.8.     [facility] Medicine Course:  Pt accepted to [facility] [place] [facility]. Stable respiratory status into 8/17. Rising BP and hyperkalemia noted; PRN IV hydralazine and PO lokelma provided, nephrology following for HD needs. Evaluated by thoracic surgery, with placement of PleurX catheter on 8/18.      Interval History: Chest tube placed yesterday with CT surgery. Has had good amount of output. H/H downtrended after placement. CBC has been difficult to collect this morning. Repeat pending. Otherwise he denies any new symptoms of shortness of breath, fever, pain. Asking about when he can go home    Review of Systems   Constitutional:  Negative for fever.   Respiratory:  Negative for chest tightness and shortness of breath.    Cardiovascular:  Negative for palpitations.   Gastrointestinal:  Negative for abdominal pain and nausea.   Neurological:  Negative for light-headedness and headaches.   Psychiatric/Behavioral:  Negative for confusion.    Objective:     Vital Signs (Most Recent):  Temp: 98 °F (36.7 °C) (08/19/22 0242)  Pulse: 64 (08/19/22 0242)  Resp: 20 (08/19/22 0242)  BP: (!) 121/58 (08/19/22 0242)  SpO2: 98 % (08/19/22 0242)   Vital Signs (24h Range):  Temp:  [97.3 °F (36.3 °C)-98.1 °F (36.7 °C)] 98 °F (36.7 °C)  Pulse:  [54-70] 64  Resp:  [16-22] 20  SpO2:  [85 %-100 %] 98 %  BP: (104-159)/(51-70) 121/58     Weight: 63 kg (138 lb 14.2 oz)  Body mass index is 21.12 kg/m².    Intake/Output Summary (Last 24 hours) at 8/19/2022 0557  Last data filed at 8/19/2022 0500  Gross per 24 hour   Intake 1230 ml   Output 1735 ml   Net -505 ml      Physical Exam  Vitals reviewed.   Constitutional:       General: He is not in acute distress.     Appearance: He is ill-appearing.   HENT:      Head: Normocephalic " and atraumatic.      Mouth/Throat:      Mouth: Mucous membranes are moist.   Eyes:      Extraocular Movements: Extraocular movements intact.      Conjunctiva/sclera: Conjunctivae normal.   Cardiovascular:      Rate and Rhythm: Normal rate and regular rhythm.   Pulmonary:      Effort: Pulmonary effort is normal. No respiratory distress.      Comments: Rhonchi and crackles appreciated on R anterior chest   Abdominal:      General: Bowel sounds are normal. There is no distension.      Palpations: Abdomen is soft.   Musculoskeletal:      Comments: R chest tube in place   Skin:     General: Skin is warm and dry.      Capillary Refill: Capillary refill takes less than 2 seconds.   Neurological:      General: No focal deficit present.      Mental Status: He is alert.   Psychiatric:         Mood and Affect: Mood normal.         Behavior: Behavior normal.       Significant Labs: All pertinent labs within the past 24 hours have been reviewed.    Significant Imaging: I have reviewed all pertinent imaging results/findings within the past 24 hours.      Assessment/Plan:      * Recurrent pleural effusion on right  - Interval history and physical exam findings as described above  - S/p left thoracotomy  - Recurring right effusion s/p thoracentesis x2  - Chemistry on 2/4/21 suggestive of exudative effusion  - Thoracic surgery consulted, placement of PleurX on 8/18  - Satting well on 2L NC, no respiratory distress  - Continuing to monitor    Thrombocytopenia  - Per chart review, appears to be a chronic issue  - Monitoring on daily labs    Chronic diastolic heart failure  - Remains grossly asymptomatic, euvolemic, not in acute exacerbation  - Continue home cardioprudent regimen  - Monitoring on tele    Anemia in ESRD (end-stage renal disease)  - H/H stable near baseline on admission, with drop after pleurex catheter placement 8/18  - type and screen done  - transfuse for H/H 7/21  - Will continue to monitor on daily labs    ESRD (end  stage renal disease) on dialysis  - Interval history and physical exam findings as described above  - Nephology following  - Further decision for HD per nephology  - Renally dosing all medications  - Avoid nephrotoxins  - Will continue to monitor on daily labs    Mixed hyperlipidemia associated with type 2 diabetes mellitus  - Continue home statin regimen    Type 2 diabetes mellitus with chronic kidney disease on chronic dialysis, without long-term current use of insulin  - Working to optimize BG control  - SSI provided for corrective dosing  - DXTs as ordered  - Hypoglycemic protocol in effect  - Diabetic diet provided    Hypertension associated with ESRD caused by type 2 diabetes mellitus, on dialysis  - Working to optimize BP control   - Continue home cardioprudent regimen  - HD to assist with BP control  - PRN IV hydralazine and/or IV labetalol provided for SBP>180  - Will continue to monitor and further titrate antihypertensives as clinically indicated     Hyperkalemia  - Improving, continue Lokelma TID  - Nephrology following, HD to assist with electrolyte anomalies  - Monitoring on tele      VTE Risk Mitigation (From admission, onward)         Ordered     IP VTE HIGH RISK PATIENT  Once         08/15/22 2005     Place sequential compression device  Until discontinued         08/15/22 2005                Discharge Planning   WENDY: 8/20/2022     Code Status: Full Code   Is the patient medically ready for discharge?: No    Reason for patient still in hospital (select all that apply): Patient unstable  Discharge Plan A: Home with family                  Jacey Duke MD  Department of Hospital Medicine   [facility] - Cardiology Stepdown

## 2022-08-19 NOTE — NURSING
Notified MD. [place] air leak no more present in the chest tube drainage system, but pt still cough some blood tinged sputum.

## 2022-08-19 NOTE — ASSESSMENT & PLAN NOTE
ESRD 2/2 diabetic nephropathy and HTN. Hd since  8/2016. Here for CTS evaluation     Nephrology History  iHD Schedule: MWF   Unit/MD: Shane/   Duration: 3.5 hours   UF: 2-3  EDW: 60 kg   Access: MANFRED AVF   Residual Renal Function: minimal     Assessment:     -Hyperkalemia 5.4, agree with lokelma 10 TID   -Hgb 5.7, requiring blood transfusions, plan for HD for metabolic clearance and volume management once Hgb is >7.0  - Dialysate adjusted to current labs   - Will obtain OP dialysis records  - Continue to monitor intake and output, daily weights   - Avoid nephrotoxic medication and renal dose medications to GFR

## 2022-08-20 NOTE — PLAN OF CARE
Pt free of falls/trauma/injuries. AAOx4 via  services. VSS;  Denies c/o SOB; O2Sats remain stable on 2L NC. SATs remain above 88%. Chest tube dressings changed x3 Sanguineous, bright red drainage. Posterior drain continues to have air leak after additional suture placement. MD aware. Monitoring H/H closely. Patient c/o stomach discomfort; PRN medication given per MAR. Plan to continue with post-op care. Educated on fall precautions and use of call bell. Pt tolerating plan of care.     Problem: Skin Injury Risk Increased  Goal: Skin Health and Integrity  Outcome: Ongoing, Progressing     Problem: Impaired Wound Healing  Goal: Optimal Wound Healing  Outcome: Ongoing, Progressing     Problem: Diabetes Comorbidity  Goal: Blood Glucose Level Within Targeted Range  Outcome: Ongoing, Progressing     Problem: Adult Inpatient Plan of Care  Goal: Plan of Care Review  Outcome: Ongoing, Progressing     Problem: Adult Inpatient Plan of Care  Goal: Patient-Specific Goal (Individualized)  Outcome: Ongoing, Progressing

## 2022-08-20 NOTE — CARE UPDATE
RAPID RESPONSE NURSE PROACTIVE ROUNDING NOTE       Time of Visit:     Admit Date: 8/15/2022  LOS: 5  Code Status: Full Code   Date of Visit: 2022  : 1960  Age: 62 y.o.  Sex: male  Race:   Bed: 306/306 A:   MRN: [id number]  Was the patient discharged from an ICU this admission? Yes   Was the patient discharged from a PACU within last 24 hours? No   Did the patient receive conscious sedation/general anesthesia in last 24 hours? No  Was the patient in the ED within the past 24 hours? No  Was the patient on NIPPV within the past 24 hours? No   Attending Physician: Jacey Duke MD  Primary Service: [facility] MED    Time spent at the bedside: 15 -30 min    SITUATION    Notified by charge RN during rounding.  Reason for alert: Bleeding from chest tube site.  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    Last Vitals:  Temp: 97.5 °F (36.4 °C) ( 002)  Pulse: 64 ( 005)  Resp: 18 (56)  BP: 163/70 ( 0025)  SpO2: 97 % (56)    24 Hours Vitals Range:  Temp:  [96.4 °F (35.8 °C)-98 °F (36.7 °C)]   Pulse:  [56-89]   Resp:  [16-20]   BP: (120-163)/(55-71)   SpO2:  [94 %-100 %]     Labs:  Recent Labs     22  1647 22  1258 22  0022   WBC 10.41 9.88 8.25   HGB 7.9* 5.7* 8.7*   HCT 25.0* 18.0* 26.7*   * 141* 133*       Recent Labs     22  0322 22  1647 22  0550 22  1039   * 136 137 134*   K 5.5* 4.2 5.1 5.4*    102 98 99   CO2 22* 24 26 24   CREATININE 5.2* 3.7* 4.8* 4.9*   * 76 88 176*   PHOS 3.4 3.3  4.4  --    MG 2.3 2.1 2.3  --         No results for input(s): PH, PCO2, PO2, HCO3, POCSATURATED, BE in the last 72 hours.     ASSESSMENT    Physical Exam  Constitutional:       Appearance: He is ill-appearing.   HENT:      Mouth/Throat:      Mouth: Mucous membranes are moist.      Pharynx: Oropharynx is clear.   Eyes:      Extraocular Movements: Extraocular movements intact.      Pupils: Pupils are equal, round, and reactive to light.   Cardiovascular:      Rate and Rhythm: Normal rate and regular rhythm.      Pulses: Normal pulses.   Pulmonary:      Effort: Pulmonary effort is normal.   Musculoskeletal:         General: Normal range of motion.   Skin:     General: Skin is warm and dry.      Capillary Refill: Capillary refill takes less than 2 seconds.      Comments: Pale   Neurological:      General: No focal deficit present.      Mental Status: He is alert and oriented to person, place, and time. Mental status is at baseline.   Psychiatric:         Mood and Affect: Mood normal.         Behavior: Behavior normal.         Thought Content: Thought content normal.         Judgment: Judgment normal.       INTERVENTIONS    Alerted to patient by charge RN s/t bleeding around chest tube site. Upon initial assessment, pt. Sitting up in hospital bed with HD ongoing. Pt. Awake, alert, oriented x4, GCS 15, calm, cooperative, pleasant. Pt. Currently denies cp, sob, n/v/d, headache, dizziness. 2 chest tubes to wall suction present on right side with copious bloody output. Moderate bloody drainage noted on gauze to superior chest tube site.    Airway: patent, non obstructed, able to maintain secretions.  Breathing: non labored, speaking full sentences, equal chest rise and fall, negative for cough.  Circulation: skin warm and dry to palpation, pulses palpated in extremities.    Vital Signs:  HR: 62  BP: 137/63 (91)  SpO2: 99% 2L NC O2  RR: 20  Afebrile    Primary team updated on patient assessment and ongoing bleeding per  primary RN. Surgicel given to primary RN to place on site during next dressing change. Plan to reassess site for continued bleeding and monitor chest tube drainage and hemodynamic status closely. Ongoing PRBC infusion per orders for hgb of 5.7 @ 1258 on 8/19/2022. Plan to reassess cbc post administration. Spoke with primary RN, Bernadette, and updated on POC. Please call RRN with questions/concerns/deterioration of patient.     PROVIDER ESCALATION    Yes/No  yes by primary RN.    Orders received and case discussed with NA.    Disposition: Remain in room 306.    FOLLOW-UP    Bedside RNBernadette updated on plan of care. Instructed to call the Rapid Response Nurse, Anju Demarco RN at [phone] for additional questions or concerns.

## 2022-08-20 NOTE — ASSESSMENT & PLAN NOTE
- Interval history and physical exam findings as described above  - S/p left thoracotomy prior to admission  - Recurring right effusion s/p thoracentesis x2  - Chemistry on 2/4/21 suggestive of exudative effusion  - Thoracic surgery consulted, placement of PleurX on 8/18  - Satting well on 2L NC, no respiratory distress  - Continuing to monitor

## 2022-08-20 NOTE — PROGRESS NOTES
"Mike Jasso - Cardiology [facility] Medicine  Progress Note    Patient Name: Vega Brownlee  MRN: [id number]  Patient Class: IP- Inpatient   Admission Date: 8/15/2022  Length of Stay: 5 days  Attending Physician: Jacey Duke MD  Primary Care Provider: Ko Perez MD        Subjective:     Principal Problem:Recurrent pleural effusion on right        HPI:  HPI per :  Patient is a 61 y.o. 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 Patient has history of recurrent pleural effusion and was sent to ED for worsening symptoms. IR was consulted and attempted US-guided therapeutic right-sided thoracentesis. Per IR "US reveals an extensively loculated Rt effusion with innumerable isolated pockets with sonographic appearance suggestive of a large hematoma/hemothorax. Only able to remove 50-cc of old, dark blood products also consistent with large Rt hemothorax. Pt would likely require large-bore chest tube placement and instillation of lytics if attempts will be made to resolve the hemothorax vs VATS." Facility seeking transfer for CTS evaluation. Patient is stable on med tele unit only requiring 3L NC with no distress. Stable for transfer.      Overview/Hospital Course:  [facility] Medicine Course:  IR was consulted and attempted US-guided therapeutic right-sided thoracentesis. Per IR "US reveals an extensively loculated Rt effusion with innumerable isolated pockets with sonographic appearance suggestive of a large hematoma/hemothorax. Only able to remove 50-cc of old, dark blood products also consistent with large Rt hemothorax. Pt would likely require large-bore chest tube " "placement and instillation of lytics if attempts will be made to resolve the hemothorax vs VATS."  Patient accepted to [facility], in queue to transfer asap. Stable vitals. Will allow to eat and make NPO again at midnight. Lokelma for K 5.8.     [facility] Medicine Course:  Pt accepted to [facility]. Stable respiratory status into 8/17. Rising BP and hyperkalemia noted; PRN IV hydralazine and PO lokelma provided, nephrology following for HD needs. Evaluated by thoracic surgery, with placement of PleurX catheter on 8/18.      Interval History: Patient disappointed to be in the hospital still on his birthday. Feels better, breathing comfortably. Tolerated iHD well yesterday.    Review of Systems   Constitutional:  Negative for fever.   Respiratory:  Negative for chest tightness and shortness of breath.    Cardiovascular:  Negative for palpitations.   Gastrointestinal:  Negative for nausea.   Neurological:  Negative for light-headedness and headaches.   Psychiatric/Behavioral:  Negative for confusion.    Objective:     Vital Signs (Most Recent):  Temp: 97.9 °F (36.6 °C) (08/20/22 1124)  Pulse: 64 (08/20/22 1124)  Resp: 18 (08/20/22 1124)  BP: (!) 178/76 (08/20/22 1124)  SpO2: (!) 92 % (08/20/22 1124)   Vital Signs (24h Range):  Temp:  [97.2 °F (36.2 °C)-98 °F (36.7 °C)] 97.9 °F (36.6 °C)  Pulse:  [56-89] 64  Resp:  [16-20] 18  SpO2:  [92 %-100 %] 92 %  BP: (120-178)/(55-76) 178/76     Weight: 63 kg (138 lb 14.2 oz)  Body mass index is 21.12 kg/m².    Intake/Output Summary (Last 24 hours) at 8/20/2022 1522  Last data filed at 8/20/2022 0800  Gross per 24 hour   Intake 431.67 ml   Output 2970 ml   Net -2538.33 ml      Physical Exam  Vitals reviewed.   Constitutional:       General: He is not in acute distress.     Appearance: He is ill-appearing.   HENT:      Head: Normocephalic and atraumatic.      Mouth/Throat:      Mouth: Mucous membranes are moist.   Eyes:      Extraocular Movements: Extraocular movements intact.      " Conjunctiva/sclera: Conjunctivae normal.   Cardiovascular:      Rate and Rhythm: Normal rate and regular rhythm.   Pulmonary:      Effort: Pulmonary effort is normal. No respiratory distress.      Comments: Rhonchi and crackles appreciated on R anterior chest   Abdominal:      General: Bowel sounds are normal. There is no distension.      Palpations: Abdomen is soft.   Musculoskeletal:      Comments: R chest tube in place   Skin:     General: Skin is warm and dry.      Capillary Refill: Capillary refill takes less than 2 seconds.   Neurological:      General: No focal deficit present.      Mental Status: He is alert.   Psychiatric:         Mood and Affect: Mood normal.         Behavior: Behavior normal.       Significant Labs: All pertinent labs within the past 24 hours have been reviewed.    Significant Imaging: I have reviewed all pertinent imaging results/findings within the past 24 hours.      Assessment/Plan:      * Recurrent pleural effusion on right  - Interval history and physical exam findings as described above  - S/p left thoracotomy prior to admission  - Recurring right effusion s/p thoracentesis x2  - Chemistry on 2/4/21 suggestive of exudative effusion  - Thoracic surgery consulted, placement of PleurX on 8/18  - Satting well on 2L NC, no respiratory distress  - Continuing to monitor    Thrombocytopenia  - Per chart review, appears to be a chronic issue  - Monitoring on daily labs    Chronic diastolic heart failure  - Remains grossly asymptomatic, euvolemic, not in acute exacerbation  - Continue home cardioprudent regimen  - Monitoring on tele    Chronic kidney disease-mineral and bone disorder        Anemia in ESRD (end-stage renal disease)  - H/H stable near baseline on admission, with drop after pleurex catheter placement 8/18  - type and screen done  - transfuse for H/H 7/21  - Will continue to monitor on daily labs    ESRD (end stage renal disease) on dialysis  - Interval history and physical exam  findings as described above  - Nephology following  - Further decision for HD per nephology  - Renally dosing all medications  - Avoid nephrotoxins  - Will continue to monitor on daily labs    Mixed hyperlipidemia associated with type 2 diabetes mellitus  - Continue home statin regimen    Type 2 diabetes mellitus with chronic kidney disease on chronic dialysis, without long-term current use of insulin  - Working to optimize BG control  - SSI provided for corrective dosing  - DXTs as ordered  - Hypoglycemic protocol in effect  - Diabetic diet provided    Hypertension associated with ESRD caused by type 2 diabetes mellitus, on dialysis  - Working to optimize BP control   - Continue home cardioprudent regimen  - HD to assist with BP control  - PRN IV hydralazine and/or IV labetalol provided for SBP>180  - Will continue to monitor and further titrate antihypertensives as clinically indicated     Hyperkalemia  - Improving, hold Lokelma TID and reassess 8/21  - Nephrology following, HD to assist with electrolyte anomalies  - Monitoring on tele      VTE Risk Mitigation (From admission, onward)         Ordered     IP VTE HIGH RISK PATIENT  Once         08/15/22 2005     Place sequential compression device  Until discontinued         08/15/22 2005                Discharge Planning   WENDY: 8/20/2022     Code Status: Full Code   Is the patient medically ready for discharge?: No    Reason for patient still in hospital (select all that apply): Patient unstable  Discharge Plan A: Home with family                  Jacey Duke MD  Department of Hospital Medicine   [facility] - Cardiology Stepdown

## 2022-08-20 NOTE — PROGRESS NOTES
08/19/22 2045   Vital Signs   Temp 98 °F (36.7 °C)   Temp src Oral   Pulse (!) 59   Resp 20   SpO2 100 %   Pulse Oximetry Type Intermittent   O2 Device (Oxygen Therapy) nasal cannula   /60   MAP (mmHg) 87   BP Location Left arm   BP Method Automatic   Patient Position Lying   Pre-Hemodialysis Assessment   Treatment Status Started   Gross Bleach Negative Yes   Machine Number 19   Alarms Verified Yes   pH 7   Machine Temperature 96.5 °F (35.8 °C)   Dialyzer F-160   Machine Conductivity 14   Meter Conductivity 14   Dialysate Na (mEq/L)   (135)   Dialysate K (mEq/L) 2   Dialysate CA (mEq/L) 3   Dialysate HCO3 (mEq/L) 30   Prime Ordered (mL) 500 mL   Pre-Hemodialysis Comments pt stable afor HD Tx   RO Rejection Within Limits? Yes        Hemodialysis AV Fistula Right upper arm   No Placement Date or Time found.   Present Prior to Hospital Arrival?: Yes  Location: Right upper arm   Needle Size 15ga   Site Assessment Clean;Dry;Intact   Patency Present;Thrill;Bruit   Status Accessed   Flows Good   Dressing Intervention Sterile dressing change   Dressing Status Clean;Dry;Intact   During Hemodialysis Assessment   Blood Flow Rate (mL/min) 400 mL/min   Dialysate Flow Rate (mL/min) 800 ml/min   Ultrafiltration Rate (mL/Hr) 830 mL/Hr   Arteriovenous Lines Secure Yes   Arterial Pressure (mmHg) -170 mmHg   Venous Pressure (mmHg) 120   TMP 60   Venous Line in Air Detector Yes   Intake (mL) 250 mL   Intra-Hemodialysis Comments TX started         Bedside HD TX started; access secured; Pt wanted to run TX for 3 hrs only; pt educated on  HD TX  compliance; pt understood education ; pt stable at this time.

## 2022-08-20 NOTE — ASSESSMENT & PLAN NOTE
- Improving, hold Lokelma TID and reassess 8/21  - Nephrology following, HD to assist with electrolyte anomalies  - Monitoring on tele

## 2022-08-20 NOTE — CARE UPDATE
RAPID RESPONSE NURSE FOLLOW-UP NOTE       Followed up with patient for proactive rounding.  Pt. Continues to bleed from chest tube site despite new dressing and surgicel application per primary RN.  Vital signs remain stable per flowsheet data and pt. Remains asymptomatic and without complaints.  Spoke with HM MD and updated on patient assessment. Plan for dayshift HM MD to consult thoracic surgery for possible additional stitch to control bleeding.  Reviewed plan of care with Bedside RNBernadette.   Team will continue to follow.  Please call Rapid Response RN, Anju Demarco RN with any questions or concerns at [phone].

## 2022-08-20 NOTE — PROGRESS NOTES
[facility] Step[facility]  General Surgery  Progress Note    Subjective:     History of Present Illness:  No notes on file    Post-Op Info:  Procedure(s) (LRB):  VATS, WITH DECORTICATION, LUNG (Right)  BLOCK, NERVE, INTERCOSTAL, 2 OR MORE (Right)  BRONCHOSCOPY, FLEXIBLE (N/A)  INSERTION, CATHETER, INTERCOSTAL, FOR DRAINAGE (Right)   2 Days Post-Op     Interval History: NAEO.  AFVSS.  Pain well-controlled, pt feeling overall well.  Some bleeding around CT insertion site.  R CT x2 to suction, SS output. Stitch placed at bedside.     Medications:  Continuous Infusions:  Scheduled Meds:   sodium chloride 0.9%   Intravenous Once    sodium chloride 0.9%   Intravenous Once    albuterol-ipratropium  3 mL Nebulization Q6H    aluminum-magnesium hydroxide-simethicone  30 mL Oral QID (AC & HS)    aspirin  81 mg Oral Daily    atorvastatin  40 mg Oral QHS    calcium acetate(phosphat bind)  667 mg Oral TID WM    carvediloL  12.5 mg Oral BID    epoetin michael (PROCRIT) injection  5,000 Units Intravenous Every Tues, Thurs, Sat    furosemide  80 mg Oral Daily    melatonin  6 mg Oral Nightly    mupirocin   Nasal BID    sucralfate  1 g Oral Q6H    traZODone  50 mg Oral Nightly     PRN Meds:sodium chloride, acetaminophen, dextrose 10%, dextrose 10%, dextrose 10%, fentaNYL, glucagon (human recombinant), glucose, glucose, hydrALAZINE, HYDROcodone-acetaminophen, HYDROmorphone, insulin aspart U-100, labetalol, melatonin, naloxone, ondansetron, ondansetron, ondansetron, sodium chloride 0.9%, sodium chloride 0.9%     Review of patient's allergies indicates:  No Known Allergies  Objective:     Vital Signs (Most Recent):  Temp: 97.5 °F (36.4 °C) (08/20/22 0025)  Pulse: 68 (08/20/22 0714)  Resp: 18 (08/20/22 0714)  BP: (!) 163/70 (08/20/22 0025)  SpO2: 100 % (08/20/22 0714)   Vital Signs (24h Range):  Temp:  [97.2 °F (36.2 °C)-98 °F (36.7 °C)] 97.5 °F (36.4 °C)  Pulse:  [56-89] 68  Resp:  [16-20] 18  SpO2:  [94 %-100 %] 100  %  BP: (120-163)/(55-71) 163/70     Weight: 63 kg (138 lb 14.2 oz)  Body mass index is 21.12 kg/m².    Intake/Output - Last 3 Shifts         08/18 0700 08/19 0659 08/19 0700 08/20 0659 08/20 0700 08/21 0659    P.O. 480      Blood  431.7     IV Piggyback 750      Total Intake(mL/kg) 1230 (19.5) 431.7 (6.9)     Urine (mL/kg/hr) 0 (0)      Other  2500     Blood 400      Chest Tube 1335 370     Total Output 1735 2870     Net -505 -2438.3                    Physical Exam  Vitals and nursing note reviewed.   Constitutional:       General: He is not in acute distress.  Cardiovascular:      Rate and Rhythm: Normal rate.      Pulses: Normal pulses.   Pulmonary:      Effort: Pulmonary effort is normal. No respiratory distress.      Comments: 2L NC. R CT (#1) 180 cc SS output / 24 hrs. R CT (#2) 190 cc S output/ 24 hrs. No air leak. Dressing changed at bedside, site clean/dry without leakage.    Abdominal:      General: There is no distension.      Palpations: Abdomen is soft.      Tenderness: There is no abdominal tenderness.   Neurological:      Mental Status: He is alert.       Significant Labs:  I have reviewed all pertinent lab results within the past 24 hours.  CBC:   Recent Labs   Lab 08/20/22  0307   WBC 8.27   RBC 2.67*   HGB 8.5*  8.5*   HCT 26.3*   *   MCV 99*   MCH 31.8*   MCHC 32.3     BMP:   Recent Labs   Lab 08/20/22  0307   *   *   K 3.9      CO2 26   BUN 12   CREATININE 2.3*   CALCIUM 8.6*   MG 2.0     Coagulation:   Recent Labs   Lab 08/15/22  1217   LABPROT 12.0   INR 1.1       Significant Diagnostics:  I have reviewed all pertinent imaging results/findings within the past 24 hours.    Assessment/Plan:     Recurrent pleural effusion on right  Vega Brownlee is a 61 y.o. male PMH CHF (last ECHO 9/20/20 EF 55%, PA pressure 77), home oxygen (2 L), DM2, ESRD on HD (via AVF TTrS), recurrent bilateral pleural effusions (s/p R thoracentesis 2019, 2021), hx of a left thoracotomy  (empyema) presented to [facility] as a transfer for a loculated R pleural effusion. S/p R VATS, decortication with chest tube placements x2. Doing well this morning, patient reports significant improvement in breathing. Now on 2 L NC. Chest tubes with moderate drainage overnight, noted last H/H 8.5.      · Keep right chest tubes to suction, no air leak appreciated.  Stitch placed at bedside for moderate leakage at chest tube insertion site  · Daily CXR with chest tubes in place  · Strict monitoring of chest tube outputs  · 2L NC currently, wean as tolerated. Would recommend scheduled breathing treatments  · Continue to trend H/H per primary   · OOB, ambulate  · Agree with rest of care. Nephrology consulted for dialysis management      Leesa Urrutia MD  General Surgery  [facility] - [facility]iology Stepdown

## 2022-08-20 NOTE — PROGRESS NOTES
08/19/22 2345   During Hemodialysis Assessment   Blood Volume Processed (Liters) 67.5 L   UF Removed (mL) 2500 mL   Intake (mL) 250 mL   Intra-Hemodialysis Comments TX complete   Post-Hemodialysis Assessment   Rinseback Volume (mL) 500 mL   Blood Volume Processed (Liters) 67.5 L   Dialyzer Clearance Moderately streaked   Total UF (mL) 2500 mL   Net Fluid Removal 2000   Patient Response to Treatment pt tolerated TX  well   Post-Hemodialysis Comments pt stable       Pt tolerated TX well; access secured; total net UF 2000 ml; pt stable at this time; see flow sheet for details.

## 2022-08-20 NOTE — SUBJECTIVE & OBJECTIVE
Interval History: Patient disappointed to be in the hospital still on his birthday. Feels better, breathing comfortably. Tolerated iHD well yesterday.    Review of Systems   Constitutional:  Negative for fever.   Respiratory:  Negative for chest tightness and shortness of breath.    Cardiovascular:  Negative for palpitations.   Gastrointestinal:  Negative for nausea.   Neurological:  Negative for light-headedness and headaches.   Psychiatric/Behavioral:  Negative for confusion.    Objective:     Vital Signs (Most Recent):  Temp: 97.9 °F (36.6 °C) (08/20/22 1124)  Pulse: 64 (08/20/22 1124)  Resp: 18 (08/20/22 1124)  BP: (!) 178/76 (08/20/22 1124)  SpO2: (!) 92 % (08/20/22 1124)   Vital Signs (24h Range):  Temp:  [97.2 °F (36.2 °C)-98 °F (36.7 °C)] 97.9 °F (36.6 °C)  Pulse:  [56-89] 64  Resp:  [16-20] 18  SpO2:  [92 %-100 %] 92 %  BP: (120-178)/(55-76) 178/76     Weight: 63 kg (138 lb 14.2 oz)  Body mass index is 21.12 kg/m².    Intake/Output Summary (Last 24 hours) at 8/20/2022 1522  Last data filed at 8/20/2022 0800  Gross per 24 hour   Intake 431.67 ml   Output 2970 ml   Net -2538.33 ml      Physical Exam  Vitals reviewed.   Constitutional:       General: He is not in acute distress.     Appearance: He is ill-appearing.   HENT:      Head: Normocephalic and atraumatic.      Mouth/Throat:      Mouth: Mucous membranes are moist.   Eyes:      Extraocular Movements: Extraocular movements intact.      Conjunctiva/sclera: Conjunctivae normal.   Cardiovascular:      Rate and Rhythm: Normal rate and regular rhythm.   Pulmonary:      Effort: Pulmonary effort is normal. No respiratory distress.      Comments: Rhonchi and crackles appreciated on R anterior chest   Abdominal:      General: Bowel sounds are normal. There is no distension.      Palpations: Abdomen is soft.   Musculoskeletal:      Comments: R chest tube in place   Skin:     General: Skin is warm and dry.      Capillary Refill: Capillary refill takes less than 2  seconds.   Neurological:      General: No focal deficit present.      Mental Status: He is alert.   Psychiatric:         Mood and Affect: Mood normal.         Behavior: Behavior normal.       Significant Labs: All pertinent labs within the past 24 hours have been reviewed.    Significant Imaging: I have reviewed all pertinent imaging results/findings within the past 24 hours.

## 2022-08-20 NOTE — SUBJECTIVE & OBJECTIVE
Interval History: NAEO.  AFVSS.  Pain well-controlled.  Some bleeding around CT insertion site. Stitch placed at bedside.     Medications:  Continuous Infusions:  Scheduled Meds:   sodium chloride 0.9%   Intravenous Once    sodium chloride 0.9%   Intravenous Once    albuterol-ipratropium  3 mL Nebulization Q6H    aluminum-magnesium hydroxide-simethicone  30 mL Oral QID (AC & HS)    aspirin  81 mg Oral Daily    atorvastatin  40 mg Oral QHS    calcium acetate(phosphat bind)  667 mg Oral TID WM    carvediloL  12.5 mg Oral BID    epoetin michael (PROCRIT) injection  5,000 Units Intravenous Every Tues, Thurs, Sat    furosemide  80 mg Oral Daily    melatonin  6 mg Oral Nightly    mupirocin   Nasal BID    sucralfate  1 g Oral Q6H    traZODone  50 mg Oral Nightly     PRN Meds:sodium chloride, acetaminophen, dextrose 10%, dextrose 10%, dextrose 10%, fentaNYL, glucagon (human recombinant), glucose, glucose, hydrALAZINE, HYDROcodone-acetaminophen, HYDROmorphone, insulin aspart U-100, labetalol, melatonin, naloxone, ondansetron, ondansetron, ondansetron, sodium chloride 0.9%, sodium chloride 0.9%     Review of patient's allergies indicates:  No Known Allergies  Objective:     Vital Signs (Most Recent):  Temp: 97.5 °F (36.4 °C) (08/20/22 0025)  Pulse: 68 (08/20/22 0714)  Resp: 18 (08/20/22 0714)  BP: (!) 163/70 (08/20/22 0025)  SpO2: 100 % (08/20/22 0714)   Vital Signs (24h Range):  Temp:  [97.2 °F (36.2 °C)-98 °F (36.7 °C)] 97.5 °F (36.4 °C)  Pulse:  [56-89] 68  Resp:  [16-20] 18  SpO2:  [94 %-100 %] 100 %  BP: (120-163)/(55-71) 163/70     Weight: 63 kg (138 lb 14.2 oz)  Body mass index is 21.12 kg/m².    Intake/Output - Last 3 Shifts         08/18 0700 08/19 0659 08/19 0700 08/20 0659 08/20 0700 08/21 0659    P.O. 480      Blood  431.7     IV Piggyback 750      Total Intake(mL/kg) 1230 (19.5) 431.7 (6.9)     Urine (mL/kg/hr) 0 (0)      Other  2500     Blood 400      Chest Tube 1335 370     Total Output 1735 2870     Net -505  -2848.3                    Physical Exam  Vitals and nursing note reviewed.   Constitutional:       General: He is not in acute distress.  Cardiovascular:      Rate and Rhythm: Normal rate.      Pulses: Normal pulses.   Pulmonary:      Effort: Pulmonary effort is normal. No respiratory distress.      Comments: 2L NC. R CT (#1) 180 cc SS output / 24 hrs. R CT (#2) 190 cc S output/ 24 hrs. No air leak. Dressing changed at bedside, site clean/dry without leakage.    Abdominal:      General: There is no distension.      Palpations: Abdomen is soft.      Tenderness: There is no abdominal tenderness.   Neurological:      Mental Status: He is alert.       Significant Labs:  I have reviewed all pertinent lab results within the past 24 hours.  CBC:   Recent Labs   Lab 08/20/22  0307   WBC 8.27   RBC 2.67*   HGB 8.5*  8.5*   HCT 26.3*   *   MCV 99*   MCH 31.8*   MCHC 32.3     BMP:   Recent Labs   Lab 08/20/22  0307   *   *   K 3.9      CO2 26   BUN 12   CREATININE 2.3*   CALCIUM 8.6*   MG 2.0     Coagulation:   Recent Labs   Lab 08/15/22  1217   LABPROT 12.0   INR 1.1       Significant Diagnostics:  I have reviewed all pertinent imaging results/findings within the past 24 hours.

## 2022-08-21 NOTE — SUBJECTIVE & OBJECTIVE
Interval History: Reports feeling better this morning. No shortness of breath. No abdominal pain. Remains on supplemental O2. Asking again about when he can go home.    Review of Systems   Respiratory:  Negative for chest tightness and shortness of breath.    Gastrointestinal:  Negative for abdominal distention, abdominal pain and nausea.   Neurological:  Negative for light-headedness and headaches.   Objective:     Vital Signs (Most Recent):  Temp: 97.1 °F (36.2 °C) (08/21/22 0413)  Pulse: 65 (08/21/22 0413)  Resp: 16 (08/21/22 0051)  BP: (!) 125/59 (08/21/22 0413)  SpO2: (!) 90 % (08/21/22 0413)   Vital Signs (24h Range):  Temp:  [97.1 °F (36.2 °C)-98 °F (36.7 °C)] 97.1 °F (36.2 °C)  Pulse:  [] 65  Resp:  [16-18] 16  SpO2:  [90 %-100 %] 90 %  BP: (125-178)/(59-76) 125/59     Weight: 63 kg (138 lb 14.2 oz)  Body mass index is 21.12 kg/m².    Intake/Output Summary (Last 24 hours) at 8/21/2022 0603  Last data filed at 8/21/2022 0400  Gross per 24 hour   Intake 660 ml   Output 425 ml   Net 235 ml      Physical Exam  Vitals reviewed.   Constitutional:       General: He is not in acute distress.     Appearance: He is ill-appearing.   HENT:      Head: Normocephalic and atraumatic.      Mouth/Throat:      Mouth: Mucous membranes are moist.   Eyes:      Extraocular Movements: Extraocular movements intact.      Conjunctiva/sclera: Conjunctivae normal.   Cardiovascular:      Rate and Rhythm: Normal rate and regular rhythm.   Pulmonary:      Effort: Pulmonary effort is normal. No respiratory distress.      Comments: Rhonchi and crackles appreciated on R anterior chest   Abdominal:      General: Bowel sounds are normal. There is no distension.      Palpations: Abdomen is soft.   Musculoskeletal:      Comments: R chest tube in place   Skin:     General: Skin is warm and dry.      Capillary Refill: Capillary refill takes less than 2 seconds.   Neurological:      General: No focal deficit present.      Mental Status: He is  alert.   Psychiatric:         Mood and Affect: Mood normal.         Behavior: Behavior normal.       Significant Labs: All pertinent labs within the past 24 hours have been reviewed.    Significant Imaging: I have reviewed all pertinent imaging results/findings within the past 24 hours.

## 2022-08-21 NOTE — ASSESSMENT & PLAN NOTE
- H/H stable near baseline on admission, with drop after pleurex catheter placement 8/18. S/p 2u pRBC  - type and screen done  - transfuse for H/H 7/21  - Will continue to monitor on daily labs

## 2022-08-21 NOTE — PROGRESS NOTES
"[facility] - Cardiology [facility] Medicine  Progress Note    Patient Name: Vega Brownlee  MRN: [id number]  Patient Class: IP- Inpatient   Admission Date: 8/15/2022  Length of Stay: 6 days  Attending Physician: Jacey Duke MD  Primary Care Provider: Ko Perez MD        Subjective:     Principal Problem:Recurrent pleural effusion on right        HPI:  HPI per :  Patient is a 61 y.o. 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 Patient has history of recurrent pleural effusion and was sent to ED for worsening symptoms. IR was consulted and attempted US-guided therapeutic right-sided thoracentesis. Per IR "US reveals an extensively loculated Rt effusion with innumerable isolated pockets with sonographic appearance suggestive of a large hematoma/hemothorax. Only able to remove 50-cc of old, dark blood products also consistent with large Rt hemothorax. Pt would likely require large-bore chest tube placement and instillation of lytics if attempts will be made to resolve the hemothorax vs VATS." Facility seeking transfer for CTS evaluation. Patient is stable on med tele unit only requiring 3L NC with no distress. Stable for transfer.      Overview/Hospital Course:  [facility] Medicine Course:  IR was consulted and attempted US-guided therapeutic right-sided thoracentesis. Per IR "US reveals an extensively loculated Rt effusion with innumerable isolated pockets with sonographic appearance suggestive of a large hematoma/hemothorax. Only able to remove 50-cc of old, dark blood products also consistent with large Rt hemothorax. Pt would likely require large-bore chest tube " "placement and instillation of lytics if attempts will be made to resolve the hemothorax vs VATS."  [facility] in queue to transfer asap. Stable vitals. Will allow to eat and make NPO again at midnight. Lokelma for K 5.8.     [facility] Medicine Course:  [place] [facility] Stable respiratory status into 8/17. Rising BP and hyperkalemia noted; PRN IV hydralazine and PO lokelma provided, nephrology following for HD needs. Evaluated by thoracic surgery, with placement of PleurX catheter on 8/18.      Interval History: Reports feeling better this morning. No shortness of breath. No abdominal pain. Remains on supplemental O2. Asking again about when he can go home.    Review of Systems   Respiratory:  Negative for chest tightness and shortness of breath.    Gastrointestinal:  Negative for abdominal distention, abdominal pain and nausea.   Neurological:  Negative for light-headedness and headaches.   Objective:     Vital Signs (Most Recent):  Temp: 97.1 °F (36.2 °C) (08/21/22 0413)  Pulse: 65 (08/21/22 0413)  Resp: 16 (08/21/22 0051)  BP: (!) 125/59 (08/21/22 0413)  SpO2: (!) 90 % (08/21/22 0413)   Vital Signs (24h Range):  Temp:  [97.1 °F (36.2 °C)-98 °F (36.7 °C)] 97.1 °F (36.2 °C)  Pulse:  [] 65  Resp:  [16-18] 16  SpO2:  [90 %-100 %] 90 %  BP: (125-178)/(59-76) 125/59     Weight: 63 kg (138 lb 14.2 oz)  Body mass index is 21.12 kg/m².    Intake/Output Summary (Last 24 hours) at 8/21/2022 0603  Last data filed at 8/21/2022 0400  Gross per 24 hour   Intake 660 ml   Output 425 ml   Net 235 ml      Physical Exam  Vitals reviewed.   Constitutional:       General: He is not in acute distress.     Appearance: He is ill-appearing.   HENT:      Head: Normocephalic and atraumatic.      Mouth/Throat:      Mouth: Mucous membranes are moist.   Eyes:      Extraocular Movements: Extraocular movements intact.      Conjunctiva/sclera: Conjunctivae normal.   Cardiovascular:      Rate and Rhythm: Normal rate and " regular rhythm.   Pulmonary:      Effort: Pulmonary effort is normal. No respiratory distress.      Comments: Rhonchi and crackles appreciated on R anterior chest   Abdominal:      General: Bowel sounds are normal. There is no distension.      Palpations: Abdomen is soft.   Musculoskeletal:      Comments: R chest tube in place   Skin:     General: Skin is warm and dry.      Capillary Refill: Capillary refill takes less than 2 seconds.   Neurological:      General: No focal deficit present.      Mental Status: He is alert.   Psychiatric:         Mood and Affect: Mood normal.         Behavior: Behavior normal.       Significant Labs: All pertinent labs within the past 24 hours have been reviewed.    Significant Imaging: I have reviewed all pertinent imaging results/findings within the past 24 hours.      Assessment/Plan:      * Recurrent pleural effusion on right  - Interval history and physical exam findings as described above  - S/p left thoracotomy prior to admission  - Recurring right effusion s/p thoracentesis x2  - Chemistry on 2/4/21 suggestive of exudative effusion  - Thoracic surgery consulted, placement of PleurX on 8/18  - Satting well on 2L NC, no respiratory distress  - Continuing to monitor    Thrombocytopenia  - Per chart review, appears to be a chronic issue  - Monitoring on daily labs    Chronic diastolic heart failure  - Remains grossly asymptomatic, euvolemic, not in acute exacerbation  - Continue home cardioprudent regimen  - Monitoring on tele    Chronic kidney disease-mineral and bone disorder        Anemia in ESRD (end-stage renal disease)  - H/H stable near baseline on admission, with drop after pleurex catheter placement 8/18. S/p 2u pRBC  - type and screen done  - transfuse for H/H 7/21  - Will continue to monitor on daily labs    ESRD (end stage renal disease) on dialysis  - Interval history and physical exam findings as described above  - Nephology following  - Further decision for HD per  nephology  - Renally dosing all medications  - Avoid nephrotoxins  - Will continue to monitor on daily labs    Mixed hyperlipidemia associated with type 2 diabetes mellitus  - Continue home statin regimen    Type 2 diabetes mellitus with chronic kidney disease on chronic dialysis, without long-term current use of insulin  - Working to optimize BG control  - SSI provided for corrective dosing  - DXTs as ordered  - Hypoglycemic protocol in effect  - Diabetic diet provided    Hypertension associated with ESRD caused by type 2 diabetes mellitus, on dialysis  - Working to optimize BP control   - Continue home cardioprudent regimen  - HD to assist with BP control  - PRN IV hydralazine and/or IV labetalol provided for SBP>180  - Will continue to monitor and further titrate antihypertensives as clinically indicated     Hyperkalemia  - Improving, continue Lokelma TID and reassess daily  - Nephrology following, HD to assist with electrolyte anomalies  - Monitoring on tele      VTE Risk Mitigation (From admission, onward)         Ordered     IP VTE HIGH RISK PATIENT  Once         08/15/22 2005     Place sequential compression device  Until discontinued         08/15/22 2005                Discharge Planning   WENDY:      Code Status: Full Code   Is the patient medically ready for discharge?: No    Reason for patient still in hospital (select all that apply): Patient unstable  Discharge Plan A: Home with family                  Jacey Duke MD  Department of Hospital Medicine   [facility] - Cardiology Stepdown

## 2022-08-21 NOTE — SUBJECTIVE & OBJECTIVE
Interval History:  NAEO.  AFVSS.  Pain well-controlled, pt feeling well.  No longer bleeding around CT insertion site on exam this AM.  R CT x2 to suction, SS output.     Medications:  Continuous Infusions:  Scheduled Meds:   [START ON 8/22/2022] sodium chloride 0.9%   Intravenous Once    albuterol-ipratropium  3 mL Nebulization Q6H    aluminum-magnesium hydroxide-simethicone  30 mL Oral QID (AC & HS)    aspirin  81 mg Oral Daily    atorvastatin  40 mg Oral QHS    calcium acetate(phosphat bind)  667 mg Oral TID WM    carvediloL  12.5 mg Oral BID    epoetin michael (PROCRIT) injection  5,000 Units Intravenous Every Tues, Thurs, Sat    furosemide  80 mg Oral Daily    insulin aspart U-100  1-10 Units Subcutaneous QID (WM & HS)    melatonin  6 mg Oral Nightly    sucralfate  1 g Oral Q6H    traZODone  50 mg Oral Nightly     PRN Meds:sodium chloride, acetaminophen, dextrose 10%, dextrose 10%, dextrose 10%, fentaNYL, glucose, glucose, hydrALAZINE, HYDROcodone-acetaminophen, HYDROmorphone, labetalol, melatonin, naloxone, ondansetron, ondansetron, ondansetron, sodium chloride 0.9%, sodium chloride 0.9%     Review of patient's allergies indicates:  No Known Allergies  Objective:     Vital Signs (Most Recent):  Temp: 98.2 °F (36.8 °C) (08/21/22 0700)  Pulse: 68 (08/21/22 0700)  Resp: 16 (08/21/22 0700)  BP: (!) 169/69 (08/21/22 0700)  SpO2: (!) 94 % (08/21/22 0700)   Vital Signs (24h Range):  Temp:  [97.1 °F (36.2 °C)-98.2 °F (36.8 °C)] 98.2 °F (36.8 °C)  Pulse:  [] 68  Resp:  [16-18] 16  SpO2:  [90 %-98 %] 94 %  BP: (125-178)/(59-76) 169/69     Weight: 63 kg (138 lb 14.2 oz)  Body mass index is 21.12 kg/m².    Intake/Output - Last 3 Shifts         08/19 0700 08/20 0659 08/20 0700 08/21 0659 08/21 0700 08/22 0659    P.O.  660 220    Blood 431.7      IV Piggyback       Total Intake(mL/kg) 431.7 (6.9) 660 (10.5) 220 (3.5)    Urine (mL/kg/hr)  0 (0)     Other 2500      Blood       Chest Tube 370 425     Total Output 2870  425     Net -2438.3 +235 +220                   Physical Exam  Vitals and nursing note reviewed.   Constitutional:       General: He is not in acute distress.  Cardiovascular:      Rate and Rhythm: Normal rate.      Pulses: Normal pulses.   Pulmonary:      Effort: Pulmonary effort is normal. No respiratory distress.      Comments: 2L NC. R CT (#1) 225 cc SS output / 24 hrs. R CT (#2) 200 cc S output/ 24 hrs. No air leak. Site clean/dry without leakage.    Abdominal:      General: There is no distension.      Palpations: Abdomen is soft.      Tenderness: There is no abdominal tenderness.   Neurological:      Mental Status: He is alert.       Significant Labs:  I have reviewed all pertinent lab results within the past 24 hours.  CBC:   Recent Labs   Lab 08/21/22  0602   WBC 10.26   RBC 2.66*   HGB 8.4*   HCT 26.3*      MCV 99*   MCH 31.6*   MCHC 31.9*     BMP:   Recent Labs   Lab 08/21/22  0500   *   *   K 5.5*   CL 98   CO2 28   BUN 30*   CREATININE 3.8*   CALCIUM 8.5*   MG 2.5       Significant Diagnostics:  I have reviewed all pertinent imaging results/findings within the past 24 hours.

## 2022-08-21 NOTE — ASSESSMENT & PLAN NOTE
- Improving, continue Lokelma TID and reassess daily  - Nephrology following, HD to assist with electrolyte anomalies  - Monitoring on tele

## 2022-08-21 NOTE — PROGRESS NOTES
[facility] - Cardiology Stepdown  General Surgery  Progress Note    Subjective:     History of Present Illness:  No notes on file    Post-Op Info:  Procedure(s) (LRB):  VATS, WITH DECORTICATION, LUNG (Right)  BLOCK, NERVE, INTERCOSTAL, 2 OR MORE (Right)  BRONCHOSCOPY, FLEXIBLE (N/A)  INSERTION, CATHETER, INTERCOSTAL, FOR DRAINAGE (Right)   3 Days Post-Op     Interval History:  NAEO.  AFVSS.  Pain well-controlled, pt feeling well.  No longer bleeding around CT insertion site on exam this AM.  R CT x2 to suction, SS output.     Medications:  Continuous Infusions:  Scheduled Meds:   [START ON 8/22/2022] sodium chloride 0.9%   Intravenous Once    albuterol-ipratropium  3 mL Nebulization Q6H    aluminum-magnesium hydroxide-simethicone  30 mL Oral QID (AC & HS)    aspirin  81 mg Oral Daily    atorvastatin  40 mg Oral QHS    calcium acetate(phosphat bind)  667 mg Oral TID WM    carvediloL  12.5 mg Oral BID    epoetin michael (PROCRIT) injection  5,000 Units Intravenous Every Tues, Thurs, Sat    furosemide  80 mg Oral Daily    insulin aspart U-100  1-10 Units Subcutaneous QID (WM & HS)    melatonin  6 mg Oral Nightly    sucralfate  1 g Oral Q6H    traZODone  50 mg Oral Nightly     PRN Meds:sodium chloride, acetaminophen, dextrose 10%, dextrose 10%, dextrose 10%, fentaNYL, glucose, glucose, hydrALAZINE, HYDROcodone-acetaminophen, HYDROmorphone, labetalol, melatonin, naloxone, ondansetron, ondansetron, ondansetron, sodium chloride 0.9%, sodium chloride 0.9%     Review of patient's allergies indicates:  No Known Allergies  Objective:     Vital Signs (Most Recent):  Temp: 98.2 °F (36.8 °C) (08/21/22 0700)  Pulse: 68 (08/21/22 0700)  Resp: 16 (08/21/22 0700)  BP: (!) 169/69 (08/21/22 0700)  SpO2: (!) 94 % (08/21/22 0700)   Vital Signs (24h Range):  Temp:  [97.1 °F (36.2 °C)-98.2 °F (36.8 °C)] 98.2 °F (36.8 °C)  Pulse:  [] 68  Resp:  [16-18] 16  SpO2:  [90 %-98 %] 94 %  BP: (125-178)/(59-76) 169/69     Weight: 63 kg  (138 lb 14.2 oz)  Body mass index is 21.12 kg/m².    Intake/Output - Last 3 Shifts         08/19 0700 08/20 0659 08/20 0700 08/21 0659 08/21 0700  08/22 0659    P.O.  660 220    Blood 431.7      IV Piggyback       Total Intake(mL/kg) 431.7 (6.9) 660 (10.5) 220 (3.5)    Urine (mL/kg/hr)  0 (0)     Other 2500      Blood       Chest Tube 370 425     Total Output 2870 425     Net -2438.3 +235 +220                   Physical Exam  Vitals and nursing note reviewed.   Constitutional:       General: He is not in acute distress.  Cardiovascular:      Rate and Rhythm: Normal rate.      Pulses: Normal pulses.   Pulmonary:      Effort: Pulmonary effort is normal. No respiratory distress.      Comments: 2L NC. R CT (#1) 225 cc SS output / 24 hrs. R CT (#2) 200 cc S output/ 24 hrs. No air leak. Site clean/dry without leakage.    Abdominal:      General: There is no distension.      Palpations: Abdomen is soft.      Tenderness: There is no abdominal tenderness.   Neurological:      Mental Status: He is alert.       Significant Labs:  I have reviewed all pertinent lab results within the past 24 hours.  CBC:   Recent Labs   Lab 08/21/22  0602   WBC 10.26   RBC 2.66*   HGB 8.4*   HCT 26.3*      MCV 99*   MCH 31.6*   MCHC 31.9*     BMP:   Recent Labs   Lab 08/21/22  0500   *   *   K 5.5*   CL 98   CO2 28   BUN 30*   CREATININE 3.8*   CALCIUM 8.5*   MG 2.5       Significant Diagnostics:  I have reviewed all pertinent imaging results/findings within the past 24 hours.    Assessment/Plan:     Recurrent pleural effusion on right  Vega Brownlee is a 61 y.o. male PMH CHF (last ECHO 9/20/20 EF 55%, PA pressure 77), home oxygen (2 L), DM2, ESRD on HD (via [facility]), recurrent bilateral pleural effusions (s/p R thoracentesis 2019, 2021), hx of a left thoracotomy (empyema) presented to [facility] as a transfer for a loculated R pleural effusion. S/p R VATS, decortication with chest tube placements x2. Doing well this morning,  patient reports significant improvement in breathing. Now on 2 L NC. Chest tubes with moderate drainage overnight, noted last H/H 8.4.      · Keep right chest tubes to suction, no air leak appreciated.  Stitch placed at bedside for moderate leakage at chest tube insertion site  · Daily CXR with chest tubes in place  · Strict monitoring of chest tube outputs  · 2L NC currently, wean as tolerated. Would recommend scheduled breathing treatments  · Continue to trend H/H per primary   · OOB, ambulate  · Agree with rest of care. Nephrology consulted for dialysis management      Leesa Urrutia MD  General Surgery  [facility] - Cardiology Stepdown

## 2022-08-22 PROBLEM — E83.39 HYPOPHOSPHATEMIA: Status: ACTIVE | Noted: 2022-01-01

## 2022-08-22 NOTE — ASSESSMENT & PLAN NOTE
- Interval history and physical exam findings as described above  - S/p left thoracotomy prior to admission  - Recurring right effusion s/p thoracentesis x2  - Chemistry on 2/4/21 suggestive of exudative effusion  - Chemistry 8/22 pending   - Thoracic surgery consulted, placement of PleurX on 8/18  - Satting well on 2L NC  - Continuing to monitor

## 2022-08-22 NOTE — SUBJECTIVE & OBJECTIVE
Interval History: 3L NC. CT (#1) 255 cc SS output / 24 hrs (125 cc overnight), with air leak. CT (#2) 30 cc SS output / 24 hrs (none documented overnight. Occlusive dressing placed at bedside    Medications:  Continuous Infusions:  Scheduled Meds:   sodium chloride 0.9%   Intravenous Once    albuterol-ipratropium  3 mL Nebulization Q6H    aluminum-magnesium hydroxide-simethicone  30 mL Oral QID (AC & HS)    atorvastatin  40 mg Oral QHS    calcium acetate(phosphat bind)  667 mg Oral TID WM    carvediloL  12.5 mg Oral BID    epoetin michael (PROCRIT) injection  5,000 Units Intravenous Every Tues, Thurs, Sat    furosemide  80 mg Oral Daily    naloxone  0.4 mg Intravenous Once    sodium zirconium cyclosilicate  5 g Oral TID    sucralfate  1 g Oral Q6H     PRN Meds:sodium chloride, acetaminophen, dextrose 10%, dextrose 10%, glucagon (human recombinant), glucose, glucose, hydrALAZINE, insulin aspart U-100, labetalol, naloxone, ondansetron, sodium chloride 0.9%     Review of patient's allergies indicates:  No Known Allergies  Objective:     Vital Signs (Most Recent):  Temp: 97.9 °F (36.6 °C) (08/22/22 0418)  Pulse: 69 (08/22/22 0722)  Resp: 18 (08/22/22 0722)  BP: (!) 152/66 (08/22/22 0418)  SpO2: 100 % (08/22/22 0722) Vital Signs (24h Range):  Temp:  [97.5 °F (36.4 °C)-98.5 °F (36.9 °C)] 97.9 °F (36.6 °C)  Pulse:  [54-69] 69  Resp:  [14-18] 18  SpO2:  [58 %-100 %] 100 %  BP: (111-166)/(52-69) 152/66     Intake/Output - Last 3 Shifts         08/20 0700  08/21 0659 08/21 0700 08/22 0659 08/22 0700  08/23 0659    P.O. 660 680     Blood       Total Intake(mL/kg) 660 (10.5) 680 (10.8)     Urine (mL/kg/hr) 0 (0)      Emesis/NG output  200     Other  2000     Chest Tube 425 285     Total Output 425 2485     Net +235 -1805                    SpO2: 100 %  O2 Device (Oxygen Therapy): nasal cannula    Physical Exam  Vitals and nursing note reviewed.   Constitutional:       General: He is not in acute distress.  Cardiovascular:       Rate and Rhythm: Normal rate.      Pulses: Normal pulses.   Pulmonary:      Effort: Pulmonary effort is normal. No respiratory distress.      Comments: 3L NC. CT (#1) 255 cc SS output / 24 hrs (125 cc overnight), with 1-chamber air leak. CT (#2) 30 cc SS output / 24 hrs (none documented overnight). Occlusive dressing placed at bedside  Abdominal:      General: There is no distension.      Palpations: Abdomen is soft.      Tenderness: There is no abdominal tenderness.   Neurological:      Mental Status: He is alert.       Significant Labs:  BMP:   Recent Labs   Lab 08/22/22  0313   *   *   K 3.6      CO2 23   BUN 14   CREATININE 1.7*   CALCIUM 8.3*   MG 2.2       CBC:   Recent Labs   Lab 08/22/22 0313   WBC 9.96   RBC 2.21*   HGB 7.2*   HCT 22.5*      *   MCH 32.6*   MCHC 32.0       CMP:   Recent Labs   Lab 08/22/22 0313   *   CALCIUM 8.3*   ALBUMIN 2.5*   PROT 5.8*   *   K 3.6   CO2 23      BUN 14   CREATININE 1.7*   ALKPHOS 161*   ALT <5*   AST 20   BILITOT 1.0         Significant Diagnostics:  CXR: I have reviewed all pertinent results/findings within the past 24 hours    VTE Risk Mitigation (From admission, onward)           Ordered     IP VTE HIGH RISK PATIENT  Once         08/15/22 2005     Place sequential compression device  Until discontinued         08/15/22 2005

## 2022-08-22 NOTE — PLAN OF CARE
Problem: Physical Therapy  Goal: Physical Therapy Goal  Description: Goals to be met by: 2022    Patient will increase functional independence with mobility by performin. Supine to sit with Supervision   2. Sit to stand transfer with Stand-by Assistance using Rolling Walker  3. Gait  x 50 feet with Stand-by Assistance using Rolling Walker.   4. Stand for 3 minutes with Stand-by Assistance using Rolling Walker  5. Lower extremity exercise program x10 reps per handout, with independence    Outcome: Ongoing, Progressing     Eval completed. Goals appropriate.

## 2022-08-22 NOTE — SUBJECTIVE & OBJECTIVE
Interval History: More fatigued overnight and this morning. Notably his phos was <1 - phos binder held, giving 15mmol IV Naphos. Hemoglobin has also been steadily downtrending. Repeat pending with type and screen.    Review of Systems   Constitutional:  Positive for fatigue. Negative for fever.   Respiratory:  Negative for shortness of breath.    Cardiovascular:  Negative for chest pain.   Gastrointestinal:  Positive for abdominal pain and constipation. Negative for nausea and vomiting.   Neurological:  Negative for headaches.   Objective:     Vital Signs (Most Recent):  Temp: 97.6 °F (36.4 °C) (08/22/22 0830)  Pulse: (!) 56 (08/22/22 0830)  Resp: 18 (08/22/22 0830)  BP: (!) 147/67 (08/22/22 0830)  SpO2: 100 % (08/22/22 0830)   Vital Signs (24h Range):  Temp:  [97.5 °F (36.4 °C)-98.5 °F (36.9 °C)] 97.6 °F (36.4 °C)  Pulse:  [54-69] 56  Resp:  [14-18] 18  SpO2:  [58 %-100 %] 100 %  BP: (111-166)/(52-69) 147/67     Weight: 63 kg (138 lb 14.2 oz)  Body mass index is 21.12 kg/m².    Intake/Output Summary (Last 24 hours) at 8/22/2022 1058  Last data filed at 8/22/2022 0600  Gross per 24 hour   Intake 460 ml   Output 2485 ml   Net -2025 ml      Physical Exam  Vitals reviewed.   Constitutional:       General: He is not in acute distress.     Appearance: He is ill-appearing.      Comments: Asleep but wakes to voice   HENT:      Head: Normocephalic and atraumatic.      Mouth/Throat:      Mouth: Mucous membranes are moist.   Eyes:      Extraocular Movements: Extraocular movements intact.      Conjunctiva/sclera: Conjunctivae normal.      Pupils: Pupils are equal, round, and reactive to light.   Cardiovascular:      Rate and Rhythm: Normal rate and regular rhythm.   Pulmonary:      Effort: Pulmonary effort is normal. No respiratory distress.      Comments: Rhonchi and crackles appreciated on R anterior chest   Abdominal:      General: Bowel sounds are normal. There is no distension.      Palpations: Abdomen is soft.    Musculoskeletal:      Comments: R chest tube in place   Skin:     General: Skin is warm and dry.      Capillary Refill: Capillary refill takes less than 2 seconds.   Neurological:      General: No focal deficit present.       Significant Labs: All pertinent labs within the past 24 hours have been reviewed.    Significant Imaging: I have reviewed all pertinent imaging results/findings within the past 24 hours.

## 2022-08-22 NOTE — PLAN OF CARE
08/22/22 1306   Post-Acute Status   Post-Acute Authorization Placement   Post-Acute Placement Status Pending post-acute provider review/more information requested     Per Natasha Hampton pt is being evaluated for rehab since pt has straight Medicare.  Referral sent via [employer].    UPDATE 1:36 PM  ESTELA sent back-up SNF referrals via [facility] to [facility], [facility], [facility], [facility], and [place].  Will continue to follow.    Chacha Dooley LMSW  [facility]  [phone]

## 2022-08-22 NOTE — PLAN OF CARE
Problem: Occupational Therapy  Goal: Occupational Therapy Goal  Description: Goals to be met by: 7 days 8/29/22     Patient will increase functional independence with ADLs by performing:    Pt to complete UE dressing with set-up  Pt to complete LE dressing with SBA  Pt to complete standing g/h skills with SBA  Pt to complete toileting with SBA  Pt to complete t/f bed, chair and commode with SBA   Outcome: Ongoing, Progressing

## 2022-08-22 NOTE — CARE UPDATE
RAPID RESPONSE NURSE FOLLOW-UP NOTE       Followed up with patient for proactive rounding.  No acute issues at this time.   Pt. Continues to be somnolent but awakens, follows commands, LOMELI, and denies complaints.   Currently receiving HD.  Vital signs hemodynamically stable per flowsheet data.    MD aware of patient assessment.  Will continue to monitor chest tube output, hemodynamics, neuro and respiratory status.   Reviewed plan of care with Bedside RNBernadette.   Team will continue to follow.  Please call Rapid Response RN, Anju Demarco RN with any questions or concerns at [phone].

## 2022-08-22 NOTE — PT/OT/SLP EVAL
Physical Therapy Evaluation    Patient Name:  Vega Brownlee   MRN:  [id number]  Admit Date: 8/15/2022  Admitting Diagnosis:  Recurrent pleural effusion on right  Length of Stay: 7 days  Recent Surgery: Procedure(s) (LRB):  VATS, WITH DECORTICATION, LUNG (Right)  BLOCK, NERVE, INTERCOSTAL, 2 OR MORE (Right)  BRONCHOSCOPY, FLEXIBLE (N/A)  INSERTION, CATHETER, INTERCOSTAL, FOR DRAINAGE (Right) 4 Days Post-Op    Recommendations:     Discharge Recommendations:  IP Rehabilitation   Discharge Equipment Recommendations: bedside commode, walker, rolling   Barriers to discharge: None    Assessment:     Vega Brownlee is a 62 y.o. male admitted with a medical diagnosis of Recurrent pleural effusion on right. Pt found alert and cooperative. Pt's primary limitation is generalized weakness and impaired endurance. Pt was able to sit EOB, stand, and take few side steps with two person assistance. Recommend pt discharge to IP Rehab once medically stable for discharge.    Chest tubes remained to suction due to known air leak.     Problem List: weakness, impaired endurance, impaired functional mobility, gait instability, impaired balance, impaired cardiopulmonary response to activity  Rehab Prognosis: Good; patient would benefit from acute skilled PT services to address these deficits and reach maximum level of function.      Plan:     During this hospitalization, patient to be seen 4 x/week to address the identified rehab impairments via gait training, therapeutic activities, therapeutic exercises, neuromuscular re-education and progress towards the established goals.    · Plan of Care Expires:  09/21/22    Subjective   Communicated with RN prior to session.  Patient found HOB elevated upon PT entry to room, agreeable to evaluation. Vega Brownlee's alone during session.    Chief Complaint: Shortness of Breath (Pt reports was sent to ER for CT. Pt with increased SOB and hx of bilateral plural effusions. )    Patient/Family Comments/goals: to  "get better  Pain/Comfort:  · Pain Rating 1: 0/10  · Pain Rating Post-Intervention 1: 0/10    Living Environment:  Pt lives with wife in a SSH with no JERRICA. Pt is ind with ambulation and mod ind with ADLs. Patient uses DME as follows: oxygen, shower chair. DME owned (not currently used): none.      Patient reports they will have assistance from wife upon discharge. Wife works and therefore pt is home alone sometimes.    Objective:   Patient found with: telemetry, peripheral IV, chest tube, oxygen     General Precautions: Standard, Cardiac fall   Orthopedic Precautions:N/A   Braces: N/A   Oxygen Device: Nasal Cannula   Vitals: BP (!) 152/66 (BP Location: Left arm, Patient Position: Lying)   Pulse 69   Temp 97.9 °F (36.6 °C) (Oral)   Resp 18   Ht 5' 8" (1.727 m)   Wt 63 kg (138 lb 14.2 oz)   SpO2 100%   BMI 21.12 kg/m²     Exams:  · Cognition:   · Alert and Cooperative  · Ox4  · Command following: Follows multistep  commands  · Fluency: clear/fluent - pt speaks Vietnamese and English     · RLE ROM: WFL  · RLE Strength: grossly 3+/5  · LLE ROM: WFL  · LLE Strength: grossly 3+/5    Outcome Measures:  AM-PAC 6 CLICK MOBILITY  Turning over in bed (including adjusting bedclothes, sheets and blankets)?: 3  Sitting down on and standing up from a chair with arms (e.g., wheelchair, bedside commode, etc.): 3  Moving from lying on back to sitting on the side of the bed?: 3  Moving to and from a bed to a chair (including a wheelchair)?: 2  Need to walk in hospital room?: 2  Climbing 3-5 steps with a railing?: 1  Basic Mobility Total Score: 14     Functional Mobility:  Additional staff present: OT  Bed Mobility:  · Supine to Sit: minimum assistance; HOB elevated  · Scooting anteriorly to EOB to have both feet planted on floor: stand by assistance  · Sit to Supine: stand by assistance; HOB flat    Sitting Balance at Edge of Bed:   Assistance Level Required: Stand-by Assistance for static; CGA for dynamic    Time: 8 " minutes   Comments:   o Worked on activity tolerance sitting EOB, worked on tolerance to positional change, and worked on sitting balance   o Pt reported slight dizziness       Transfers:   · Sit <> Stand Transfer: minimum assistance with no assistive device x 2 trials from EOB  · Increased time to obtain upright posture and balance due to generalized weakness      Gait:   · Patient ambulated: 2 side steps to right + 4 side steps to right (seated rest break between trials due to fatigue)  · Patient required: moderate assist  · Patient used: hand-held assist  · Gait Deviation(s): occasional unsteady gait, decreased step length, flexed posture and decreased cortney  · Impairments due to: impaired balance, decreased strength and decreased endurance  · Comments:   · Facilitation of upright posture and B weight shifting   · Verbal cuing to advance B LEs        Therapeutic Activities, Exercises, & Education:   Educated pt on PT role/POC  Educated pt on importance of OOB activity and daily ambulation   Pt verbalized understanding     Patient left HOB elevated with all lines intact, call button in reach and RN notified.    GOALS:   Multidisciplinary Problems     Physical Therapy Goals        Problem: Physical Therapy    Goal Priority Disciplines Outcome Goal Variances Interventions   Physical Therapy Goal     PT, PT/OT Ongoing, Progressing     Description: Goals to be met by: 2022    Patient will increase functional independence with mobility by performin. Supine to sit with Supervision   2. Sit to stand transfer with Stand-by Assistance using Rolling Walker  3. Gait  x 50 feet with Stand-by Assistance using Rolling Walker.   4. Stand for 3 minutes with Stand-by Assistance using Rolling Walker  5. 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        Past Surgical History:   Procedure Laterality Date    arm fracture Left     BRONCHOSCOPY N/A 8/18/2022    Procedure: BRONCHOSCOPY, FLEXIBLE;  Surgeon: Memo Peraza MD;  [facility];  Service: Thoracic;  Laterality: N/A;    COLONOSCOPY N/A 8/16/2017    Procedure: COLONOSCOPY;  Surgeon: Sebastián Lamas MD;  Location: [facility] ENDO (4TH FLR);  Service: Endoscopy;  Laterality: N/A;  dialysis pt/potassium level 1st/svn    FINGER AMPUTATION Left     left index finger    INJECTION OF ANESTHETIC AGENT AROUND MULTIPLE INTERCOSTAL NERVES Right 8/18/2022    Procedure: BLOCK, NERVE, INTERCOSTAL, 2 OR MORE;  Surgeon: Memo Peraza MD;  [facility];  Service: Thoracic;  Laterality: Right;    LUNG SURGERY      thoracotomy    PORTACATH PLACEMENT  08/2016    THORACOSCOPIC DECORTICATION OF LUNG Right 8/18/2022    Procedure: VATS, WITH DECORTICATION, LUNG;  Surgeon: Memo Peraza MD;  [facility];  Service: Thoracic;  Laterality: Right;    TUBE THORACOTOMY Right 8/18/2022    Procedure: INSERTION, CATHETER, INTERCOSTAL, FOR DRAINAGE;  Surgeon: Memo Peraza MD;  [facility];  Service: Thoracic;  Laterality: Right;       Time Tracking:     PT Received On: 08/22/22  PT Start Time: 0824     PT Stop Time: 0844  PT Total Time (min): 20 min     Billable Minutes: Evaluation 8 and Gait Training 8

## 2022-08-22 NOTE — ASSESSMENT & PLAN NOTE
Recurrent pleural effusion on right  Vega Brownlee is a 61 y.o. male PMH CHF (last ECHO 9/20/20 EF 55%, PA pressure 77), home oxygen (2 L), DM2, ESRD on HD (via [facility] T[facility]urSat), recurrent bilateral pleural effusions (s/p R thoracentesis 2019, 2021), hx of a left thoracotomy (empyema) presented to [facility] as a transfer for a loculated R pleural effusion. S/p R VATS, decortication with chest tube placements x2. Over the weekend developing an air leak from the apical chest tube. Continues to drain. CT x2 remain to suction.      · Keep right chest tubes to suction. Now with air leak appreciated from apical chest tube  · Occlusive dressing placed around chest tube sites   · Daily CXR with chest tubes in place  · 3L NC currently, wean as tolerated. Would recommend scheduled breathing treatments  · H/H decrease with moderate drainage, transfusion per primary   · OOB, ambualte  · Agree with rest of care. Nephrology consulted, appreciate recs

## 2022-08-22 NOTE — PROGRESS NOTES
"[facility] - Cardiology [facility] Medicine  Progress Note    Patient Name: Vega Brownlee  MRN: [id number]  Patient Class: IP- Inpatient   Admission Date: 8/15/2022  Length of Stay: 7 days  Attending Physician: Jacey Duke MD  Primary Care Provider: Ko Perez MD        Subjective:     Principal Problem:Recurrent pleural effusion on right        HPI:  HPI per :  Patient is a 61 y.o. 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 Patient has history of recurrent pleural effusion and was sent to ED for worsening symptoms. IR was consulted and attempted US-guided therapeutic right-sided thoracentesis. Per IR "US reveals an extensively loculated Rt effusion with innumerable isolated pockets with sonographic appearance suggestive of a large hematoma/hemothorax. Only able to remove 50-cc of old, dark blood products also consistent with large Rt hemothorax. Pt would likely require large-bore chest tube placement and instillation of lytics if attempts will be made to resolve the hemothorax vs VATS." Facility seeking transfer for CTS evaluation. Patient is stable on med tele unit only requiring 3L NC with no distress. Stable for transfer.      Overview/Hospital Course:  [facility] Medicine Course:  IR was consulted and attempted US-guided therapeutic right-sided thoracentesis. Per IR "US reveals an extensively loculated Rt effusion with innumerable isolated pockets with sonographic appearance suggestive of a large hematoma/hemothorax. Only able to remove 50-cc of old, dark blood products also consistent with large Rt hemothorax. Pt would likely require large-bore chest tube " "placement and instillation of lytics if attempts will be made to resolve the hemothorax vs VATS."  Patient accepted to [facility], in queue to transfer asap. Stable vitals. Will allow to eat and make NPO again at midnight. Lokelma for K 5.8.     [facility] Medicine Course:  Pt accepted to [facility]. Stable respiratory status into 8/17. Rising BP and hyperkalemia noted; PRN IV hydralazine and PO lokelma provided, nephrology following for HD needs. Evaluated by thoracic surgery, with placement of PleurX catheter on 8/18.      Interval History: More fatigued overnight and this morning. Notably his phos was <1 - phos binder held, giving 15mmol IV Naphos. Hemoglobin has also been steadily downtrending. Repeat pending with type and screen.    Review of Systems   Constitutional:  Positive for fatigue. Negative for fever.   Respiratory:  Negative for shortness of breath.    Cardiovascular:  Negative for chest pain.   Gastrointestinal:  Positive for abdominal pain and constipation. Negative for nausea and vomiting.   Neurological:  Negative for headaches.   Objective:     Vital Signs (Most Recent):  Temp: 97.6 °F (36.4 °C) (08/22/22 0830)  Pulse: (!) 56 (08/22/22 0830)  Resp: 18 (08/22/22 0830)  BP: (!) 147/67 (08/22/22 0830)  SpO2: 100 % (08/22/22 0830)   Vital Signs (24h Range):  Temp:  [97.5 °F (36.4 °C)-98.5 °F (36.9 °C)] 97.6 °F (36.4 °C)  Pulse:  [54-69] 56  Resp:  [14-18] 18  SpO2:  [58 %-100 %] 100 %  BP: (111-166)/(52-69) 147/67     Weight: 63 kg (138 lb 14.2 oz)  Body mass index is 21.12 kg/m².    Intake/Output Summary (Last 24 hours) at 8/22/2022 1058  Last data filed at 8/22/2022 0600  Gross per 24 hour   Intake 460 ml   Output 2485 ml   Net -2025 ml      Physical Exam  Vitals reviewed.   Constitutional:       General: He is not in acute distress.     Appearance: He is ill-appearing.      Comments: Asleep but wakes to voice   HENT:      Head: Normocephalic and atraumatic.      Mouth/Throat:      Mouth: Mucous membranes are " moist.   Eyes:      Extraocular Movements: Extraocular movements intact.      Conjunctiva/sclera: Conjunctivae normal.      Pupils: Pupils are equal, round, and reactive to light.   Cardiovascular:      Rate and Rhythm: Normal rate and regular rhythm.   Pulmonary:      Effort: Pulmonary effort is normal. No respiratory distress.      Comments: Rhonchi and crackles appreciated on R anterior chest   Abdominal:      General: Bowel sounds are normal. There is no distension.      Palpations: Abdomen is soft.   Musculoskeletal:      Comments: R chest tube in place   Skin:     General: Skin is warm and dry.      Capillary Refill: Capillary refill takes less than 2 seconds.   Neurological:      General: No focal deficit present.       Significant Labs: All pertinent labs within the past 24 hours have been reviewed.    Significant Imaging: I have reviewed all pertinent imaging results/findings within the past 24 hours.      Assessment/Plan:      * Recurrent pleural effusion on right  - Interval history and physical exam findings as described above  - S/p left thoracotomy prior to admission  - Recurring right effusion s/p thoracentesis x2  - Chemistry on 2/4/21 suggestive of exudative effusion  - Chemistry 8/22 pending   - Thoracic surgery consulted, placement of PleurX on 8/18  - Satting well on 2L NC  - Continuing to monitor    Hypophosphatemia  Phos <1 8/22am  Replete with NaPhos 15mmol, follow up with labs and replete as needed      Thrombocytopenia  - Per chart review, appears to be a chronic issue  - Monitoring on daily labs    Chronic diastolic heart failure  - Remains grossly asymptomatic, euvolemic, not in acute exacerbation  - Continue home cardioprudent regimen  - Monitoring on tele    Chronic kidney disease-mineral and bone disorder        Anemia in ESRD (end-stage renal disease)  - H/H stable near baseline on admission, with drop after pleurex catheter placement 8/18. S/p 2u pRBC  - type and screen done  -  transfuse for H/H 7/21  - Will continue to monitor on daily labs    ESRD (end stage renal disease) on dialysis  - Interval history and physical exam findings as described above  - Nephology following  - Further decision for HD per nephology  - Renally dosing all medications  - Avoid nephrotoxins  - Will continue to monitor on daily labs    Mixed hyperlipidemia associated with type 2 diabetes mellitus  - Continue home statin regimen    Type 2 diabetes mellitus with chronic kidney disease on chronic dialysis, without long-term current use of insulin  - Working to optimize BG control  - SSI provided for corrective dosing  - DXTs as ordered  - Hypoglycemic protocol in effect  - Diabetic diet provided    Hypertension associated with ESRD caused by type 2 diabetes mellitus, on dialysis  - Working to optimize BP control   - Continue home cardioprudent regimen  - HD to assist with BP control  - PRN IV hydralazine and/or IV labetalol provided for SBP>180  - Will continue to monitor and further titrate antihypertensives as clinically indicated     Hyperkalemia  - Improving, continue Lokelma TID and reassess daily  - Nephrology following, HD to assist with electrolyte anomalies  - Monitoring on tele      VTE Risk Mitigation (From admission, onward)         Ordered     IP VTE HIGH RISK PATIENT  Once         08/15/22 2005     Place sequential compression device  Until discontinued         08/15/22 2005                Discharge Planning   WENDY:      Code Status: Full Code   Is the patient medically ready for discharge?: No    Reason for patient still in hospital (select all that apply): Patient unstable  Discharge Plan A: Home with family                  Jacey Duke MD  Department of Hospital Medicine   [facility] - Cardiology Stepdown

## 2022-08-22 NOTE — PLAN OF CARE
"Shift assessment at shift change, pt. Is lethargic and not keeping eyes open during conversations. Pt. Is oriented x4- english speaking. Wife at bedside st. She is worried about how tired he is. All "sedating/sleep" aids held. Called on call G team to inform of pt. Change in LOC and new audible air leak @ insertion site- petroleum gauze placed.  All vitals within normal limits. CTS called and informed of cont. air leak within chest tube canister and also at chest tube insertion site. Stitch placed by resident- informed team that audible air leak still present within chest tube and at insertion site post suture placement. Labs drawn- see results.  HD ordered. During HD pt. Is able to state his name and starts speaking in Italian (native language) and is needing physical stimulus to keep awake-vital signs stable. Dr. Bermudez @bedside to evaluate. Orders to not admin Narcan and redraw labs post HD.   Pt. A&O x4 post HD. Dr. Bermudez informed of labs, orders to hold phos binder due to phos level <1 and to trend H&H.   Audible air leak still present in chest tube canister 1 and at chest tube insertion site.   Pt. On cont. Tele and spo2 monitoring at bedside.     Problem: Adult Inpatient Plan of Care  Goal: Plan of Care Review  Outcome: Ongoing, Not Progressing  Goal: Patient-Specific Goal (Individualized)  Outcome: Ongoing, Not Progressing  Goal: Absence of Hospital-Acquired Illness or Injury  Outcome: Ongoing, Not Progressing  Goal: Optimal Comfort and Wellbeing  Outcome: Ongoing, Not Progressing  Goal: Readiness for Transition of Care  Outcome: Ongoing, Not Progressing     Problem: Diabetes Comorbidity  Goal: Blood Glucose Level Within Targeted Range  Outcome: Ongoing, Not Progressing     Problem: Infection  Goal: Absence of Infection Signs and Symptoms  Outcome: Ongoing, Not Progressing     Problem: Impaired Wound Healing  Goal: Optimal Wound Healing  Outcome: Ongoing, Not Progressing     Problem: Device-Related Complication " Risk (Hemodialysis)  Goal: Safe, Effective Therapy Delivery  Outcome: Ongoing, Not Progressing     Problem: Hemodynamic Instability (Hemodialysis)  Goal: Effective Tissue Perfusion  Outcome: Ongoing, Not Progressing     Problem: Infection (Hemodialysis)  Goal: Absence of Infection Signs and Symptoms  Outcome: Ongoing, Not Progressing     Problem: Skin Injury Risk Increased  Goal: Skin Health and Integrity  Outcome: Ongoing, Not Progressing

## 2022-08-22 NOTE — PROGRESS NOTES
[facility]asso - Cardiology Stepdown  Thoracic Surgery  Progress Note    Subjective:       Post-Op Info:  Procedure(s) (LRB):  VATS, WITH DECORTICATION, LUNG (Right)  BLOCK, NERVE, INTERCOSTAL, 2 OR MORE (Right)  BRONCHOSCOPY, FLEXIBLE (N/A)  INSERTION, CATHETER, INTERCOSTAL, FOR DRAINAGE (Right)   4 Days Post-Op     Interval History: 3L NC. CT (#1) 255 cc SS output / 24 hrs (125 cc overnight), with air leak. CT (#2) 30 cc SS output / 24 hrs (none documented overnight. Occlusive dressing placed at bedside    Medications:  Continuous Infusions:  Scheduled Meds:   sodium chloride 0.9%   Intravenous Once    albuterol-ipratropium  3 mL Nebulization Q6H    aluminum-magnesium hydroxide-simethicone  30 mL Oral QID (AC & HS)    atorvastatin  40 mg Oral QHS    calcium acetate(phosphat bind)  667 mg Oral TID WM    carvediloL  12.5 mg Oral BID    epoetin imchael (PROCRIT) injection  5,000 Units Intravenous Every Tues, Thurs, Sat    furosemide  80 mg Oral Daily    naloxone  0.4 mg Intravenous Once    sodium zirconium cyclosilicate  5 g Oral TID    sucralfate  1 g Oral Q6H     PRN Meds:sodium chloride, acetaminophen, dextrose 10%, dextrose 10%, glucagon (human recombinant), glucose, glucose, hydrALAZINE, insulin aspart U-100, labetalol, naloxone, ondansetron, sodium chloride 0.9%     Review of patient's allergies indicates:  No Known Allergies  Objective:     Vital Signs (Most Recent):  Temp: 97.9 °F (36.6 °C) (08/22/22 0418)  Pulse: 69 (08/22/22 0722)  Resp: 18 (08/22/22 0722)  BP: (!) 152/66 (08/22/22 0418)  SpO2: 100 % (08/22/22 0722) Vital Signs (24h Range):  Temp:  [97.5 °F (36.4 °C)-98.5 °F (36.9 °C)] 97.9 °F (36.6 °C)  Pulse:  [54-69] 69  Resp:  [14-18] 18  SpO2:  [58 %-100 %] 100 %  BP: (111-166)/(52-69) 152/66     Intake/Output - Last 3 Shifts         08/20 0700 08/21 0659 08/21 0700 08/22 0659 08/22 0700 08/23 0659    P.O. 660 680     Blood       Total Intake(mL/kg) 660 (10.5) 680 (10.8)     Urine (mL/kg/hr) 0 (0)       Emesis/NG output  200     Other  2000     Chest Tube 425 285     Total Output 425 2485     Net +235 -1805                    SpO2: 100 %  O2 Device (Oxygen Therapy): nasal cannula    Physical Exam  Vitals and nursing note reviewed.   Constitutional:       General: He is not in acute distress.  Cardiovascular:      Rate and Rhythm: Normal rate.      Pulses: Normal pulses.   Pulmonary:      Effort: Pulmonary effort is normal. No respiratory distress.      Comments: 3L NC. CT (#1) 255 cc SS output / 24 hrs (125 cc overnight), with 1-chamber air leak. CT (#2) 30 cc SS output / 24 hrs (none documented overnight). Occlusive dressing placed at bedside  Abdominal:      General: There is no distension.      Palpations: Abdomen is soft.      Tenderness: There is no abdominal tenderness.   Neurological:      Mental Status: He is alert.       Significant Labs:  BMP:   Recent Labs   Lab 08/22/22  0313   *   *   K 3.6      CO2 23   BUN 14   CREATININE 1.7*   CALCIUM 8.3*   MG 2.2       CBC:   Recent Labs   Lab 08/22/22 0313   WBC 9.96   RBC 2.21*   HGB 7.2*   HCT 22.5*      *   MCH 32.6*   MCHC 32.0       CMP:   Recent Labs   Lab 08/22/22 0313   *   CALCIUM 8.3*   ALBUMIN 2.5*   PROT 5.8*   *   K 3.6   CO2 23      BUN 14   CREATININE 1.7*   ALKPHOS 161*   ALT <5*   AST 20   BILITOT 1.0         Significant Diagnostics:  CXR: I have reviewed all pertinent results/findings within the past 24 hours    VTE Risk Mitigation (From admission, onward)           Ordered     IP VTE HIGH RISK PATIENT  Once         08/15/22 2005     Place sequential compression device  Until discontinued         08/15/22 2005                  Assessment/Plan:     * Recurrent pleural effusion on right   Recurrent pleural effusion on right  Vega Brownlee is a 61 y.o. male PMH CHF (last ECHO 9/20/20 EF 55%, PA pressure 77), home oxygen (2 L), DM2, ESRD on HD (via [facility] TThurSat), recurrent bilateral pleural  effusions (s/p R thoracentesis 2019, 2021), hx of a left thoracotomy (empyema) presented to [facility] as a transfer for a loculated R pleural effusion. S/p R VATS, decortication with chest tube placements x2. Over the weekend developing an air leak from the apical chest tube. Continues to drain. CT x2 remain to suction.      · Keep right chest tubes to suction. Now with air leak appreciated from apical chest tube.   · Occlusive dressing placed around chest tube sites   · Daily CXR with chest tubes in place  · Would recommend replacing phos, <1 this am  · 3L NC currently, wean as tolerated. Would recommend scheduled breathing treatments  · H/H decrease with moderate drainage, transfusion per primary. 7.2/22.5 this am  · OOB, ambualte  · Agree with rest of care. Nephrology consulted, appreciate recs. Dialysis overnight            Margoth Mota MD  Thoracic Surgery

## 2022-08-22 NOTE — ANESTHESIA POSTPROCEDURE EVALUATION
Anesthesia Post Evaluation    Patient: Vega Brownlee    Procedure(s) Performed: Procedure(s) (LRB):  VATS, WITH DECORTICATION, LUNG (Right)  BLOCK, NERVE, INTERCOSTAL, 2 OR MORE (Right)  BRONCHOSCOPY, FLEXIBLE (N/A)  INSERTION, CATHETER, INTERCOSTAL, FOR DRAINAGE (Right)    Final Anesthesia Type: general      Patient location during evaluation: PACU  Patient participation: Yes- Able to Participate  Level of consciousness: awake and alert  Post-procedure vital signs: reviewed and stable  Pain management: adequate  Airway patency: patent    PONV status at discharge: No PONV  Anesthetic complications: no      Cardiovascular status: blood pressure returned to baseline  Respiratory status: unassisted, spontaneous ventilation and room air  Hydration status: euvolemic            Vitals Value   /67   Temp 36.4 °C (97.6 °F)   Pulse 56   Resp 18   SpO2 100 %         Event Time   Out of Recovery 08/18/2022 14:30:00         Pain/Iza Score: Iza Score: 9

## 2022-08-22 NOTE — CARE UPDATE
RAPID RESPONSE NURSE PROACTIVE ROUNDING NOTE       Time of Visit:     Admit Date: 8/15/2022  LOS: 7  Code Status: Full Code   Date of Visit: 2022  : 1960  Age: 62 y.o.  Sex: male  Race:   Bed: 306/306 A:   MRN: [id number]  Was the patient discharged from an ICU this admission? Yes   Was the patient discharged from a PACU within last 24 hours? No   Did the patient receive conscious sedation/general anesthesia in last 24 hours? No  Was the patient in the ED within the past 24 hours? No  Was the patient on NIPPV within the past 24 hours? No   Attending Physician: Jacey Duke MD  Primary Service: [facility] MED    Time spent at the bedside: 15 -30 min    SITUATION    Notified by bedside RN during rounding.  Reason for alert: Somnolence, ongoing chest tube air leak.  Called to evaluate the patient for Neuro and circulatory    BACKGROUND     Why is the patient in the hospital?: Recurrent pleural effusion on right    Patient has a past medical history of A-V fistula, Anemia, Cataract, CHF (congestive heart failure), Cigarette smoker, Diabetes mellitus, type 2, Disorder of kidney and ureter, ESRD on hemodialysis, Hyperlipidemia, Hypertension, and 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.    Last Vitals:  Temp: 97.5 °F (36.4 °C) (2351)  Pulse: 57 (2351)  Resp: 16 (2351)  BP: 164/68 (2351)  SpO2: 100 % (2351)    24 Hours Vitals Range:  Temp:  [97.1 °F (36.2 °C)-98.5 °F (36.9 °C)]   Pulse:  [56-68]   Resp:  [14-18]   BP: (118-169)/(55-69)   SpO2:  [58 %-100 %]     Labs:  Recent Labs     22  1243 22  1836 226 22  2141   WBC 9.45 9.23 9.24  --    HGB 7.9* 7.4* 7.1*  --    HCT 24.4* 23.3* 22.4* 22*    162 170  --        Recent Labs     22  0550 22  1039 22  0307 22  0500 22  2135      < > 135* 134* 135*   K 5.1   < > 3.9 5.5* 5.8*   CL 98   < > 100 98 98    CO2 26   < > 26 28 29   CREATININE 4.8*   < > 2.3* 3.8* 4.5*   GLU 88   < > 128* 155* 154*   PHOS 4.4  --  2.7 1.7*  --    MG 2.3  --  2.0 2.5 3.0*    < > = values in this interval not displayed.        Recent Labs     22   PH 7.354   PCO2 58.2*   PO2 40   HCO3 32.5*   POCSATURATED 72*   BE 7        ASSESSMENT    Physical Exam  Constitutional:       Appearance: He is ill-appearing.      Comments: Lethargic during conversation   HENT:      Mouth/Throat:      Mouth: Mucous membranes are dry.      Pharynx: Oropharynx is clear.   Eyes:      Extraocular Movements: Extraocular movements intact.      Pupils: Pupils are equal, round, and reactive to light.   Cardiovascular:      Rate and Rhythm: Normal rate and regular rhythm.      Pulses: Normal pulses.   Pulmonary:      Comments: Right-sided crackles with auscultation  Right-sided chest tubes x2  Musculoskeletal:         General: Normal range of motion.   Skin:     General: Skin is warm and dry.      Capillary Refill: Capillary refill takes less than 2 seconds.      Comments: Pale   Neurological:      General: No focal deficit present.      Mental Status: He is alert and oriented to person, place, and time.       INTERVENTIONS    Upon initial assessment, pt. Sitting up in hospital bed with family at bs. Pt. Somnolent, easily arouses to verbal stimuli. While awake, pt. Oriented x4, follows all commands, LOMELI. Currently states mild abd pain without radiation. Denies cp, sob, n/v/d, headache, dizziness. POCT CB.    Airway: patent, non obstructed, able to maintain secretions.  Breathing: non labored, speaking full sentences, equal chest rise and fall, crackles auscultated in RLL lung.  Circulation: skin warm and dry to palpation.    Vital Signs:  HR: 62, NSR  BP: 148/80 (102)  SpO2: 100% 3L NC O2  RR: 22  Afebrile    Stat chest xray and VBG obtained.    @ 214:  PH: 7.354  PCO2: 58.2  PO2: 40  HCO3: 32.5    Na: 135  K: 5.7    Surgery MD also at bs to  redress chest tube s/t noted air leak. Dressing with bloody output. Primary team MD made aware of patient assessment per primary RN. S/t chest tube x2 and continuous air leak, do not recommend bipap use on floor. If becomes more somnolent, altered, or hemodynamically unstable, may need CCM consult/transfer. Plan to monitor neuro status and vital signs closely and also continue to monitor chest tube output along with respiratory status. Spoke with primary RN, Bernadette, and updated on POC. Please call RRN with questions/cocerns/deterioration of patient.     PROVIDER ESCALATION    Yes/No  yes by primary RN.    Orders received and case discussed with NA.    Disposition: Remain in room 306.    FOLLOW-UP    Bedside RNBernadette updated on plan of care. Instructed to call the Rapid Response Nurse, Anju Demarco RN at [phone] for additional questions or concerns.

## 2022-08-22 NOTE — NURSING
08/22/22 0315   Vital Signs   Pulse (!) 57   Resp 16   SpO2 100 %   Pulse Oximetry Type Continuous   Oximetry Probe Site Intact   BP (!) 140/66   MAP (mmHg) 95   BP Location Left arm   BP Method Automatic   Patient Position Lying   Assessments (Pre/Post)   Blood Liters Processed (BLP) 63   Pre-Hemodialysis Assessment   Treatment Status Completed   During Hemodialysis Assessment   Blood Volume Processed (Liters) 63 L   UF Removed (mL) 2000 mL   Intra-Hemodialysis Comments tx completed, bloodlines rinfused.   Post-Hemodialysis Assessment   Rinseback Volume (mL) 250 mL   Blood Volume Processed (Liters) 63 L   Dialyzer Clearance Lightly streaked   Total UF (mL) 2000 mL   Net Fluid Removal 1500   Patient Response to Treatment tolerated well   Post-Hemodialysis Comments Tx completed.  1.5 L UF

## 2022-08-22 NOTE — PT/OT/SLP EVAL
"Occupational Therapy  Co- Evaluation    Name: Vega Brownlee  MRN: [id number]  Admitting Diagnosis:  Recurrent pleural effusion on right  Recent Surgery: Procedure(s) (LRB):  VATS, WITH DECORTICATION, LUNG (Right)  BLOCK, NERVE, INTERCOSTAL, 2 OR MORE (Right)  BRONCHOSCOPY, FLEXIBLE (N/A)  INSERTION, CATHETER, INTERCOSTAL, FOR DRAINAGE (Right) 4 Days Post-Op    Recommendations:     Discharge Recommendations:    REHAB  Assessment:     Vega Brownlee is a 62 y.o. male with a medical diagnosis of Recurrent pleural effusion on right.   Performance deficits affecting function: weakness, impaired endurance, impaired self care skills, impaired functional mobility, gait instability, impaired balance.  Pt tolerated session fairly well. Pt with good effort. Pt is not safe for d/c home and to benefit from post acute therapy prior to return home    Rehab Prognosis: Good; patient would benefit from acute skilled OT services to address these deficits and reach maximum level of function.       Plan:     Patient to be seen 4 x/week to address the above listed problems via self-care/home management, therapeutic activities, therapeutic exercises  · Plan of Care Expires:    · Plan of Care Reviewed with: patient    Subjective     "I have no pain" pt states.    Occupational Profile:  Pt resides with spouse in one story home with no JERRICA. Pt reports he has a shower chair. Pt reports he was fully independent prior to arrival. Pt does not drive. Spouse works and he is home alone daily.   Pt wears oxygen at home day/night.     Pain/Comfort:  · Pain Rating 1: 0/10    Patients cultural, spiritual, Worship conflicts given the current situation: no    Objective:     Communicated with: nsg prior to session.  Patient found supine in bed with CT x 2 to suction and 3 LPM oxygen via NC. Tele, BP and pulse ox in place.  Coeval completed this date to optimize functional performance and safety.     General Precautions: Standard, fall     Occupational " Performance:    Bed Mobility:    Supine>sit with MIN A    Sit>supine with SBA     Functional Mobility/Transfers:  · Sit>stand with MIN A   · Few side steps with MOD A     Activities of Daily Living:  · Feeding: set-up  · G/H: set-up in supported sitting. Pt with good B UE integration and attention to task. Unable to tolerate task in sitting or standing due to impaired sitting balance and tolerance for activity. Pt did spit blood clot into spittoon when rinsing mouth with nsg notified.   · LE dressing; TOTAL A     Cognitive/Visual Perceptual:  Pt awake, alert and oriented. Pt following commands. Pt speaking clear and fluent English.     Physical Exam:  Pt is right hand dominant and demo WFL UE strength/ROM, coordination and sensation.   AMPAC 6 Click ADL:  AMPAC Total Score: 14    Treatment & Education:  Pt tolerated sitting EOB approx 8 min with CGA/MIN A  for dynamic sitting balance with posterior lean. 2 standing trials completed and few side steps with MOD A with cues for hand placement/  Education provided re: OT POC  and safety with functional mobility/ADL skills.   Air leak noted in chest tube with nsg aware and CT maintained on suction throughout session.   Education:    Patient left supine with all lines intact, call button in reach and nsg notified    GOALS:   Multidisciplinary Problems     Occupational Therapy Goals        Problem: Occupational Therapy    Goal Priority Disciplines Outcome Interventions   Occupational Therapy Goal     OT, PT/OT Ongoing, Progressing    Description: Goals to be met by: 7 days 8/29/22     Patient will increase functional independence with ADLs by performing:    Pt to complete UE dressing with set-up  Pt to complete LE dressing with SBA  Pt to complete standing g/h skills with SBA  Pt to complete toileting with SBA  Pt to complete t/f bed, chair and commode with SBA                    History:     Past Medical History:   Diagnosis Date    A-V fistula     Anemia     Cataract      CHF (congestive heart failure)     Cigarette smoker     Diabetes mellitus, type 2     Disorder of kidney and ureter     ESRD on hemodialysis     Hyperlipidemia     Hypertension     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;  Service: Thoracic;  Laterality: N/A;    COLONOSCOPY N/A 8/16/2017    Procedure: COLONOSCOPY;  Surgeon: Sebastián Lamas MD;  Location: [facility] ENDO (4TH FLR);  Service: Endoscopy;  Laterality: N/A;  dialysis pt/potassium level 1st/svn    FINGER AMPUTATION Left     left index finger    INJECTION OF ANESTHETIC AGENT AROUND MULTIPLE INTERCOSTAL NERVES Right 8/18/2022    Procedure: BLOCK, NERVE, INTERCOSTAL, 2 OR MORE;  Surgeon: Memo Peraza MD;  [facility];  Service: Thoracic;  Laterality: Right;    LUNG SURGERY      thoracotomy    PORTACATH PLACEMENT  08/2016    THORACOSCOPIC DECORTICATION OF LUNG Right 8/18/2022    Procedure: VATS, WITH DECORTICATION, LUNG;  Surgeon: Memo Peraza MD;  [facility];  Service: Thoracic;  Laterality: Right;    TUBE THORACOTOMY Right 8/18/2022    Procedure: INSERTION, CATHETER, INTERCOSTAL, FOR DRAINAGE;  Surgeon: Memo Peraza MD;  [facility];  Service: Thoracic;  Laterality: Right;       Time Tracking:     OT Date of Treatment: 08/22/22  OT Start Time: 0824  OT Stop Time: 0841  OT Total Time (min): 17 min    Billable Minutes:Evaluation 9  Self Care/Home Management 8 8/22/2022

## 2022-08-22 NOTE — CARE UPDATE
RAPID RESPONSE NURSE PROACTIVE ROUNDING NOTE       Time of Visit: 1131    Admit Date: 8/15/2022  LOS: 7  Code Status: Full Code   Date of Visit: 2022  : 1960  Age: 62 y.o.  Sex: male  Race:   Bed: 306/306 A:   MRN: [id number]  Was the patient discharged from an ICU this admission? Yes   Was the patient discharged from a PACU within last 24 hours? No   Did the patient receive conscious sedation/general anesthesia in last 24 hours? No  Was the patient in the ED within the past 24 hours? No  Was the patient on NIPPV within the past 24 hours? No   Attending Physician: Jacey Duke MD  Primary Service: [facility] MED    Time spent at the bedside: < 15 min    SITUATION    Notified by previous RRN during handoff.  Reason for alert: Increased chest tube output.  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    Last Vitals:  Temp: 97.6 °F (36.4 °C) ( 1121)  Pulse: 60 ( 1226)  Resp: 20 ( 1226)  BP: 168/73 ( 1121)  SpO2: 100 % ( 1226)    24 Hours Vitals Range:  Temp:  [97.5 °F (36.4 °C)-98.1 °F (36.7 °C)]   Pulse:  [54-69]   Resp:  [14-20]   BP: (111-168)/(52-73)   SpO2:  [58 %-100 %]     Labs:  Recent Labs     22  2136 22  2141 22  0313 22  1126   WBC 9.24  --  9.96 8.50   HGB 7.1*  --  7.2* 7.3*  7.3*   HCT 22.4* 22* 22.5* 23.9*     --  154 167       Recent Labs     22  0307 22  0500 22  2135 22  0313   * 134* 135* 133*   K 3.9 5.5* 5.8* 3.6    98 98 103   CO2 26 28 29 23   CREATININE 2.3* 3.8* 4.5* 1.7*    * 155* 154* 117*   PHOS 2.7 1.7*  --  <1.0*   MG 2.0 2.5 3.0* 2.2        Recent Labs     08/21/22  2141 08/22/22  0209   PH 7.354 7.310*   PCO2 58.2* 51.0*   PO2 40 44*   HCO3 32.5* 25.7   POCSATURATED 72* 75*   BE 7 -1        ASSESSMENT    Physical Exam  Constitutional:       General: He is not in acute distress.  Cardiovascular:      Rate and Rhythm: Regular rhythm. Bradycardia present.   Pulmonary:      Effort: No respiratory distress.      Comments: R chest tubes noted x 2.  Neurological:      Mental Status: He is alert and oriented to person, place, and time. Mental status is at baseline.       INTERVENTIONS    The patient was seen for Respiratory problem. Staff concerns included increased chest tube drainage. The following interventions were performed: continued pulse ox monitoring continued, continued cardiac monitoring continued and no additional interventions needed at this time.    RECOMMENDATIONS    Continue POC per primary team. Alert team if output increased to > 2L in 24h period.    PROVIDER ESCALATION    Yes/No  no    Orders received and case discussed with NA.    Disposition: Remain in room 306.    FOLLOW-UP    Charge RN, Claudette updated on plan of care. Instructed to call the Rapid Response Nurse, Leticia Garza RN at [phone] for additional questions or concerns.

## 2022-08-23 NOTE — HOSPITAL COURSE
8/22/22: Participated w/ PT. Bed mob SBA- Delia. Sit to stand Delia. Ambulated 2 side steps to right + 4 side steps to right (seated rest break between trials due to fatigue) modA- A.   8/26/22: Participated w/ PT. Bed mob SBA- Delia. Ambulated 15 and 15 and 50 ft standing Adl's between trials 1 and 2 CGA w/ RW.

## 2022-08-23 NOTE — SUBJECTIVE & OBJECTIVE
Interval History: feels better this morning. More energy. Denies any shortness of breath or chest pain. Asking again about when he can go home.    Review of Systems   Constitutional:  Negative for fever.   Respiratory:  Negative for shortness of breath.    Cardiovascular:  Negative for chest pain.   Gastrointestinal:  Negative for abdominal pain, constipation, nausea and vomiting.   Neurological:  Negative for headaches.   Objective:     Vital Signs (Most Recent):  Temp: 97.6 °F (36.4 °C) (08/22/22 1121)  Pulse: (!) 58 (08/22/22 1512)  Resp: 20 (08/22/22 1226)  BP: (!) 168/73 (08/22/22 1121)  SpO2: 100 % (08/22/22 1226)   Vital Signs (24h Range):  Temp:  [97.5 °F (36.4 °C)-98.1 °F (36.7 °C)] 97.6 °F (36.4 °C)  Pulse:  [54-69] 58  Resp:  [14-20] 20  SpO2:  [100 %] 100 %  BP: (111-168)/(52-73) 168/73     Weight: 63 kg (138 lb 14.2 oz)  Body mass index is 21.12 kg/m².    Intake/Output Summary (Last 24 hours) at 8/22/2022 1911  Last data filed at 8/22/2022 1900  Gross per 24 hour   Intake 477 ml   Output 2450 ml   Net -1973 ml      Physical Exam  Vitals reviewed.   Constitutional:       General: He is not in acute distress.     Appearance: He is ill-appearing.   HENT:      Head: Normocephalic and atraumatic.      Mouth/Throat:      Mouth: Mucous membranes are moist.   Eyes:      Extraocular Movements: Extraocular movements intact.      Conjunctiva/sclera: Conjunctivae normal.      Pupils: Pupils are equal, round, and reactive to light.   Cardiovascular:      Rate and Rhythm: Normal rate and regular rhythm.   Pulmonary:      Effort: Pulmonary effort is normal. No respiratory distress.      Comments: Rhonchi and crackles appreciated on R anterior chest. Decreased breath sounds on the R  Abdominal:      General: Bowel sounds are normal. There is no distension.      Palpations: Abdomen is soft.   Musculoskeletal:      Comments: R chest tube in place   Skin:     General: Skin is warm and dry.      Capillary Refill: Capillary  refill takes less than 2 seconds.   Neurological:      General: No focal deficit present.       Significant Labs: All pertinent labs within the past 24 hours have been reviewed.    Significant Imaging: I have reviewed all pertinent imaging results/findings within the past 24 hours.

## 2022-08-23 NOTE — SUBJECTIVE & OBJECTIVE
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      Past Surgical History:   Procedure Laterality Date    arm fracture Left     BRONCHOSCOPY N/A 8/18/2022    Procedure: BRONCHOSCOPY, FLEXIBLE;  Surgeon: Memo Peraza MD;  [facility];  Service: Thoracic;  Laterality: N/A;    COLONOSCOPY N/A 8/16/2017    Procedure: COLONOSCOPY;  Surgeon: Sebastián Lamas MD;  Location: [facility] ENDO (4TH FLR);  Service: Endoscopy;  Laterality: N/A;  dialysis pt/potassium level 1st/svn    FINGER AMPUTATION Left     left index finger    INJECTION OF ANESTHETIC AGENT AROUND MULTIPLE INTERCOSTAL NERVES Right 8/18/2022    Procedure: BLOCK, NERVE, INTERCOSTAL, 2 OR MORE;  Surgeon: Memo Peraza MD;  [facility];  Service: Thoracic;  Laterality: Right;    LUNG SURGERY      thoracotomy    PORTACATH PLACEMENT  08/2016    THORACOSCOPIC DECORTICATION OF LUNG Right 8/18/2022    Procedure: VATS, WITH DECORTICATION, LUNG;  Surgeon: Memo Peraza MD;  [facility];  Service: Thoracic;  Laterality: Right;    TUBE THORACOTOMY Right 8/18/2022    Procedure: INSERTION, CATHETER, INTERCOSTAL, FOR DRAINAGE;  Surgeon: Memo Peraza MD;  [facility];  Service: Thoracic;  Laterality: Right;     Review of patient's allergies indicates:  No Known Allergies    Scheduled Medications:    sodium chloride 0.9%   Intravenous Once    albuterol-ipratropium  3 mL Nebulization Q6H    atorvastatin  40 mg Oral QHS    carvediloL  12.5 mg Oral BID    epoetin michael (PROCRIT) injection  5,000 Units Intravenous Every Tues, Thurs, Sat    furosemide  80 mg Oral Daily    naloxone  0.4 mg Intravenous Once     pantoprazole  40 mg Oral Daily    polyethylene glycol  17 g Oral BID    sodium zirconium cyclosilicate  5 g Oral TID       PRN Medications: sodium chloride, acetaminophen, dextrose 10%, dextrose 10%, glucagon (human recombinant), glucose, glucose, hydrALAZINE, insulin aspart U-100, labetalol, naloxone, ondansetron, sodium chloride 0.9%    Family History       Problem Relation (Age of Onset)    Arthritis Sister    Diabetes Mother    Memory loss Mother    No Known Problems Father, Sister, Brother, Sister          Tobacco Use    Smoking status: Former Smoker     Packs/day: 0.25     Years: 30.00     Pack years: 7.50     Start date: 4/26/1982    Smokeless tobacco: Former User   Substance and Sexual Activity    Alcohol use: No    Drug use: No    Sexual activity: Yes     Partners: Female     Review of Systems   Constitutional:  Positive for activity change. Negative for fatigue and fever.   HENT:  Negative for trouble swallowing and voice change.    Respiratory:  Negative for cough and shortness of breath.    Cardiovascular:  Negative for chest pain and leg swelling.   Gastrointestinal:  Negative for abdominal distention and abdominal pain.   Genitourinary:  Negative for difficulty urinating and flank pain.   Musculoskeletal:  Positive for gait problem. Negative for back pain.   Skin:  Negative for color change and rash.   Neurological:  Positive for weakness. Negative for speech difficulty and numbness.   Psychiatric/Behavioral:  Negative for agitation and confusion.    Objective:     Vital Signs (Most Recent):  Temp: 97.5 °F (36.4 °C) (08/23/22 0721)  Pulse: (!) 53 (08/23/22 0732)  Resp: 18 (08/23/22 0732)  BP: (!) 155/67 (08/23/22 0721)  SpO2: 100 % (08/23/22 0732)      Vital Signs (24h Range):  Temp:  [97.4 °F (36.3 °C)-97.7 °F (36.5 °C)] 97.5 °F (36.4 °C)  Pulse:  [52-68] 53  Resp:  [17-20] 18  SpO2:  [97 %-100 %] 100 %  BP: (121-162)/(60-70) 155/67     Body mass index is 18.7 kg/m².    Physical Exam  Vitals and  nursing note reviewed.   Constitutional:       Appearance: He is well-developed.   HENT:      Head: Normocephalic and atraumatic.   Eyes:      General:         Right eye: No discharge.         Left eye: No discharge.      Pupils: Pupils are equal, round, and reactive to light.   Pulmonary:      Effort: Pulmonary effort is normal. No respiratory distress.      Comments: Chest tubes  Abdominal:      General: There is no distension.      Palpations: Abdomen is soft.      Tenderness: There is no abdominal tenderness.   Musculoskeletal:         General: No deformity.      Cervical back: Neck supple.      Comments: Deconditioned and mild weakness   Skin:     General: Skin is warm and dry.   Neurological:      Sensory: No sensory deficit.      Motor: Weakness present. No abnormal muscle tone.      Gait: Gait abnormal.   Psychiatric:         Mood and Affect: Mood normal.         Behavior: Behavior normal.     NEUROLOGICAL EXAMINATION:     CRANIAL NERVES     CN III, IV, VI   Pupils are equal, round, and reactive to light.    Diagnostic Results: Labs: Reviewed  ECG: Reviewed  X-Ray: Reviewed

## 2022-08-23 NOTE — RESPIRATORY THERAPY
RAPID RESPONSE RESPIRATORY CHART CHECK       Chart check completed. Call [phone] for further concerns or assistance.

## 2022-08-23 NOTE — CONSULTS
[facility] - Cardiology Stepdown  Physical Medicine & Rehab  Consult Note    Patient Name: Vega Brownlee  MRN: [id number]  Admission Date: 8/15/2022  Hospital Length of Stay: 8 days  Attending Physician: Jacey Duke MD     Inpatient consult to Physical Medicine & Rehabilitation  Consult performed by: Michelle Araiza NP  Consult requested by:  Jacey Duke MD    Collaborating Physician: Dahiana Doan MD  Reason for Consult:  Assess rehabilitation needs     Consults  Subjective:     Principal Problem: Recurrent pleural effusion on right    HPI: Vega Brownlee is a 62-year-old male with PMHx of CHF (EF 55%), home oxygen (2 L), DM, ESRD on HD (T, Th, Sat), recurrent bilateral pleural effusions (s/p R thoracentesis 2019, 2021), hx of a left thoracotomy (empyema). Patient presented to [facility] on 8/15/22 as a transfer for loculated R pleural effusion. S/p R VATS, decortication with chest tube placements x2 8/18. Now on 2 L NC.     Functional History: Patient lives with wife in a single story home with no steps to enter.  Prior to admission, [facility]. DME: none.       Hospital Course: 8/22/22: Participated w/ PT. Bed mob SBA- Delia. Sit to stand Delia. Ambulated 2 side steps to right + 4 side steps to right (seated rest break between trials due to fatigue) modA- HHA.       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      Past Surgical History:   Procedure Laterality Date    arm fracture Left     BRONCHOSCOPY N/A 8/18/2022    Procedure: BRONCHOSCOPY, FLEXIBLE;  Surgeon: Memo Peraza MD;  Location: [facility] OR [address] [place] [address];  Service: Thoracic;  Laterality: N/A;    COLONOSCOPY N/A 8/16/2017    Procedure: COLONOSCOPY;   Surgeon: Sebastián Lamas MD;  Location: [facility] ENDO (4TH FLR);  Service: Endoscopy;  Laterality: N/A;  dialysis pt/potassium level 1st/svn    FINGER AMPUTATION Left     left index finger    INJECTION OF ANESTHETIC AGENT AROUND MULTIPLE INTERCOSTAL NERVES Right 8/18/2022    Procedure: BLOCK, NERVE, INTERCOSTAL, 2 OR MORE;  Surgeon: Memo Peraza MD;  Location: [facility] OR 2ND FLR;  Service: Thoracic;  Laterality: Right;    LUNG SURGERY      thoracotomy    PORTACATH PLACEMENT  08/2016    THORACOSCOPIC DECORTICATION OF LUNG Right 8/18/2022    Procedure: VATS, WITH DECORTICATION, LUNG;  Surgeon: Memo Peraza MD;  Location: [facility] OR 2ND FLR;  Service: Thoracic;  Laterality: Right;    TUBE THORACOTOMY Right 8/18/2022    Procedure: INSERTION, CATHETER, INTERCOSTAL, FOR DRAINAGE;  Surgeon: Memo Peraza MD;  Location: [facility] OR 2ND FLR;  Service: Thoracic;  Laterality: Right;     Review of patient's allergies indicates:  No Known Allergies    Scheduled Medications:    sodium chloride 0.9%   Intravenous Once    albuterol-ipratropium  3 mL Nebulization Q6H    atorvastatin  40 mg Oral QHS    carvediloL  12.5 mg Oral BID    epoetin michael (PROCRIT) injection  5,000 Units Intravenous Every Tues, Thurs, Sat    furosemide  80 mg Oral Daily    naloxone  0.4 mg Intravenous Once    pantoprazole  40 mg Oral Daily    polyethylene glycol  17 g Oral BID    sodium zirconium cyclosilicate  5 g Oral TID       PRN Medications: sodium chloride, acetaminophen, dextrose 10%, dextrose 10%, glucagon (human recombinant), glucose, glucose, hydrALAZINE, insulin aspart U-100, labetalol, naloxone, ondansetron, sodium chloride 0.9%    Family History       Problem Relation (Age of Onset)    Arthritis Sister    Diabetes Mother    Memory loss Mother    No Known Problems Father, Sister, Brother, Sister          Tobacco Use    Smoking status: Former Smoker     Packs/day: 0.25     Years: 30.00     Pack years: 7.50     Start date:  4/26/1982    Smokeless tobacco: Former User   Substance and Sexual Activity    Alcohol use: No    Drug use: No    Sexual activity: Yes     Partners: Female     Review of Systems   Constitutional:  Positive for activity change. Negative for fatigue and fever.   HENT:  Negative for trouble swallowing and voice change.    Respiratory:  Negative for cough and shortness of breath.    Cardiovascular:  Negative for chest pain and leg swelling.   Gastrointestinal:  Negative for abdominal distention and abdominal pain.   Genitourinary:  Negative for difficulty urinating and flank pain.   Musculoskeletal:  Positive for gait problem. Negative for back pain.   Skin:  Negative for color change and rash.   Neurological:  Positive for weakness. Negative for speech difficulty and numbness.   Psychiatric/Behavioral:  Negative for agitation and confusion.      Objective:     Vital Signs (Most Recent):  Temp: 97.5 °F (36.4 °C) (08/23/22 0721)  Pulse: (!) 53 (08/23/22 0732)  Resp: 18 (08/23/22 0732)  BP: (!) 155/67 (08/23/22 0721)  SpO2: 100 % (08/23/22 0732)      Vital Signs (24h Range):  Temp:  [97.4 °F (36.3 °C)-97.7 °F (36.5 °C)] 97.5 °F (36.4 °C)  Pulse:  [52-68] 53  Resp:  [17-20] 18  SpO2:  [97 %-100 %] 100 %  BP: (121-162)/(60-70) 155/67     Body mass index is 18.7 kg/m².    Physical Exam  Vitals and nursing note reviewed.   Constitutional:       Appearance: He is well-developed.   HENT:      Head: Normocephalic and atraumatic.   Eyes:      General:         Right eye: No discharge.         Left eye: No discharge.      Pupils: Pupils are equal, round, and reactive to light.   Pulmonary:      Effort: Pulmonary effort is normal. No respiratory distress.      Comments: Chest tubes  Abdominal:      General: There is no distension.      Palpations: Abdomen is soft.      Tenderness: There is no abdominal tenderness.   Musculoskeletal:         General: No deformity.      Cervical back: Neck supple.      Comments: Deconditioned and  mild weakness   Skin:     General: Skin is warm and dry.   Neurological:      Sensory: No sensory deficit.      Motor: Weakness present. No abnormal muscle tone.      Gait: Gait abnormal.   Psychiatric:         Mood and Affect: Mood normal.         Behavior: Behavior normal.       Diagnostic Results:   Labs: Reviewed  ECG: Reviewed  X-Ray: Reviewed    Assessment/Plan:     * Recurrent pleural effusion on right  - S/p R VATS, decortication with chest tube placements x2 8/18.  -  Now on 2 L NC.     - Related to prolonged/acute hospital course.     Recommendations  -  Encourage mobility, OOB in chair at least 3 hours per day, and early ambulation as appropriate  -  PT/OT evaluate and treat  -  Pain management  -  Monitor for and prevent skin breakdown and pressure ulcers  · Early mobility, repositioning/weight shifting every 20-30 minutes when sitting, turn patient every 2 hours, proper mattress/overlay and chair cushioning, pressure relief/heel protector boots  -  DVT prophylaxis    -  Reviewed discharge options (IP rehab, SNF, HH therapy, and OP therapy)    ESRD (end stage renal disease) on dialysis  - T, Th, Sat    PM&R Recommendation:     At this time, the PM&R team has reviewed this patient's ongoing medical case including inpatient diagnosis, medical history, clinical examination, labs, vitals, current social and functional history.    We will continue to follow as a rehab candidate. Patient verbalizing that he wants to go home once medically stable. Will follow up tomorrow.     Thank you for your consult.     Michelle Araiza NP  Department of Physical Medicine & Rehab  [facility] - Cardiology Stepdown

## 2022-08-23 NOTE — PLAN OF CARE
08/23/22 1504   Post-Acute Status   Post-Acute Authorization Placement   Post-Acute Placement Status Pending medical clearance/testing     Natasha following for medical clearance.    Chacha Dooley LMSW  [facility]  [phone]

## 2022-08-23 NOTE — SUBJECTIVE & OBJECTIVE
Interval History: No issues overnight. Now on 2L NC. CT (#1) 90 cc serous output overnight, small air leak on suction. CT (#2) with no output overnight, no air leak     Medications:  Continuous Infusions:  Scheduled Meds:   sodium chloride 0.9%   Intravenous Once    albuterol-ipratropium  3 mL Nebulization Q6H    atorvastatin  40 mg Oral QHS    carvediloL  12.5 mg Oral BID    epoetin michael (PROCRIT) injection  5,000 Units Intravenous Every Tues, Thurs, Sat    furosemide  80 mg Oral Daily    naloxone  0.4 mg Intravenous Once    pantoprazole  40 mg Oral Daily    polyethylene glycol  17 g Oral BID    sodium zirconium cyclosilicate  5 g Oral TID     PRN Meds:sodium chloride, acetaminophen, dextrose 10%, dextrose 10%, glucagon (human recombinant), glucose, glucose, hydrALAZINE, insulin aspart U-100, labetalol, naloxone, ondansetron, sodium chloride 0.9%     Review of patient's allergies indicates:  No Known Allergies  Objective:     Vital Signs (Most Recent):  Temp: 97.5 °F (36.4 °C) (08/23/22 0721)  Pulse: (!) 53 (08/23/22 0732)  Resp: 18 (08/23/22 0732)  BP: (!) 155/67 (08/23/22 0721)  SpO2: 100 % (08/23/22 0732) Vital Signs (24h Range):  Temp:  [97.4 °F (36.3 °C)-97.7 °F (36.5 °C)] 97.5 °F (36.4 °C)  Pulse:  [52-68] 53  Resp:  [17-20] 18  SpO2:  [97 %-100 %] 100 %  BP: (121-168)/(60-73) 155/67     Intake/Output - Last 3 Shifts         08/21 0700 08/22 0659 08/22 0700 08/23 0659 08/23 0700 08/24 0659    P.O. 680 477     Total Intake(mL/kg) 680 (10.8) 477 (7.6)     Urine (mL/kg/hr)       Emesis/NG output 200      Other 2000      Chest Tube 285 415     Total Output 2485 415     Net -1805 +62                    SpO2: 100 %  O2 Device (Oxygen Therapy): nasal cannula    Physical Exam  Vitals and nursing note reviewed.   Constitutional:       General: He is not in acute distress.  Cardiovascular:      Rate and Rhythm: Normal rate.      Pulses: Normal pulses.   Pulmonary:      Effort: Pulmonary effort is normal. No  respiratory distress.      Comments: 2L NC. CT (#1) 415 cc SS output / 24 hrs (90 cc overnight), with 1-chamber air leak. CT (#2) 0 cc SS output overnight. Occlusive dressing placed at bedside  Abdominal:      General: There is no distension.      Palpations: Abdomen is soft.      Tenderness: There is no abdominal tenderness.   Neurological:      Mental Status: He is alert.       Significant Labs:  BMP:   Recent Labs   Lab 08/23/22 0424   *   *   K 5.4*      CO2 22*   BUN 28*   CREATININE 3.5*   CALCIUM 7.7*   MG 2.9*       CBC:   Recent Labs   Lab 08/23/22 0424   WBC 6.96   RBC 2.22*   HGB 7.3*   HCT 23.1*      *   MCH 32.9*   MCHC 31.6*       CMP:   Recent Labs   Lab 08/23/22 0424   *   CALCIUM 7.7*   ALBUMIN 2.4*   PROT 5.6*   *   K 5.4*   CO2 22*      BUN 28*   CREATININE 3.5*   ALKPHOS 134   ALT <5*   AST 22   BILITOT 0.9         Significant Diagnostics:  CXR: I have reviewed all pertinent results/findings within the past 24 hours    VTE Risk Mitigation (From admission, onward)           Ordered     IP VTE HIGH RISK PATIENT  Once         08/15/22 2005     Place sequential compression device  Until discontinued         08/15/22 2005

## 2022-08-23 NOTE — CARE UPDATE
RAPID RESPONSE NURSE ROUND       Rounding completed with charge RN, Gemma for sanguineous chest tube output, reports patient stable at this time, H/H stable. No additional concerns verbalized at this time. Instructed to call [phone] for further concerns or assistance.

## 2022-08-23 NOTE — ASSESSMENT & PLAN NOTE
- Interval history and physical exam findings as described above  - S/p left thoracotomy prior to admission  - Recurring right effusion s/p thoracentesis x2  - Chemistry on 2/4/21 suggestive of exudative effusion  - Chemistry 8/22 exudative with high LDH and low glucose. Cultures neg. Consideration of possible underlying malignancy. Cytology pending  - Thoracic surgery consulted, placement of PleurX on 8/18  - Satting well on 1L NC  - Continuing to monitor

## 2022-08-23 NOTE — PROGRESS NOTES
[facility] HEMODIALYSIS NOTE     Patient currently on hemodialysis for removal of uremic toxins and volume.     Patient seen and evaluated on hemodialysis, tolerating treatment, see HD flowsheet for vitals and assessments.    Labs have been reviewed and the dialysate bath has been adjusted.       Assessment/Plan:    -ESRD on HD   -Patient seen on HD, tolerating treatment well, w/o complaints   -UF goal of 2L as tolerated   -Renal diet, if not NPO   -Strict I/O's and daily weights  -Daily renal function panels  -Keep MAP >65 while on HD   -Maintain Hgb >7.0  -Continue epo with HD   -Will continue to follow while inpatient       RUDOLPH Reyna, AGNP-C  Nephrology  Pager:  [phone]

## 2022-08-23 NOTE — PROGRESS NOTES
Arrived patient's room for bedside HD TX.  Received report from primary nurse.  Pt. Awake, alert and responsive.  VSS.  HD started via MANFRED AV fistula.

## 2022-08-23 NOTE — PLAN OF CARE
"[facility] - Cardiology Stepdown  Discharge Reassessment    Primary Care Provider: Ko Perez MD    Expected Discharge Date: 8/26/2022    Reassessment (most recent)     Discharge Reassessment - 08/23/22 1451        Discharge Reassessment    Assessment Type Discharge Planning Brief Assessment     Did the patient's condition or plan change since previous assessment? Yes     Discharge Plan discussed with: Parent(s)     Communicated WENDY with patient/caregiver Yes     Discharge Plan A Home with family;Home Health     Discharge Plan B Rehab     DME Needed Upon Discharge  none     Discharge Barriers Identified None               CM spoke with pt who lives at home with his spouse. Informed pt that we are working on Rehab placement for him. Pt verbalized he want to go home. When discussed who will care for him at home, pt said, "my wife, she 6 year younger than me, she will care for me. My daughter  in April and move to [place] and son is in [place], I do not have any other family here."   Discussed HHC vs Rehab pt would rather go home with HHC.   BG BonillaN, RN    [phone]      "

## 2022-08-23 NOTE — HPI
Vega Brownlee is a 62-year-old male with PMHx of CHF (EF 55%), home oxygen (2 L), DM, ESRD on HD (T, Th, Sat), recurrent bilateral pleural effusions (s/p R thoracentesis 2019, 2021), hx of a left thoracotomy (empyema). Patient presented to [facility] [place]/15/22 as a transfer for loculated R pleural effusion. S/p R VATS, decortication with chest tube placements x2 8/18. Now on 2 L NC.  Enrique temp 9/5 and 9/6    -ID consulted for evaluation after fever >101 on broad spectrum antibiotics.      -No leukocytosis, BC NGTD. Suspect possible non-infectious causes of recent fever.      RECOMMENDATIONS  -Recommend routine workup of inpatient fever  -COVID testing done and negative yest  -Followup repeat blood cultures. ESRD patient so urine cultures unavailable  -Currently not on DVT prophylaxis, would recommend atleast lower extremity ultrasound, given his recent history of worsening respiratory status   -Continue Vancomycin/Cefepime with plans to deescalate pending cultures  -Monitor fever curve, leukocytes on CBC       Functional History: Patient lives with wife in a single story home with no steps to enter.  Prior to admission, [facility]. DME: none.

## 2022-08-23 NOTE — CONSULTS
Inpatient consult to Physical Medicine Rehab  Consult performed by: Michelle Araiza NP  Consult ordered by: Jacey Duke MD  Reason for consult: Assess rehab needs      Reviewed patient history and current admission.  Rehab team following.  Full consult to follow.    RUDOLPH Toney, FNP-C  Physical Medicine & Rehabilitation   08/23/2022

## 2022-08-23 NOTE — PLAN OF CARE
Plan of care discussed with patient.  Patient remains free of falls and trauma. Chest tube 1 canister exchanged, canister full. Pt continues to have air leak in chest tube canister. Patient has no complaints of pain. Discussed medications and care. Patient has no questions at this time. Will continue to monitor.       Problem: Adult Inpatient Plan of Care  Goal: Plan of Care Review  Outcome: Ongoing, Progressing  Goal: Patient-Specific Goal (Individualized)  Outcome: Ongoing, Progressing  Goal: Absence of Hospital-Acquired Illness or Injury  Outcome: Ongoing, Progressing  Goal: Optimal Comfort and Wellbeing  Outcome: Ongoing, Progressing  Goal: Readiness for Transition of Care  Outcome: Ongoing, Progressing     Problem: Diabetes Comorbidity  Goal: Blood Glucose Level Within Targeted Range  Outcome: Ongoing, Progressing     Problem: Infection  Goal: Absence of Infection Signs and Symptoms  Outcome: Ongoing, Progressing     Problem: Impaired Wound Healing  Goal: Optimal Wound Healing  Outcome: Ongoing, Progressing     Problem: Device-Related Complication Risk (Hemodialysis)  Goal: Safe, Effective Therapy Delivery  Outcome: Ongoing, Progressing     Problem: Hemodynamic Instability (Hemodialysis)  Goal: Effective Tissue Perfusion  Outcome: Ongoing, Progressing     Problem: Infection (Hemodialysis)  Goal: Absence of Infection Signs and Symptoms  Outcome: Ongoing, Progressing     Problem: Skin Injury Risk Increased  Goal: Skin Health and Integrity  Outcome: Ongoing, Progressing

## 2022-08-23 NOTE — ASSESSMENT & PLAN NOTE
Vega Brownlee is a 61 y.o. male PMH CHF (last ECHO 9/20/20 EF 55%, PA pressure 77), home oxygen (2 L), DM2, ESRD on HD (via AVF TThurSat), recurrent bilateral pleural effusions (s/p R thoracentesis 2019, 2021), hx of a left thoracotomy (empyema) presented to [facility] as a transfer for a loculated R pleural effusion. S/p R VATS, decortication with chest tube placements x2 5/18.  Now on 2 L NC.     · Will place CT x2 to water seal  · Daily CXR with chest tubes in place  · Strict monitoring of chest tube outputs  · 2L NC currently, wean as tolerated. Would recommend scheduled breathing treatments  · Pleural labs drawn yesterday  · OOB, ambualte  · Agree with rest of care. Nephrology consulted, appreciate recs.

## 2022-08-23 NOTE — PROGRESS NOTES
[facility] - Cardiology Stepdown  Thoracic Surgery  Progress Note    Subjective:       Post-Op Info:  Procedure(s) (LRB):  VATS, WITH DECORTICATION, LUNG (Right)  BLOCK, NERVE, INTERCOSTAL, 2 OR MORE (Right)  BRONCHOSCOPY, FLEXIBLE (N/A)  INSERTION, CATHETER, INTERCOSTAL, FOR DRAINAGE (Right)   5 Days Post-Op     Interval History: No issues overnight. Now on 2L NC. CT (#1) 90 cc serous output overnight, small air leak on suction. CT (#2) with no output overnight, no air leak. H/H 7.3/23.1.     Medications:  Continuous Infusions:  Scheduled Meds:   sodium chloride 0.9%   Intravenous Once    albuterol-ipratropium  3 mL Nebulization Q6H    atorvastatin  40 mg Oral QHS    carvediloL  12.5 mg Oral BID    epoetin michael (PROCRIT) injection  5,000 Units Intravenous Every Tues, Thurs, Sat    furosemide  80 mg Oral Daily    naloxone  0.4 mg Intravenous Once    pantoprazole  40 mg Oral Daily    polyethylene glycol  17 g Oral BID    sodium zirconium cyclosilicate  5 g Oral TID     PRN Meds:sodium chloride, acetaminophen, dextrose 10%, dextrose 10%, glucagon (human recombinant), glucose, glucose, hydrALAZINE, insulin aspart U-100, labetalol, naloxone, ondansetron, sodium chloride 0.9%     Review of patient's allergies indicates:  No Known Allergies  Objective:     Vital Signs (Most Recent):  Temp: 97.5 °F (36.4 °C) (08/23/22 0721)  Pulse: (!) 53 (08/23/22 0732)  Resp: 18 (08/23/22 0732)  BP: (!) 155/67 (08/23/22 0721)  SpO2: 100 % (08/23/22 0732) Vital Signs (24h Range):  Temp:  [97.4 °F (36.3 °C)-97.7 °F (36.5 °C)] 97.5 °F (36.4 °C)  Pulse:  [52-68] 53  Resp:  [17-20] 18  SpO2:  [97 %-100 %] 100 %  BP: (121-168)/(60-73) 155/67     Intake/Output - Last 3 Shifts         08/21 0700 08/22 0659 08/22 0700 08/23 0659 08/23 0700 08/24 0659    P.O. 680 477     Total Intake(mL/kg) 680 (10.8) 477 (7.6)     Urine (mL/kg/hr)       Emesis/NG output 200      Other 2000      Chest Tube 285 415     Total Output 1971 415     Net -1803  +62                    SpO2: 100 %  O2 Device (Oxygen Therapy): nasal cannula    Physical Exam  Vitals and nursing note reviewed.   Constitutional:       General: He is not in acute distress.  Cardiovascular:      Rate and Rhythm: Normal rate.      Pulses: Normal pulses.   Pulmonary:      Effort: Pulmonary effort is normal. No respiratory distress.      Comments: 2L NC. CT (#1) 415 cc SS output / 24 hrs (90 cc overnight), with 1-chamber air leak. CT (#2) 0 cc SS output overnight. Occlusive dressing placed at bedside  Abdominal:      General: There is no distension.      Palpations: Abdomen is soft.      Tenderness: There is no abdominal tenderness.   Neurological:      Mental Status: He is alert.       Significant Labs:  BMP:   Recent Labs   Lab 08/23/22  0424   *   *   K 5.4*      CO2 22*   BUN 28*   CREATININE 3.5*   CALCIUM 7.7*   MG 2.9*       CBC:   Recent Labs   Lab 08/23/22 0424   WBC 6.96   RBC 2.22*   HGB 7.3*   HCT 23.1*      *   MCH 32.9*   MCHC 31.6*       CMP:   Recent Labs   Lab 08/23/22  0424   *   CALCIUM 7.7*   ALBUMIN 2.4*   PROT 5.6*   *   K 5.4*   CO2 22*      BUN 28*   CREATININE 3.5*   ALKPHOS 134   ALT <5*   AST 22   BILITOT 0.9         Significant Diagnostics:  CXR: I have reviewed all pertinent results/findings within the past 24 hours    VTE Risk Mitigation (From admission, onward)           Ordered     IP VTE HIGH RISK PATIENT  Once         08/15/22 2005     Place sequential compression device  Until discontinued         08/15/22 2005                  Assessment/Plan:     * Recurrent pleural effusion on right  Vega Brownlee is a 61 y.o. male PMH CHF (last ECHO 9/20/20 EF 55%, PA pressure 77), home oxygen (2 L), DM2, ESRD on HD (via AVF TThurSat), recurrent bilateral pleural effusions (s/p R thoracentesis 2019, 2021), hx of a left thoracotomy (empyema) presented to [facility] as a transfer for a loculated R pleural effusion. S/p R VATS,  decortication with chest tube placements x2 5/18.  Now on 2 L NC.     · Will place CT x2 to water seal  · Daily CXR with chest tubes in place  · Strict monitoring of chest tube outputs  · 2L NC currently, wean as tolerated. Would recommend scheduled breathing treatments  · Pleural labs drawn yesterday  · OOB, ambualte  · Agree with rest of care. Nephrology consulted, appreciate recs.           Margoth Mota MD  Thoracic Surgery  [facility] - Cardiology Stepdown

## 2022-08-23 NOTE — PROGRESS NOTES
"[facility] Medicine  Progress Note    Patient Name: Vega Brownlee  MRN: [id number]  Patient Class: IP- Inpatient   Admission Date: 8/15/2022  Length of Stay: 8 days  Attending Physician: Jacey Duke MD  Primary Care Provider: Ko Perez MD        Subjective:     Principal Problem:Recurrent pleural effusion on right        HPI:  HPI per :  Patient is a 61 y.o. 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 Patient has history of recurrent pleural effusion and was sent to ED for worsening symptoms. IR was consulted and attempted US-guided therapeutic right-sided thoracentesis. Per IR "US reveals an extensively loculated Rt effusion with innumerable isolated pockets with sonographic appearance suggestive of a large hematoma/hemothorax. Only able to remove 50-cc of old, dark blood products also consistent with large Rt hemothorax. Pt would likely require large-bore chest tube placement and instillation of lytics if attempts will be made to resolve the hemothorax vs VATS." Facility seeking transfer for CTS evaluation. Patient is stable on med tele unit only requiring 3L NC with no distress. Stable for transfer.      Overview/Hospital Course:  [facility] Medicine Course:  IR was consulted and attempted US-guided therapeutic right-sided thoracentesis. Per IR "US reveals an extensively loculated Rt effusion with innumerable isolated pockets with sonographic appearance suggestive of a large hematoma/hemothorax. Only able to remove 50-cc of old, dark blood products also consistent with large Rt hemothorax. Pt would likely require large-bore chest tube " "placement and instillation of lytics if attempts will be made to resolve the hemothorax vs VATS."  Patient accepted to [facility], in queue to transfer asap. Stable vitals. Will allow to eat and make NPO again at midnight. Lokelma for K 5.8.     [facility] Medicine Course:  Pt accepted to [facility] [place]. Stable respiratory status into 8/17. Rising BP and hyperkalemia noted; PRN IV hydralazine and PO lokelma provided, nephrology following for HD needs. Evaluated by thoracic surgery, with placement of PleurX catheter on 8/18. Required 2u pRBC after the procedure, with stable low Hgb since that time. Pleural fluid exudative, with elevated LDH and low glucose. Cultures negative.       Interval History: feels better this morning. More energy. Denies any shortness of breath or chest pain. Asking again about when he can go home.    Review of Systems   Constitutional:  Negative for fever.   Respiratory:  Negative for shortness of breath.    Cardiovascular:  Negative for chest pain.   Gastrointestinal:  Negative for abdominal pain, constipation, nausea and vomiting.   Neurological:  Negative for headaches.   Objective:     Vital Signs (Most Recent):  Temp: 97.6 °F (36.4 °C) (08/22/22 1121)  Pulse: (!) 58 (08/22/22 1512)  Resp: 20 (08/22/22 1226)  BP: (!) 168/73 (08/22/22 1121)  SpO2: 100 % (08/22/22 1226)   Vital Signs (24h Range):  Temp:  [97.5 °F (36.4 °C)-98.1 °F (36.7 °C)] 97.6 °F (36.4 °C)  Pulse:  [54-69] 58  Resp:  [14-20] 20  SpO2:  [100 %] 100 %  BP: (111-168)/(52-73) 168/73     Weight: 63 kg (138 lb 14.2 oz)  Body mass index is 21.12 kg/m².    Intake/Output Summary (Last 24 hours) at 8/22/2022 1911  Last data filed at 8/22/2022 1900  Gross per 24 hour   Intake 477 ml   Output 2450 ml   Net -1973 ml      Physical Exam  Vitals reviewed.   Constitutional:       General: He is not in acute distress.     Appearance: He is ill-appearing.   HENT:      Head: Normocephalic and atraumatic.      Mouth/Throat:      Mouth: Mucous membranes are " moist.   Eyes:      Extraocular Movements: Extraocular movements intact.      Conjunctiva/sclera: Conjunctivae normal.      Pupils: Pupils are equal, round, and reactive to light.   Cardiovascular:      Rate and Rhythm: Normal rate and regular rhythm.   Pulmonary:      Effort: Pulmonary effort is normal. No respiratory distress.      Comments: Rhonchi and crackles appreciated on R anterior chest. Decreased breath sounds on the R  Abdominal:      General: Bowel sounds are normal. There is no distension.      Palpations: Abdomen is soft.   Musculoskeletal:      Comments: R chest tube in place   Skin:     General: Skin is warm and dry.      Capillary Refill: Capillary refill takes less than 2 seconds.   Neurological:      General: No focal deficit present.       Significant Labs: All pertinent labs within the past 24 hours have been reviewed.    Significant Imaging: I have reviewed all pertinent imaging results/findings within the past 24 hours.      Assessment/Plan:      * Recurrent pleural effusion on right  - Interval history and physical exam findings as described above  - S/p left thoracotomy prior to admission  - Recurring right effusion s/p thoracentesis x2  - Chemistry on 2/4/21 suggestive of exudative effusion  - Chemistry 8/22 exudative with high LDH and low glucose. Cultures neg. Consideration of possible underlying malignancy. Cytology pending  - Thoracic surgery consulted, placement of PleurX on 8/18  - Satting well on 1L NC  - Continuing to monitor    Hypophosphatemia  Phos <1 8/22am  Replete with NaPhos 15mmol. Improved      Thrombocytopenia  - Per chart review, appears to be a chronic issue  - Monitoring on daily labs    Chronic diastolic heart failure  - Remains grossly asymptomatic, euvolemic, not in acute exacerbation  - Continue home cardioprudent regimen  - Monitoring on tele    Chronic kidney disease-mineral and bone disorder        Anemia in ESRD (end-stage renal disease)  - H/H stable near  baseline on admission, with drop after pleurex catheter placement 8/18. S/p 2u pRBC  - type and screen done  - transfuse for H/H 7/21  - Will continue to monitor on daily labs    ESRD (end stage renal disease) on dialysis  - Interval history and physical exam findings as described above  - Nephology following  - Further decision for HD per nephology  - Renally dosing all medications  - Avoid nephrotoxins  - Will continue to monitor on daily labs    Mixed hyperlipidemia associated with type 2 diabetes mellitus  - Continue home statin regimen    Type 2 diabetes mellitus with chronic kidney disease on chronic dialysis, without long-term current use of insulin  - Working to optimize BG control  - SSI provided for corrective dosing  - DXTs as ordered  - Hypoglycemic protocol in effect  - Diabetic diet provided    Hypertension associated with ESRD caused by type 2 diabetes mellitus, on dialysis  - Working to optimize BP control   - Continue home cardioprudent regimen  - HD to assist with BP control  - PRN IV hydralazine and/or IV labetalol provided for SBP>180  - Will continue to monitor and further titrate antihypertensives as clinically indicated     Hyperkalemia  - Improving, continue Lokelma TID and reassess daily  - Nephrology following, HD to assist with electrolyte anomalies  - Monitoring on tele      VTE Risk Mitigation (From admission, onward)         Ordered     IP VTE HIGH RISK PATIENT  Once         08/15/22 2005     Place sequential compression device  Until discontinued         08/15/22 2005                Discharge Planning   WENDY: 8/26/2022     Code Status: Full Code   Is the patient medically ready for discharge?: No    Reason for patient still in hospital (select all that apply): Patient unstable  Discharge Plan A: Home with family, Home Health                  Jacey Duke MD  Department of Hospital Medicine   [facility] - [facility]

## 2022-08-23 NOTE — PLAN OF CARE
Problem: Adult Inpatient Plan of Care  Goal: Plan of Care Review  Outcome: Ongoing, Progressing  Goal: Patient-Specific Goal (Individualized)  Outcome: Ongoing, Progressing  Goal: Absence of Hospital-Acquired Illness or Injury  Outcome: Ongoing, Progressing  Goal: Optimal Comfort and Wellbeing  Outcome: Ongoing, Progressing  Goal: Readiness for Transition of Care  Outcome: Ongoing, Progressing     Problem: Diabetes Comorbidity  Goal: Blood Glucose Level Within Targeted Range  Outcome: Ongoing, Progressing     Problem: Infection  Goal: Absence of Infection Signs and Symptoms  Outcome: Ongoing, Progressing     Problem: Impaired Wound Healing  Goal: Optimal Wound Healing  Outcome: Ongoing, Progressing     Problem: Device-Related Complication Risk (Hemodialysis)  Goal: Safe, Effective Therapy Delivery  Outcome: Ongoing, Progressing     Problem: Hemodynamic Instability (Hemodialysis)  Goal: Effective Tissue Perfusion  Outcome: Ongoing, Progressing     Problem: Infection (Hemodialysis)  Goal: Absence of Infection Signs and Symptoms  Outcome: Ongoing, Progressing     Problem: Skin Injury Risk Increased  Goal: Skin Health and Integrity  Outcome: Ongoing, Progressing     Patient resting comfortably in bed. Answered all questions appropriately. No complaints of pain nor have any other concerns. Chest tubes in tact. No drainage. No bleeding noted. Vitals stable. Bed in lowest position, call light within reach and Patient/wife verbalized understanding with plan of care. Will continue to monitor.

## 2022-08-24 PROBLEM — R53.81 PHYSICAL DECONDITIONING: Status: ACTIVE | Noted: 2022-01-01

## 2022-08-24 NOTE — PLAN OF CARE
Plan of care discussed with patient.  Patient remains free of falls and trauma,  fall precautions in place. Chest tube 1 and 2 to water seal. No output on #2, and 40cc out on #1. Patient has no complaints of pain. Discussed medications and care. Patient has no questions at this time. Will continue to monitor.       Problem: Adult Inpatient Plan of Care  Goal: Plan of Care Review  Outcome: Ongoing, Progressing  Goal: Patient-Specific Goal (Individualized)  Outcome: Ongoing, Progressing  Goal: Absence of Hospital-Acquired Illness or Injury  Outcome: Ongoing, Progressing  Goal: Optimal Comfort and Wellbeing  Outcome: Ongoing, Progressing  Goal: Readiness for Transition of Care  Outcome: Ongoing, Progressing     Problem: Diabetes Comorbidity  Goal: Blood Glucose Level Within Targeted Range  Outcome: Ongoing, Progressing     Problem: Infection  Goal: Absence of Infection Signs and Symptoms  Outcome: Ongoing, Progressing     Problem: Impaired Wound Healing  Goal: Optimal Wound Healing  Outcome: Ongoing, Progressing     Problem: Device-Related Complication Risk (Hemodialysis)  Goal: Safe, Effective Therapy Delivery  Outcome: Ongoing, Progressing     Problem: Hemodynamic Instability (Hemodialysis)  Goal: Effective Tissue Perfusion  Outcome: Ongoing, Progressing     Problem: Infection (Hemodialysis)  Goal: Absence of Infection Signs and Symptoms  Outcome: Ongoing, Progressing     Problem: Skin Injury Risk Increased  Goal: Skin Health and Integrity  Outcome: Ongoing, Progressing

## 2022-08-24 NOTE — ASSESSMENT & PLAN NOTE
- H/H stable near baseline on admission, with drop after pleurex catheter placement 8/18. S/p 2u pRBC 8/19. S/p 1u pRBC 8/23, no active bleeding   - type and screen done  - transfuse for H/H 7/21  - Will continue to monitor on daily labs

## 2022-08-24 NOTE — ASSESSMENT & PLAN NOTE
- Interval history and physical exam findings as described above  - S/p left thoracotomy prior to admission  - Recurring right effusion s/p thoracentesis x2  - Chemistry on 2/4/21 suggestive of exudative effusion  - Chemistry 8/22 exudative with high LDH and low glucose. Cultures neg. Unclear significance with prior manipulation, but new CT findings must consider possible underlying malignancy. Will need ongoing outpatient work up. Cytology pending  - Thoracic surgery consulted, placement of PleurX on 8/18     - s/p chest tube placement x2. One removed 8/24. Follow daily Xray  - Satting well on 1L NC  - Continuing to monitor

## 2022-08-24 NOTE — PROGRESS NOTES
08/23/22 1915   Vital Signs   Temp 97.3 °F (36.3 °C)   Temp src Axillary   Pulse (!) 53   /61   Assessments (Pre/Post)   Blood Liters Processed (BLP) 70   Level of Consciousness (AVPU) alert   Dialyzer Clearance mildly streaked   Pre-Hemodialysis Assessment   Treatment Status Completed        Hemodialysis AV Fistula Right upper arm   No Placement Date or Time found.   Present Prior to Hospital Arrival?: Yes  Location: Right upper arm   Site Assessment Clean;Dry;Intact   Patency Present;Thrill;Bruit   Status Deaccessed   Site Condition No complications   Dressing Gauze   During Hemodialysis Assessment   UF Removed (mL) 2500 mL   Post-Hemodialysis Assessment   Rinseback Volume (mL) 250 mL   Blood Volume Processed (Liters) 70 L   Dialyzer Clearance Lightly streaked   Total UF (mL) 2500 mL   Net Fluid Removal 1650   Patient Response to Treatment Tolerated well.   Post-Hemodialysis Comments Treatment complete, blood returned, needles pulled x2 and pressure dressings applied.

## 2022-08-24 NOTE — SUBJECTIVE & OBJECTIVE
Interval History: No issues overnight. Dialysis last night. CT #1 with minimal output overnight, no output from CT #2. Patient reports feeling well, no dyspnea or SOB    Medications:  Continuous Infusions:  Scheduled Meds:   [START ON 8/25/2022] sodium chloride 0.9%   Intravenous Once    albuterol-ipratropium  3 mL Nebulization Q6H    atorvastatin  40 mg Oral QHS    carvediloL  12.5 mg Oral BID    epoetin michael (PROCRIT) injection  5,000 Units Intravenous Every Tues, Thurs, Sat    furosemide  80 mg Oral Daily    naloxone  0.4 mg Intravenous Once    pantoprazole  40 mg Oral Daily    polyethylene glycol  17 g Oral BID    potassium phosphate IVPB  15 mmol Intravenous Once    sodium zirconium cyclosilicate  5 g Oral TID     PRN Meds:sodium chloride, acetaminophen, dextrose 10%, dextrose 10%, glucagon (human recombinant), glucose, glucose, hydrALAZINE, hydrOXYzine HCL, insulin aspart U-100, labetalol, naloxone, ondansetron, sodium chloride 0.9%     Review of patient's allergies indicates:  No Known Allergies  Objective:     Vital Signs (Most Recent):  Temp: 96.2 °F (35.7 °C) (08/24/22 0744)  Pulse: (!) 54 (08/24/22 0811)  Resp: 16 (08/24/22 0811)  BP: (!) 160/72 (08/24/22 0744)  SpO2: 96 % (08/24/22 0811)   Vital Signs (24h Range):  Temp:  [96.2 °F (35.7 °C)-98.1 °F (36.7 °C)] 96.2 °F (35.7 °C)  Pulse:  [53-73] 54  Resp:  [16-18] 16  SpO2:  [96 %-100 %] 96 %  BP: (126-161)/(56-72) 160/72     Intake/Output - Last 3 Shifts         08/22 0700 08/23 0659 08/23 0700 08/24 0659 08/24 0700 08/25 0659    P.O. 477      Blood  3291.2     Total Intake(mL/kg) 477 (7.6) 3291.2 (59)     Emesis/NG output       Other  2500     Chest Tube 415      Total Output 415 2500     Net +62 +791.2            Stool Occurrence  1 x             SpO2: 96 %  O2 Device (Oxygen Therapy): nasal cannula    Physical Exam  Vitals and nursing note reviewed.   Constitutional:       General: He is not in acute distress.  Cardiovascular:      Rate and Rhythm:  Normal rate.      Pulses: Normal pulses.   Pulmonary:      Effort: Pulmonary effort is normal. No respiratory distress.      Comments: R CT (#1) with minimal SS output, small intermittent air leak on FE. R CT(#2) with no output overnight, no air leak  Abdominal:      General: There is no distension.      Palpations: Abdomen is soft.      Tenderness: There is no abdominal tenderness.   Neurological:      General: No focal deficit present.      Mental Status: He is alert and oriented to person, place, and time.       Significant Labs:  BMP:   Recent Labs   Lab 08/24/22  0248   *      K 3.8      CO2 25   BUN 11   CREATININE 2.0*   CALCIUM 7.7*   MG 2.3     CBC:   Recent Labs   Lab 08/24/22  0248   WBC 6.22   RBC 2.62*   HGB 8.2*   HCT 25.2*   *   MCV 96   MCH 31.3*   MCHC 32.5     CMP:   Recent Labs   Lab 08/23/22  0424 08/24/22  0248   * 122*   CALCIUM 7.7* 7.7*   ALBUMIN 2.4* 2.3*   PROT 5.6*  --    * 136   K 5.4* 3.8   CO2 22* 25    105   BUN 28* 11   CREATININE 3.5* 2.0*   ALKPHOS 134  --    ALT <5*  --    AST 22  --    BILITOT 0.9  --        Significant Diagnostics:  CXR: I have reviewed all pertinent results/findings within the past 24 hours    VTE Risk Mitigation (From admission, onward)           Ordered     IP VTE HIGH RISK PATIENT  Once         08/15/22 2005     Place sequential compression device  Until discontinued         08/15/22 2005

## 2022-08-24 NOTE — PROGRESS NOTES
[facility]iology Stepdown  Physical Medicine & Rehab  Progress Note    Patient Name: Vega Brownlee  MRN: [id number]  Admission Date: 8/15/2022  Length of Stay: 9 days  Attending Physician: Jacey Duke MD    Subjective:     Principal Problem:Recurrent pleural effusion on right    Hospital Course:   8/22/22: Participated w/ PT. Bed mob SBA- Delia. Sit to stand Delia. Ambulated 2 side steps to right + 4 side steps to right (seated rest break between trials due to fatigue) modA- HHA.       Interval History 8/24/2022:  Patient is seen for follow-up PM&R evaluation and recommendations: 1 chest tube remains. Participating with therapy.     HPI, Past Medical, Family, and Social History remains the same as documented in the initial encounter.    Scheduled Medications:    [START ON 8/25/2022] sodium chloride 0.9%   Intravenous Once    albuterol-ipratropium  3 mL Nebulization Q6H    atorvastatin  40 mg Oral QHS    carvediloL  12.5 mg Oral BID    epoetin michael (PROCRIT) injection  5,000 Units Intravenous Every Tues, Thurs, Sat    furosemide  80 mg Oral Daily    naloxone  0.4 mg Intravenous Once    pantoprazole  40 mg Oral Daily    polyethylene glycol  17 g Oral BID    potassium phosphate IVPB  15 mmol Intravenous Once    sodium zirconium cyclosilicate  5 g Oral TID       Diagnostic Results: Labs: Reviewed  ECG: Reviewed  CT: Reviewed    PRN Medications: sodium chloride, acetaminophen, dextrose 10%, dextrose 10%, glucagon (human recombinant), glucose, glucose, hydrALAZINE, hydrOXYzine HCL, insulin aspart U-100, labetalol, naloxone, ondansetron, sodium chloride 0.9%    Review of Systems   Constitutional:  Positive for activity change. Negative for fatigue and fever.   HENT:  Negative for trouble swallowing and voice change.    Respiratory:  Negative for cough and shortness of breath.    Cardiovascular:  Negative for chest pain and leg swelling.   Gastrointestinal:  Negative for abdominal distention and abdominal  pain.   Genitourinary:  Negative for difficulty urinating and flank pain.   Musculoskeletal:  Positive for gait problem. Negative for back pain.   Skin:  Negative for color change and rash.   Neurological:  Positive for weakness. Negative for speech difficulty and numbness.   Psychiatric/Behavioral:  Negative for agitation and confusion.    Objective:     Vital Signs (Most Recent):  Temp: 96.2 °F (35.7 °C) (08/24/22 0744)  Pulse: (!) 54 (08/24/22 0811)  Resp: 16 (08/24/22 0811)  BP: (!) 160/72 (08/24/22 0744)  SpO2: 96 % (08/24/22 0811)      Vital Signs (24h Range):  Temp:  [96.2 °F (35.7 °C)-98.1 °F (36.7 °C)] 96.2 °F (35.7 °C)  Pulse:  [53-73] 54  Resp:  [16-18] 16  SpO2:  [96 %-100 %] 96 %  BP: (126-161)/(56-72) 160/72     Physical Exam  Vitals and nursing note reviewed.   Constitutional:       Appearance: He is well-developed.   HENT:      Head: Normocephalic and atraumatic.   Eyes:      General:         Right eye: No discharge.         Left eye: No discharge.      Pupils: Pupils are equal, round, and reactive to light.   Pulmonary:      Effort: Pulmonary effort is normal. No respiratory distress.      Comments: Chest tube x 1   Abdominal:      General: There is no distension.      Palpations: Abdomen is soft.      Tenderness: There is no abdominal tenderness.   Musculoskeletal:         General: No deformity.      Cervical back: Neck supple.      Comments: Deconditioned and mild weakness   Skin:     General: Skin is warm and dry.   Neurological:      Sensory: No sensory deficit.      Motor: Weakness present. No abnormal muscle tone.      Gait: Gait abnormal.   Psychiatric:         Mood and Affect: Mood normal.         Behavior: Behavior normal.     Assessment/Plan:      * Recurrent pleural effusion on right  - S/p R VATS, decortication with chest tube placements x2 8/18.  -  Now on 2 L NC.     Physical deconditioning  - Related to prolonged/acute hospital course.     Recommendations  -  Encourage mobility, OOB  in chair at least 3 hours per day, and early ambulation as appropriate  -  PT/OT evaluate and treat  -  Pain management  -  Monitor for and prevent skin breakdown and pressure ulcers  · Early mobility, repositioning/weight shifting every 20-30 minutes when sitting, turn patient every 2 hours, proper mattress/overlay and chair cushioning, pressure relief/heel protector boots  -  DVT prophylaxis    -  Reviewed discharge options (IP rehab, SNF, HH therapy, and OP therapy)    ESRD (end stage renal disease) on dialysis  - T, Th, Sat    PM&R Recommendation:     At this time, the PM&R team has reviewed this patient's ongoing medical case including inpatient diagnosis, medical history, clinical examination, labs, vitals, current social and functional history to provide the post-acute recommendation as follows:     RECOMMENDATIONS: inpatient rehabilitation due to good motivation/participation with therapies and good potential for recovery.    MEDICAL STABILITY:     At this time, barriers for post-acute rehab admission removal of last chest tube and continued improvement in SOB.     We will continue to follow.     Michelle Araiza NP  Department of Physical Medicine & Rehab   [facility]

## 2022-08-24 NOTE — ASSESSMENT & PLAN NOTE
Vega Brownlee is a 61 y.o. male PMH CHF (last ECHO 9/20/20 EF 55%, PA pressure 77), home oxygen (2 L), DM2, ESRD on HD (via AVF TThurSat), recurrent bilateral pleural effusions (s/p R thoracentesis 2019, 2021), hx of a left thoracotomy (empyema) presented to [facility] [place] as a transfer for a loculated R pleural effusion. S/p R VATS, decortication with chest tube placements x2 5/18.  Now on 2 L NC.      CT#2 with minimal output in >1 days with no air leak - removed at bedside today. Will keep CT#1 to WS today, possible clamp versus remove tomorrow.    · CT#1 to water seal  · Daily CXR with chest tubes in place  · Strict monitoring of chest tube outputs  · 2L NC currently, wean as tolerated. Would recommend scheduled breathing treatments  · Pleural labs drawn yesterday  · OOB, ambualte  · Agree with rest of care. Nephrology consulted, appreciate recs.

## 2022-08-24 NOTE — SUBJECTIVE & OBJECTIVE
Interval History 8/24/2022:  Patient is seen for follow-up PM&R evaluation and recommendations: 1 chest tube remains. Participating with therapy.     HPI, Past Medical, Family, and Social History remains the same as documented in the initial encounter.    Scheduled Medications:    [START ON 8/25/2022] sodium chloride 0.9%   Intravenous Once    albuterol-ipratropium  3 mL Nebulization Q6H    atorvastatin  40 mg Oral QHS    carvediloL  12.5 mg Oral BID    epoetin michael (PROCRIT) injection  5,000 Units Intravenous Every Tues, Thurs, Sat    furosemide  80 mg Oral Daily    naloxone  0.4 mg Intravenous Once    pantoprazole  40 mg Oral Daily    polyethylene glycol  17 g Oral BID    potassium phosphate IVPB  15 mmol Intravenous Once    sodium zirconium cyclosilicate  5 g Oral TID       Diagnostic Results: Labs: Reviewed  ECG: Reviewed  CT: Reviewed    PRN Medications: sodium chloride, acetaminophen, dextrose 10%, dextrose 10%, glucagon (human recombinant), glucose, glucose, hydrALAZINE, hydrOXYzine HCL, insulin aspart U-100, labetalol, naloxone, ondansetron, sodium chloride 0.9%    Review of Systems   Constitutional:  Positive for activity change. Negative for fatigue and fever.   HENT:  Negative for trouble swallowing and voice change.    Respiratory:  Negative for cough and shortness of breath.    Cardiovascular:  Negative for chest pain and leg swelling.   Gastrointestinal:  Negative for abdominal distention and abdominal pain.   Genitourinary:  Negative for difficulty urinating and flank pain.   Musculoskeletal:  Positive for gait problem. Negative for back pain.   Skin:  Negative for color change and rash.   Neurological:  Positive for weakness. Negative for speech difficulty and numbness.   Psychiatric/Behavioral:  Negative for agitation and confusion.    Objective:     Vital Signs (Most Recent):  Temp: 96.2 °F (35.7 °C) (08/24/22 0744)  Pulse: (!) 54 (08/24/22 0811)  Resp: 16 (08/24/22 0811)  BP: (!) 160/72 (08/24/22  0744)  SpO2: 96 % (08/24/22 0811)      Vital Signs (24h Range):  Temp:  [96.2 °F (35.7 °C)-98.1 °F (36.7 °C)] 96.2 °F (35.7 °C)  Pulse:  [53-73] 54  Resp:  [16-18] 16  SpO2:  [96 %-100 %] 96 %  BP: (126-161)/(56-72) 160/72     Physical Exam  Vitals and nursing note reviewed.   Constitutional:       Appearance: He is well-developed.   HENT:      Head: Normocephalic and atraumatic.   Eyes:      General:         Right eye: No discharge.         Left eye: No discharge.      Pupils: Pupils are equal, round, and reactive to light.   Pulmonary:      Effort: Pulmonary effort is normal. No respiratory distress.      Comments: Chest tube x 1   Abdominal:      General: There is no distension.      Palpations: Abdomen is soft.      Tenderness: There is no abdominal tenderness.   Musculoskeletal:         General: No deformity.      Cervical back: Neck supple.      Comments: Deconditioned and mild weakness   Skin:     General: Skin is warm and dry.   Neurological:      Sensory: No sensory deficit.      Motor: Weakness present. No abnormal muscle tone.      Gait: Gait abnormal.   Psychiatric:         Mood and Affect: Mood normal.         Behavior: Behavior normal.     NEUROLOGICAL EXAMINATION:     CRANIAL NERVES     CN III, IV, VI   Pupils are equal, round, and reactive to light.

## 2022-08-24 NOTE — PROGRESS NOTES
"[facility] - Cardiology [facility] Medicine  Progress Note    Patient Name: Vega Brownlee  MRN: [id number]  Patient Class: IP- Inpatient   Admission Date: 8/15/2022  Length of Stay: 9 days  Attending Physician: Jacey Duke MD  Primary Care Provider: Ko Perez MD        Subjective:     Principal Problem:Recurrent pleural effusion on right        HPI:  HPI per :  Patient is a 61 y.o. 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 Patient has history of recurrent pleural effusion and was sent to ED for worsening symptoms. IR was consulted and attempted US-guided therapeutic right-sided thoracentesis. Per IR "US reveals an extensively loculated Rt effusion with innumerable isolated pockets with sonographic appearance suggestive of a large hematoma/hemothorax. Only able to remove 50-cc of old, dark blood products also consistent with large Rt hemothorax. Pt would likely require large-bore chest tube placement and instillation of lytics if attempts will be made to resolve the hemothorax vs VATS." Facility seeking transfer for CTS evaluation. Patient is stable on med tele unit only requiring 3L NC with no distress. Stable for transfer.      Overview/Hospital Course:  [facility] Medicine Course:  IR was consulted and attempted US-guided therapeutic right-sided thoracentesis. Per IR "US reveals an extensively loculated Rt effusion with innumerable isolated pockets with sonographic appearance suggestive of a large hematoma/hemothorax. Only able to remove 50-cc of old, dark blood products also consistent with large Rt hemothorax. Pt would likely require large-bore chest tube " "placement and instillation of lytics if attempts will be made to resolve the hemothorax vs VATS."  Patient accepted to [facility], in queue to transfer asap. Stable vitals. Will allow to eat and make NPO again at midnight. Lokelma for K 5.8.     [facility] Medicine Course:  Pt accepted to [facility], Nephrology following for HD needs. Evaluated by thoracic surgery, with placement of PleurX catheter x2 on 8/18. Required 2u pRBC after the procedure, with stable low Hgb since that time. Pleural fluid exudative, with elevated LDH and low glucose. Cultures negative. Cytology pending. Interpretation is difficult with prior manipulation with traumatic thoras in the past. However, with new CT chest findings would certainly benefit from outpatient malignancy eval with follow up CT chest in 3 months and possible bx pending results. Awaiting stabilization and chest tube removal, then likely will need inpatient rehab if patient amenable       Interval History: Voices no concerns today. Feels better. Required 1u pRBC overnight for Hgb 6.9. No significant bleeding noted. Is wanting to get up and out of bed today. CT surgery pulled one of the chest tubes this morning.     Review of Systems   Constitutional:  Negative for fever.   Respiratory:  Negative for shortness of breath.    Cardiovascular:  Negative for chest pain.   Gastrointestinal:  Negative for abdominal pain, constipation, nausea and vomiting.   Neurological:  Negative for headaches.   Objective:     Vital Signs (Most Recent):  Temp: 96.2 °F (35.7 °C) (08/24/22 0744)  Pulse: (!) 54 (08/24/22 0811)  Resp: 16 (08/24/22 0811)  BP: (!) 160/72 (08/24/22 0744)  SpO2: 96 % (08/24/22 0811)   Vital Signs (24h Range):  Temp:  [96.2 °F (35.7 °C)-98.1 °F (36.7 °C)] 96.2 °F (35.7 °C)  Pulse:  [53-73] 54  Resp:  [16-18] 16  SpO2:  [96 %-100 %] 96 %  BP: (126-161)/(56-72) 160/72     Weight: 55.8 kg (123 lb 0.3 oz)  Body mass index is 18.7 kg/m².    Intake/Output Summary (Last 24 hours) at " 8/24/2022 1001  Last data filed at 8/23/2022 2330  Gross per 24 hour   Intake 3291.17 ml   Output 2500 ml   Net 791.17 ml      Physical Exam  Vitals reviewed.   Constitutional:       General: He is not in acute distress.     Comments: Chronically ill appearing   HENT:      Head: Normocephalic and atraumatic.      Mouth/Throat:      Mouth: Mucous membranes are moist.   Eyes:      Extraocular Movements: Extraocular movements intact.      Conjunctiva/sclera: Conjunctivae normal.   Cardiovascular:      Rate and Rhythm: Normal rate and regular rhythm.   Pulmonary:      Effort: Pulmonary effort is normal. No respiratory distress.      Comments: Rhonchi and crackles appreciated on R anterior chest. Decreased breath sounds on the R  Abdominal:      General: There is no distension.      Palpations: Abdomen is soft.   Musculoskeletal:      Comments: R chest tube in place   Skin:     General: Skin is warm and dry.      Capillary Refill: Capillary refill takes less than 2 seconds.   Neurological:      General: No focal deficit present.       Significant Labs: All pertinent labs within the past 24 hours have been reviewed.    Significant Imaging: I have reviewed all pertinent imaging results/findings within the past 24 hours.      Assessment/Plan:      * Recurrent pleural effusion on right  - Interval history and physical exam findings as described above  - S/p left thoracotomy prior to admission  - Recurring right effusion s/p thoracentesis x2  - Chemistry on 2/4/21 suggestive of exudative effusion  - Chemistry 8/22 exudative with high LDH and low glucose. Cultures neg. Unclear significance with prior manipulation, but new CT findings must consider possible underlying malignancy. Will need ongoing outpatient work up. Cytology pending  - Thoracic surgery consulted, placement of PleurX on 8/18     - s/p chest tube placement x2. One removed 8/24. Follow daily Xray  - Satting well on 1L NC  - Continuing to monitor    Physical  deconditioning  Rehab medicine following. Likely candidate for inpatient rehab when medically clear for DC      Hypophosphatemia  Phos <1 8/22am. Repleted with NaPhos 15mmol  Phos 1.6 8/24am. Repleted with NaPhos 15mmol      Thrombocytopenia  - Per chart review, appears to be a chronic issue  - Monitoring on daily labs    Chronic diastolic heart failure  - Remains grossly asymptomatic, euvolemic, not in acute exacerbation  - Continue home cardioprudent regimen  - Monitoring on tele    Chronic kidney disease-mineral and bone disorder  - renal management as above    Anemia in ESRD (end-stage renal disease)  - H/H stable near baseline on admission, with drop after pleurex catheter placement 8/18. S/p 2u pRBC 8/19. S/p 1u pRBC 8/23, no active bleeding   - type and screen done  - transfuse for H/H 7/21  - Will continue to monitor on daily labs    ESRD (end stage renal disease) on dialysis  - Interval history and physical exam findings as described above  - Nephology following  - Further decision for HD per nephology  - Renally dosing all medications  - Avoid nephrotoxins  - Will continue to monitor on daily labs    Mixed hyperlipidemia associated with type 2 diabetes mellitus  - Continue home statin regimen    Type 2 diabetes mellitus with chronic kidney disease on chronic dialysis, without long-term current use of insulin  - Working to optimize BG control  - SSI provided for corrective dosing  - DXTs as ordered  - Hypoglycemic protocol in effect  - Diabetic diet provided    Hypertension associated with ESRD caused by type 2 diabetes mellitus, on dialysis  - Working to optimize BP control   - Continue home cardioprudent regimen  - HD to assist with BP control  - PRN IV hydralazine and/or IV labetalol provided for SBP>180  - Will continue to monitor and further titrate antihypertensives as clinically indicated     Hyperkalemia  - Improving, continue Lokelma TID and reassess daily  - Nephrology following, HD to assist with  electrolyte anomalies  - Monitoring on tele      VTE Risk Mitigation (From admission, onward)         Ordered     IP VTE HIGH RISK PATIENT  Once         08/15/22 2005     Place sequential compression device  Until discontinued         08/15/22 2005                Discharge Planning   WENDY: 8/26/2022     Code Status: Full Code   Is the patient medically ready for discharge?: No    Reason for patient still in hospital (select all that apply): Patient unstable  Discharge Plan A: Home with family, Home Health                  May SPENCER Duke MD  Department of Hospital Medicine   [facility] - Cardiology Stepdown

## 2022-08-24 NOTE — PROGRESS NOTES
[facility] - Cardiology Stepdown  Thoracic Surgery  Progress Note    Subjective:       Post-Op Info:  Procedure(s) (LRB):  VATS, WITH DECORTICATION, LUNG (Right)  BLOCK, NERVE, INTERCOSTAL, 2 OR MORE (Right)  BRONCHOSCOPY, FLEXIBLE (N/A)  INSERTION, CATHETER, INTERCOSTAL, FOR DRAINAGE (Right)   6 Days Post-Op     Interval History: No issues overnight. Dialysis last night. CT #1 with minimal output overnight, no output from CT #2. Patient reports feeling well, no dyspnea or SOB    Medications:  Continuous Infusions:  Scheduled Meds:   [START ON 8/25/2022] sodium chloride 0.9%   Intravenous Once    albuterol-ipratropium  3 mL Nebulization Q6H    atorvastatin  40 mg Oral QHS    carvediloL  12.5 mg Oral BID    epoetin michael (PROCRIT) injection  5,000 Units Intravenous Every Tues, Thurs, Sat    furosemide  80 mg Oral Daily    naloxone  0.4 mg Intravenous Once    pantoprazole  40 mg Oral Daily    polyethylene glycol  17 g Oral BID    potassium phosphate IVPB  15 mmol Intravenous Once    sodium zirconium cyclosilicate  5 g Oral TID     PRN Meds:sodium chloride, acetaminophen, dextrose 10%, dextrose 10%, glucagon (human recombinant), glucose, glucose, hydrALAZINE, hydrOXYzine HCL, insulin aspart U-100, labetalol, naloxone, ondansetron, sodium chloride 0.9%     Review of patient's allergies indicates:  No Known Allergies  Objective:     Vital Signs (Most Recent):  Temp: 96.2 °F (35.7 °C) (08/24/22 0744)  Pulse: (!) 54 (08/24/22 0811)  Resp: 16 (08/24/22 0811)  BP: (!) 160/72 (08/24/22 0744)  SpO2: 96 % (08/24/22 0811)   Vital Signs (24h Range):  Temp:  [96.2 °F (35.7 °C)-98.1 °F (36.7 °C)] 96.2 °F (35.7 °C)  Pulse:  [53-73] 54  Resp:  [16-18] 16  SpO2:  [96 %-100 %] 96 %  BP: (126-161)/(56-72) 160/72     Intake/Output - Last 3 Shifts         08/22 0700 08/23 0659 08/23 0700 08/24 0659 08/24 0700 08/25 0659    P.O. 477      Blood  3291.2     Total Intake(mL/kg) 477 (7.6) 3291.2 (59)     Emesis/NG output       Other   2500     Chest Tube 415      Total Output 415 2500     Net +62 +791.2            Stool Occurrence  1 x             SpO2: 96 %  O2 Device (Oxygen Therapy): nasal cannula    Physical Exam  Vitals and nursing note reviewed.   Constitutional:       General: He is not in acute distress.  Cardiovascular:      Rate and Rhythm: Normal rate.      Pulses: Normal pulses.   Pulmonary:      Effort: Pulmonary effort is normal. No respiratory distress.      Comments: R CT (#1) with minimal SS output, small intermittent air leak on FE. R CT(#2) with no output overnight, no air leak  Abdominal:      General: There is no distension.      Palpations: Abdomen is soft.      Tenderness: There is no abdominal tenderness.   Neurological:      General: No focal deficit present.      Mental Status: He is alert and oriented to person, place, and time.       Significant Labs:  BMP:   Recent Labs   Lab 08/24/22  0248   *      K 3.8      CO2 25   BUN 11   CREATININE 2.0*   CALCIUM 7.7*   MG 2.3     CBC:   Recent Labs   Lab 08/24/22  0248   WBC 6.22   RBC 2.62*   HGB 8.2*   HCT 25.2*   *   MCV 96   MCH 31.3*   MCHC 32.5     CMP:   Recent Labs   Lab 08/23/22  0424 08/24/22  0248   * 122*   CALCIUM 7.7* 7.7*   ALBUMIN 2.4* 2.3*   PROT 5.6*  --    * 136   K 5.4* 3.8   CO2 22* 25    105   BUN 28* 11   CREATININE 3.5* 2.0*   ALKPHOS 134  --    ALT <5*  --    AST 22  --    BILITOT 0.9  --        Significant Diagnostics:  CXR: I have reviewed all pertinent results/findings within the past 24 hours    VTE Risk Mitigation (From admission, onward)           Ordered     IP VTE HIGH RISK PATIENT  Once         08/15/22 2005     Place sequential compression device  Until discontinued         08/15/22 2005                  Assessment/Plan:     * Recurrent pleural effusion on right  Vega Brownlee is a 61 y.o. male PMH CHF (last ECHO 9/20/20 EF 55%, PA pressure 77), home oxygen (2 L), DM2, ESRD on HD (via AVF TThurSat),  recurrent bilateral pleural effusions (s/p R thoracentesis 2019, 2021), hx of a left thoracotomy (empyema) presented to [facility] as a transfer for a loculated R pleural effusion. S/p R VATS, decortication with chest tube placements x2 5/18.      CT#2 with minimal output in >1 days with no air leak - removed at bedside today. Will keep CT#1 to WS today, possible clamp versus remove tomorrow.    · CT#1 to water seal  · Daily CXR with chest tubes in place  · Strict monitoring of chest tube outputs, please document per shift   · 1 L NC currently. Would recommend scheduled breathing treatments, wean as tolerated  · Pleural fluid labs and CT chest reviewed. Outside CT remarks on new pulmonary nodules, however difficult to appreciate in the setting of the degree of fibrinous exudates and effusion. While cannot rule out underlying malignancy as a cause for the recurrent effusions, would be an outpatient workup with new imaging once he has clinically resolved from this acute episode  · OOB, ambulate   · Agree with rest of care. Nephrology consulted, appreciate recs.           Margoth Mota MD  Thoracic Surgery  [facility] - Cardiology Stepdown

## 2022-08-24 NOTE — SUBJECTIVE & OBJECTIVE
Interval History: Voices no concerns today. Feels better. Required 1u pRBC overnight for Hgb 6.9. No significant bleeding noted. Is wanting to get up and out of bed today. CT surgery pulled one of the chest tubes this morning.     Review of Systems   Constitutional:  Negative for fever.   Respiratory:  Negative for shortness of breath.    Cardiovascular:  Negative for chest pain.   Gastrointestinal:  Negative for abdominal pain, constipation, nausea and vomiting.   Neurological:  Negative for headaches.   Objective:     Vital Signs (Most Recent):  Temp: 96.2 °F (35.7 °C) (08/24/22 0744)  Pulse: (!) 54 (08/24/22 0811)  Resp: 16 (08/24/22 0811)  BP: (!) 160/72 (08/24/22 0744)  SpO2: 96 % (08/24/22 0811)   Vital Signs (24h Range):  Temp:  [96.2 °F (35.7 °C)-98.1 °F (36.7 °C)] 96.2 °F (35.7 °C)  Pulse:  [53-73] 54  Resp:  [16-18] 16  SpO2:  [96 %-100 %] 96 %  BP: (126-161)/(56-72) 160/72     Weight: 55.8 kg (123 lb 0.3 oz)  Body mass index is 18.7 kg/m².    Intake/Output Summary (Last 24 hours) at 8/24/2022 1001  Last data filed at 8/23/2022 2330  Gross per 24 hour   Intake 3291.17 ml   Output 2500 ml   Net 791.17 ml      Physical Exam  Vitals reviewed.   Constitutional:       General: He is not in acute distress.     Comments: Chronically ill appearing   HENT:      Head: Normocephalic and atraumatic.      Mouth/Throat:      Mouth: Mucous membranes are moist.   Eyes:      Extraocular Movements: Extraocular movements intact.      Conjunctiva/sclera: Conjunctivae normal.   Cardiovascular:      Rate and Rhythm: Normal rate and regular rhythm.   Pulmonary:      Effort: Pulmonary effort is normal. No respiratory distress.      Comments: Rhonchi and crackles appreciated on R anterior chest. Decreased breath sounds on the R  Abdominal:      General: There is no distension.      Palpations: Abdomen is soft.   Musculoskeletal:      Comments: R chest tube in place   Skin:     General: Skin is warm and dry.      Capillary Refill:  Capillary refill takes less than 2 seconds.   Neurological:      General: No focal deficit present.       Significant Labs: All pertinent labs within the past 24 hours have been reviewed.    Significant Imaging: I have reviewed all pertinent imaging results/findings within the past 24 hours.

## 2022-08-24 NOTE — NURSING
AAO: X 3    VITAL SIGNS:  Heart rate dee to normal sinus    BM: X 1    UO: Anuric    PAIN: No complaints    IVF: None    Patient sleep most of the shift.     Chest tube x 1 was removed per MD. Other chest tube in place with sanguinous output of 12ml on the shift.    AV fistula dressing clean, dry, and intact.     Patient lying in bed with eyes closed and no complaints. Bed in lowest position, side rails up x 2, call light within reach. Will continue to monitor until report is given.

## 2022-08-25 PROBLEM — E83.39 HYPOPHOSPHATEMIA: Status: RESOLVED | Noted: 2022-01-01 | Resolved: 2022-01-01

## 2022-08-25 NOTE — ASSESSMENT & PLAN NOTE
- H/H stable near baseline on admission, with drop after pleurex catheter placement 8/18. S/p 2u pRBC 8/19. S/p 1u pRBC 8/23, no active bleeding   - Will continue to monitor on daily labs

## 2022-08-25 NOTE — PLAN OF CARE
Pt. Remain stable throughout shift with no complications.   18 G PIV placed to LFA after being pulled out with gown change. Pt. Fed sandwich and milk at 0500 per pt. request.   Pt. St. No needs or concerns. Pt. Laying in bed with eyes closed.

## 2022-08-25 NOTE — PROGRESS NOTES
[facility] NEPH[facility] STAFF HEMODIALYSIS NOTE     Patient currently on hemodialysis for removal of uremic toxins and volume.     Patient seen and evaluated on hemodialysis, tolerating treatment, see HD flowsheet for vitals and assessments.     Labs have been reviewed and the dialysate bath has been adjusted.        Assessment/Plan:     -ESRD on HD   -Patient seen on HD, tolerating treatment well, w/o complaints   -UF goal of 2L as tolerated   -Renal diet, if not NPO   -Strict I/O's and daily weights  -Daily renal function panels  -Keep MAP >65 while on HD   -Maintain Hgb >7.0  -Continue epo with HD   -Will continue to follow while inpatient         RUDOLPH Reyna, AGNP-C  Nephrology  Pager:  [phone]

## 2022-08-25 NOTE — PT/OT/SLP PROGRESS
"Occupational Therapy   Treatment    Name: Vega Brownlee  MRN: [id number]  Admitting Diagnosis:  Recurrent pleural effusion on right  7 Days Post-Op    Recommendations:     Discharge Recommendations:    REHAB    Assessment:     Vega Brownlee is a 62 y.o. male with a medical diagnosis of Recurrent pleural effusion on right.Performance deficits affecting function are weakness, impaired endurance, impaired self care skills, impaired functional mobility, gait instability, impaired balance. Pt tolerated session well with improved functional performance since eval; however, pt is not safe for d/c home. Pt to benefit from post acute therapy prior to return home .     Rehab Prognosis:  Good; patient would benefit from acute skilled OT services to address these deficits and reach maximum level of function.       Plan:     Patient to be seen 4 x/week to address the above listed problems via self-care/home management, therapeutic activities, therapeutic exercises  · Plan of Care Expires:    · Plan of Care Reviewed with: patient    Subjective   "This is stupid" pt states when OT and nsg present this morning.   Pain/Comfort:  ·  Pt denies pain.     Objective:     Communicated with: nsg prior to session.  Patient found in bed with 2 LPM oxygen in place. CT to water seal and tele.  General Precautions: Standard, fall     Occupational Performance:     Bed Mobility:    Supine>sit with MIN A      Functional Mobility/Transfers:  · Sit>stand with SBA  · Few steps to chair with CGA.     Activities of Daily Living:  · Feeding: independent  · UE dressing with MIN A   · LE dressing: SBA manage footwear      [facility] 6 Click ADL: 19    Treatment & Education:  Pt with increased frustration at start of session. Nsg present for vital signs and pt eating. However, pt states he was no longer hungry and agreeable to therapy.  Pt completed supine>sit with MIN A with cues for hand placement. Pt initially with posterior lean requiring CGA for postural control, " but progressed with SBA seated EOB. Pt engaged with self care skills and able to stand and take few steps to chair.   Pt declined g/h skills.   Education provided re: OT POC and safety with functional mobility/ADL skills. Education provided re: importance of OOB activity and impacts of prolonged mobility.     Patient left up in chair with all lines intact, call button in reach and nsg notifiedEducation:      GOALS:   Multidisciplinary Problems     Occupational Therapy Goals        Problem: Occupational Therapy    Goal Priority Disciplines Outcome Interventions   Occupational Therapy Goal     OT, PT/OT Ongoing, Progressing    Description: Goals to be met by: 7 days 8/29/22     Patient will increase functional independence with ADLs by performing:    Pt to complete UE dressing with set-up  Pt to complete LE dressing with SBA  Pt to complete standing g/h skills with SBA  Pt to complete toileting with SBA  Pt to complete t/f bed, chair and commode with SBA                    Time Tracking:     OT Date of Treatment: 08/25/22  OT Start Time: 0816  OT Stop Time: 0840  OT Total Time (min): 24 min    Billable Minutes:Self Care/Home Management 12  Therapeutic Activity 12    OT/VARGAS: OT          8/25/2022

## 2022-08-25 NOTE — SUBJECTIVE & OBJECTIVE
Interval History: No issues overnight. CT with minimal serous output overnight.     Medications:  Continuous Infusions:  Scheduled Meds:   sodium chloride 0.9%   Intravenous Once    albuterol-ipratropium  3 mL Nebulization Q6H    atorvastatin  40 mg Oral QHS    carvediloL  12.5 mg Oral BID    epoetin michael (PROCRIT) injection  5,000 Units Intravenous Every Tues, Thurs, Sat    furosemide  80 mg Oral Daily    naloxone  0.4 mg Intravenous Once    pantoprazole  40 mg Oral Daily    polyethylene glycol  17 g Oral BID    sodium zirconium cyclosilicate  5 g Oral TID     PRN Meds:sodium chloride, acetaminophen, dextrose 10%, dextrose 10%, glucagon (human recombinant), glucose, glucose, hydrALAZINE, hydrOXYzine HCL, insulin aspart U-100, labetalol, naloxone, ondansetron, sodium chloride 0.9%     Review of patient's allergies indicates:  No Known Allergies  Objective:     Vital Signs (Most Recent):  Temp: 98 °F (36.7 °C) (08/25/22 0418)  Pulse: (!) 50 (08/25/22 0418)  Resp: 17 (08/25/22 0418)  BP: (!) 149/63 (08/25/22 0418)  SpO2: 100 % (08/25/22 0418)   Vital Signs (24h Range):  Temp:  [96.1 °F (35.6 °C)-98 °F (36.7 °C)] 98 °F (36.7 °C)  Pulse:  [50-73] 50  Resp:  [16-20] 17  SpO2:  [94 %-100 %] 100 %  BP: (123-160)/(58-72) 149/63     Intake/Output - Last 3 Shifts         08/23 0700 08/24 0659 08/24 0700 08/25 0659 08/25 0700  08/26 0659    P.O.  462     Blood 3291.2      IV Piggyback  248.2     Total Intake(mL/kg) 3291.2 (59) 710.2 (12.7)     Other 2500      Stool  0     Chest Tube  12     Total Output 2500 12     Net +791.2 +698.2            Urine Occurrence  0 x     Stool Occurrence 1 x 3 x             SpO2: 100 %  O2 Device (Oxygen Therapy): nasal cannula    Physical Exam  Vitals and nursing note reviewed.   Constitutional:       General: He is not in acute distress.  Cardiovascular:      Rate and Rhythm: Normal rate.      Pulses: Normal pulses.   Pulmonary:      Effort: Pulmonary effort is normal. No respiratory  distress.      Comments: R CT (#1) with minimal SS output, small intermittent air leak on FE  Abdominal:      General: There is no distension.      Palpations: Abdomen is soft.      Tenderness: There is no abdominal tenderness.   Neurological:      General: No focal deficit present.      Mental Status: He is alert and oriented to person, place, and time.       Significant Labs:  BMP:   Recent Labs   Lab 08/25/22  0322   *      K 4.2      CO2 23   BUN 19   CREATININE 3.6*   CALCIUM 7.6*   MG 2.9*       CBC:   Recent Labs   Lab 08/25/22 0322   WBC 8.65   RBC 2.88*   HGB 9.1*  9.0*   HCT 29.0*      *   MCH 31.6*   MCHC 31.4*       CMP:   Recent Labs   Lab 08/25/22 0322   *   CALCIUM 7.6*   ALBUMIN 2.4*      K 4.2   CO2 23      BUN 19   CREATININE 3.6*         Significant Diagnostics:  CXR: I have reviewed all pertinent results/findings within the past 24 hours    VTE Risk Mitigation (From admission, onward)           Ordered     IP VTE HIGH RISK PATIENT  Once         08/15/22 2005     Place sequential compression device  Until discontinued         08/15/22 2005

## 2022-08-25 NOTE — PROGRESS NOTES
"[facility] - Cardiology [facility] Medicine  Progress Note    Patient Name: Vega Brownlee  MRN: [id number]  Patient Class: IP- Inpatient   Admission Date: 8/15/2022  Length of Stay: 10 days  Attending Physician: Zack Culver MD  Primary Care Provider: Ko Perez MD        Subjective:     Principal Problem:Recurrent pleural effusion on right        HPI:  HPI per :  Patient is a 61 y.o. 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 Patient has history of recurrent pleural effusion and was sent to ED for worsening symptoms. IR was consulted and attempted US-guided therapeutic right-sided thoracentesis. Per IR "US reveals an extensively loculated Rt effusion with innumerable isolated pockets with sonographic appearance suggestive of a large hematoma/hemothorax. Only able to remove 50-cc of old, dark blood products also consistent with large Rt hemothorax. Pt would likely require large-bore chest tube placement and instillation of lytics if attempts will be made to resolve the hemothorax vs VATS." Facility seeking transfer for CTS evaluation. Patient is stable on med tele unit only requiring 3L NC with no distress. Stable for transfer.      Overview/Hospital Course:  [facility] Medicine Course:  IR was consulted and attempted US-guided therapeutic right-sided thoracentesis. Per IR "US reveals an extensively loculated Rt effusion with innumerable isolated pockets with sonographic appearance suggestive of a large hematoma/hemothorax. Only able to remove 50-cc of old, dark blood products also consistent with large Rt hemothorax. Pt would likely require large-bore chest tube " "placement and instillation of lytics if attempts will be made to resolve the hemothorax vs VATS."  [place] [facility], in queue to transfer asap. Stable vitals. Will allow to eat and make NPO again at midnight. Lokelma for K 5.8.     [facility] Medicine Course:  Pt accepted to [facility] [place], Nephrology following for HD needs. Evaluated by thoracic surgery, with placement of PleurX catheter x2 on 8/18. Required 2u pRBC after the procedure, with stable low Hgb since that time. Pleural fluid exudative, with elevated LDH and low glucose. Cultures negative. Cytology pending. Interpretation is difficult with prior manipulation with traumatic thoras in the past. However, with new CT chest findings would certainly benefit from outpatient malignancy eval with follow up CT chest in 3 months and possible bx pending results. Awaiting stabilization and chest tube removal, then likely will need inpatient rehab if patient amenable       Interval History: Pt seen and examined this morning on rounds. KALYAN. Grossly denies any acute issues at this time, awaiting chest tube removal. Care plan reviewed. Otherwise, doing well and with no further complaints at this time.      Objective:     Vital Signs (Most Recent):  Temp: 98 °F (36.7 °C) (08/25/22 0825)  Pulse: (!) 48 (08/25/22 1132)  Resp: 18 (08/25/22 0903)  BP: 139/60 (08/25/22 0825)  SpO2: 97 % (08/25/22 0903)   Vital Signs (24h Range):  Temp:  [96.3 °F (35.7 °C)-98 °F (36.7 °C)] 98 °F (36.7 °C)  Pulse:  [48-65] 48  Resp:  [16-20] 18  SpO2:  [94 %-100 %] 97 %  BP: (139-156)/(60-69) 139/60     Weight: 55.8 kg (123 lb 0.3 oz)  Body mass index is 18.7 kg/m².    Intake/Output Summary (Last 24 hours) at 8/25/2022 1154  Last data filed at 8/25/2022 0500  Gross per 24 hour   Intake 488.24 ml   Output 12 ml   Net 476.24 ml        Physical Exam  Gen: in NAD, appears stated age  Neuro: AAOx4, CN2-12 grossly intact BL; motor, sensory, and strength grossly intact BL  HEENT: REYNOLD LOPEZ PERRLA, " MMM; no thyromegaly or lymphadenopathy; no JVD appreciated  CVS: HR 54, regular rate, no m/r/g; S1/S2 auscultated with no S3 or S4; capillary refill < 2 sec  Resp: Rhonchi and crackles appreciated on R anterior chest. Decreased breath sounds on the R; no belabored breathing or accessory muscle use appreciated ; R chest tube in place   Abd: BS+ in all 4 quadrants; NTND, soft to palpation; no organomegaly appreciated   Extrem: pulses full, equal, and regular over all 4 extremities; no UE or LE edema BL      Significant Labs: All pertinent labs within the past 24 hours have been reviewed.    Significant Imaging: I have reviewed all pertinent imaging results/findings within the past 24 hours.      Assessment/Plan:      * Recurrent pleural effusion on right  - Interval history and physical exam findings as described above  - S/p left thoracotomy prior to admission  - Recurring right effusion s/p thoracentesis x2  - Chemistry on 2/4/21 suggestive of exudative effusion  - Chemistry 8/22 exudative with high LDH and low glucose. Cultures neg. Unclear significance with prior manipulation, but new CT findings must consider possible underlying malignancy. Will need ongoing outpatient work up. Cytology pending  - Thoracic surgery consulted, placement of PleurX on 8/18     - s/p chest tube placement x2. One removed 8/24. Follow daily Xray  - Satting well on 1L NC  - Continuing to monitor    ESRD (end stage renal disease) on dialysis  - Interval history and physical exam findings as described above  - Nephology following  - Further decision for HD per nephology  - Renally dosing all medications  - Avoid nephrotoxins  - Will continue to monitor on daily labs    Anemia in ESRD (end-stage renal disease)  - H/H stable near baseline on admission, with drop after pleurex catheter placement 8/18. S/p 2u pRBC 8/19. S/p 1u pRBC 8/23, no active bleeding   - Will continue to monitor on daily labs    Chronic diastolic heart failure  - Remains  grossly asymptomatic, euvolemic, not in acute exacerbation  - Continue home cardioprudent regimen  - Monitoring on tele    Hypertension associated with ESRD caused by type 2 diabetes mellitus, on dialysis  - Working to optimize BP control   - Continue home cardioprudent regimen  - HD to assist with BP control  - PRN IV hydralazine and/or IV labetalol provided for SBP>180  - Will continue to monitor and further titrate antihypertensives as clinically indicated     Mixed hyperlipidemia associated with type 2 diabetes mellitus  - Continue home statin regimen    Type 2 diabetes mellitus with chronic kidney disease on chronic dialysis, without long-term current use of insulin  - Working to optimize BG control  - SSI provided for corrective dosing  - DXTs as ordered  - Hypoglycemic protocol in effect  - Diabetic diet provided    Thrombocytopenia  - Per chart review, appears to be a chronic issue  - Monitoring on daily labs    Physical deconditioning  - Rehab medicine following. Likely candidate for inpatient rehab when medically clear for DC    Hypophosphatemia  - Nephrology following, on HD  - Monitoring on daily labs    Chronic kidney disease-mineral and bone disorder  - Renal management as above      VTE Risk Mitigation (From admission, onward)         Ordered     IP VTE HIGH RISK PATIENT  Once         08/15/22 2005     Place sequential compression device  Until discontinued         08/15/22 2005                Discharge Planning   WENDY: 8/26/2022     Code Status: Full Code   Is the patient medically ready for discharge?: No    Reason for patient still in hospital (select all that apply): Patient trending condition  Discharge Plan A: Home with family, Home Health          Zack Culver MD  Attending Physician  Department of Hospital Medicine  Epic secure chat preferred, or ext. [phone]  8/25/2022

## 2022-08-25 NOTE — ASSESSMENT & PLAN NOTE
Vega Brownlee is a 61 y.o. male PMH CHF (last ECHO 9/20/20 EF 55%, PA pressure 77), home oxygen (2 L), DM2, ESRD on HD (via AVF TThurSat), recurrent bilateral pleural effusions (s/p R thoracentesis 2019, 2021), hx of a left thoracotomy (empyema) presented to [facility] [place] as a transfer for a loculated R pleural effusion. S/p R VATS, decortication with chest tube placements x2 5/18.  Now on 1 L NC.      R CT x1 to WS, minimal output overnight. Will possibly clamp today, pending CXR from this am.     · Daily CXR with chest tubes in place  · Strict monitoring of chest tube outputs  · 1L NC currently, wean as tolerated. Would recommend scheduled breathing treatments  · OOB, ambualte  · Agree with rest of care. Nephrology consulted, appreciate recs.

## 2022-08-25 NOTE — PT/OT/SLP PROGRESS
Physical Therapy      Patient Name:  Vega Brownlee   MRN:  [id number]    Patient not seen today. Pt out of room for HD on attempt.   Will follow-up per POC.

## 2022-08-25 NOTE — SUBJECTIVE & OBJECTIVE
Interval History: Pt seen and examined this morning on jeanette BIGGS. Grossly denies any acute issues at this time, awaiting chest tube removal. Care plan reviewed. Otherwise, doing well and with no further complaints at this time.      Objective:     Vital Signs (Most Recent):  Temp: 98 °F (36.7 °C) (08/25/22 0825)  Pulse: (!) 48 (08/25/22 1132)  Resp: 18 (08/25/22 0903)  BP: 139/60 (08/25/22 0825)  SpO2: 97 % (08/25/22 0903)   Vital Signs (24h Range):  Temp:  [96.3 °F (35.7 °C)-98 °F (36.7 °C)] 98 °F (36.7 °C)  Pulse:  [48-65] 48  Resp:  [16-20] 18  SpO2:  [94 %-100 %] 97 %  BP: (139-156)/(60-69) 139/60     Weight: 55.8 kg (123 lb 0.3 oz)  Body mass index is 18.7 kg/m².    Intake/Output Summary (Last 24 hours) at 8/25/2022 1154  Last data filed at 8/25/2022 0500  Gross per 24 hour   Intake 488.24 ml   Output 12 ml   Net 476.24 ml        Physical Exam  Gen: in NAD, appears stated age  Neuro: AAOx4, CN2-12 grossly intact BL; motor, sensory, and strength grossly intact BL  HEENT: NTNC, EOMI, PERRLA, MMM; no thyromegaly or lymphadenopathy; no JVD appreciated  CVS: HR 54, regular rate, no m/r/g; S1/S2 auscultated with no S3 or S4; capillary refill < 2 sec  Resp: Rhonchi and crackles appreciated on R anterior chest. Decreased breath sounds on the R; no belabored breathing or accessory muscle use appreciated ; R chest tube in place   Abd: BS+ in all 4 quadrants; NTND, soft to palpation; no organomegaly appreciated   Extrem: pulses full, equal, and regular over all 4 extremities; no UE or LE edema BL      Significant Labs: All pertinent labs within the past 24 hours have been reviewed.    Significant Imaging: I have reviewed all pertinent imaging results/findings within the past 24 hours.

## 2022-08-25 NOTE — PLAN OF CARE
Problem: Device-Related Complication Risk (Hemodialysis)  Goal: Safe, Effective Therapy Delivery  Outcome: Ongoing, Progressing      Dialysis tx ended, Hemostasis achieved to the right arm AV fistula    Net fluid removed: 589 ml    Right chest tube to water seal, no complications    Report given to nurse Allan. Nurse assisted with transport of patient to room 306

## 2022-08-25 NOTE — PLAN OF CARE
Problem: Adult Inpatient Plan of Care  Goal: Plan of Care Review  Outcome: Ongoing, Progressing  Goal: Patient-Specific Goal (Individualized)  Outcome: Ongoing, Progressing  Goal: Absence of Hospital-Acquired Illness or Injury  Outcome: Ongoing, Progressing  Goal: Optimal Comfort and Wellbeing  Outcome: Ongoing, Progressing  Goal: Readiness for Transition of Care  Outcome: Ongoing, Progressing     Problem: Diabetes Comorbidity  Goal: Blood Glucose Level Within Targeted Range  Outcome: Ongoing, Progressing     Problem: Infection  Goal: Absence of Infection Signs and Symptoms  Outcome: Ongoing, Progressing     Problem: Impaired Wound Healing  Goal: Optimal Wound Healing  Outcome: Ongoing, Progressing     Problem: Device-Related Complication Risk (Hemodialysis)  Goal: Safe, Effective Therapy Delivery  Outcome: Ongoing, Progressing     Problem: Hemodynamic Instability (Hemodialysis)  Goal: Effective Tissue Perfusion  Outcome: Ongoing, Progressing     Problem: Infection (Hemodialysis)  Goal: Absence of Infection Signs and Symptoms  Outcome: Ongoing, Progressing     Problem: Skin Injury Risk Increased  Goal: Skin Health and Integrity  Outcome: Ongoing, Progressing

## 2022-08-25 NOTE — PROGRESS NOTES
[facility] - Cardiology Stepdown  Thoracic Surgery  Progress Note    Subjective:     Post-Op Info:  Procedure(s) (LRB):  VATS, WITH DECORTICATION, LUNG (Right)  BLOCK, NERVE, INTERCOSTAL, 2 OR MORE (Right)  BRONCHOSCOPY, FLEXIBLE (N/A)  INSERTION, CATHETER, INTERCOSTAL, FOR DRAINAGE (Right)   7 Days Post-Op     Interval History: No issues overnight. CT with minimal serous output overnight.     Medications:  Continuous Infusions:  Scheduled Meds:   sodium chloride 0.9%   Intravenous Once    albuterol-ipratropium  3 mL Nebulization Q6H    atorvastatin  40 mg Oral QHS    carvediloL  12.5 mg Oral BID    epoetin micheal (PROCRIT) injection  5,000 Units Intravenous Every Tues, Thurs, Sat    furosemide  80 mg Oral Daily    naloxone  0.4 mg Intravenous Once    pantoprazole  40 mg Oral Daily    polyethylene glycol  17 g Oral BID    sodium zirconium cyclosilicate  5 g Oral TID     PRN Meds:sodium chloride, acetaminophen, dextrose 10%, dextrose 10%, glucagon (human recombinant), glucose, glucose, hydrALAZINE, hydrOXYzine HCL, insulin aspart U-100, labetalol, naloxone, ondansetron, sodium chloride 0.9%     Review of patient's allergies indicates:  No Known Allergies  Objective:     Vital Signs (Most Recent):  Temp: 98 °F (36.7 °C) (08/25/22 0418)  Pulse: (!) 50 (08/25/22 0418)  Resp: 17 (08/25/22 0418)  BP: (!) 149/63 (08/25/22 0418)  SpO2: 100 % (08/25/22 0418)   Vital Signs (24h Range):  Temp:  [96.1 °F (35.6 °C)-98 °F (36.7 °C)] 98 °F (36.7 °C)  Pulse:  [50-73] 50  Resp:  [16-20] 17  SpO2:  [94 %-100 %] 100 %  BP: (123-160)/(58-72) 149/63     Intake/Output - Last 3 Shifts         08/23 0700  08/24 0659 08/24 0700 08/25 0659 08/25 0700  08/26 0659    P.O.  462     Blood 3291.2      IV Piggyback  248.2     Total Intake(mL/kg) 3291.2 (59) 710.2 (12.7)     Other 2500      Stool  0     Chest Tube  12     Total Output 2500 12     Net +791.2 +698.2            Urine Occurrence  0 x     Stool Occurrence 1 x 3 x              SpO2: 100 %  O2 Device (Oxygen Therapy): nasal cannula    Physical Exam  Vitals and nursing note reviewed.   Constitutional:       General: He is not in acute distress.  Cardiovascular:      Rate and Rhythm: Normal rate.      Pulses: Normal pulses.   Pulmonary:      Effort: Pulmonary effort is normal. No respiratory distress.      Comments: R CT (#1) with minimal SS output, no air leak appreciated  Abdominal:      General: There is no distension.      Palpations: Abdomen is soft.      Tenderness: There is no abdominal tenderness.   Neurological:      General: No focal deficit present.      Mental Status: He is alert and oriented to person, place, and time.       Significant Labs:  BMP:   Recent Labs   Lab 08/25/22  0322   *      K 4.2      CO2 23   BUN 19   CREATININE 3.6*   CALCIUM 7.6*   MG 2.9*       CBC:   Recent Labs   Lab 08/25/22 0322   WBC 8.65   RBC 2.88*   HGB 9.1*  9.0*   HCT 29.0*      *   MCH 31.6*   MCHC 31.4*       CMP:   Recent Labs   Lab 08/25/22  0322   *   CALCIUM 7.6*   ALBUMIN 2.4*      K 4.2   CO2 23      BUN 19   CREATININE 3.6*         Significant Diagnostics:  CXR: I have reviewed all pertinent results/findings within the past 24 hours    VTE Risk Mitigation (From admission, onward)           Ordered     IP VTE HIGH RISK PATIENT  Once         08/15/22 2005     Place sequential compression device  Until discontinued         08/15/22 2005                  Assessment/Plan:     * Recurrent pleural effusion on right  Vega Brownlee is a 61 y.o. male PMH CHF (last ECHO 9/20/20 EF 55%, PA pressure 77), home oxygen (2 L), DM2, ESRD on HD (via AVF TTrSat), recurrent bilateral pleural effusions (s/p R thoracentesis 2019, 2021), hx of a left thoracotomy (empyema) [place] [facility] [place] loculated R pleural effusion. S/p R VATS, decortication with chest tube placements x2 5/18.  Now on 1 L NC.      R CT x1 to WS, minimal output  overnight. Will clamp today    · CT clamped this am, will keep clamped today  · Daily CXR with chest tubes in place  · Strict monitoring of chest tube outputs  · 1L NC currently, wean as tolerated. Would recommend scheduled breathing treatments  · OOB, ambualte  · Agree with rest of care. Nephrology consulted, appreciate recs.           Margoth Mota MD  Thoracic Surgery  [facility] - Cardiology Stepdown

## 2022-08-26 NOTE — PT/OT/SLP PROGRESS
Occupational Therapy   Co-Treatment    Name: Vega Brownlee  MRN: [id number]  Admitting Diagnosis:  Recurrent pleural effusion on right  8 Days Post-Op    Recommendations:     Discharge Recommendations: rehabilitation facility  Discharge Equipment Recommendations:  bedside commode, shower chair, walker, rolling  Barriers to discharge:  None    Assessment:     Vega Brownlee is a 62 y.o. male with a medical diagnosis of Recurrent pleural effusion on right.  He presents with need for encouragement and education to participate as pt states he's too weak. Pt eventually demonstrated progress as evidenced by increased functional mobility and tolerating self-care tasks standing at sink. Performance deficits affecting function are weakness, impaired balance, impaired endurance, impaired cardiopulmonary response to activity, impaired self care skills, impaired functional mobility, gait instability. Pt has very impaired activity tolerance and benefits from co-treatment with PT to maximize pt participation and progression with therapy.     Rehab Prognosis:  Good; patient would benefit from acute skilled OT services to address these deficits and reach maximum level of function.       Plan:     Patient to be seen 4 x/week to address the above listed problems via self-care/home management, therapeutic activities, therapeutic exercises  · Plan of Care Expires:    · Plan of Care Reviewed with: patient    Subjective     Pain/Comfort:  · Pain Rating 1: 0/10  · Pain Rating Post-Intervention 1: 0/10    Objective:     Communicated with: RN prior to session.  Patient found supine with telemetry, chest tube, oxygen upon OT entry to room.    General Precautions: Standard, fall   Orthopedic Precautions:    Braces:    Respiratory Status: Nasal cannula, flow 2 L/min     Occupational Performance:     Bed Mobility:    · Patient completed Scooting/Bridging with stand by assistance to EOB  · Patient completed Supine to Sit with minimum assistance      Functional Mobility/Transfers:  · Patient completed Sit <> Stand Transfer with minimum assistance  with  no assistive device   · Patient completed Bed <> Chair Transfer using Step Transfer technique with minimum assistance with rolling walker  · Functional Mobility: CGA using RW  to/from bathroom and in hallway for short HH distance    Activities of Daily Living:  · Grooming: modified independence with tasks though cueing needed to complete thoroughly  · Upper Body Dressing: minimum assistance    · Lower Body Dressing: minimum assistance        AMPAC 6 Click ADL: 20    Treatment & Education:  Pt ed on OT POC  Pt ed on importance of OOB and activitiy participation\  Pt ed on ROM ex's 3x daily for increased overall strength and endurance  Pt ed on safety and need to call for assistance    Patient left up in chair with all lines intact, call button in reach and RN notifiedEducation:      GOALS:   Multidisciplinary Problems     Occupational Therapy Goals        Problem: Occupational Therapy    Goal Priority Disciplines Outcome Interventions   Occupational Therapy Goal     OT, PT/OT Ongoing, Progressing    Description: Goals to be met by: 7 days 8/29/22     Patient will increase functional independence with ADLs by performing:    Pt to complete UE dressing with set-up  Pt to complete LE dressing with SBA  Pt to complete standing g/h skills with SBA  Pt to complete toileting with SBA  Pt to complete t/f bed, chair and commode with SBA                    Time Tracking:     OT Date of Treatment: 08/26/22  OT Start Time: 0852  OT Stop Time: 0908  OT Total Time (min): 16 min    Billable Minutes:Self Care/Home Management 16               8/26/2022

## 2022-08-26 NOTE — PLAN OF CARE
Plan of care reviewed with patient. Chest tube with minimal dark red output, clamped per MD order. 50 mL total out for day shift. Pt ambulating with x1 assistance, up in chair for meals. Blood sugar monitored, insulin given as ordered. Pt had 3 BM's today, k 3.8, lokelma discontinued by provider. Waffle cushion applied to mattress for skin protection. Call light within reach, instructed to call RN for mobility.

## 2022-08-26 NOTE — SUBJECTIVE & OBJECTIVE
Interval History: Pt seen and examined this morning on jeanette BIGGS. Remains grossly asymptomatic, awaiting eventual chest tube removal, agreeable with rehab at discharge. Care plan reviewed. Otherwise, doing well and with no further complaints at this time.      Objective:     Vital Signs (Most Recent):  Temp: 97.4 °F (36.3 °C) (08/26/22 1132)  Pulse: (!) 52 (08/26/22 1219)  Resp: 16 (08/26/22 1219)  BP: (!) 178/75 (08/26/22 1132)  SpO2: 97 % (08/26/22 1219)   Vital Signs (24h Range):  Temp:  [97.2 °F (36.2 °C)-98.3 °F (36.8 °C)] 97.4 °F (36.3 °C)  Pulse:  [51-68] 52  Resp:  [16-18] 16  SpO2:  [94 %-100 %] 97 %  BP: ()/(45-75) 178/75     Weight: 55.8 kg (123 lb 0.3 oz)  Body mass index is 18.7 kg/m².    Intake/Output Summary (Last 24 hours) at 8/26/2022 1226  Last data filed at 8/26/2022 1000  Gross per 24 hour   Intake 644.02 ml   Output 1479 ml   Net -834.98 ml        Physical Exam  Gen: in NAD, appears stated age  Neuro: AAOx4, CN2-12 grossly intact BL; motor, sensory, and strength grossly intact BL  HEENT: NTNC, EOMI, PERRLA, MMM; no thyromegaly or lymphadenopathy; no JVD appreciated  CVS: HR 54, regular rate, no m/r/g; S1/S2 auscultated with no S3 or S4; capillary refill < 2 sec  Resp: Rhonchi and crackles appreciated on R anterior chest (improved from previous). Decreased breath sounds on the R; no belabored breathing or accessory muscle use appreciated ; R chest tube in place   Abd: BS+ in all 4 quadrants; NTND, soft to palpation; no organomegaly appreciated   Extrem: pulses full, equal, and regular over all 4 extremities; no UE or LE edema BL      Significant Labs: All pertinent labs within the past 24 hours have been reviewed.    Significant Imaging: I have reviewed all pertinent imaging results/findings within the past 24 hours.

## 2022-08-26 NOTE — PROGRESS NOTES
[facility] - [facility]iology Step[facility]  Thoracic Surgery  Progress Note    Subjective:       Post-Op Info:  Procedure(s) (LRB):  VATS, WITH DECORTICATION, LUNG (Right)  BLOCK, NERVE, INTERCOSTAL, 2 OR MORE (Right)  BRONCHOSCOPY, FLEXIBLE (N/A)  INSERTION, CATHETER, INTERCOSTAL, FOR DRAINAGE (Right)   8 Days Post-Op     Interval History: No issues overnight. On 2L NC. 40 cc R CT output, no air leak    Medications:  Continuous Infusions:  Scheduled Meds:   [START ON 8/27/2022] sodium chloride 0.9%   Intravenous Once    albuterol-ipratropium  3 mL Nebulization Q6H    atorvastatin  40 mg Oral QHS    carvediloL  3.125 mg Oral BID    epoetin michael (PROCRIT) injection  5,000 Units Intravenous Every Tues, Thurs, Sat    furosemide  80 mg Oral Daily    naloxone  0.4 mg Intravenous Once    pantoprazole  40 mg Oral Daily    polyethylene glycol  17 g Oral BID    potassium, sodium phosphates  2 packet Oral TID     PRN Meds:sodium chloride, acetaminophen, dextrose 10%, dextrose 10%, glucagon (human recombinant), glucose, glucose, hydrALAZINE, hydrOXYzine HCL, insulin aspart U-100, labetalol, naloxone, ondansetron, sodium chloride 0.9%     Review of patient's allergies indicates:  No Known Allergies  Objective:     Vital Signs (Most Recent):  Temp: 98.3 °F (36.8 °C) (08/26/22 0739)  Pulse: (!) 53 (08/26/22 0739)  Resp: 18 (08/26/22 0739)  BP: (!) 144/65 (08/26/22 0914)  SpO2: 99 % (08/26/22 0739)   Vital Signs (24h Range):  Temp:  [97.2 °F (36.2 °C)-98.3 °F (36.8 °C)] 98.3 °F (36.8 °C)  Pulse:  [48-68] 53  Resp:  [16-18] 18  SpO2:  [92 %-100 %] 99 %  BP: ()/(45-70) 144/65     Intake/Output - Last 3 Shifts         08/24 0700 08/25 0659 08/25 0700 08/26 0659 08/26 0700 08/27 0659    P.O. 462 220     I.V. (mL/kg)  306 (5.5)     Blood       IV Piggyback 248.2      Total Intake(mL/kg) 710.2 (12.7) 526 (9.4)     Urine (mL/kg/hr)  0 (0)     Other  1389     Stool 0 0     Chest Tube 12 90     Total Output 12 1479     Net +698.2  -953            Urine Occurrence 0 x 0 x     Stool Occurrence 3 x 1 x             SpO2: 99 %  O2 Device (Oxygen Therapy): nasal cannula    Physical Exam  Vitals and nursing note reviewed.   Cardiovascular:      Rate and Rhythm: Normal rate.      Pulses: Normal pulses.   Pulmonary:      Effort: Pulmonary effort is normal. No respiratory distress.      Comments: 2L NC. R CT to WS, no air leak appreciated, 40 cc serous output       Significant Labs:  BMP:   Recent Labs   Lab 08/26/22  0241   *      K 3.8      CO2 23   BUN 10   CREATININE 2.2*   CALCIUM 7.4*   MG 2.3     CBC:   Recent Labs   Lab 08/26/22  0241   WBC 7.68   RBC 2.43*   HGB 7.8*   HCT 24.6*   *   *   MCH 32.1*   MCHC 31.7*     CMP:   Recent Labs   Lab 08/26/22  0241   *   CALCIUM 7.4*   ALBUMIN 2.2*   PROT 5.0*      K 3.8   CO2 23      BUN 10   CREATININE 2.2*   ALKPHOS 153*   ALT <5*   AST 20   BILITOT 0.7       Significant Diagnostics:  CXR: I have reviewed all pertinent results/findings within the past 24 hours    VTE Risk Mitigation (From admission, onward)           Ordered     IP VTE HIGH RISK PATIENT  Once         08/15/22 2005     Place sequential compression device  Until discontinued         08/15/22 2005                  Assessment/Plan:     * Recurrent pleural effusion on right  Vega Brownlee is a 61 y.o. male PMH CHF (last ECHO 9/20/20 EF 55%, PA pressure 77), home oxygen (2 L), DM2, ESRD on HD (via [facility]), recurrent bilateral pleural effusions (s/p R thoracentesis 2019, 2021), hx of a left thoracotomy (empyema) presented to [facility] as a transfer for a loculated R pleural effusion. S/p R VATS, decortication with chest tube placements x2 5/18.  Now on 2 L NC.      R CT x1 to WS, 90 cc output. Will keep to WS, possibly clamp this afternoon    · R CT to WS  · Daily CXR with chest tubes in place  · Strict monitoring of chest tube outputs  · 2L NC currently, wean as tolerated. Would recommend  scheduled breathing treatments  · OOB, ambualte  · Agree with rest of care. Nephrology consulted, appreciate recs.           Margoth Mota MD  Thoracic Surgery  [facility] - [facility] Step[facility]

## 2022-08-26 NOTE — ASSESSMENT & PLAN NOTE
Vega Brownlee is a 61 y.o. male PMH CHF (last ECHO 9/20/20 EF 55%, PA pressure 77), home oxygen (2 L), DM2, ESRD on HD (via [facility]), recurrent bilateral pleural effusions (s/p R thoracentesis 2019, 2021), hx of a left thoracotomy (empyema) presented to [facility] [place] as a transfer for a loculated R pleural effusion. S/p R VATS, decortication with chest tube placements x2 5/18.  Now on 2 L NC.      R CT x1 to WS, 40 cc output. Will clamp CT today, possible removal tomorrow     · Clamp R CT  · Daily CXR with chest tubes in place  · Strict monitoring of chest tube outputs  · 2L NC currently, wean as tolerated. Would recommend scheduled breathing treatments  · OOB, ambualte  · Agree with rest of care. Nephrology consulted, appreciate recs.

## 2022-08-26 NOTE — PROGRESS NOTES
[facility] - [facility]iology Stepdown  Physical Medicine & Rehab  Progress Note    Patient Name: Vega Brownlee  MRN: [id number]  Admission Date: 8/15/2022  Length of Stay: 11 days  Attending Physician: Zack Culver MD    Subjective:     Principal Problem:Recurrent pleural effusion on right    Hospital Course:   8/22/22: Participated w/ PT. Bed mob SBA- Delia. Sit to stand Delia. Ambulated 2 side steps to right + 4 side steps to right (seated rest break between trials due to fatigue) modA- HHA.   8/26/22: Participated w/ PT. Bed mob SBA- Delia. Ambulated 15 and 15 and 50 ft standing Adl's between trials 1 and 2 CGA w/ RW.     Interval History 8/26/2022:  Patient is seen for follow-up PM&R evaluation and recommendations: Participating with therapy.     HPI, Past Medical, Family, and Social History remains the same as documented in the initial encounter.    Scheduled Medications:    [START ON 8/27/2022] sodium chloride 0.9%   Intravenous Once    albuterol-ipratropium  3 mL Nebulization Q6H    atorvastatin  40 mg Oral QHS    carvediloL  3.125 mg Oral BID    epoetin michael (PROCRIT) injection  5,000 Units Intravenous Every Tues, Thurs, Sat    furosemide  80 mg Oral Daily    naloxone  0.4 mg Intravenous Once    pantoprazole  40 mg Oral Daily    polyethylene glycol  17 g Oral BID    potassium, sodium phosphates  2 packet Oral TID     Diagnostic Results:   Labs: Reviewed  ECG: Reviewed  CT: Reviewed    PRN Medications: sodium chloride, acetaminophen, dextrose 10%, dextrose 10%, glucagon (human recombinant), glucose, glucose, hydrALAZINE, hydrOXYzine HCL, insulin aspart U-100, labetalol, naloxone, ondansetron, sodium chloride 0.9%    Review of Systems   Constitutional:  Positive for activity change. Negative for fatigue and fever.   HENT:  Negative for trouble swallowing and voice change.    Respiratory:  Negative for cough and shortness of breath.    Cardiovascular:  Negative for chest pain and leg swelling.    Gastrointestinal:  Negative for abdominal distention and abdominal pain.   Genitourinary:  Negative for difficulty urinating and flank pain.   Musculoskeletal:  Positive for gait problem. Negative for back pain.   Skin:  Negative for color change and rash.   Neurological:  Positive for weakness. Negative for speech difficulty and numbness.   Psychiatric/Behavioral:  Negative for agitation and confusion.      Objective:     Vital Signs (Most Recent):  Temp: 98.3 °F (36.8 °C) (08/26/22 0739)  Pulse: (!) 53 (08/26/22 0739)  Resp: 18 (08/26/22 0739)  BP: (!) 144/65 (08/26/22 0914)  SpO2: 99 % (08/26/22 0739)      Vital Signs (24h Range):  Temp:  [97.2 °F (36.2 °C)-98.3 °F (36.8 °C)] 98.3 °F (36.8 °C)  Pulse:  [48-68] 53  Resp:  [16-18] 18  SpO2:  [92 %-100 %] 99 %  BP: ()/(45-70) 144/65     Physical Exam  Vitals and nursing note reviewed.   Constitutional:       Appearance: He is well-developed.   HENT:      Head: Normocephalic and atraumatic.   Eyes:      General:         Right eye: No discharge.         Left eye: No discharge.      Pupils: Pupils are equal, round, and reactive to light.   Pulmonary:      Effort: Pulmonary effort is normal. No respiratory distress.      Comments: Chest tube x 1   Abdominal:      General: There is no distension.      Palpations: Abdomen is soft.      Tenderness: There is no abdominal tenderness.   Musculoskeletal:         General: No deformity.      Cervical back: Neck supple.      Comments: Deconditioned and mild weakness   Skin:     General: Skin is warm and dry.   Neurological:      Sensory: No sensory deficit.      Motor: Weakness present. No abnormal muscle tone.      Gait: Gait abnormal.   Psychiatric:         Mood and Affect: Mood normal.         Behavior: Behavior normal.     Assessment/Plan:      * Recurrent pleural effusion on right  - S/p R VATS, decortication with chest tube placements x2 8/18.  -  Now on NC O2.     Physical deconditioning  - Related to  prolonged/acute hospital course.     Recommendations  -  Encourage mobility, OOB in chair at least 3 hours per day, and early ambulation as appropriate  -  PT/OT evaluate and treat  -  Pain management  -  Monitor for and prevent skin breakdown and pressure ulcers  · Early mobility, repositioning/weight shifting every 20-30 minutes when sitting, turn patient every 2 hours, proper mattress/overlay and chair cushioning, pressure relief/heel protector boots  -  DVT prophylaxis    -  Reviewed discharge options (IP rehab, SNF, HH therapy, and OP therapy)    ESRD (end stage renal disease) on dialysis  - T, Th, Sat    PM&R Recommendation:     At this time, the PM&R team has reviewed this patient's ongoing medical case including inpatient diagnosis, medical history, clinical examination, labs, vitals, current social and functional history to provide the post-acute recommendation as follows:     RECOMMENDATIONS: inpatient rehabilitation due to good motivation/participation with therapies and good potential for recovery.    MEDICAL STABILITY:     At this time, barriers for post-acute rehab admission include change HD schedule to [facility] and chest tube removal.      I will sign off with the transfer of the patient to [facility] liaison who will continue to follow going forward until admission to [facility].     Michelle Araiza NP  Department of Physical Medicine & Rehab   [facility] - [facility] Step[facility]

## 2022-08-26 NOTE — SUBJECTIVE & OBJECTIVE
Interval History: No issues overnight. On 2L NC. 40 cc R CT output, no air leak    Medications:  Continuous Infusions:  Scheduled Meds:   [START ON 8/27/2022] sodium chloride 0.9%   Intravenous Once    albuterol-ipratropium  3 mL Nebulization Q6H    atorvastatin  40 mg Oral QHS    carvediloL  3.125 mg Oral BID    epoetin michael (PROCRIT) injection  5,000 Units Intravenous Every Tues, Thurs, Sat    furosemide  80 mg Oral Daily    naloxone  0.4 mg Intravenous Once    pantoprazole  40 mg Oral Daily    polyethylene glycol  17 g Oral BID    potassium, sodium phosphates  2 packet Oral TID     PRN Meds:sodium chloride, acetaminophen, dextrose 10%, dextrose 10%, glucagon (human recombinant), glucose, glucose, hydrALAZINE, hydrOXYzine HCL, insulin aspart U-100, labetalol, naloxone, ondansetron, sodium chloride 0.9%     Review of patient's allergies indicates:  No Known Allergies  Objective:     Vital Signs (Most Recent):  Temp: 98.3 °F (36.8 °C) (08/26/22 0739)  Pulse: (!) 53 (08/26/22 0739)  Resp: 18 (08/26/22 0739)  BP: (!) 144/65 (08/26/22 0914)  SpO2: 99 % (08/26/22 0739)   Vital Signs (24h Range):  Temp:  [97.2 °F (36.2 °C)-98.3 °F (36.8 °C)] 98.3 °F (36.8 °C)  Pulse:  [48-68] 53  Resp:  [16-18] 18  SpO2:  [92 %-100 %] 99 %  BP: ()/(45-70) 144/65     Intake/Output - Last 3 Shifts         08/24 0700 08/25 0659 08/25 0700 08/26 0659 08/26 0700 08/27 0659    P.O. 462 220     I.V. (mL/kg)  306 (5.5)     Blood       IV Piggyback 248.2      Total Intake(mL/kg) 710.2 (12.7) 526 (9.4)     Urine (mL/kg/hr)  0 (0)     Other  1389     Stool 0 0     Chest Tube 12 90     Total Output 12 1479     Net +698.2 -953            Urine Occurrence 0 x 0 x     Stool Occurrence 3 x 1 x             SpO2: 99 %  O2 Device (Oxygen Therapy): nasal cannula    Physical Exam  Vitals and nursing note reviewed.   Cardiovascular:      Rate and Rhythm: Normal rate.      Pulses: Normal pulses.   Pulmonary:      Effort: Pulmonary effort is normal. No  respiratory distress.      Comments: 2L NC. R CT to WS, no air leak appreciated, 40 cc serous output       Significant Labs:  BMP:   Recent Labs   Lab 08/26/22  0241   *      K 3.8      CO2 23   BUN 10   CREATININE 2.2*   CALCIUM 7.4*   MG 2.3     CBC:   Recent Labs   Lab 08/26/22  0241   WBC 7.68   RBC 2.43*   HGB 7.8*   HCT 24.6*   *   *   MCH 32.1*   MCHC 31.7*     CMP:   Recent Labs   Lab 08/26/22  0241   *   CALCIUM 7.4*   ALBUMIN 2.2*   PROT 5.0*      K 3.8   CO2 23      BUN 10   CREATININE 2.2*   ALKPHOS 153*   ALT <5*   AST 20   BILITOT 0.7       Significant Diagnostics:  CXR: I have reviewed all pertinent results/findings within the past 24 hours    VTE Risk Mitigation (From admission, onward)           Ordered     IP VTE HIGH RISK PATIENT  Once         08/15/22 2005     Place sequential compression device  Until discontinued         08/15/22 2005

## 2022-08-26 NOTE — SUBJECTIVE & OBJECTIVE
Interval History 8/26/2022:  Patient is seen for follow-up PM&R evaluation and recommendations: Participating with therapy.     HPI, Past Medical, Family, and Social History remains the same as documented in the initial encounter.    Scheduled Medications:    [START ON 8/27/2022] sodium chloride 0.9%   Intravenous Once    albuterol-ipratropium  3 mL Nebulization Q6H    atorvastatin  40 mg Oral QHS    carvediloL  3.125 mg Oral BID    epoetin michael (PROCRIT) injection  5,000 Units Intravenous Every Tues, Thurs, Sat    furosemide  80 mg Oral Daily    naloxone  0.4 mg Intravenous Once    pantoprazole  40 mg Oral Daily    polyethylene glycol  17 g Oral BID    potassium, sodium phosphates  2 packet Oral TID       Diagnostic Results: Labs: Reviewed  ECG: Reviewed  CT: Reviewed    PRN Medications: sodium chloride, acetaminophen, dextrose 10%, dextrose 10%, glucagon (human recombinant), glucose, glucose, hydrALAZINE, hydrOXYzine HCL, insulin aspart U-100, labetalol, naloxone, ondansetron, sodium chloride 0.9%    Review of Systems   Constitutional:  Positive for activity change. Negative for fatigue and fever.   HENT:  Negative for trouble swallowing and voice change.    Respiratory:  Negative for cough and shortness of breath.    Cardiovascular:  Negative for chest pain and leg swelling.   Gastrointestinal:  Negative for abdominal distention and abdominal pain.   Genitourinary:  Negative for difficulty urinating and flank pain.   Musculoskeletal:  Positive for gait problem. Negative for back pain.   Skin:  Negative for color change and rash.   Neurological:  Positive for weakness. Negative for speech difficulty and numbness.   Psychiatric/Behavioral:  Negative for agitation and confusion.    Objective:     Vital Signs (Most Recent):  Temp: 98.3 °F (36.8 °C) (08/26/22 0739)  Pulse: (!) 53 (08/26/22 0739)  Resp: 18 (08/26/22 0739)  BP: (!) 144/65 (08/26/22 0914)  SpO2: 99 % (08/26/22 0739)      Vital Signs (24h Range):  Temp:   [97.2 °F (36.2 °C)-98.3 °F (36.8 °C)] 98.3 °F (36.8 °C)  Pulse:  [48-68] 53  Resp:  [16-18] 18  SpO2:  [92 %-100 %] 99 %  BP: ()/(45-70) 144/65     Physical Exam  Vitals and nursing note reviewed.   Constitutional:       Appearance: He is well-developed.   HENT:      Head: Normocephalic and atraumatic.   Eyes:      General:         Right eye: No discharge.         Left eye: No discharge.      Pupils: Pupils are equal, round, and reactive to light.   Pulmonary:      Effort: Pulmonary effort is normal. No respiratory distress.      Comments: Chest tube x 1   Abdominal:      General: There is no distension.      Palpations: Abdomen is soft.      Tenderness: There is no abdominal tenderness.   Musculoskeletal:         General: No deformity.      Cervical back: Neck supple.      Comments: Deconditioned and mild weakness   Skin:     General: Skin is warm and dry.   Neurological:      Sensory: No sensory deficit.      Motor: Weakness present. No abnormal muscle tone.      Gait: Gait abnormal.   Psychiatric:         Mood and Affect: Mood normal.         Behavior: Behavior normal.     NEUROLOGICAL EXAMINATION:     CRANIAL NERVES     CN III, IV, VI   Pupils are equal, round, and reactive to light.

## 2022-08-26 NOTE — PLAN OF CARE
[facility] - [facility] Step[facility]  Discharge Reassessment    Primary Care Provider: Ko Perez MD    Expected Discharge Date: 8/28/2022    Reassessment (most recent)     Discharge Reassessment - 08/26/22 1437        Discharge Reassessment    Assessment Type Discharge Planning Reassessment     Did the patient's condition or plan change since previous assessment? No     Discharge Plan discussed with: Patient     Communicated WENDY with patient/caregiver Yes     Discharge Plan A Rehab     Discharge Plan B Home Health     DME Needed Upon Discharge  none     Discharge Barriers Identified None     Why the patient remains in the hospital Requires continued medical care        Post-Acute Status    Post-Acute Authorization Placement     Post-Acute Placement Status Pending medical clearance/testing               Per Dr Culver pt still needs his chest tubes removed.  Natasha following for medical clearance.  SW met with pt at bedside who voiced agreement with plan for rehab.  Weekend CM to be notified of possible weekend discharge.    Chacha Dooley LMSW  [facility]  [id number]

## 2022-08-26 NOTE — PROGRESS NOTES
"[facility] - [facility] Medicine  Progress Note    Patient Name: Vega Brownlee  MRN: [id number]  Patient Class: IP- Inpatient   Admission Date: 8/15/2022  Length of Stay: 11 days  Attending Physician: Zack Culver MD  Primary Care Provider: Ko Perez MD        Subjective:     Principal Problem:Recurrent pleural effusion on right        HPI:  HPI per :  Patient is a 61 y.o. 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 Patient has history of recurrent pleural effusion and was sent to ED for worsening symptoms. IR was consulted and attempted US-guided therapeutic right-sided thoracentesis. Per IR "US reveals an extensively loculated Rt effusion with innumerable isolated pockets with sonographic appearance suggestive of a large hematoma/hemothorax. Only able to remove 50-cc of old, dark blood products also consistent with large Rt hemothorax. Pt would likely require large-bore chest tube placement and instillation of lytics if attempts will be made to resolve the hemothorax vs VATS." Facility seeking transfer for CTS evaluation. Patient is stable on med tele unit only requiring 3L NC with no distress. Stable for transfer.      Overview/Hospital Course:  [facility] Medicine Course:  IR was consulted and attempted US-guided therapeutic right-sided thoracentesis. Per IR "US reveals an extensively loculated Rt effusion with innumerable isolated pockets with sonographic appearance suggestive of a large hematoma/hemothorax. Only able to remove 50-cc of old, dark blood products also consistent with large Rt hemothorax. Pt would likely require large-bore chest tube " "placement and instillation of lytics if attempts will be made to resolve the hemothorax vs VATS."  Patient [place] [facility], in queue to transfer asap. Stable vitals. Will allow to eat and make NPO again at midnight. Lokelma for K 5.8.     [facility] Medicine Course:  Pt accepted to [facility], Nephrology following for HD needs. Evaluated by thoracic surgery, with placement of PleurX catheter x2 on 8/18. Required 2u pRBC after the procedure, with stable low Hgb since that time. Pleural fluid exudative, with elevated LDH and low glucose. Cultures negative. Cytology pending. Interpretation is difficult with prior manipulation with traumatic thoras in the past. However, with new CT chest findings would certainly benefit from outpatient malignancy eval with follow up CT chest in 3 months and possible bx pending results. Awaiting chest tube removal, then planning to discharge to [facility].      Interval History: Pt seen and examined this morning on rounds. KALYAN. Remains grossly asymptomatic, awaiting eventual chest tube removal, agreeable with rehab at discharge. Care plan reviewed. Otherwise, doing well and with no further complaints at this time.      Objective:     Vital Signs (Most Recent):  Temp: 97.4 °F (36.3 °C) (08/26/22 1132)  Pulse: (!) 52 (08/26/22 1219)  Resp: 16 (08/26/22 1219)  BP: (!) 178/75 (08/26/22 1132)  SpO2: 97 % (08/26/22 1219)   Vital Signs (24h Range):  Temp:  [97.2 °F (36.2 °C)-98.3 °F (36.8 °C)] 97.4 °F (36.3 °C)  Pulse:  [51-68] 52  Resp:  [16-18] 16  SpO2:  [94 %-100 %] 97 %  BP: ()/(45-75) 178/75     Weight: 55.8 kg (123 lb 0.3 oz)  Body mass index is 18.7 kg/m².    Intake/Output Summary (Last 24 hours) at 8/26/2022 1226  Last data filed at 8/26/2022 1000  Gross per 24 hour   Intake 644.02 ml   Output 1479 ml   Net -834.98 ml        Physical Exam  Gen: in NAD, appears stated age  Neuro: AAOx4, CN2-12 grossly intact BL; motor, sensory, and strength grossly intact BL  HEENT: JOHN, REYNOLD, " PERRLA, MMM; no thyromegaly or lymphadenopathy; no JVD appreciated  CVS: HR 54, regular rate, no m/r/g; S1/S2 auscultated with no S3 or S4; capillary refill < 2 sec  Resp: Rhonchi and crackles appreciated on R anterior chest (improved from previous). Decreased breath sounds on the R; no belabored breathing or accessory muscle use appreciated ; R chest tube in place   Abd: BS+ in all 4 quadrants; NTND, soft to palpation; no organomegaly appreciated   Extrem: pulses full, equal, and regular over all 4 extremities; no UE or LE edema BL      Significant Labs: All pertinent labs within the past 24 hours have been reviewed.    Significant Imaging: I have reviewed all pertinent imaging results/findings within the past 24 hours.      Assessment/Plan:      * Recurrent pleural effusion on right  - Interval history and physical exam findings as described above  - S/p left thoracotomy prior to admission  - Recurring right effusion s/p thoracentesis x2  - Chemistry on 2/4/21 suggestive of exudative effusion  - Chemistry 8/22 exudative with high LDH and low glucose. Cultures neg. Unclear significance with prior manipulation, but new CT findings must consider possible underlying malignancy. Will need ongoing outpatient work up. Cytology pending  - Thoracic surgery consulted, placement of PleurX on 8/18     - s/p chest tube placement x2. One removed 8/24. Follow daily Xray  - Satting well on 1L NC  - Continuing to monitor    ESRD (end stage renal disease) on dialysis  - Interval history and physical exam findings as described above  - Nephology following  - Further decision for HD per nephology  - Renally dosing all medications  - Avoid nephrotoxins  - Will continue to monitor on daily labs    Anemia in ESRD (end-stage renal disease)  - H/H stable near baseline on admission, with drop after pleurex catheter placement 8/18. S/p 2u pRBC 8/19. S/p 1u pRBC 8/23, no active bleeding   - Will continue to monitor on daily labs    Chronic  diastolic heart failure  - Remains grossly asymptomatic, euvolemic, not in acute exacerbation  - Continue home cardioprudent regimen  - Monitoring on tele    Hypertension associated with ESRD caused by type 2 diabetes mellitus, on dialysis  - Working to optimize BP control   - Continue home cardioprudent regimen  - HD to assist with BP control  - PRN IV hydralazine and/or IV labetalol provided for SBP>180  - Will continue to monitor and further titrate antihypertensives as clinically indicated     Mixed hyperlipidemia associated with type 2 diabetes mellitus  - Continue home statin regimen    Type 2 diabetes mellitus with chronic kidney disease on chronic dialysis, without long-term current use of insulin  - Working to optimize BG control  - SSI provided for corrective dosing  - DXTs as ordered  - Hypoglycemic protocol in effect  - Diabetic diet provided    Thrombocytopenia  - Per chart review, appears to be a chronic issue  - Monitoring on daily labs    Physical deconditioning  - Rehab medicine following. Likely candidate for inpatient rehab when medically clear for DC    Chronic kidney disease-mineral and bone disorder  - Renal management as above      VTE Risk Mitigation (From admission, onward)         Ordered     IP VTE HIGH RISK PATIENT  Once         08/15/22 2005     Place sequential compression device  Until discontinued         08/15/22 2005                Discharge Planning   WENDY: 8/27/2022     Code Status: Full Code   Is the patient medically ready for discharge?: No    Reason for patient still in hospital (select all that apply): Patient trending condition  Discharge Plan A: Home with family, Home Health          aZck Culver MD  Attending Physician  Department of Hospital Medicine  Epic secure chat preferred, or ext. [phone]  8/26/2022

## 2022-08-26 NOTE — PT/OT/SLP PROGRESS
Physical Therapy Treatment    Patient Name:  Vega Brownlee   MRN:  [id number]  Admit Date: 8/15/2022  Admitting Diagnosis:  Recurrent pleural effusion on right   Length of Stay: 11 days  Recent Surgery: Procedure(s) (LRB):  VATS, WITH DECORTICATION, LUNG (Right)  BLOCK, NERVE, INTERCOSTAL, 2 OR MORE (Right)  BRONCHOSCOPY, FLEXIBLE (N/A)  INSERTION, CATHETER, INTERCOSTAL, FOR DRAINAGE (Right) 8 Days Post-Op    Recommendations:     Discharge Recommendations:  rehabilitation facility   Discharge Equipment Recommendations: bedside commode, walker, rolling   Barriers to discharge: None    Plan:     During this hospitalization, patient to be seen 4 x/week to address the listed problems via gait training, therapeutic activities, therapeutic exercises, neuromuscular re-education  · Plan of Care Expires:  09/21/22   Plan of Care Reviewed with: patient    Assessment:     Vega Brownlee is a 62 y.o. male admitted with a medical diagnosis of Recurrent pleural effusion on right. Pt found alert and cooperative. Pt required education and motivation to assist with progressing with functional mobility. Pt progressing towards goals, but not at PLOF. Pt tolerated session well but continues to required a RW to increase safety of ambulation.  Pt is improving with therapy evidenced by increased gait distance. Pt would benefit from continued PT services to improve overall functional mobility. Pt is an excellent IP rehab candidate due to qualifying diagnosis, good activity tolerance, decline from PLOF, good family support, and excellent motivation. Recommend d/c to Rehab to maximize functional independence.         Problem List: weakness, impaired endurance, impaired self care skills, impaired functional mobility, gait instability, impaired balance, decreased safety awareness, impaired cardiopulmonary response to activity.  Rehab Prognosis: Good     GOALS:   Multidisciplinary Problems     Physical Therapy Goals        Problem: Physical Therapy     "Goal Priority Disciplines Outcome Goal Variances Interventions   Physical Therapy Goal     PT, PT/OT Ongoing, Progressing     Description: Goals to be met by: 2022    Patient will increase functional independence with mobility by performin. Supine to sit with Supervision   2. Sit to stand transfer with Stand-by Assistance using Rolling Walker  3. Gait  x 50 feet with Stand-by Assistance using Rolling Walker.   4. Stand for 3 minutes with Stand-by Assistance using Rolling Walker  5. Lower extremity exercise program x10 reps per handout, with independence                     Subjective   Communicated with RN prior to session.  Patient found HOB elevated upon PT entry to room, agreeable to evaluation. Vega Brownlee's alone during session.    Chief Complaint: wanting to eat   Patient/Family Comments/goals: to get better  Pain/Comfort:  · Pain Rating 1: 0/10  · Pain Rating Post-Intervention 1: 0/10    Objective:   Patient found with: telemetry, peripheral IV, chest tube, oxygen   General Precautions: Standard, Cardiac fall   Orthopedic Precautions:N/A   Braces: N/A   Oxygen Device: Nasal Cannula  Vitals: BP (!) 144/65   Pulse (!) 53   Temp 98.3 °F (36.8 °C) (Tympanic)   Resp 18   Ht 5' 8" (1.727 m)   Wt 55.8 kg (123 lb 0.3 oz)   SpO2 99%   BMI 18.70 kg/m²     Outcome Measures:  AM-PAC 6 CLICK MOBILITY  Turning over in bed (including adjusting bedclothes, sheets and blankets)?: 3  Sitting down on and standing up from a chair with arms (e.g., wheelchair, bedside commode, etc.): 3  Moving from lying on back to sitting on the side of the bed?: 3  Moving to and from a bed to a chair (including a wheelchair)?: 3  Need to walk in hospital room?: 3  Climbing 3-5 steps with a railing?: 2  Basic Mobility Total Score: 17       Functional Mobility:  Additional staff present: OT - due to pt requiring 2 skilled therapists to safely perform functional mobility and to accommodate pt's activity tolerance    Bed Mobility: "    Supine to Sit: minimum assistance; HOB elevated   Scooting anteriorly to EOB to have both feet planted on floor: stand by assistance      Sitting Balance at Edge of Bed:   Assistance Level Required: Stand-by Assistance   Time: 5 minutes   Comments:   o Worked on activity tolerance sitting EOB and worked on tolerance to positional change     Transfers:    Sit <> Stand Transfer: minimum assistance with rolling walker x 2 trials from EOB   o Verbal cuing for hand placement and safety awareness      Gait:  · Patient ambulated: 15ft + 15ft + 50ft   · Standing ADLs between trials 1 and 2   · Seated rest break between trials 2 and 3    · Patient required: contact guard  · Patient used: rolling walker  · Gait Pattern observed: reciprocal gait  · Gait Deviation(s): occasional unsteady gait, decreased step length, flexed posture and decreased cortney  · Impairments due to: impaired balance, decreased strength and decreased endurance  · Comments:   · Mask donned  · Pt frail with decreased strength, endurance, and postural endurance  · Worked on gait quality and distance to improve overall strength and endurance   · Verbal cuing for upright posture and RW management        Therapeutic Activities, Exercises, and Education:   Educated pt on PT role/POC  Educated pt on importance of OOB activity and daily ambulation   Pt verbalized understanding     Pt performed standing ADLs with OT     T/f to chair to increase tolerance to OOB activity and to create optimal positioning for lung expansion     Patient left up in chair with all lines intact, call button in reach and RN notified..    Time Tracking:     PT Received On: 08/26/22  PT Start Time: 0850     PT Stop Time: 0910  PT Total Time (min): 20 min       Billable Minutes:   · Gait Training 10    Treatment Type: Treatment  PT/PTA: PT

## 2022-08-27 PROBLEM — N18.6 ESRD (END STAGE RENAL DISEASE): Status: ACTIVE | Noted: 2022-01-01

## 2022-08-27 NOTE — PROGRESS NOTES
[facility] - Cardiology Stepdown  General Surgery  Progress Note    Subjective:     History of Present Illness:  No notes on file    Post-Op Info:  Procedure(s) (LRB):  VATS, WITH DECORTICATION, LUNG (Right)  BLOCK, NERVE, INTERCOSTAL, 2 OR MORE (Right)  BRONCHOSCOPY, FLEXIBLE (N/A)  INSERTION, CATHETER, INTERCOSTAL, FOR DRAINAGE (Right)   9 Days Post-Op     Interval History: Pt seen in dialysis. No issues overnight. CT with 50 cc output overnight. Clamp trial today.    Medications:  Continuous Infusions:  Scheduled Meds:   sodium chloride 0.9%   Intravenous Once    albuterol-ipratropium  3 mL Nebulization Q6H    atorvastatin  40 mg Oral QHS    carvediloL  3.125 mg Oral BID    epoetin michael (PROCRIT) injection  5,000 Units Intravenous Every Tues, Thurs, Sat    furosemide  80 mg Oral Daily    naloxone  0.4 mg Intravenous Once    pantoprazole  40 mg Oral Daily    polyethylene glycol  17 g Oral BID    sodium zirconium cyclosilicate  5 g Oral TID     PRN Meds:sodium chloride, acetaminophen, dextrose 10%, dextrose 10%, glucagon (human recombinant), glucose, glucose, hydrALAZINE, hydrOXYzine HCL, insulin aspart U-100, labetalol, naloxone, ondansetron, sodium chloride 0.9%     Review of patient's allergies indicates:  No Known Allergies  Objective:     Vital Signs (Most Recent):  Temp: 97.6 °F (36.4 °C) (08/27/22 0517)  Pulse: (!) 56 (08/27/22 0800)  Resp: 18 (08/27/22 0800)  BP: (!) 150/66 (08/27/22 0800)  SpO2: 99 % (08/27/22 0517)   Vital Signs (24h Range):  Temp:  [97.4 °F (36.3 °C)-98.5 °F (36.9 °C)] 97.6 °F (36.4 °C)  Pulse:  [] 56  Resp:  [16-20] 18  SpO2:  [95 %-99 %] 99 %  BP: (144-193)/(65-82) 150/66     Intake/Output - Last 3 Shifts         08/25 0700 08/26 0659 08/26 0700 08/27 0659 08/27 0700  08/28 0659    P.O. 220 818 0    I.V. (mL/kg) 306 (5.5)      IV Piggyback       Total Intake(mL/kg) 526 (9.4) 818 (14.7) 0 (0)    Urine (mL/kg/hr) 0 (0) 0 (0) 0 (0)    Other 1389      Stool 0 0     Chest  Tube 90 50     Total Output 1479 50 0    Net -953 +768 0           Urine Occurrence 0 x 0 x 0 x    Stool Occurrence 1 x 3 x             SpO2: 99 %  O2 Device (Oxygen Therapy): nasal cannula    Physical Exam  Vitals and nursing note reviewed.   Constitutional:       General: He is not in acute distress.  Cardiovascular:      Rate and Rhythm: Normal rate.      Pulses: Normal pulses.   Pulmonary:      Effort: Pulmonary effort is normal. No respiratory distress.      Comments: R CT with minimal SS output, clamped  Abdominal:      General: There is no distension.      Palpations: Abdomen is soft.      Tenderness: There is no abdominal tenderness.   Neurological:      General: No focal deficit present.      Mental Status: He is alert and oriented to person, place, and time.       Significant Labs:  BMP:   Recent Labs   Lab 08/27/22  0256   *   *   K 5.2*      CO2 21*   BUN 18   CREATININE 3.6*   CALCIUM 7.3*   MG 2.5       CBC:   Recent Labs   Lab 08/27/22  0256   WBC 7.82   RBC 2.63*   HGB 8.2*   HCT 25.8*      MCV 98   MCH 31.2*   MCHC 31.8*       CMP:   Recent Labs   Lab 08/27/22  0256   *   CALCIUM 7.3*   ALBUMIN 2.4*   PROT 5.6*   *   K 5.2*   CO2 21*      BUN 18   CREATININE 3.6*   ALKPHOS 159*   ALT 7*   AST 29   BILITOT 0.9         Significant Diagnostics:  CXR: I have reviewed all pertinent results/findings within the past 24 hours    VTE Risk Mitigation (From admission, onward)           Ordered     IP VTE HIGH RISK PATIENT  Once         08/15/22 2005     Place sequential compression device  Until discontinued         08/15/22 2005                  Assessment/Plan:     * Recurrent pleural effusion on right   Recurrent pleural effusion on right  Vega Brownlee is a 61 y.o. male PMH CHF (last ECHO 9/20/20 EF 55%, PA pressure 77), home oxygen (2 L), DM2, ESRD on HD (via [facility]), recurrent bilateral pleural effusions (s/p R thoracentesis 2019, 2021), hx of a left  thoracotomy (empyema) presented to [facility] as a transfer for a loculated R pleural effusion. S/p R VATS, decortication with chest tube placements x2. Now with one CT, clamp trial.      · Clamp trial today for R CT. Pending CXR, will plan to take out tomorrow 8/28 if no SOB/clinical deterioration  · Occlusive dressing placed around chest tube sites   · Daily CXR with chest tubes in place  · 3L NC currently, wean as tolerated. On home oxygen. Would recommend scheduled breathing treatments  · OOB, ambualte             Hailey Rdz MD  General Surgery  [facility] - Cardiology Stepdown

## 2022-08-27 NOTE — ASSESSMENT & PLAN NOTE
Recurrent pleural effusion on right  Vega Brownlee is a 61 y.o. male PMH CHF (last ECHO 9/20/20 EF 55%, PA pressure 77), home oxygen (2 L), DM2, ESRD on HD (via [facility]), recurrent bilateral pleural effusions (s/p R thoracentesis 2019, 2021), hx of a left thoracotomy (empyema) presented [place] [facility] [place] a [place] for a loculated R pleural effusion. S/p R VATS, decortication with chest tube placements x2. Now with one CT, clamp trial.      · Clamp trial today for R CT. Pending CXR, will plan to take out tomorrow 8/28 if no SOB/clinical deterioration  · Occlusive dressing placed around chest tube sites   · Daily CXR with chest tubes in place  · 3L NC currently, wean as tolerated. On home oxygen. Would recommend scheduled breathing treatments  · OOB, ambualte

## 2022-08-27 NOTE — SUBJECTIVE & OBJECTIVE
Interval History: No issues overnight. CT with 50 cc output overnight. Clamp trial today.    Medications:  Continuous Infusions:  Scheduled Meds:   sodium chloride 0.9%   Intravenous Once    albuterol-ipratropium  3 mL Nebulization Q6H    atorvastatin  40 mg Oral QHS    carvediloL  3.125 mg Oral BID    epoetin michael (PROCRIT) injection  5,000 Units Intravenous Every Tues, Thurs, Sat    furosemide  80 mg Oral Daily    naloxone  0.4 mg Intravenous Once    pantoprazole  40 mg Oral Daily    polyethylene glycol  17 g Oral BID    sodium zirconium cyclosilicate  5 g Oral TID     PRN Meds:sodium chloride, acetaminophen, dextrose 10%, dextrose 10%, glucagon (human recombinant), glucose, glucose, hydrALAZINE, hydrOXYzine HCL, insulin aspart U-100, labetalol, naloxone, ondansetron, sodium chloride 0.9%     Review of patient's allergies indicates:  No Known Allergies  Objective:     Vital Signs (Most Recent):  Temp: 97.6 °F (36.4 °C) (08/27/22 0517)  Pulse: (!) 56 (08/27/22 0800)  Resp: 18 (08/27/22 0800)  BP: (!) 150/66 (08/27/22 0800)  SpO2: 99 % (08/27/22 0517)   Vital Signs (24h Range):  Temp:  [97.4 °F (36.3 °C)-98.5 °F (36.9 °C)] 97.6 °F (36.4 °C)  Pulse:  [] 56  Resp:  [16-20] 18  SpO2:  [95 %-99 %] 99 %  BP: (144-193)/(65-82) 150/66     Intake/Output - Last 3 Shifts         08/25 0700  08/26 0659 08/26 0700 08/27 0659 08/27 0700 08/28 0659    P.O. 220 818 0    I.V. (mL/kg) 306 (5.5)      IV Piggyback       Total Intake(mL/kg) 526 (9.4) 818 (14.7) 0 (0)    Urine (mL/kg/hr) 0 (0) 0 (0) 0 (0)    Other 1389      Stool 0 0     Chest Tube 90 50     Total Output 1479 50 0    Net -953 +768 0           Urine Occurrence 0 x 0 x 0 x    Stool Occurrence 1 x 3 x             SpO2: 99 %  O2 Device (Oxygen Therapy): nasal cannula    Physical Exam  Vitals and nursing note reviewed.   Constitutional:       General: He is not in acute distress.  Cardiovascular:      Rate and Rhythm: Normal rate.      Pulses: Normal pulses.    Pulmonary:      Effort: Pulmonary effort is normal. No respiratory distress.      Comments: R CT with minimal SS output, clamped  Abdominal:      General: There is no distension.      Palpations: Abdomen is soft.      Tenderness: There is no abdominal tenderness.   Neurological:      General: No focal deficit present.      Mental Status: He is alert and oriented to person, place, and time.       Significant Labs:  BMP:   Recent Labs   Lab 08/27/22  0256   *   *   K 5.2*      CO2 21*   BUN 18   CREATININE 3.6*   CALCIUM 7.3*   MG 2.5       CBC:   Recent Labs   Lab 08/27/22 0256   WBC 7.82   RBC 2.63*   HGB 8.2*   HCT 25.8*      MCV 98   MCH 31.2*   MCHC 31.8*       CMP:   Recent Labs   Lab 08/27/22 0256   *   CALCIUM 7.3*   ALBUMIN 2.4*   PROT 5.6*   *   K 5.2*   CO2 21*      BUN 18   CREATININE 3.6*   ALKPHOS 159*   ALT 7*   AST 29   BILITOT 0.9         Significant Diagnostics:  CXR: I have reviewed all pertinent results/findings within the past 24 hours    VTE Risk Mitigation (From admission, onward)           Ordered     IP VTE HIGH RISK PATIENT  Once         08/15/22 2005     Place sequential compression device  Until discontinued         08/15/22 2005

## 2022-08-27 NOTE — PROCEDURES
[facility] HEMODIALYSIS NOTE     Patient currently on hemodialysis for removal of uremic toxins and volume.     Patient seen and evaluated on hemodialysis, tolerating treatment, see HD flowsheet for vitals and assessments.      Ultrafiltration goal is 2L      Labs have been reviewed and the dialysate bath has been adjusted.     Assessment/Plan:  Seen on HD this morning, tolerating well.    Low K bath  Renal diet  Discontinue K binder    Anemia  -EPO    BMM  -renal diet  -hold phos binders for now      RUDOLPH Price, CHAYAP-BC  Nephrology  Pager:  [phone]

## 2022-08-27 NOTE — SUBJECTIVE & OBJECTIVE
Interval History: Pt seen and examined this morning on jeanette SAUNDERSNAMON. Remains grossly asymptomatic, hopeful for chest tube removal tomorrow and discharge to rehab on Monday. Care plan reviewed. Otherwise, doing well and with no further complaints at this time.      Objective:     Vital Signs (Most Recent):  Temp: 97.6 °F (36.4 °C) (08/27/22 0517)  Pulse: 62 (08/27/22 1015)  Resp: 18 (08/27/22 1015)  BP: (!) 168/72 (08/27/22 1015)  SpO2: 99 % (08/27/22 0517)   Vital Signs (24h Range):  Temp:  [97.4 °F (36.3 °C)-98.5 °F (36.9 °C)] 97.6 °F (36.4 °C)  Pulse:  [] 62  Resp:  [16-20] 18  SpO2:  [95 %-99 %] 99 %  BP: (143-193)/(65-82) 168/72     Weight: 55.8 kg (123 lb 0.3 oz)  Body mass index is 18.7 kg/m².    Intake/Output Summary (Last 24 hours) at 8/27/2022 1035  Last data filed at 8/27/2022 0700  Gross per 24 hour   Intake 700 ml   Output 50 ml   Net 650 ml        Physical Exam  Gen: in NAD, appears stated age  Neuro: AAOx4, CN2-12 grossly intact BL; motor, sensory, and strength grossly intact BL  HEENT: NTNC, EOMI, PERRLA, MMM; no thyromegaly or lymphadenopathy; no JVD appreciated  CVS: RRR, no m/r/g; S1/S2 auscultated with no S3 or S4; capillary refill < 2 sec  Resp: coarse and mildly decreased breath sounds in the right lung fields (improved from previous); no belabored breathing or accessory muscle use appreciated ; R chest tube in place   Abd: BS+ in all 4 quadrants; NTND, soft to palpation; no organomegaly appreciated   Extrem: pulses full, equal, and regular over all 4 extremities; no UE or LE edema BL      Significant Labs: All pertinent labs within the past 24 hours have been reviewed.    Significant Imaging: I have reviewed all pertinent imaging results/findings within the past 24 hours.

## 2022-08-27 NOTE — PROGRESS NOTES
"[facility] - Cardiology [facility] Medicine  Progress Note    Patient Name: Vega Brownlee  MRN: [id number]  Patient Class: IP- Inpatient   Admission Date: 8/15/2022  Length of Stay: 12 days  Attending Physician: Zack Culver MD  Primary Care Provider: Ko Perez MD        Subjective:     Principal Problem:Recurrent pleural effusion on right        HPI:  HPI per :  Patient is a 61 y.o. 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 Patient has history of recurrent pleural effusion and was sent to ED for worsening symptoms. IR was consulted and attempted US-guided therapeutic right-sided thoracentesis. Per IR "US reveals an extensively loculated Rt effusion with innumerable isolated pockets with sonographic appearance suggestive of a large hematoma/hemothorax. Only able to remove 50-cc of old, dark blood products also consistent with large Rt hemothorax. Pt would likely require large-bore chest tube placement and instillation of lytics if attempts will be made to resolve the hemothorax vs VATS." Facility seeking transfer for CTS evaluation. Patient is stable on med tele unit only requiring 3L NC with no distress. Stable for transfer.      Overview/Hospital Course:  [facility] Medicine Course:  IR was consulted and attempted US-guided therapeutic right-sided thoracentesis. Per IR "US reveals an extensively loculated Rt effusion with innumerable isolated pockets with sonographic appearance suggestive of a large hematoma/hemothorax. Only able to remove 50-cc of old, dark blood products also consistent with large Rt hemothorax. Pt would likely require large-bore chest tube " "placement and instillation of lytics if attempts will be made to resolve the hemothorax vs VATS."  [place] [facility] in queue to [place] asap. Stable vitals. Will allow to eat and make NPO again at midnight. Lokelma for K 5.8.     [facility] Medicine Course:  Pt accepted to [facility], Nephrology following for HD needs. Evaluated by thoracic surgery, with placement of PleurX catheter x2 on 8/18. Required 2u pRBC after the procedure, with stable low Hgb since that time. Pleural fluid exudative, with elevated LDH and low glucose. Cultures negative. Cytology pending. Interpretation is difficult with prior manipulation with traumatic thoras in the past. However, with new CT chest findings would certainly benefit from outpatient malignancy eval with follow up CT chest in 3 months and possible bx pending results. Awaiting chest tube removal, then planning to discharge to [facility].      Interval History: Pt seen and examined this morning on rounds. KALYAN. Remains grossly asymptomatic, hopeful for chest tube removal tomorrow and discharge to rehab on Monday. Care plan reviewed. Otherwise, doing well and with no further complaints at this time.      Objective:     Vital Signs (Most Recent):  Temp: 97.6 °F (36.4 °C) (08/27/22 0517)  Pulse: 62 (08/27/22 1015)  Resp: 18 (08/27/22 1015)  BP: (!) 168/72 (08/27/22 1015)  SpO2: 99 % (08/27/22 0517)   Vital Signs (24h Range):  Temp:  [97.4 °F (36.3 °C)-98.5 °F (36.9 °C)] 97.6 °F (36.4 °C)  Pulse:  [] 62  Resp:  [16-20] 18  SpO2:  [95 %-99 %] 99 %  BP: (143-193)/(65-82) 168/72     Weight: 55.8 kg (123 lb 0.3 oz)  Body mass index is 18.7 kg/m².    Intake/Output Summary (Last 24 hours) at 8/27/2022 1035  Last data filed at 8/27/2022 0700  Gross per 24 hour   Intake 700 ml   Output 50 ml   Net 650 ml        Physical Exam  Gen: in NAD, appears stated age  Neuro: AAOx4, CN2-12 grossly intact BL; motor, sensory, and strength grossly intact BL  HEENT: JOHN, REYNOLD, LYNN, VINH; " no thyromegaly or lymphadenopathy; no JVD appreciated  CVS: RRR, no m/r/g; S1/S2 auscultated with no S3 or S4; capillary refill < 2 sec  Resp: coarse and mildly decreased breath sounds in the right lung fields (improved from previous); no belabored breathing or accessory muscle use appreciated ; R chest tube in place   Abd: BS+ in all 4 quadrants; NTND, soft to palpation; no organomegaly appreciated   Extrem: pulses full, equal, and regular over all 4 extremities; no UE or LE edema BL      Significant Labs: All pertinent labs within the past 24 hours have been reviewed.    Significant Imaging: I have reviewed all pertinent imaging results/findings within the past 24 hours.      Assessment/Plan:      * Recurrent pleural effusion on right  - Interval history and physical exam findings as described above  - S/p left thoracotomy prior to admission  - Recurring right effusion s/p thoracentesis x2  - Chemistry on 2/4/21 suggestive of exudative effusion  - Chemistry 8/22 exudative with high LDH and low glucose. Cultures neg. Unclear significance with prior manipulation, but new CT findings must consider possible underlying malignancy. Will need ongoing outpatient work up. Cytology pending  - Thoracic surgery consulted, placement of PleurX on 8/18     - s/p chest tube placement x2. One removed 8/24. Follow daily Xray  - Satting well on 1L NC  - Continuing to monitor    ESRD (end stage renal disease) on dialysis  - Interval history and physical exam findings as described above  - Nephology following  - Further decision for HD per nephology  - Renally dosing all medications  - Avoid nephrotoxins  - Will continue to monitor on daily labs    Anemia in ESRD (end-stage renal disease)  - H/H stable near baseline on admission, with drop after pleurex catheter placement 8/18. S/p 2u pRBC 8/19. S/p 1u pRBC 8/23, no active bleeding   - Will continue to monitor on daily labs    Chronic diastolic heart failure  - Remains grossly  asymptomatic, euvolemic, not in acute exacerbation  - Continue home cardioprudent regimen  - Monitoring on tele    Hypertension associated with ESRD caused by type 2 diabetes mellitus, on dialysis  - Working to optimize BP control   - Continue home cardioprudent regimen  - HD to assist with BP control  - PRN IV hydralazine and/or IV labetalol provided for SBP>180  - Will continue to monitor and further titrate antihypertensives as clinically indicated     Mixed hyperlipidemia associated with type 2 diabetes mellitus  - Continue home statin regimen    Type 2 diabetes mellitus with chronic kidney disease on chronic dialysis, without long-term current use of insulin  - Working to optimize BG control  - SSI provided for corrective dosing  - DXTs as ordered  - Hypoglycemic protocol in effect  - Diabetic diet provided    Thrombocytopenia  - Per chart review, appears to be a chronic issue  - Monitoring on daily labs    Physical deconditioning  - Rehab medicine following. Likely candidate for inpatient rehab when medically clear for DC    Chronic kidney disease-mineral and bone disorder  - Renal management as above      VTE Risk Mitigation (From admission, onward)         Ordered     IP VTE HIGH RISK PATIENT  Once         08/15/22 2005     Place sequential compression device  Until discontinued         08/15/22 2005                Discharge Planning   WENDY: 8/29/2022     Code Status: Full Code   Is the patient medically ready for discharge?: No    Reason for patient still in hospital (select all that apply): Patient trending condition  Discharge Plan A: Rehab          Zack Culver MD  Attending Physician  Department of Hospital Medicine  Epic secure chat preferred, or ext. [phone]  8/27/2022

## 2022-08-27 NOTE — NURSING
Notified MD pt returned from HD, complaining of pain at the chest tube site. Chest tube has been clamped since yesterday afternoon. Chest tube dressing changed, very saturated and malodorous. 's, saturating 95% on 2L NC. CXR being done now at bedside. PRN hydralazine given and respiratory treatment being administered now.    Surgical team aware. Per surgery, keeping chest tube clamped at this time and will remove chest tube tomorrow 8/28.

## 2022-08-27 NOTE — PROGRESS NOTES
HD treatment started. No complications with access to the right upper arm. Lines secured and telemetry in place. Complaints of discomfort at the chest tube site. Chest tube intact and clamped.

## 2022-08-27 NOTE — PROGRESS NOTES
HD treatment complete. Duration of treatment 3.5 hours and 2 L  removed. Treatment was tolerated well and no complications with access to the right upper arm. Needles removed and hemostasis achieved. Dressing intact and no drainage noted. Thrill and bruit present.

## 2022-08-28 NOTE — SUBJECTIVE & OBJECTIVE
Interval History: Chest tube dressing saturated upon exam this AM. Nursing staff changed saturated dressing 2x yesterday.     Medications:  Continuous Infusions:  Scheduled Meds:   albuterol-ipratropium  3 mL Nebulization Q6H    atorvastatin  40 mg Oral QHS    carvediloL  3.125 mg Oral BID    diphenhydrAMINE  25 mg Oral QHS    epoetin michael (PROCRIT) injection  5,000 Units Intravenous Every Tues, Thurs, Sat    furosemide  80 mg Oral Daily    LIDOcaine HCL 10 mg/ml (1%)  10 mL Other Once    naloxone  0.4 mg Intravenous Once    NIFEdipine  90 mg Oral Daily    pantoprazole  40 mg Oral Daily    polyethylene glycol  17 g Oral BID    sodium zirconium cyclosilicate  5 g Oral TID     PRN Meds:sodium chloride, acetaminophen, dextrose 10%, dextrose 10%, glucagon (human recombinant), glucose, glucose, hydrALAZINE, HYDROmorphone, hydrOXYzine HCL, insulin aspart U-100, labetalol, naloxone, ondansetron, sodium chloride 0.9%     Review of patient's allergies indicates:  No Known Allergies  Objective:     Vital Signs (Most Recent):  Temp: 98.3 °F (36.8 °C) (08/28/22 0728)  Pulse: (!) 54 (08/28/22 0728)  Resp: 14 (08/28/22 0728)  BP: (!) 151/68 (08/28/22 0728)  SpO2: 99 % (08/28/22 0728)   Vital Signs (24h Range):  Temp:  [97.8 °F (36.6 °C)-98.8 °F (37.1 °C)] 98.3 °F (36.8 °C)  Pulse:  [54-70] 54  Resp:  [14-18] 14  SpO2:  [90 %-100 %] 99 %  BP: (145-195)/() 151/68     Intake/Output - Last 3 Shifts         08/26 0700 08/27 0659 08/27 0700 08/28 0659 08/28 0700 08/29 0659    P.O. 818 558     I.V. (mL/kg)       Other  600     Total Intake(mL/kg) 818 (14.7) 1158 (20.8)     Urine (mL/kg/hr) 0 (0) 0 (0)     Other  2607     Stool 0 0     Chest Tube 50      Total Output 50 2607     Net +768 -1449            Urine Occurrence 0 x 0 x     Stool Occurrence 3 x 2 x             SpO2: 99 %  O2 Device (Oxygen Therapy): nasal cannula    Physical Exam  Vitals and nursing note reviewed.   Constitutional:       General: He is not in acute  distress.  Cardiovascular:      Rate and Rhythm: Normal rate.      Pulses: Normal pulses.   Pulmonary:      Effort: Pulmonary effort is normal. No respiratory distress.      Comments: R CT with minimal SS output, clamped. Dressing saturated  Abdominal:      General: There is no distension.      Palpations: Abdomen is soft.      Tenderness: There is no abdominal tenderness.   Neurological:      General: No focal deficit present.      Mental Status: He is alert and oriented to person, place, and time.       Significant Labs:  BMP:   Recent Labs   Lab 08/28/22  0643   *      K 3.9      CO2 26   BUN 15   CREATININE 2.9*   CALCIUM 7.2*   MG 2.2       CBC:   Recent Labs   Lab 08/28/22  0643   WBC 9.72   RBC 2.43*   HGB 7.8*   HCT 24.3*      *   MCH 32.1*   MCHC 32.1       CMP:   Recent Labs   Lab 08/28/22  0643   *   CALCIUM 7.2*   ALBUMIN 2.1*   PROT 5.2*      K 3.9   CO2 26      BUN 15   CREATININE 2.9*   ALKPHOS 169*   ALT 7*   AST 21   BILITOT 1.0         Significant Diagnostics:  CXR: I have reviewed all pertinent results/findings within the past 24 hours    VTE Risk Mitigation (From admission, onward)           Ordered     IP VTE HIGH RISK PATIENT  Once         08/15/22 2005     Place sequential compression device  Until discontinued         08/15/22 2005

## 2022-08-28 NOTE — PLAN OF CARE
Plan of care reviewed with patient. Chest tube remains clamped, dressing placed by surgical team this AM clean, dry, intact. Patient has no complaints of chest pain or shortness of breath. Up to chair for meals with x1 assistance. VSS, blood glucose monitored. Call light within reach.      1745: Chest tube site assessed, no drainage, no complaints of pain or shortness or breath. Dressing clean, dry, intact.

## 2022-08-28 NOTE — PROGRESS NOTES
[facility] - Cardiology Stepdown  General Surgery  Progress Note    Subjective:     History of Present Illness:  No notes on file    Post-Op Info:  Procedure(s) (LRB):  VATS, WITH DECORTICATION, LUNG (Right)  BLOCK, NERVE, INTERCOSTAL, 2 OR MORE (Right)  BRONCHOSCOPY, FLEXIBLE (N/A)  INSERTION, CATHETER, INTERCOSTAL, FOR DRAINAGE (Right)   10 Days Post-Op     Interval History: Chest tube dressing saturated upon exam this AM. Nursing staff changed saturated dressing 2x yesterday.     Medications:  Continuous Infusions:  Scheduled Meds:   albuterol-ipratropium  3 mL Nebulization Q6H    atorvastatin  40 mg Oral QHS    carvediloL  3.125 mg Oral BID    diphenhydrAMINE  25 mg Oral QHS    epoetin michael (PROCRIT) injection  5,000 Units Intravenous Every Tues, Thurs, Sat    furosemide  80 mg Oral Daily    LIDOcaine HCL 10 mg/ml (1%)  10 mL Other Once    naloxone  0.4 mg Intravenous Once    NIFEdipine  90 mg Oral Daily    pantoprazole  40 mg Oral Daily    polyethylene glycol  17 g Oral BID    sodium zirconium cyclosilicate  5 g Oral TID     PRN Meds:sodium chloride, acetaminophen, dextrose 10%, dextrose 10%, glucagon (human recombinant), glucose, glucose, hydrALAZINE, HYDROmorphone, hydrOXYzine HCL, insulin aspart U-100, labetalol, naloxone, ondansetron, sodium chloride 0.9%     Review of patient's allergies indicates:  No Known Allergies  Objective:     Vital Signs (Most Recent):  Temp: 98.3 °F (36.8 °C) (08/28/22 0728)  Pulse: (!) 54 (08/28/22 0728)  Resp: 14 (08/28/22 0728)  BP: (!) 151/68 (08/28/22 0728)  SpO2: 99 % (08/28/22 0728)   Vital Signs (24h Range):  Temp:  [97.8 °F (36.6 °C)-98.8 °F (37.1 °C)] 98.3 °F (36.8 °C)  Pulse:  [54-70] 54  Resp:  [14-18] 14  SpO2:  [90 %-100 %] 99 %  BP: (145-195)/() 151/68     Intake/Output - Last 3 Shifts         08/26 0700 08/27 0659 08/27 0700 08/28 0659 08/28 0700 08/29 0659    P.O. 818 558     I.V. (mL/kg)       Other  600     Total Intake(mL/kg) 818 (14.7) 1015  (20.8)     Urine (mL/kg/hr) 0 (0) 0 (0)     Other  2607     Stool 0 0     Chest Tube 50      Total Output 50 2607     Net +768 -1449            Urine Occurrence 0 x 0 x     Stool Occurrence 3 x 2 x             SpO2: 99 %  O2 Device (Oxygen Therapy): nasal cannula    Physical Exam  Vitals and nursing note reviewed.   Constitutional:       General: He is not in acute distress.  Cardiovascular:      Rate and Rhythm: Normal rate.      Pulses: Normal pulses.   Pulmonary:      Effort: Pulmonary effort is normal. No respiratory distress.      Comments: R CT with minimal SS output, clamped. Dressing saturated  Abdominal:      General: There is no distension.      Palpations: Abdomen is soft.      Tenderness: There is no abdominal tenderness.   Neurological:      General: No focal deficit present.      Mental Status: He is alert and oriented to person, place, and time.       Significant Labs:  BMP:   Recent Labs   Lab 08/28/22  0643   *      K 3.9      CO2 26   BUN 15   CREATININE 2.9*   CALCIUM 7.2*   MG 2.2       CBC:   Recent Labs   Lab 08/28/22  0643   WBC 9.72   RBC 2.43*   HGB 7.8*   HCT 24.3*      *   MCH 32.1*   MCHC 32.1       CMP:   Recent Labs   Lab 08/28/22  0643   *   CALCIUM 7.2*   ALBUMIN 2.1*   PROT 5.2*      K 3.9   CO2 26      BUN 15   CREATININE 2.9*   ALKPHOS 169*   ALT 7*   AST 21   BILITOT 1.0         Significant Diagnostics:  CXR: I have reviewed all pertinent results/findings within the past 24 hours    VTE Risk Mitigation (From admission, onward)           Ordered     IP VTE HIGH RISK PATIENT  Once         08/15/22 2005     Place sequential compression device  Until discontinued         08/15/22 2005                  Assessment/Plan:     * Recurrent pleural effusion on right   Recurrent pleural effusion on right  Vega Brownlee is a 61 y.o. male PMH CHF (last ECHO 9/20/20 EF 55%, PA pressure 77), home oxygen (2 L), DM2, ESRD on HD (via [facility]),  recurrent bilateral pleural effusions (s/p R thoracentesis 2019, 2021), hx of a left thoracotomy (empyema) presented to [facility] as a transfer for a loculated R pleural effusion. S/p R VATS, decortication with chest tube placements x2. Now with one CT, clamp trial.      · Dressing saturated on exam. Pt continues to be at baseline for oxygen requirements. Will plan to add a stitch to chest tube and reclamp today. Hopeful to remove chest tube tomorrow.   · Daily CXR with chest tubes in place  · 3L NC currently, wean as tolerated. On home oxygen. Would recommend scheduled breathing treatments  · OOB, ambulate             Hailey Rdz MD  General Surgery  [facility] - Cardiology Stepdown

## 2022-08-28 NOTE — SUBJECTIVE & OBJECTIVE
Interval History: Pt seen and examined this morning on jeanette BIGGS. Seen with CTS this morning, who informed him that chest tube needs to remain in place due to continued drainage; if unable to be pulled tomorrow, they will plan for pleurX placement. Pt verbalized his understanding, and that this will further delay his discharge to rehab. Care plan reviewed. Otherwise, doing well and with no further complaints at this time.      Objective:     Vital Signs (Most Recent):  Temp: 98.3 °F (36.8 °C) (08/28/22 0728)  Pulse: (!) 53 (08/28/22 0843)  Resp: 20 (08/28/22 0843)  BP: (!) 151/68 (08/28/22 0851)  SpO2: 96 % (08/28/22 0843)   Vital Signs (24h Range):  Temp:  [97.8 °F (36.6 °C)-98.8 °F (37.1 °C)] 98.3 °F (36.8 °C)  Pulse:  [53-70] 53  Resp:  [14-20] 20  SpO2:  [90 %-100 %] 96 %  BP: (147-195)/() 151/68     Weight: 55.8 kg (123 lb 0.3 oz)  Body mass index is 18.7 kg/m².    Intake/Output Summary (Last 24 hours) at 8/28/2022 1009  Last data filed at 8/27/2022 1700  Gross per 24 hour   Intake 1158 ml   Output 2607 ml   Net -1449 ml        Physical Exam  Gen: in NAD, appears stated age  Neuro: AAOx4, CN2-12 grossly intact BL; motor, sensory, and strength grossly intact BL  HEENT: NTNC, EOMI, PERRLA, MMM; no thyromegaly or lymphadenopathy; no JVD appreciated  CVS: RRR, no m/r/g; S1/S2 auscultated with no S3 or S4; capillary refill < 2 sec  Resp: coarse and mildly decreased breath sounds in the right lung fields (improved from previous); no belabored breathing or accessory muscle use appreciated ; R chest tube in place   Abd: BS+ in all 4 quadrants; NTND, soft to palpation; no organomegaly appreciated   Extrem: pulses full, equal, and regular over all 4 extremities; no UE or LE edema BL      Significant Labs: All pertinent labs within the past 24 hours have been reviewed.    Significant Imaging: I have reviewed all pertinent imaging results/findings within the past 24 hours.

## 2022-08-28 NOTE — PLAN OF CARE
Pt cooperative with routine care and procedures. Pt VSS. Pt up with assistance to bedside commode and reminded to call for assistance if increased weakness or s/s of distress. Bed locked and in lowest position. Call bell within reach. Pt remained free from s/s of distress and pain. Pt on 2L nasal canula. Pt has a chest tube clamped off ready to be pulled out today. Dressing on chest tube changed twice due to drainage at chest tube site. Pt tolerated dressing change well.       Problem: Adult Inpatient Plan of Care  Goal: Plan of Care Review  Outcome: Ongoing, Progressing  Flowsheets (Taken 8/28/2022 0301)  Plan of Care Reviewed With: patient  Goal: Patient-Specific Goal (Individualized)  Outcome: Ongoing, Progressing  Flowsheets (Taken 8/28/2022 0301)  Anxieties, Fears or Concerns: Discharge plan  Individualized Care Needs: Monitor chest tube site for copious drainage, pain levels, and lab values  Patient-Specific Goals (Include Timeframe): To remain injury free before discharge  Goal: Absence of Hospital-Acquired Illness or Injury  Outcome: Ongoing, Progressing  Goal: Optimal Comfort and Wellbeing  Outcome: Ongoing, Progressing  Goal: Readiness for Transition of Care  Outcome: Ongoing, Progressing     Problem: Diabetes Comorbidity  Goal: Blood Glucose Level Within Targeted Range  Outcome: Ongoing, Progressing     Problem: Infection  Goal: Absence of Infection Signs and Symptoms  Outcome: Ongoing, Progressing     Problem: Impaired Wound Healing  Goal: Optimal Wound Healing  Outcome: Ongoing, Progressing     Problem: Device-Related Complication Risk (Hemodialysis)  Goal: Safe, Effective Therapy Delivery  Outcome: Ongoing, Progressing     Problem: Infection (Hemodialysis)  Goal: Absence of Infection Signs and Symptoms  Outcome: Ongoing, Progressing     Problem: Hemodynamic Instability (Hemodialysis)  Goal: Effective Tissue Perfusion  Outcome: Ongoing, Progressing     Problem: Skin Injury Risk Increased  Goal: Skin  Health and Integrity  Outcome: Ongoing, Progressing     Problem: Fall Injury Risk  Goal: Absence of Fall and Fall-Related Injury  Outcome: Ongoing, Progressing

## 2022-08-28 NOTE — ASSESSMENT & PLAN NOTE
Recurrent pleural effusion on right  Vega Brownlee is a 61 y.o. male PMH CHF (last ECHO 9/20/20 EF 55%, PA pressure 77), home oxygen (2 L), DM2, ESRD on HD (via [facility]), recurrent bilateral pleural effusions (s/p R thoracentesis 2019, 2021), hx of a left thoracotomy (empyema) presented to [facility] as a transfer for a loculated R pleural effusion. S/p R VATS, decortication with chest tube placements x2. Now with one CT, clamp trial.      · Dressing saturated on exam. Pt continues to be at baseline for oxygen requirements. Will plan to add a stitch to chest tube and reclamp today. Hopeful to remove chest tube tomorrow.   · Daily CXR with chest tubes in place  · 3L NC currently, wean as tolerated. On home oxygen. Would recommend scheduled breathing treatments  · OOB, ambulate

## 2022-08-28 NOTE — PROGRESS NOTES
"Mike Jasso - Cardiology [facility] Medicine  Progress Note    Patient Name: Vega Brownlee  MRN: [id number]  Patient Class: IP- Inpatient   Admission Date: 8/15/2022  Length of Stay: 13 days  Attending Physician: Zack Culver MD  Primary Care Provider: Ko Perez MD        Subjective:     Principal Problem:Recurrent pleural effusion on right        HPI:  HPI per :  Patient is a 61 y.o. 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 Patient has history of recurrent pleural effusion and was sent to ED for worsening symptoms. IR was consulted and attempted US-guided therapeutic right-sided thoracentesis. Per IR "US reveals an extensively loculated Rt effusion with innumerable isolated pockets with sonographic appearance suggestive of a large hematoma/hemothorax. Only able to remove 50-cc of old, dark blood products also consistent with large Rt hemothorax. Pt would likely require large-bore chest tube placement and instillation of lytics if attempts will be made to resolve the hemothorax vs VATS." Facility seeking transfer for CTS evaluation. Patient is stable on med tele unit only requiring 3L NC with no distress. Stable for transfer.      Overview/Hospital Course:  [facility] Medicine Course:  IR was consulted and attempted US-guided therapeutic right-sided thoracentesis. Per IR "US reveals an extensively loculated Rt effusion with innumerable isolated pockets with sonographic appearance suggestive of a large hematoma/hemothorax. Only able to remove 50-cc of old, dark blood products also consistent with large Rt hemothorax. Pt would likely require large-bore chest tube " "placement and instillation of lytics if attempts will be made to resolve the hemothorax vs VATS."  Patient accepted to [facility], in queue to transfer asap. Stable vitals. Will allow to eat and make NPO again at midnight. Lokelma for K 5.8.     [facility] Hospital Medicine Course:  [place] [facility] needs. Evaluated by thoracic surgery, with placement of PleurX catheter x2 on 8/18. Required 2u pRBC after the procedure, with stable low Hgb since that time. Pleural fluid exudative, with elevated LDH and low glucose. Cultures negative. Cytology pending. Interpretation is difficult with prior manipulation with traumatic thoras in the past. However, with new CT chest findings would certainly benefit from outpatient malignancy eval with follow up CT chest in 3 months and possible bx pending results. Awaiting chest tube removal, then planning to discharge to [facility].      Interval History: Pt seen and examined this morning on jeanette BIGGS. Seen with CTS this morning, who informed him that chest tube needs to remain in place due to continued drainage; if unable to be pulled tomorrow, they will plan for pleurX placement. Pt verbalized his understanding, and that this will further delay his discharge to rehab. Care plan reviewed. Otherwise, doing well and with no further complaints at this time.      Objective:     Vital Signs (Most Recent):  Temp: 98.3 °F (36.8 °C) (08/28/22 0728)  Pulse: (!) 53 (08/28/22 0843)  Resp: 20 (08/28/22 0843)  BP: (!) 151/68 (08/28/22 0851)  SpO2: 96 % (08/28/22 0843)   Vital Signs (24h Range):  Temp:  [97.8 °F (36.6 °C)-98.8 °F (37.1 °C)] 98.3 °F (36.8 °C)  Pulse:  [53-70] 53  Resp:  [14-20] 20  SpO2:  [90 %-100 %] 96 %  BP: (147-195)/() 151/68     Weight: 55.8 kg (123 lb 0.3 oz)  Body mass index is 18.7 kg/m².    Intake/Output Summary (Last 24 hours) at 8/28/2022 1009  Last data filed at 8/27/2022 1700  Gross per 24 hour   Intake 1158 ml   Output 2607 ml   Net -1449 " ml        Physical Exam  Gen: in NAD, appears stated age  Neuro: AAOx4, CN2-12 grossly intact BL; motor, sensory, and strength grossly intact BL  HEENT: NTNC, EOMI, PERRLA, MMM; no thyromegaly or lymphadenopathy; no JVD appreciated  CVS: RRR, no m/r/g; S1/S2 auscultated with no S3 or S4; capillary refill < 2 sec  Resp: coarse and mildly decreased breath sounds in the right lung fields (improved from previous); no belabored breathing or accessory muscle use appreciated ; R chest tube in place   Abd: BS+ in all 4 quadrants; NTND, soft to palpation; no organomegaly appreciated   Extrem: pulses full, equal, and regular over all 4 extremities; no UE or LE edema BL      Significant Labs: All pertinent labs within the past 24 hours have been reviewed.    Significant Imaging: I have reviewed all pertinent imaging results/findings within the past 24 hours.      Assessment/Plan:      * Recurrent pleural effusion on right  - Interval history and physical exam findings as described above  - S/p left thoracotomy prior to admission  - Recurring right effusion s/p thoracentesis x2  - Chemistry on 2/4/21 suggestive of exudative effusion  - Chemistry 8/22 exudative with high LDH and low glucose. Cultures neg. Unclear significance with prior manipulation, but new CT findings must consider possible underlying malignancy. Will need ongoing outpatient work up. Cytology pending  - Thoracic surgery consulted, placement of PleurX on 8/18     - s/p chest tube placement x2. One removed 8/24. Follow daily Xray  - Satting well on 1L NC  - Continuing to monitor    ESRD (end stage renal disease) on dialysis  - Interval history and physical exam findings as described above  - Nephology following  - Further decision for HD per nephology  - Renally dosing all medications  - Avoid nephrotoxins  - Will continue to monitor on daily labs    Anemia in ESRD (end-stage renal disease)  - H/H stable near baseline on admission, with drop after pleurex  catheter placement 8/18. S/p 2u pRBC 8/19. S/p 1u pRBC 8/23, no active bleeding   - Will continue to monitor on daily labs    Chronic diastolic heart failure  - Remains grossly asymptomatic, euvolemic, not in acute exacerbation  - Continue home cardioprudent regimen  - Monitoring on tele    Hypertension associated with ESRD caused by type 2 diabetes mellitus, on dialysis  - Working to optimize BP control   - Continue home cardioprudent regimen; added nifedipine  - HD to assist with BP control  - PRN IV hydralazine and/or IV labetalol provided for SBP>180  - Will continue to monitor and further titrate antihypertensives as clinically indicated     Mixed hyperlipidemia associated with type 2 diabetes mellitus  - Continue home statin regimen    Type 2 diabetes mellitus with chronic kidney disease on chronic dialysis, without long-term current use of insulin  - Working to optimize BG control  - SSI provided for corrective dosing  - DXTs as ordered  - Hypoglycemic protocol in effect  - Diabetic diet provided    Thrombocytopenia  - Per chart review, appears to be a chronic issue  - Monitoring on daily labs    Physical deconditioning  - Rehab medicine following. Likely candidate for inpatient rehab when medically clear for DC    Chronic kidney disease-mineral and bone disorder  - Renal management as above      VTE Risk Mitigation (From admission, onward)         Ordered     IP VTE HIGH RISK PATIENT  Once         08/15/22 2005     Place sequential compression device  Until discontinued         08/15/22 2005                Discharge Planning   WENDY: 8/30/2022     Code Status: Full Code   Is the patient medically ready for discharge?: No    Reason for patient still in hospital (select all that apply): Patient trending condition  Discharge Plan A: Rehab          Zack Culver MD  Attending Physician  Department of Hospital Medicine  Epic secure chat preferred, or ext. [phone]  8/28/2022

## 2022-08-28 NOTE — ASSESSMENT & PLAN NOTE
- Working to optimize BP control   - Continue home cardioprudent regimen; added nifedipine  - HD to assist with BP control  - PRN IV hydralazine and/or IV labetalol provided for SBP>180  - Will continue to monitor and further titrate antihypertensives as clinically indicated

## 2022-08-28 NOTE — RESPIRATORY THERAPY
RAPID RESPONSE RESPIRATORY CHART CHECK       Chart check completed.  Please call [phone] for further concerns or assistance.

## 2022-08-29 NOTE — PROGRESS NOTES
Mike Jasso - Cardiology Stepdown  Thoracic Surgery  Progress Note    Subjective:         Post-Op Info:  Procedure(s) (LRB):  VATS, WITH DECORTICATION, LUNG (Right)  BLOCK, NERVE, INTERCOSTAL, 2 OR MORE (Right)  BRONCHOSCOPY, FLEXIBLE (N/A)  INSERTION, CATHETER, INTERCOSTAL, FOR DRAINAGE (Right)   11 Days Post-Op     Interval History: No issues overnight, CT clamped. 2L NC    Medications:  Continuous Infusions:  Scheduled Meds:   albuterol-ipratropium  3 mL Nebulization Q6H    atorvastatin  40 mg Oral QHS    carvediloL  3.125 mg Oral BID    diphenhydrAMINE  25 mg Oral QHS    epoetin michael (PROCRIT) injection  5,000 Units Intravenous Every Tues, Thurs, Sat    furosemide  80 mg Oral Daily    naloxone  0.4 mg Intravenous Once    NIFEdipine  90 mg Oral Q12H    pantoprazole  40 mg Oral Daily    polyethylene glycol  17 g Oral BID    sodium zirconium cyclosilicate  5 g Oral TID     PRN Meds:sodium chloride, acetaminophen, dextrose 10%, dextrose 10%, glucagon (human recombinant), glucose, glucose, hydrALAZINE, HYDROmorphone, hydrOXYzine HCL, insulin aspart U-100, labetalol, naloxone, ondansetron, sodium chloride 0.9%     Review of patient's allergies indicates:  No Known Allergies  Objective:     Vital Signs (Most Recent):  Temp: 97.6 °F (36.4 °C) (08/29/22 0350)  Pulse: 61 (08/29/22 0900)  Resp: 18 (08/29/22 0750)  BP: 127/62 (08/29/22 0900)  SpO2: 99 % (08/29/22 0723)   Vital Signs (24h Range):  Temp:  [97.6 °F (36.4 °C)-99.6 °F (37.6 °C)] 97.6 °F (36.4 °C)  Pulse:  [56-75] 61  Resp:  [16-20] 18  SpO2:  [90 %-99 %] 99 %  BP: (100-163)/(50-70) 127/62     Intake/Output - Last 3 Shifts         08/27 0700 08/28 0659 08/28 0700 08/29 0659 08/29 0700 08/30 0659    P.O. 558 558     Other 600      Total Intake(mL/kg) 1158 (20.8) 558 (10)     Urine (mL/kg/hr) 0 (0)      Other 2607      Stool 0      Chest Tube       Total Output 2607      Net -1449 +558            Urine Occurrence 0 x 0 x     Stool Occurrence 2 x 0 x              SpO2: 99 %  O2 Device (Oxygen Therapy): nasal cannula    Physical Exam  Vitals and nursing note reviewed.   Constitutional:       General: He is not in acute distress.  Cardiovascular:      Rate and Rhythm: Normal rate.      Pulses: Normal pulses.   Pulmonary:      Effort: Pulmonary effort is normal. No respiratory distress.      Comments: R CT with minimal SS output, clamped  Abdominal:      General: There is no distension.      Palpations: Abdomen is soft.      Tenderness: There is no abdominal tenderness.   Neurological:      General: No focal deficit present.      Mental Status: He is alert and oriented to person, place, and time.       Significant Labs:  BMP:   Recent Labs   Lab 08/29/22  0611   *   *   K 4.7      CO2 24   BUN 24*   CREATININE 3.9*   CALCIUM 7.1*   MG 2.2       CBC:   Recent Labs   Lab 08/29/22  0611   WBC 12.71*   RBC 2.46*   HGB 7.8*   HCT 24.8*      *   MCH 31.7*   MCHC 31.5*       CMP:   Recent Labs   Lab 08/29/22  0611   *   CALCIUM 7.1*   ALBUMIN 2.1*   PROT 5.5*   *   K 4.7   CO2 24      BUN 24*   CREATININE 3.9*   ALKPHOS 144*   ALT 7*   AST 27   BILITOT 0.9         Significant Diagnostics:  CXR: I have reviewed all pertinent results/findings within the past 24 hours    VTE Risk Mitigation (From admission, onward)           Ordered     IP VTE HIGH RISK PATIENT  Once         08/15/22 2005     Place sequential compression device  Until discontinued         08/15/22 2005                  Assessment/Plan:     * Recurrent pleural effusion on right  Vega Brownlee is a 61 y.o. male PMH CHF (last ECHO 9/20/20 EF 55%, PA pressure 77), home oxygen (2 L), DM2, ESRD on HD (via [facility]), recurrent bilateral pleural effusions (s/p R thoracentesis 2019, 2021), hx of a left thoracotomy (empyema) presented to [facility] as a transfer for a loculated R pleural effusion. S/p R VATS, decortication with chest tube placements x2 5/18.  Now on 2 L NC.       R CT clamped. Will obtain CT chest non-contrast today to evaluate pleural effusion now s/p decortication.     · Clamp R CT  · CT chest today, will follow-up  · Daily CXR with chest tubes in place  · Strict monitoring of chest tube outputs  · 2L NC currently, home requirement   · OOB, ambulate  · Agree with rest of care. Nephrology consulted, appreciate recs. Dialysis today           Margoth Mota MD  Thoracic Surgery  [facility] Step[facility]

## 2022-08-29 NOTE — RESPIRATORY THERAPY
RAPID RESPONSE RESPIRATORY CHART CHECK       Chart check completed, bedside RT, [place] contacted for RR of 100 she charted.  She is going to recheck and chart new RR Instructed to call [phone] for further concerns or assistance.

## 2022-08-29 NOTE — ASSESSMENT & PLAN NOTE
Vega Brownlee is a 61 y.o. male PMH CHF (last ECHO 9/20/20 EF 55%, PA pressure 77), home oxygen (2 L), DM2, ESRD on HD (via [facility]), recurrent bilateral pleural effusions (s/p R thoracentesis 2019, 2021), hx of a left thoracotomy (empyema) [facility] for a loculated R pleural effusion. S/p R VATS, decortication with chest tube placements x2 5/18.  Now on 2 L NC.      R CT clamped. Will obtain CT chest non-contrast today to evaluate pleural effusion now s/p decortication.     · Clamp R CT  · CT chest today, will follow-up  · Daily CXR with chest tubes in place  · Strict monitoring of chest tube outputs  · 2L NC currently, home requirement   · OOB, ambulate  · Agree with rest of care. Nephrology consulted, appreciate recs. Dialysis today

## 2022-08-29 NOTE — PT/OT/SLP PROGRESS
Occupational Therapy      Patient Name:  Vega Brownlee   MRN:  [id number]    Patient not seen today secondary to pt MASON for HD  . Will follow-up 8/30/22.    8/29/2022

## 2022-08-29 NOTE — PROGRESS NOTES
3 hours HD Tx completed. 1.5L of fluid removed. Patient tolerated well. Blood returned, needles pulled, manual pressure held until hemostasis was achieved. . Report given to BRENDA Diggs RN.

## 2022-08-29 NOTE — PLAN OF CARE
Pt maintained free from falls/ trauma/ injuries and skin breakdown. Reposition q 2 hrs. Pt denied pain or discomfort. Chest tube in place, drsg CDI, clamped. Pt on 3 L O2 with no distress. Frequently coughing up white sputum.  before bed, insulin given. Plan of care reviewed. Pt verbalized understanding. All questions and concerns addressed. Will continue to monitor.

## 2022-08-29 NOTE — PROGRESS NOTES
"Mike Jasso - Cardiology [facility] Medicine  Progress Note    Patient Name: Vega Brownlee  MRN: [id number]  Patient Class: IP- Inpatient   Admission Date: 8/15/2022  Length of Stay: 14 days  Attending Physician: Zack Culver MD  Primary Care Provider: Ko Perez MD        Subjective:     Principal Problem:Recurrent pleural effusion on right        HPI:  HPI per :  Patient is a 61 y.o. 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 Patient has history of recurrent pleural effusion and was sent to ED for worsening symptoms. IR was consulted and attempted US-guided therapeutic right-sided thoracentesis. Per IR "US reveals an extensively loculated Rt effusion with innumerable isolated pockets with sonographic appearance suggestive of a large hematoma/hemothorax. Only able to remove 50-cc of old, dark blood products also consistent with large Rt hemothorax. Pt would likely require large-bore chest tube placement and instillation of lytics if attempts will be made to resolve the hemothorax vs VATS." Facility seeking transfer for CTS evaluation. Patient is stable on med tele unit only requiring 3L NC with no distress. Stable for transfer.      Overview/Hospital Course:  [facility] Medicine Course:  IR was consulted and attempted US-guided therapeutic right-sided thoracentesis. Per IR "US reveals an extensively loculated Rt effusion with innumerable isolated pockets with sonographic appearance suggestive of a large hematoma/hemothorax. Only able to remove 50-cc of old, dark blood products also consistent with large Rt hemothorax. Pt would likely require large-bore chest tube " "placement and instillation of lytics if attempts will be made to resolve the hemothorax vs VATS."  [facility], in queue to transfer asap. Stable vitals. Will allow to eat and make NPO again at midnight. Lokelma for K 5.8.     [facility] Medicine Course:  [place] [facility] [place] for [place] needs. Evaluated by thoracic surgery, with placement of PleurX catheter x2 on 8/18. Required 2u pRBC after the procedure, with stable low Hgb since that time. Pleural fluid exudative, with elevated LDH and low glucose. Cultures negative. Cytology pending. Interpretation is difficult with prior manipulation with traumatic thoras in the past. However, with new CT chest findings would certainly benefit from outpatient malignancy eval with follow up CT chest in 3 months and possible bx pending results. Awaiting chest tube removal by CTS, then planning to discharge to [facility].      Interval History: Pt seen and examined this morning on michelle. KALYAN. Grossly asymptomatic, continues to await chest tube removal before discharging to [facility]. Care plan reviewed. Otherwise, doing well and with no further complaints at this time.      Objective:     Vital Signs (Most Recent):  Temp: 98.4 °F (36.9 °C) (08/29/22 1250)  Pulse: 65 (08/29/22 1250)  Resp: 20 (08/29/22 1250)  BP: (!) 141/64 (08/29/22 1250)  SpO2: 99 % (08/29/22 0723)   Vital Signs (24h Range):  Temp:  [97.6 °F (36.4 °C)-99.6 °F (37.6 °C)] 98.4 °F (36.9 °C)  Pulse:  [60-75] 65  Resp:  [17-20] 20  SpO2:  [90 %-99 %] 99 %  BP: (100-141)/(50-64) 141/64     Weight: 55.8 kg (123 lb 0.3 oz)  Body mass index is 18.7 kg/m².    Intake/Output Summary (Last 24 hours) at 8/29/2022 1305  Last data filed at 8/29/2022 1100  Gross per 24 hour   Intake 338 ml   Output 2100 ml   Net -1762 ml        Physical Exam  Gen: in NAD, appears stated age  Neuro: AAOx4, CN2-12 grossly intact BL; motor, sensory, and strength grossly intact BL  HEENT: JOHN, LYNN FLAHERTY, " MMM; no thyromegaly or lymphadenopathy; no JVD appreciated  CVS: RRR, no m/r/g; S1/S2 auscultated with no S3 or S4; capillary refill < 2 sec  Resp: coarse and mildly decreased breath sounds in the right lung fields (improved from previous); no belabored breathing or accessory muscle use appreciated ; R chest tube in place   Abd: BS+ in all 4 quadrants; NTND, soft to palpation; no organomegaly appreciated   Extrem: pulses full, equal, and regular over all 4 extremities; no UE or LE edema BL      Significant Labs: All pertinent labs within the past 24 hours have been reviewed.    Significant Imaging: I have reviewed all pertinent imaging results/findings within the past 24 hours.      Assessment/Plan:      * Recurrent pleural effusion on right  - Interval history and physical exam findings as described above  - S/p left thoracotomy prior to admission  - Recurring right effusion s/p thoracentesis x2  - Chemistry on 2/4/21 suggestive of exudative effusion  - Chemistry 8/22 exudative with high LDH and low glucose. Cultures neg. Unclear significance with prior manipulation, but new CT findings must consider possible underlying malignancy. Will need ongoing outpatient work up. Cytology pending  - Thoracic surgery consulted, placement of PleurX on 8/18     - s/p chest tube placement x2. One removed 8/24. Follow daily Xray  - Satting well on 1L NC  - Continuing to monitor    ESRD (end stage renal disease) on dialysis  - Interval history and physical exam findings as described above  - Nephology following  - Further decision for HD per nephology  - Renally dosing all medications  - Avoid nephrotoxins  - Will continue to monitor on daily labs    Anemia in ESRD (end-stage renal disease)  - H/H stable near baseline on admission, with drop after pleurex catheter placement 8/18. S/p 2u pRBC 8/19. S/p 1u pRBC 8/23, no active bleeding   - Will continue to monitor on daily labs    Chronic diastolic heart failure  - Remains grossly  asymptomatic, euvolemic, not in acute exacerbation  - Continue home cardioprudent regimen  - Monitoring on tele    Hypertension associated with ESRD caused by type 2 diabetes mellitus, on dialysis  - Working to optimize BP control   - Continue home cardioprudent regimen; added nifedipine  - HD to assist with BP control  - PRN IV hydralazine and/or IV labetalol provided for SBP>180  - Will continue to monitor and further titrate antihypertensives as clinically indicated     Mixed hyperlipidemia associated with type 2 diabetes mellitus  - Continue home statin regimen    Type 2 diabetes mellitus with chronic kidney disease on chronic dialysis, without long-term current use of insulin  - Working to optimize BG control  - SSI provided for corrective dosing  - DXTs as ordered  - Hypoglycemic protocol in effect  - Diabetic diet provided    Thrombocytopenia  - Per chart review, appears to be a chronic issue  - Monitoring on daily labs    Physical deconditioning  - Rehab medicine following. Likely candidate for inpatient rehab when medically clear for DC    Chronic kidney disease-mineral and bone disorder  - Renal management as above      VTE Risk Mitigation (From admission, onward)         Ordered     IP VTE HIGH RISK PATIENT  Once         08/15/22 2005     Place sequential compression device  Until discontinued         08/15/22 2005                Discharge Planning   WENDY: 8/31/2022     Code Status: Full Code   Is the patient medically ready for discharge?: No    Reason for patient still in hospital (select all that apply): Patient trending condition  Discharge Plan A: Rehab          Zack Culver MD  Attending Physician  Department of Hospital Medicine  Epic secure chat preferred, or ext. [phone]  8/29/2022

## 2022-08-29 NOTE — SUBJECTIVE & OBJECTIVE
Interval History: No issues overnight, CT clamped. 2L NC    Medications:  Continuous Infusions:  Scheduled Meds:   albuterol-ipratropium  3 mL Nebulization Q6H    atorvastatin  40 mg Oral QHS    carvediloL  3.125 mg Oral BID    diphenhydrAMINE  25 mg Oral QHS    epoetin michael (PROCRIT) injection  5,000 Units Intravenous Every Tues, Thurs, Sat    furosemide  80 mg Oral Daily    naloxone  0.4 mg Intravenous Once    NIFEdipine  90 mg Oral Q12H    pantoprazole  40 mg Oral Daily    polyethylene glycol  17 g Oral BID    sodium zirconium cyclosilicate  5 g Oral TID     PRN Meds:sodium chloride, acetaminophen, dextrose 10%, dextrose 10%, glucagon (human recombinant), glucose, glucose, hydrALAZINE, HYDROmorphone, hydrOXYzine HCL, insulin aspart U-100, labetalol, naloxone, ondansetron, sodium chloride 0.9%     Review of patient's allergies indicates:  No Known Allergies  Objective:     Vital Signs (Most Recent):  Temp: 97.6 °F (36.4 °C) (08/29/22 0350)  Pulse: 61 (08/29/22 0900)  Resp: 18 (08/29/22 0750)  BP: 127/62 (08/29/22 0900)  SpO2: 99 % (08/29/22 0723)   Vital Signs (24h Range):  Temp:  [97.6 °F (36.4 °C)-99.6 °F (37.6 °C)] 97.6 °F (36.4 °C)  Pulse:  [56-75] 61  Resp:  [16-20] 18  SpO2:  [90 %-99 %] 99 %  BP: (100-163)/(50-70) 127/62     Intake/Output - Last 3 Shifts         08/27 0700  08/28 0659 08/28 0700 08/29 0659 08/29 0700 08/30 0659    P.O. 558 558     Other 600      Total Intake(mL/kg) 1158 (20.8) 558 (10)     Urine (mL/kg/hr) 0 (0)      Other 2607      Stool 0      Chest Tube       Total Output 2607      Net -1449 +558            Urine Occurrence 0 x 0 x     Stool Occurrence 2 x 0 x             SpO2: 99 %  O2 Device (Oxygen Therapy): nasal cannula    Physical Exam  Vitals and nursing note reviewed.   Constitutional:       General: He is not in acute distress.  Cardiovascular:      Rate and Rhythm: Normal rate.      Pulses: Normal pulses.   Pulmonary:      Effort: Pulmonary effort is normal. No respiratory  distress.      Comments: R CT with minimal SS output, clamped  Abdominal:      General: There is no distension.      Palpations: Abdomen is soft.      Tenderness: There is no abdominal tenderness.   Neurological:      General: No focal deficit present.      Mental Status: He is alert and oriented to person, place, and time.       Significant Labs:  BMP:   Recent Labs   Lab 08/29/22  0611   *   *   K 4.7      CO2 24   BUN 24*   CREATININE 3.9*   CALCIUM 7.1*   MG 2.2       CBC:   Recent Labs   Lab 08/29/22  0611   WBC 12.71*   RBC 2.46*   HGB 7.8*   HCT 24.8*      *   MCH 31.7*   MCHC 31.5*       CMP:   Recent Labs   Lab 08/29/22  0611   *   CALCIUM 7.1*   ALBUMIN 2.1*   PROT 5.5*   *   K 4.7   CO2 24      BUN 24*   CREATININE 3.9*   ALKPHOS 144*   ALT 7*   AST 27   BILITOT 0.9         Significant Diagnostics:  CXR: I have reviewed all pertinent results/findings within the past 24 hours    VTE Risk Mitigation (From admission, onward)           Ordered     IP VTE HIGH RISK PATIENT  Once         08/15/22 2005     Place sequential compression device  Until discontinued         08/15/22 2005

## 2022-08-29 NOTE — PLAN OF CARE
Plan of care discussed with patient. Patient Aox4 and VS stable. Patient remains free of falls and trauma. Patient up with assistx2, fall precautions in place. Patient has no complaints of pain. Patient on 2L NC. Chest tube removed today by CTS. Patient received hemodialysis where 1.5L was taken off. Patient to possible discharge to rehab tomorrow. Discussed medications and care. Patient has no questions at this time. Will continue to monitor.

## 2022-08-29 NOTE — NURSING
Patient transported to dialysis via bed. Patient sent with chest tube intact. Patient also sent on 2L of oxygen via NC. Patient stable when leaving unit. Report given to dialysis nurse.

## 2022-08-29 NOTE — PT/OT/SLP PROGRESS
Physical Therapy      Patient Name:  Vega Brownlee   MRN:  [id number]    Patient not seen today. Pt out of room to HD on attempt. Will follow-up per POC.

## 2022-08-29 NOTE — PROGRESS NOTES
Report received from BRENDA Diggs RN. Patient arrived to [facility] via bed. AAOx4. HD initiated via MANFRED fistula with 15g needles. BFR @400.

## 2022-08-29 NOTE — PROGRESS NOTES
[facility] NOTE    Vega Brownlee is a 62 y.o. male currently on hemodialysis for removal of uremic toxins and volume.     Patient seen and evaluated on hemodialysis, tolerating treatment, see HD flowsheet for vitals and assessments.    No Hypotension, chest pain, shortness of breath, cramping, nausea or vomiting.      Labs have been reviewed and the dialysate bath has been adjusted.     Labs:     Recent Labs   Lab 08/27/22  0256 08/28/22  0643 08/29/22  0611   * 136 132*   K 5.2* 3.9 4.7    102 100   CO2 21* 26 24   BUN 18 15 24*   CREATININE 3.6* 2.9* 3.9*   CALCIUM 7.3* 7.2* 7.1*   PHOS 4.6* 3.0 3.2     Recent Labs   Lab 08/27/22  0256 08/28/22  0643 08/29/22  0611   WBC 7.82 9.72 12.71*   HGB 8.2* 7.8* 7.8*   HCT 25.8* 24.3* 24.8*    182 223     Lab Results   Component Value Date    FESATURATED 27 08/22/2022    FERRITIN 1,751 (H) 08/22/2022        Assessment/Plan      Ultrafiltration goal: Liters: 1.5L. Maintain MAP > 65 mmHg.    Duration: 3 hours    - Seen on dialysis today, tolerating session with current UFR, no complications. Appears comfortable on NC.   - Per note, awaiting chest tube removal, dc to rehab once tube is removed. CTS planning for repeat imaging today. CT clamped.    - Patient encouraged to limit their weight gain per treatment to no more than 1 kilogram per day (2.2 pounds) between dialysis sessions. Limit fluid intake to 32 oz per day.   - Remain on Lokelma   - No lab stick/BP intake on access site  - Continue to monitor intake and output, daily weights   - Please avoid gadolinium, fleets, phos-based laxatives, NSAIDs  - Will follow closely and continue dialysis treatments while in-patient    Anemia  - Hgb 7.8, Continue TAYLOR with dialysis treatments    BMM  - Renal diet with protein intake goal 1.5 g/kg/d  - Novasource with meals   - F/U PO4, Mg, Calcium. And albumin levels.   - Phos 3.2.  No binders.    HTN  - BP Normal  - Goal for BP <140mmHg SBP and <90  mmHg DBP.   - Continue home antihypertensive regimen; adjust as needed.       Ayana Townsend DNP, APRN, FNP-C  Nephrology Department  Pager:  [phone]

## 2022-08-29 NOTE — SUBJECTIVE & OBJECTIVE
Interval History: Pt seen and examined this morning on jeanette BIGGS. Grossly asymptomatic, continues to await chest tube removal before discharging to [facility]. Care plan reviewed. Otherwise, doing well and with no further complaints at this time.      Objective:     Vital Signs (Most Recent):  Temp: 98.4 °F (36.9 °C) (08/29/22 1250)  Pulse: 65 (08/29/22 1250)  Resp: 20 (08/29/22 1250)  BP: (!) 141/64 (08/29/22 1250)  SpO2: 99 % (08/29/22 0723)   Vital Signs (24h Range):  Temp:  [97.6 °F (36.4 °C)-99.6 °F (37.6 °C)] 98.4 °F (36.9 °C)  Pulse:  [60-75] 65  Resp:  [17-20] 20  SpO2:  [90 %-99 %] 99 %  BP: (100-141)/(50-64) 141/64     Weight: 55.8 kg (123 lb 0.3 oz)  Body mass index is 18.7 kg/m².    Intake/Output Summary (Last 24 hours) at 8/29/2022 1305  Last data filed at 8/29/2022 1100  Gross per 24 hour   Intake 338 ml   Output 2100 ml   Net -1762 ml        Physical Exam  Gen: in NAD, appears stated age  Neuro: AAOx4, CN2-12 grossly intact BL; motor, sensory, and strength grossly intact BL  HEENT: NTNC, EOMI, PERRLA, MMM; no thyromegaly or lymphadenopathy; no JVD appreciated  CVS: RRR, no m/r/g; S1/S2 auscultated with no S3 or S4; capillary refill < 2 sec  Resp: coarse and mildly decreased breath sounds in the right lung fields (improved from previous); no belabored breathing or accessory muscle use appreciated ; R chest tube in place   Abd: BS+ in all 4 quadrants; NTND, soft to palpation; no organomegaly appreciated   Extrem: pulses full, equal, and regular over all 4 extremities; no UE or LE edema BL      Significant Labs: All pertinent labs within the past 24 hours have been reviewed.    Significant Imaging: I have reviewed all pertinent imaging results/findings within the past 24 hours.

## 2022-08-30 PROBLEM — D72.829 LEUKOCYTOSIS: Status: ACTIVE | Noted: 2022-01-01

## 2022-08-30 NOTE — ASSESSMENT & PLAN NOTE
- Interval history and physical exam findings as described above  - S/p left thoracotomy prior to admission  - Recurring right effusion s/p thoracentesis x2  - Chemistry on 2/4/21 suggestive of exudative effusion  - Chemistry 8/22 exudative with high LDH and low glucose. Cultures neg. Unclear significance with prior manipulation, but new CT findings must consider possible underlying malignancy. Will need ongoing outpatient work up. Cytology pending  - Chest tubes removed 8/29  - Satting well on 1L NC  - Continuing to monitor

## 2022-08-30 NOTE — NURSING
"Pt. Attempting to get out of bed, stating "I need a break" pt. Respiratory rate in the 30s. Spo2 reading 85%. Increase NC o2 from 3-5L with spo2 readings increase to 100%.   Chest tube pulled 8/29/22-site with a bloody saturated dressing- when performing dressing change audible air leak and sucking is heard with inspiration and expiration. Petroleum gauzed placed and on call thoracic sx called. Informed of situation. Orders for chest xray for AM and team will round first thing in the morning. Advised to call team back if pt. O2 needs increase or pt. Starts decompensating.     [facility] (Dr. Sancho Pearl) informed via secure text. Agrees with thoracic recommendations and also to inform if pt. Decompensates.       "

## 2022-08-30 NOTE — ASSESSMENT & PLAN NOTE
- Mild uptrend noted, afebrile  - Procal elevated  - BCx pending  - Started on empiric azithro/rocephin

## 2022-08-30 NOTE — TREATMENT PLAN
All chest tubes removed. Residual component of trapped lung. Clinically asymptomatic. We will follow as an outpatient to determine need for future intervention. No further inpatient thoracic surgery intervention.     CB

## 2022-08-30 NOTE — PT/OT/SLP PROGRESS
Occupational Therapy      Patient Name:  Vega Brownlee   MRN:  [id number]    Patient not seen today secondary to on hold per RN 2* wheezing/saturation from CT site . Will follow-up 8/31.    8/30/2022

## 2022-08-30 NOTE — PLAN OF CARE
Plan of care discussed with patient. Patient Aox4 and VS stable. Patient remains free of falls and trauma. Patient up with assistx1, fall precautions in place. Patient has no complaints of pain. Patient started on IV and PO abx daily. Discussed medications and care. Patient has no questions at this time. Will continue to monitor.

## 2022-08-30 NOTE — PROGRESS NOTES
"[facility] Medicine  Progress Note    Patient Name: Vega Brownlee  MRN: [id number]  Patient Class: IP- Inpatient   Admission Date: 8/15/2022  Length of Stay: 15 days  Attending Physician: Zack Culver MD  Primary Care Provider: Ko Perez MD        Subjective:     Principal Problem:Recurrent pleural effusion on right        HPI:  HPI per :  Patient is a 61 y.o. 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 Patient has history of recurrent pleural effusion and was sent to ED for worsening symptoms. IR was consulted and attempted US-guided therapeutic right-sided thoracentesis. Per IR "US reveals an extensively loculated Rt effusion with innumerable isolated pockets with sonographic appearance suggestive of a large hematoma/hemothorax. Only able to remove 50-cc of old, dark blood products also consistent with large Rt hemothorax. Pt would likely require large-bore chest tube placement and instillation of lytics if attempts will be made to resolve the hemothorax vs VATS." Facility seeking transfer for CTS evaluation. Patient is stable on med tele unit only requiring 3L NC with no distress. Stable for transfer.      Overview/Hospital Course:  [facility] Medicine Course:  IR was consulted and attempted US-guided therapeutic right-sided thoracentesis. Per IR "US reveals an extensively loculated Rt effusion with innumerable isolated pockets with sonographic appearance suggestive of a large hematoma/hemothorax. Only able to remove 50-cc of old, dark blood products also consistent with large Rt hemothorax. Pt would likely require large-bore chest tube " "placement and instillation of lytics if attempts will be made to resolve the hemothorax vs VATS."  Patient accepted to [facility], in queue to transfer asap. Stable vitals. Will allow to eat and make NPO again at midnight. Lokelma for K 5.8.     [facility] Hospital Medicine Course:  Pt accepted to [facility], Nephrology following for HD needs. Evaluated by thoracic surgery, with placement of PleurX catheter x2 on 8/18. Required 2u pRBC after the procedure, with stable low Hgb since that time. Pleural fluid exudative, with elevated LDH and low glucose. Cultures negative. Cytology pending. Interpretation is difficult with prior manipulation with traumatic thoras in the past. However, with new CT chest findings would certainly benefit from outpatient malignancy eval with follow up CT chest in 3 months and possible bx pending results.     Chest tube removed by CTS on 8/30, tolerated well with same O2 requirements into 8/30. Leukocytosis noted with elevated procal, BCx obtained and started on azithro/rocephin. Planning to discharge to [facility].      Interval History: Pt seen and examined this morning on jeanette BIGGS. Remains grossly asymptomatic, discussed working up his leukocytosis before discharging to [facility]. Care plan reviewed. Otherwise, doing well and with no further complaints at this time.      Objective:     Vital Signs (Most Recent):  Temp: 98.1 °F (36.7 °C) (08/30/22 0809)  Pulse: 62 (08/30/22 0809)  Resp: 20 (08/30/22 0809)  BP: (!) 117/58 (08/30/22 0809)  SpO2: 98 % (08/30/22 0809)   Vital Signs (24h Range):  Temp:  [97.7 °F (36.5 °C)-98.9 °F (37.2 °C)] 98.1 °F (36.7 °C)  Pulse:  [61-68] 62  Resp:  [16-20] 20  SpO2:  [89 %-100 %] 98 %  BP: (115-141)/(56-64) 117/58     Weight: 55.8 kg (123 lb 0.3 oz)  Body mass index is 18.7 kg/m².    Intake/Output Summary (Last 24 hours) at 8/30/2022 1029  Last data filed at 8/30/2022 0000  Gross per 24 hour   Intake 480 ml   Output 2100 ml   Net -1620 ml        Physical Exam  Gen: " in NAD, appears stated age  Neuro: AAOx4, CN2-12 grossly intact BL; motor, sensory, and strength grossly intact BL  HEENT: NTNC, EOMI, PERRLA, MMM; no thyromegaly or lymphadenopathy; no JVD appreciated  CVS: RRR, no m/r/g; S1/S2 auscultated with no S3 or S4; capillary refill < 2 sec  Resp: coarse and mildly decreased breath sounds in the right lung fields (improved from previous); no belabored breathing or accessory muscle use appreciated  Abd: BS+ in all 4 quadrants; NTND, soft to palpation; no organomegaly appreciated   Extrem: pulses full, equal, and regular over all 4 extremities; no UE or LE edema BL      Significant Labs: All pertinent labs within the past 24 hours have been reviewed.    Significant Imaging: I have reviewed all pertinent imaging results/findings within the past 24 hours.      Assessment/Plan:      * Recurrent pleural effusion on right  - Interval history and physical exam findings as described above  - S/p left thoracotomy prior to admission  - Recurring right effusion s/p thoracentesis x2  - Chemistry on 2/4/21 suggestive of exudative effusion  - Chemistry 8/22 exudative with high LDH and low glucose. Cultures neg. Unclear significance with prior manipulation, but new CT findings must consider possible underlying malignancy. Will need ongoing outpatient work up. Cytology pending  - Chest tubes removed 8/29  - Satting well on 1L NC  - Continuing to monitor    Leukocytosis  - Mild uptrend noted, afebrile  - Procal elevated  - BCx pending  - Started on empiric azithro/rocephin    ESRD (end stage renal disease) on dialysis  - Interval history and physical exam findings as described above  - Nephology following  - Further decision for HD per nephology  - Renally dosing all medications  - Avoid nephrotoxins  - Will continue to monitor on daily labs    Anemia in ESRD (end-stage renal disease)  - H/H stable near baseline on admission, with drop after pleurex catheter placement 8/18. S/p 2u pRBC 8/19.  S/p 1u pRBC 8/23, no active bleeding   - Will continue to monitor on daily labs    Chronic diastolic heart failure  - Remains grossly asymptomatic, euvolemic, not in acute exacerbation  - Continue home cardioprudent regimen  - Monitoring on tele    Hypertension associated with ESRD caused by type 2 diabetes mellitus, on dialysis  - Working to optimize BP control   - Continue home cardioprudent regimen; added nifedipine  - HD to assist with BP control  - PRN IV hydralazine and/or IV labetalol provided for SBP>180  - Will continue to monitor and further titrate antihypertensives as clinically indicated     Mixed hyperlipidemia associated with type 2 diabetes mellitus  - Continue home statin regimen    Type 2 diabetes mellitus with chronic kidney disease on chronic dialysis, without long-term current use of insulin  - Working to optimize BG control  - SSI provided for corrective dosing  - DXTs as ordered  - Hypoglycemic protocol in effect  - Diabetic diet provided    Thrombocytopenia  - Per chart review, appears to be a chronic issue  - Monitoring on daily labs    Physical deconditioning  - Rehab medicine following. Likely candidate for inpatient rehab when medically clear for DC    Chronic kidney disease-mineral and bone disorder  - Renal management as above      VTE Risk Mitigation (From admission, onward)         Ordered     IP VTE HIGH RISK PATIENT  Once         08/15/22 2005     Place sequential compression device  Until discontinued         08/15/22 2005                Discharge Planning   WENDY: 8/31/2022     Code Status: Full Code   Is the patient medically ready for discharge?: No    Reason for patient still in hospital (select all that apply): Patient new problem  Discharge Plan A: Rehab          Zack Culver MD  Attending Physician  Department of Hospital Medicine  Epic secure chat preferred, or ext. [phone]  8/30/2022

## 2022-08-30 NOTE — PLAN OF CARE
[facility] Stepdown  Discharge Reassessment    Primary Care Provider: Ko Perez MD    Expected Discharge Date: 8/31/2022    Reassessment (most recent)       Discharge Reassessment - 08/30/22 1630          Discharge Reassessment    Assessment Type Discharge Planning Reassessment     Did the patient's condition or plan change since previous assessment? No     Discharge Plan discussed with: Patient     Communicated WENDY with patient/caregiver Date not available/Unable to determine     Discharge Plan A Rehab     Discharge Plan B Home Health     DME Needed Upon Discharge  none     Discharge Barriers Identified None     Why the patient remains in the hospital Requires continued medical care                 [facility] following for medical clearance.  SW will continue to follow.    Chacha Dooley LMSW  [facility]  [phone]

## 2022-08-30 NOTE — PLAN OF CARE
Attempt to decrease o2 from 3L to 2L at 2000 vital signs and unsuccessful- pt. Desat to 88-89% so placed back to 3L.    During rounds pt. Was attempting to get out of bed, Spo2 in 80s- PLEASE SEE PRIOR NURSING NOTE    Pt. Was restless throughout shift and only slept for aprox 3-4 hours.   Pt. With multiple request for water/juice- education regarding HD provided.   Pt. Laying in bed with no needs or concerns at this time.      Problem: Respiratory Compromise (Pneumothorax)  Goal: Optimal Oxygenation and Ventilation  Outcome: Ongoing, Not Progressing  Problem: Adult Inpatient Plan of Care  Goal: Plan of Care Review  8/30/2022 0519 by Bernadette Flores RN  Outcome: Ongoing, Progressing  8/30/2022 0518 by Bernadette Flores RN  Outcome: Ongoing, Progressing  Goal: Patient-Specific Goal (Individualized)  8/30/2022 0519 by Bernadette Flores RN  Outcome: Ongoing, Progressing  8/30/2022 0518 by Bernadette Flores RN  Outcome: Ongoing, Progressing  Goal: Absence of Hospital-Acquired Illness or Injury  8/30/2022 0519 by Bernadette Flores RN  Outcome: Ongoing, Progressing  8/30/2022 0518 by Bernadette Flores RN  Outcome: Ongoing, Progressing  Goal: Optimal Comfort and Wellbeing  8/30/2022 0519 by Bernadette Flores RN  Outcome: Ongoing, Progressing  8/30/2022 0518 by Bernadette Flores RN  Outcome: Ongoing, Progressing  Goal: Readiness for Transition of Care  8/30/2022 0519 by Bernadette Flores RN  Outcome: Ongoing, Progressing  8/30/2022 0518 by Bernadette Flores RN  Outcome: Ongoing, Progressing     Problem: Diabetes Comorbidity  Goal: Blood Glucose Level Within Targeted Range  8/30/2022 0519 by Bernadette Flores RN  Outcome: Ongoing, Progressing  8/30/2022 0518 by Bernadette Flores RN  Outcome: Ongoing, Progressing     Problem: Infection  Goal: Absence of Infection Signs and Symptoms  8/30/2022 0519 by Bernadette Flores RN  Outcome: Ongoing, Progressing  8/30/2022 0518 by Bernadette Flores RN  Outcome: Ongoing, Progressing     Problem: Impaired Wound Healing  Goal:  Optimal Wound Healing  8/30/2022 0519 by Bernadette Flores RN  Outcome: Ongoing, Progressing  8/30/2022 0518 by Bernadette Flores RN  Outcome: Ongoing, Progressing     Problem: Device-Related Complication Risk (Hemodialysis)  Goal: Safe, Effective Therapy Delivery  8/30/2022 0519 by Bernadette Flores RN  Outcome: Ongoing, Progressing  8/30/2022 0518 by Bernadette Flores RN  Outcome: Ongoing, Progressing     Problem: Hemodynamic Instability (Hemodialysis)  Goal: Effective Tissue Perfusion  8/30/2022 0519 by Bernadette Flores RN  Outcome: Ongoing, Progressing  8/30/2022 0518 by Bernadette Flores RN  Outcome: Ongoing, Progressing     Problem: Infection (Hemodialysis)  Goal: Absence of Infection Signs and Symptoms  8/30/2022 0519 by Bernadette Flores RN  Outcome: Ongoing, Progressing  8/30/2022 0518 by Bernadette Flores RN  Outcome: Ongoing, Progressing     Problem: Skin Injury Risk Increased  Goal: Skin Health and Integrity  8/30/2022 0519 by Bernadette Flores RN  Outcome: Ongoing, Progressing  8/30/2022 0518 by Bernadette Flores RN  Outcome: Ongoing, Progressing     Problem: Fall Injury Risk  Goal: Absence of Fall and Fall-Related Injury  8/30/2022 0519 by Bernadette Flores RN  Outcome: Ongoing, Progressing  8/30/2022 0518 by Bernadette Flores RN  Outcome: Ongoing, Progressing     Problem: Electrolyte Imbalance (Chronic Kidney Disease)  Goal: Electrolyte Balance  8/30/2022 0519 by Bernadette Flores RN  Outcome: Ongoing, Progressing  8/30/2022 0518 by Bernadette Flores RN  Outcome: Ongoing, Progressing     Problem: Fluid Volume Excess (Chronic Kidney Disease)  Goal: Fluid Balance  8/30/2022 0519 by Bernadette Flores RN  Outcome: Ongoing, Progressing  8/30/2022 0518 by Bernadette Flores RN  Outcome: Ongoing, Progressing

## 2022-08-30 NOTE — SUBJECTIVE & OBJECTIVE
Interval History: Pt seen and examined this morning on rounds. KALYAN. Remains grossly asymptomatic, discussed working up his leukocytosis before discharging to [facility]. Care plan reviewed. Otherwise, doing well and with no further complaints at this time.      Objective:     Vital Signs (Most Recent):  Temp: 98.1 °F (36.7 °C) (08/30/22 0809)  Pulse: 62 (08/30/22 0809)  Resp: 20 (08/30/22 0809)  BP: (!) 117/58 (08/30/22 0809)  SpO2: 98 % (08/30/22 0809)   Vital Signs (24h Range):  Temp:  [97.7 °F (36.5 °C)-98.9 °F (37.2 °C)] 98.1 °F (36.7 °C)  Pulse:  [61-68] 62  Resp:  [16-20] 20  SpO2:  [89 %-100 %] 98 %  BP: (115-141)/(56-64) 117/58     Weight: 55.8 kg (123 lb 0.3 oz)  Body mass index is 18.7 kg/m².    Intake/Output Summary (Last 24 hours) at 8/30/2022 1029  Last data filed at 8/30/2022 0000  Gross per 24 hour   Intake 480 ml   Output 2100 ml   Net -1620 ml        Physical Exam  Gen: in NAD, appears stated age  Neuro: AAOx4, CN2-12 grossly intact BL; motor, sensory, and strength grossly intact BL  HEENT: NTNC, EOMI, PERRLA, MMM; no thyromegaly or lymphadenopathy; no JVD appreciated  CVS: RRR, no m/r/g; S1/S2 auscultated with no S3 or S4; capillary refill < 2 sec  Resp: coarse and mildly decreased breath sounds in the right lung fields (improved from previous); no belabored breathing or accessory muscle use appreciated  Abd: BS+ in all 4 quadrants; NTND, soft to palpation; no organomegaly appreciated   Extrem: pulses full, equal, and regular over all 4 extremities; no UE or LE edema BL      Significant Labs: All pertinent labs within the past 24 hours have been reviewed.    Significant Imaging: I have reviewed all pertinent imaging results/findings within the past 24 hours.

## 2022-08-30 NOTE — PT/OT/SLP PROGRESS
Physical Therapy      Patient Name:  Vega Brownlee   MRN:  [id number]    Patient not seen today. Chest tube pulled yesterday but continues to have oozing from insertion site and an audible air leak. Will follow-up when appropriate.

## 2022-08-31 PROBLEM — J96.21 ACUTE ON CHRONIC RESPIRATORY FAILURE WITH HYPOXIA: Status: ACTIVE | Noted: 2022-01-01

## 2022-08-31 PROBLEM — D69.6 THROMBOCYTOPENIA: Status: RESOLVED | Noted: 2022-01-01 | Resolved: 2022-01-01

## 2022-08-31 NOTE — ASSESSMENT & PLAN NOTE
- Rehab medicine following  - [facility]

## 2022-08-31 NOTE — PT/OT/SLP PROGRESS
Physical Therapy Treatment    Patient Name:  Vega Brownlee   MRN:  [id number]  Admit Date: 8/15/2022  Admitting Diagnosis:  Recurrent pleural effusion on right   Length of Stay: 16 days  Recent Surgery: Procedure(s) (LRB):  VATS, WITH DECORTICATION, LUNG (Right)  BLOCK, NERVE, INTERCOSTAL, 2 OR MORE (Right)  BRONCHOSCOPY, FLEXIBLE (N/A)  INSERTION, CATHETER, INTERCOSTAL, FOR DRAINAGE (Right) 13 Days Post-Op    Recommendations:     Discharge Recommendations:  rehabilitation facility   Discharge Equipment Recommendations: bedside commode, walker, rolling, shower chair   Barriers to discharge: None    Plan:     During this hospitalization, patient to be seen 4 x/week to address the listed problems via gait training, therapeutic activities, therapeutic exercises, neuromuscular re-education  Plan of Care Expires:  09/21/22  Plan of Care Reviewed with: patient    Assessment:     Vega Brownlee is a 62 y.o. male admitted with a medical diagnosis of Recurrent pleural effusion on right. Pt progressing towards goals, but not at PLOF. Pt tolerated session well but continues to be limited by SOB, fatigue, and impaired endurance. Pt requires increased encouragement to progress with mobility. Pt would benefit from continued PT services to improve overall functional mobility. Pt is an excellent IP rehab candidate due to good activity tolerance, decline from PLOF, good family support, and excellent motivation. Recommend d/c to Rehab to maximize functional independence.       Problem List: weakness, impaired endurance, impaired functional mobility, gait instability, impaired balance, decreased safety awareness, impaired cardiopulmonary response to activity.  Rehab Prognosis: Good     GOALS:   Multidisciplinary Problems       Physical Therapy Goals          Problem: Physical Therapy    Goal Priority Disciplines Outcome Goal Variances Interventions   Physical Therapy Goal     PT, PT/OT Ongoing, Progressing     Description: Goals to be met by:  "2022    Patient will increase functional independence with mobility by performin. Supine to sit with Supervision   2. Sit to stand transfer with Stand-by Assistance using Rolling Walker  3. Gait  x 50 feet with Stand-by Assistance using Rolling Walker.   4. Stand for 3 minutes with Stand-by Assistance using Rolling Walker  5. Lower extremity exercise program x10 reps per handout, with independence                         Subjective   Communicated with RN prior to session.  Patient found up in chair upon PT entry to room, agreeable to evaluation. Vega Brownlee's alone present during session.    Chief Complaint: debility  Patient/Family Comments/goals: to go to rehab   Pain/Comfort:  Pain Rating 1: 0/10  Pain Rating Post-Intervention 1: 0/10    Objective:   Patient found with: telemetry, oxygen   General Precautions: Standard, Cardiac fall   Orthopedic Precautions:N/A   Braces: N/A   Oxygen Device: Nasal Cannula   Vitals: BP (!) 103/46 (BP Location: Right leg, Patient Position: Lying)   Pulse 66   Temp 98.2 °F (36.8 °C) (Oral)   Resp 18   Ht 5' 8" (1.727 m)   Wt 55.8 kg (123 lb 0.3 oz)   SpO2 96%   BMI 18.70 kg/m²     Outcome Measures:  AM-PAC 6 CLICK MOBILITY  Turning over in bed (including adjusting bedclothes, sheets and blankets)?: 3  Sitting down on and standing up from a chair with arms (e.g., wheelchair, bedside commode, etc.): 3  Moving from lying on back to sitting on the side of the bed?: 3  Moving to and from a bed to a chair (including a wheelchair)?: 3  Need to walk in hospital room?: 2  Climbing 3-5 steps with a railing?: 2  Basic Mobility Total Score: 16     Functional Mobility:  Additional staff present: OT - due to pt requiring 2 skilled therapists to safely perform functional mobility and to accommodate pt's activity tolerance    Bed Mobility:  Not performed 2nd to pt found in chair     Transfers:   Sit <> Stand Transfer: minimum assistance with rolling walker x 2 trials (chair, " toilet)        Gait:  Patient ambulated: 15ft + 15ft (standing ADLs and toileting between trials)   Patient required:  min A progressing to mod A with fatigue   Patient used: rolling walker  Gait Pattern observed: reciprocal gait  Gait Deviation(s): unsteady gait, decreased step length, narrow base of support, flexed posture, decreased cortney, and posterior lean   Impairments due to: impaired balance, decreased strength, decreased endurance, and SOB  Comments:   Facilitation of hip and thoracic extension + facilitation of anterior weight shift to encourage midline   Verbal cuing for upright posture, RW management, and pacing   SOB present  Pt unsteady       Therapeutic Activities, Exercises, and Education:   Educated pt on PT role/POC  Educated pt on importance of OOB activity and daily ambulation   Pt verbalized understanding     Pt performed standing ADLs with OT    Patient left up in chair with all lines intact, call button in reach, and RN notified..    Time Tracking:     PT Received On: 08/31/22  PT Start Time: 0933     PT Stop Time: 0956  PT Total Time (min): 23 min       Billable Minutes:   Gait Training 15 and Therapeutic Activity 8    Treatment Type: Treatment  PT/PTA: PT

## 2022-08-31 NOTE — SUBJECTIVE & OBJECTIVE
Interval History: Pt seen and examined this morning on jeanette BIGGS. Remains grossly asymptomatic, denies any worsening respiratory symptoms despite increased O2 requirements. Discussed need for abx and infectious work-up given new leukocytosis, which will further delay his discharge to rehab. Care plan reviewed. Otherwise, doing well and with no further complaints at this time.      Objective:     Vital Signs (Most Recent):  Temp: 98.2 °F (36.8 °C) (08/31/22 1132)  Pulse: 66 (08/31/22 1216)  Resp: 18 (08/31/22 1216)  BP: (!) 103/46 (08/31/22 1132)  SpO2: 96 % (08/31/22 1216)   Vital Signs (24h Range):  Temp:  [97.3 °F (36.3 °C)-98.2 °F (36.8 °C)] 98.2 °F (36.8 °C)  Pulse:  [57-89] 66  Resp:  [12-20] 18  SpO2:  [92 %-100 %] 96 %  BP: ()/(46-55) 103/46     Weight: 55.8 kg (123 lb 0.3 oz)  Body mass index is 18.7 kg/m².    Intake/Output Summary (Last 24 hours) at 8/31/2022 1303  Last data filed at 8/31/2022 0600  Gross per 24 hour   Intake 345.76 ml   Output 0 ml   Net 345.76 ml        Physical Exam  Gen: in NAD, appears stated age  Neuro: AAOx4, CN2-12 grossly intact BL; motor, sensory, and strength grossly intact BL  HEENT: NTNC, EOMI, PERRLA, MMM; no thyromegaly or lymphadenopathy; no JVD appreciated  CVS: RRR, no m/r/g; S1/S2 auscultated with no S3 or S4; capillary refill < 2 sec  Resp: coarse and mildly decreased breath sounds in the right lung fields (improved from previous); no belabored breathing or accessory muscle use appreciated  Abd: BS+ in all 4 quadrants; NTND, soft to palpation; no organomegaly appreciated   Extrem: pulses full, equal, and regular over all 4 extremities; no UE or LE edema BL      Significant Labs: All pertinent labs within the past 24 hours have been reviewed.    Significant Imaging: I have reviewed all pertinent imaging results/findings within the past 24 hours.

## 2022-08-31 NOTE — ASSESSMENT & PLAN NOTE
- Working to optimize BP control   - Continue home cardioprudent regimen; nifedipine held on 8/31 due to relative hypotension in the setting of new infection concerns  - HD to assist with BP control  - PRN IV hydralazine and/or IV labetalol provided for SBP>180  - Will continue to monitor and further titrate antihypertensives as clinically indicated

## 2022-08-31 NOTE — PROGRESS NOTES
[facility] - [facility] Step[facility]  Nephrology  Progress Note    Patient Name: Vega Brownlee  MRN: [id number]  Admission Date: 8/15/2022  Hospital Length of Stay: 16 days  Attending Provider: Zack Culver MD   Primary Care Physician: Ko Perez MD  Principal Problem:Recurrent pleural effusion on right    Subjective:     Interval History:  CT removed on yesterday. Oxygen requirements slowly increasing overnight, from 3 to 6 L NC. Patient with mild complaints of shortness of breath on exam.   Electrolytes non emergent. Plans for HD tonight.     Review of patient's allergies indicates:  No Known Allergies  Current Facility-Administered Medications   Medication Frequency    0.9%  NaCl infusion (for blood administration) Q24H PRN    0.9%  NaCl infusion Once    acetaminophen tablet 650 mg Q4H PRN    albuterol-ipratropium 2.5 mg-0.5 mg/3 mL nebulizer solution 3 mL Q6H    atorvastatin tablet 40 mg QHS    carvediloL tablet 3.125 mg BID    cefepime in dextrose 5 % 1 gram/50 mL IVPB 1 g Q24H    dextrose 10% bolus 125 mL PRN    dextrose 10% bolus 250 mL PRN    diphenhydrAMINE capsule 25 mg QHS    epoetin michael injection 5,000 Units Every Tues, Thurs, Sat    furosemide tablet 80 mg Daily    glucagon (human recombinant) injection 1 mg PRN    glucose chewable tablet 16 g PRN    glucose chewable tablet 24 g PRN    hydrALAZINE injection 10 mg Q6H PRN    HYDROmorphone injection 1 mg Q6H PRN    hydrOXYzine HCL tablet 25 mg TID PRN    insulin aspart U-100 pen 0-5 Units QID (AC + HS) PRN    labetalol 20 mg/4 mL (5 mg/mL) IV syring Q6H PRN    naloxone 0.4 mg/mL injection 0.02 mg PRN    naloxone 0.4 mg/mL injection 0.4 mg Once    ondansetron disintegrating tablet 8 mg Q8H PRN    pantoprazole EC tablet 40 mg Daily    polyethylene glycol packet 17 g BID    sodium chloride 0.9% flush 10 mL Q12H PRN    sodium zirconium cyclosilicate packet 5 g TID    vancomycin - pharmacy to dose pharmacy to manage frequency     vancomycin in dextrose 5 % 1 gram/250 mL IVPB 1,000 mg Once       Objective:     Vital Signs (Most Recent):  Temp: 97.3 °F (36.3 °C) (08/31/22 0832)  Pulse: 66 (08/31/22 0832)  Resp: 16 (08/31/22 0832)  BP: (!) 100/54 (08/31/22 0832)  SpO2: 96 % (08/31/22 0832)  O2 Device (Oxygen Therapy): nasal cannula w/ humidification (08/31/22 0714)   Vital Signs (24h Range):  Temp:  [97.3 °F (36.3 °C)-98.3 °F (36.8 °C)] 97.3 °F (36.3 °C)  Pulse:  [57-89] 66  Resp:  [12-20] 16  SpO2:  [92 %-100 %] 96 %  BP: ()/(50-55) 100/54     Weight: 55.8 kg (123 lb 0.3 oz) (08/23/22 0817)  Body mass index is 18.7 kg/m².  Body surface area is 1.64 meters squared.    I/O last 3 completed shifts:  In: 825.8 [P.O.:780; IV Piggyback:45.8]  Out: 0     Physical Exam  Vitals and nursing note reviewed.   Constitutional:       General: He is not in acute distress.  HENT:      Head: Normocephalic and atraumatic.   Eyes:      General: No scleral icterus.  Cardiovascular:      Rate and Rhythm: Normal rate.   Pulmonary:      Effort: Pulmonary effort is normal. No respiratory distress.      Breath sounds: Rales present.   Abdominal:      General: There is no distension.      Palpations: Abdomen is soft.      Tenderness: There is no abdominal tenderness.   Musculoskeletal:         General: No swelling.      Right lower leg: No edema.      Left lower leg: No edema.   Skin:     General: Skin is dry.      Coloration: Skin is pale.      Findings: No rash.   Neurological:      General: No focal deficit present.      Mental Status: He is alert and oriented to person, place, and time.   Psychiatric:         Mood and Affect: Mood normal.       Significant Labs:  CBC:   Recent Labs   Lab 08/31/22 0629   WBC 15.24*   RBC 2.60*   HGB 8.1*   HCT 25.0*      MCV 96   MCH 31.2*   MCHC 32.4     CMP:   Recent Labs   Lab 08/31/22  0629   *   CALCIUM 7.3*   ALBUMIN 2.1*   PROT 5.5*   *   K 4.5   CO2 21*      BUN 36*   CREATININE 4.3*   ALKPHOS  144*   ALT 7*   AST 16   BILITOT 0.9     All labs within the past 24 hours have been reviewed.       Assessment/Plan:     * Recurrent pleural effusion on right  -Management per primary     Chronic kidney disease-mineral and bone disorder  - continue home calcium acetate 667 TID with food   Phos 4.6    Anemia in ESRD (end-stage renal disease)  -goal 10-11  hgb 8.1  Continue Epogen with HD treatments     ESRD (end stage renal disease) on dialysis  ESRD 2/2 diabetic nephropathy and HTN. Hd since  8/2016. Here for CTS evaluation     Nephrology History  iHD Schedule: MWF   Unit/MD: [facility]/   Duration: 3.5 hours   UF: 2-3  EDW: 60 kg   Access: MANFRED AVF   Residual Renal Function: minimal     Assessment:     - Will plan for HD this evening at bedside for clearance and volume management depending on MAPs. Target UF 1-2 L as tolerated, keep MAP >65.  - Dialysate adjusted to current labs   - Will obtain OP dialysis records  - Continue to monitor intake and output, daily weights   - Avoid nephrotoxic medication and renal dose medications to GFR              Thank you for your consult. I will follow-up with patient. Please contact us if you have any additional questions.    Ayana Townsend, DEBRA, FNP-C  Nephrology  [facility] - [facility] Step[facility]

## 2022-08-31 NOTE — ASSESSMENT & PLAN NOTE
- Interval history and physical exam findings as described above  - S/p left thoracotomy prior to admission  - Recurring right effusion s/p thoracentesis x2  - Chest tubes removed 8/29 by CTS   - No further inpatient interventions, will follow in clinic  - Increased O2 requirements to 6L on 8/31   - CXR without pneumothorax; discussed with CTS, no interventions from their service   - Infectious work-up as below for possible infectious cause  - IS encouraged  - Weaning O2 requirements as tolerated  - Continuing to monitor

## 2022-08-31 NOTE — ASSESSMENT & PLAN NOTE
ESRD 2/2 diabetic nephropathy and HTN. Hd since  8/2016. Here for CTS evaluation     Nephrology History  iHD Schedule: MWF   Unit/MD: [facility]/   Duration: 3.5 hours   UF: 2-3  EDW: 60 kg   Access: MANFRED AVF   Residual Renal Function: minimal     Assessment:     - Will plan for HD this evening at bedside for clearance and volume management depending on MAPs. Target UF 1-2 L as tolerated, keep MAP >65.  - Dialysate adjusted to current labs   - Will obtain OP dialysis records  - Continue to monitor intake and output, daily weights   - Avoid nephrotoxic medication and renal dose medications to GFR

## 2022-08-31 NOTE — PROGRESS NOTES
"[facility] - Cardiology [facility] Medicine  Progress Note    Patient Name: Vega Brownlee  MRN: [id number]  Patient Class: IP- Inpatient   Admission Date: 8/15/2022  Length of Stay: 16 days  Attending Physician: Zack Culver MD  Primary Care Provider: Ko Perez MD        Subjective:     Principal Problem:Recurrent pleural effusion on right        HPI:  HPI per :  Patient is a 61 y.o. 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 Patient has history of recurrent pleural effusion and was sent to ED for worsening symptoms. IR was consulted and attempted US-guided therapeutic right-sided thoracentesis. Per IR "US reveals an extensively loculated Rt effusion with innumerable isolated pockets with sonographic appearance suggestive of a large hematoma/hemothorax. Only able to remove 50-cc of old, dark blood products also consistent with large Rt hemothorax. Pt would likely require large-bore chest tube placement and instillation of lytics if attempts will be made to resolve the hemothorax vs VATS." Facility seeking transfer for CTS evaluation. Patient is stable on med tele unit only requiring 3L NC with no distress. Stable for transfer.      Overview/Hospital Course:  [facility] Medicine Course:  IR was consulted and attempted US-guided therapeutic right-sided thoracentesis. Per IR "US reveals an extensively loculated Rt effusion with innumerable isolated pockets with sonographic appearance suggestive of a large hematoma/hemothorax. Only able to remove 50-cc of old, dark blood products also consistent with large Rt hemothorax. Pt would likely require large-bore chest tube " "placement and instillation of lytics if attempts will be made to resolve the hemothorax vs VATS."  Patient accepted to [facility], in queue to transfer asap. Stable vitals. Will allow to eat and make NPO again at midnight. Lokelma for K 5.8.     [facility] Medicine Course:  Pt accepted to [facility], Nephrology following for HD needs. Evaluated by thoracic surgery, with placement of PleurX catheter x2 on 8/18. Required 2u pRBC after the procedure, with stable low Hgb since that time. Pleural fluid exudative, with elevated LDH and low glucose. Cultures negative. Cytology pending. Interpretation is difficult with prior manipulation with traumatic thoras in the past. However, with new CT chest findings would certainly benefit from outpatient malignancy eval with follow up CT chest in 3 months and possible bx pending results.     Chest tube removed by CTS on 8/30, tolerated well with same O2 requirements. Leukocytosis noted with elevated procal, BCx obtained and started on azithro/rocephin; leukocytosis worsened on 8/31 with relative hypotension compared to previous (nifedipine held), so abx broadened to vanc and cefepime pending culture results. Overnight and into 8/31, pt noted to have increased O2 requirements up to 6L NC from 2L: CXR without pneumothorax; discussed with CTS, who stated they have no further inpatient recommendations and will follow him in clinic.    Planning to discharge to [facility] when medically stable, SW/CM assisting.      Interval History: Pt seen and examined this morning on rounds. CHIPEON. Remains grossly asymptomatic, denies any worsening respiratory symptoms despite increased O2 requirements. Discussed need for abx and infectious work-up given new leukocytosis, which will further delay his discharge to rehab. Care plan reviewed. Otherwise, doing well and with no further complaints at this time.      Objective:     Vital Signs (Most Recent):  Temp: 98.2 °F (36.8 °C) (08/31/22 1132)  Pulse: 66 " (08/31/22 1216)  Resp: 18 (08/31/22 1216)  BP: (!) 103/46 (08/31/22 1132)  SpO2: 96 % (08/31/22 1216)   Vital Signs (24h Range):  Temp:  [97.3 °F (36.3 °C)-98.2 °F (36.8 °C)] 98.2 °F (36.8 °C)  Pulse:  [57-89] 66  Resp:  [12-20] 18  SpO2:  [92 %-100 %] 96 %  BP: ()/(46-55) 103/46     Weight: 55.8 kg (123 lb 0.3 oz)  Body mass index is 18.7 kg/m².    Intake/Output Summary (Last 24 hours) at 8/31/2022 1303  Last data filed at 8/31/2022 0600  Gross per 24 hour   Intake 345.76 ml   Output 0 ml   Net 345.76 ml        Physical Exam  Gen: in NAD, appears stated age  Neuro: AAOx4, CN2-12 grossly intact BL; motor, sensory, and strength grossly intact BL  HEENT: NTNC, EOMI, PERRLA, MMM; no thyromegaly or lymphadenopathy; no JVD appreciated  CVS: RRR, no m/r/g; S1/S2 auscultated with no S3 or S4; capillary refill < 2 sec  Resp: coarse and mildly decreased breath sounds in the right lung fields (improved from previous); no belabored breathing or accessory muscle use appreciated  Abd: BS+ in all 4 quadrants; NTND, soft to palpation; no organomegaly appreciated   Extrem: pulses full, equal, and regular over all 4 extremities; no UE or LE edema BL      Significant Labs: All pertinent labs within the past 24 hours have been reviewed.    Significant Imaging: I have reviewed all pertinent imaging results/findings within the past 24 hours.      Assessment/Plan:      * Recurrent pleural effusion on right  - Interval history and physical exam findings as described above  - S/p left thoracotomy prior to admission  - Recurring right effusion s/p thoracentesis x2  - Chest tubes removed 8/29 by CTS   - No further inpatient interventions, will follow in clinic  - Increased O2 requirements to 6L on 8/31   - CXR without pneumothorax; discussed with CTS, no interventions from their service   - Infectious work-up as below for possible infectious cause  - IS encouraged  - Weaning O2 requirements as tolerated  - Continuing to  monitor    Leukocytosis  - Uptrend noted, remains afebrile  - Procal elevated  - BCx pending  - Azithro/rocephin broadened to vanc and cefepime on 8/31    Acute on chronic respiratory failure with hypoxia  - As above    ESRD (end stage renal disease) on dialysis  - Interval history and physical exam findings as described above  - Nephology following  - Further decision for HD per nephology  - Renally dosing all medications  - Avoid nephrotoxins  - Will continue to monitor on daily labs    Anemia in ESRD (end-stage renal disease)  - H/H stable near baseline on admission, with drop after pleurex catheter placement 8/18. S/p 2u pRBC 8/19. S/p 1u pRBC 8/23, no active bleeding   - Will continue to monitor on daily labs    Chronic diastolic heart failure  - Remains grossly asymptomatic, euvolemic, not in acute exacerbation  - Continue home cardioprudent regimen  - Monitoring on tele    Hypertension associated with ESRD caused by type 2 diabetes mellitus, on dialysis  - Working to optimize BP control   - Continue home cardioprudent regimen; nifedipine held on 8/31 due to relative hypotension in the setting of new infection concerns  - HD to assist with BP control  - PRN IV hydralazine and/or IV labetalol provided for SBP>180  - Will continue to monitor and further titrate antihypertensives as clinically indicated     Mixed hyperlipidemia associated with type 2 diabetes mellitus  - Continue home statin regimen    Type 2 diabetes mellitus with chronic kidney disease on chronic dialysis, without long-term current use of insulin  - Working to optimize BG control  - SSI provided for corrective dosing  - DXTs as ordered  - Hypoglycemic protocol in effect  - Diabetic diet provided    Physical deconditioning  - Rehab medicine following  - Accepted to [facility] when medically clear for DC    Chronic kidney disease-mineral and bone disorder  - Renal management as above      VTE Risk Mitigation (From admission, onward)         Ordered      IP VTE HIGH RISK PATIENT  Once         08/15/22 2005     Place sequential compression device  Until discontinued         08/15/22 2005                Discharge Planning   WENDY: 9/2/2022     Code Status: Full Code   Is the patient medically ready for discharge?: No    Reason for patient still in hospital (select all that apply): Patient trending condition  Discharge Plan A: Rehab            Zack Culver MD  Attending Physician  Department of Hospital Medicine  Epic secure chat preferred, or ext. [phone]  8/31/2022

## 2022-08-31 NOTE — PROGRESS NOTES
08/30/22 1954   Vital Signs   BP (!) 91/50   MAP (mmHg) 68   BP Location Left arm   BP Method Automatic   Patient Position Lying   Dr. Morales notified of above and routine doses of carvedolol and nifedipine due.  Orders received to hold the 2100 doses of these meds for now.  Will continue to monitor.

## 2022-08-31 NOTE — PLAN OF CARE
Patient cooperative and pleasant with routine care and procedures.  Minimal english but able to communicate basic needs.  Old chest tube site dressing change this evening with moderate amount of sanguinous drainage to dressing.  Fall precautions reviewed and bed alarm in use.  Patient resting quietly and without complaints.  Will continue to monitor.

## 2022-08-31 NOTE — SUBJECTIVE & OBJECTIVE
Interval History:  CT removed on yesterday. Oxygen requirements slowly increasing overnight, from 3 to 6 L NC. Patient with mild complaints of shortness of breath on exam.   Electrolytes non emergent. Plans for HD tonight.     Review of patient's allergies indicates:  No Known Allergies  Current Facility-Administered Medications   Medication Frequency    0.9%  NaCl infusion (for blood administration) Q24H PRN    0.9%  NaCl infusion Once    acetaminophen tablet 650 mg Q4H PRN    albuterol-ipratropium 2.5 mg-0.5 mg/3 mL nebulizer solution 3 mL Q6H    atorvastatin tablet 40 mg QHS    carvediloL tablet 3.125 mg BID    cefepime in dextrose 5 % 1 gram/50 mL IVPB 1 g Q24H    dextrose 10% bolus 125 mL PRN    dextrose 10% bolus 250 mL PRN    diphenhydrAMINE capsule 25 mg QHS    epoetin michael injection 5,000 Units Every Tues, Thurs, Sat    furosemide tablet 80 mg Daily    glucagon (human recombinant) injection 1 mg PRN    glucose chewable tablet 16 g PRN    glucose chewable tablet 24 g PRN    hydrALAZINE injection 10 mg Q6H PRN    HYDROmorphone injection 1 mg Q6H PRN    hydrOXYzine HCL tablet 25 mg TID PRN    insulin aspart U-100 pen 0-5 Units QID (AC + HS) PRN    labetalol 20 mg/4 mL (5 mg/mL) IV syring Q6H PRN    naloxone 0.4 mg/mL injection 0.02 mg PRN    naloxone 0.4 mg/mL injection 0.4 mg Once    ondansetron disintegrating tablet 8 mg Q8H PRN    pantoprazole EC tablet 40 mg Daily    polyethylene glycol packet 17 g BID    sodium chloride 0.9% flush 10 mL Q12H PRN    sodium zirconium cyclosilicate packet 5 g TID    vancomycin - pharmacy to dose pharmacy to manage frequency    vancomycin in dextrose 5 % 1 gram/250 mL IVPB 1,000 mg Once       Objective:     Vital Signs (Most Recent):  Temp: 97.3 °F (36.3 °C) (08/31/22 0832)  Pulse: 66 (08/31/22 0832)  Resp: 16 (08/31/22 0832)  BP: (!) 100/54 (08/31/22 0832)  SpO2: 96 % (08/31/22 0832)  O2 Device (Oxygen Therapy): nasal cannula w/ humidification (08/31/22 0714)   Vital Signs  (24h Range):  Temp:  [97.3 °F (36.3 °C)-98.3 °F (36.8 °C)] 97.3 °F (36.3 °C)  Pulse:  [57-89] 66  Resp:  [12-20] 16  SpO2:  [92 %-100 %] 96 %  BP: ()/(50-55) 100/54     Weight: 55.8 kg (123 lb 0.3 oz) (08/23/22 0817)  Body mass index is 18.7 kg/m².  Body surface area is 1.64 meters squared.    I/O last 3 completed shifts:  In: 825.8 [P.O.:780; IV Piggyback:45.8]  Out: 0     Physical Exam  Vitals and nursing note reviewed.   Constitutional:       General: He is not in acute distress.  HENT:      Head: Normocephalic and atraumatic.   Eyes:      General: No scleral icterus.  Cardiovascular:      Rate and Rhythm: Normal rate.   Pulmonary:      Effort: Pulmonary effort is normal. No respiratory distress.      Breath sounds: Rales present.   Abdominal:      General: There is no distension.      Palpations: Abdomen is soft.      Tenderness: There is no abdominal tenderness.   Musculoskeletal:         General: No swelling.      Right lower leg: No edema.      Left lower leg: No edema.   Skin:     General: Skin is dry.      Coloration: Skin is pale.      Findings: No rash.   Neurological:      General: No focal deficit present.      Mental Status: He is alert and oriented to person, place, and time.   Psychiatric:         Mood and Affect: Mood normal.       Significant Labs:  CBC:   Recent Labs   Lab 08/31/22  0629   WBC 15.24*   RBC 2.60*   HGB 8.1*   HCT 25.0*      MCV 96   MCH 31.2*   MCHC 32.4     CMP:   Recent Labs   Lab 08/31/22  0629   *   CALCIUM 7.3*   ALBUMIN 2.1*   PROT 5.5*   *   K 4.5   CO2 21*      BUN 36*   CREATININE 4.3*   ALKPHOS 144*   ALT 7*   AST 16   BILITOT 0.9     All labs within the past 24 hours have been reviewed.

## 2022-08-31 NOTE — PT/OT/SLP PROGRESS
Occupational Therapy   Treatment    Name: Vega Brownlee  MRN: [id number]  Admitting Diagnosis:  Recurrent pleural effusion on right  13 Days Post-Op    Recommendations:     Discharge Recommendations: rehabilitation facility  Discharge Equipment Recommendations:  bedside commode, walker, rolling, shower chair  Barriers to discharge:  None    Assessment:     Vega Brownlee is a 62 y.o. male with a medical diagnosis of Recurrent pleural effusion on right.  He presents with decreased tolerance this session with pt requiring prolonged seated rest breaks following ADL at sink. Pt with decreased mobility tolerance and increased assistance needed to 2* fatigue. Performance deficits affecting function are weakness, decreased safety awareness, impaired cardiopulmonary response to activity, impaired endurance, gait instability, impaired balance, impaired self care skills.     Rehab Prognosis:  Good; patient would benefit from acute skilled OT services to address these deficits and reach maximum level of function.       Plan:     Patient to be seen 4 x/week to address the above listed problems via self-care/home management, therapeutic activities, therapeutic exercises  Plan of Care Expires:    Plan of Care Reviewed with: patient    Subjective     Pain/Comfort:  Pain Rating 1: 0/10  Pain Rating Post-Intervention 1: 0/10    Objective:     Communicated with: RN prior to session.  Patient found up in chair with telemetry, oxygen upon OT entry to room.    General Precautions: Standard, fall   Orthopedic Precautions:    Braces:    Respiratory Status: Nasal cannula, flow 5 L/min     Occupational Performance:     Bed Mobility:    NT     Functional Mobility/Transfers:  Patient completed Sit <> Stand Transfer with minimum assistance  with  rolling walker   Patient completed Toilet Transfer Step Transfer and STS technique with minimum assistance with  rolling walker and grab bars  Functional Mobility: Minimal A regressing to Mod A 2*  fatigue    Activities of Daily Living:  Grooming: minimal assistance with tasks as pt hunched over at sink and assistance needed for reaching faucet and towels  Upper Body Dressing: contact guard assistance    Lower Body Dressing: minimum assistance donning socks seated in chair  Toileting: minimum assistance      AMPAC 6 Click ADL: 20    Treatment & Education:  Pt ed on OT POC  Pt ed progressing activity  Pt ed on safety and importance of upright posture for energy conservation   Pt ed on ROM ex's 3x daily for increased overall strength and endurance with focus on arm chair push-ups and pulls    Patient left up in chair with all lines intact, call button in reach, and RN notified    GOALS:   Multidisciplinary Problems       Occupational Therapy Goals          Problem: Occupational Therapy    Goal Priority Disciplines Outcome Interventions   Occupational Therapy Goal     OT, PT/OT Ongoing, Progressing    Description: Goals to be met by: 7 days 8/29/22     Patient will increase functional independence with ADLs by performing:    Pt to complete UE dressing with set-up  Pt to complete LE dressing with SBA  Pt to complete standing g/h skills with SBA  Pt to complete toileting with SBA  Pt to complete t/f bed, chair and commode with SBA                        Time Tracking:     OT Date of Treatment: 08/31/22  OT Start Time: 0933  OT Stop Time: 0956  OT Total Time (min): 23 min    Billable Minutes:Self Care/Home Management 15  Therapeutic Activity 8               8/31/2022

## 2022-08-31 NOTE — PLAN OF CARE
Per Dr Culver pt is now on 6L O2 and on IV abx pending cultures.  Additional referral sent to TA.  Will continue to follow.    Chacha Dooley LMSW  [facility]  [phone]

## 2022-08-31 NOTE — ASSESSMENT & PLAN NOTE
- Uptrend noted, remains afebrile  - Procal elevated  - BCx pending  - Azithro/rocephin broadened to vanc and cefepime on 8/31

## 2022-08-31 NOTE — PROGRESS NOTES
Pharmacokinetic Initial Assessment: IV Vancomycin    Assessment/Plan:    Initiate intravenous vancomycin 1000 mg IV x 1  Desired empiric serum trough concentration is 15-20 mcg/mL  Draw vancomycin random with AM labs tomorrow    Pharmacy will continue to follow and monitor vancomycin.      Thank you for the consult,   Yuridia Rod, PharmD, [facility]  [phone]       Patient brief summary:  Vega Brownlee is a 62 y.o. male initiated on antimicrobial therapy with IV Vancomycin for treatment of suspected bacteremia    Drug Allergies:   Review of patient's allergies indicates:  No Known Allergies    Actual Body Weight:   55.8 kg    Renal Function:   Estimated Creatinine Clearance: 14.1 mL/min (A) (based on SCr of 4.3 mg/dL (H)).,     CBC (last 72 hours):  Recent Labs   Lab Result Units 08/29/22  0611 08/30/22  0423 08/31/22  0629   WBC K/uL 12.71* 13.88* 15.24*   Hemoglobin g/dL 7.8* 8.0* 8.1*   Hematocrit % 24.8* 25.1* 25.0*   Platelets K/uL 223 221 241   Gran % % 82.9* 84.2* 86.4*   Lymph % % 5.2* 4.9* 3.9*   Mono % % 9.4 8.9 7.5   Eosinophil % % 1.3 1.0 0.9   Basophil % % 0.6 0.6 0.7   Differential Method  Automated Automated Automated       Metabolic Panel (last 72 hours):  Recent Labs   Lab Result Units 08/29/22  0611 08/30/22  0423 08/31/22  0629   Sodium mmol/L 132* 134* 132*   Potassium mmol/L 4.7 3.9 4.5   Chloride mmol/L 100 102 101   CO2 mmol/L 24 24 21*   Glucose mg/dL 186* 175* 184*   BUN mg/dL 24* 22 36*   Creatinine mg/dL 3.9* 2.9* 4.3*   Albumin g/dL 2.1* 2.2* 2.1*   Total Bilirubin mg/dL 0.9 1.0 0.9   Alkaline Phosphatase U/L 144* 162* 144*   AST U/L 27 19 16   ALT U/L 7* 7* 7*   Magnesium mg/dL 2.2 2.1 2.2   Phosphorus mg/dL 3.2 2.8 4.6*       Drug levels (last 3 results):  No results for input(s): VANCOMYCINRA, VANCORANDOM, VANCOMYCINPE, VANCOPEAK, VANCOMYCINTR, VANCOTROUGH in the last 72 hours.    Microbiologic Results:  Microbiology Results (last 7 days)       Procedure Component Value Units Date/Time     Blood culture [[id number]] Collected: 08/30/22 0739    Order Status: Completed Specimen: Blood from Antecubital, Left Arm Updated: 08/31/22 0822     Blood Culture, Routine No Growth to date      No Growth to date    Blood culture [[id number]] Collected: 08/30/22 0741    Order Status: Completed Specimen: Blood from Peripheral, Left Hand Updated: 08/31/22 0822     Blood Culture, Routine No Growth to date      No Growth to date    Culture, Anaerobe [[id number]] Collected: 08/18/22 1057    Order Status: Completed Specimen: Pleural Fluid from Lung, Right Updated: 08/25/22 0947     Anaerobic Culture No anaerobes isolated    Narrative:      Right pleural effusion for culture

## 2022-09-01 NOTE — PROGRESS NOTES
Pharmacokinetic Assessment Follow Up: IV Vancomycin    Vancomycin serum concentration assessment(s):  Vancomycin concentration this AM erroneously drawn as vancomycin dose was never given yesterday as ordered   Vr of < 1.4 clinically irrelevant  Vancomycin 1000 mg dose being given now    Vancomycin Regimen Plan:   Obtain random with AM labs tomorrow, dose if random < 20mcg/mL    Drug levels (last 3 results):  Recent Labs   Lab Result Units 09/01/22  0454   Vancomycin, Random ug/mL <1.4       Pharmacy will continue to follow and monitor vancomycin.    Thank you for the consult,   Yuridia Rod, PharmD, BCPS  [phone]

## 2022-09-01 NOTE — ASSESSMENT & PLAN NOTE
- Rehab medicine following  - [facility]

## 2022-09-01 NOTE — ASSESSMENT & PLAN NOTE
- Interval history and physical exam findings as described above  - S/p left thoracotomy prior to admission  - Recurring right effusion s/p thoracentesis x2  - Chest tubes removed 8/29 by CTS   - No further inpatient interventions, will follow in clinic  - Increased O2 requirements to 6L on 8/31, improving    - CXR without pneumothorax; discussed with CTS, no interventions from their service   - Infectious work-up as below for possible infectious cause  - IS encouraged  - Weaning O2 requirements as tolerated  - Continuing to monitor

## 2022-09-01 NOTE — RESPIRATORY THERAPY
RAPID RESPONSE RESPIRATORY THERAPY           Time of visit: 837     Code Status: Full Code   : 1960  Bed: 306/306 A:   MRN: [id number]  Time spent at the bedside: < 15 min    SITUATION    Evaluated patient for wean or discontinuance of oxygen therapy.     BACKGROUND    Patient has a past medical history of A-V fistula, Anemia, Cataract, CHF (congestive heart failure), Cigarette smoker, Diabetes mellitus, type 2, Disorder of kidney and ureter, ESRD on hemodialysis, Hyperlipidemia, Hypertension, and 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.    24 Hours Vitals Range:  Temp:  [97.9 °F (36.6 °C)-99.5 °F (37.5 °C)]   Pulse:  [63-72]   Resp:  [14-20]   BP: ()/(46-69)   SpO2:  [95 %-100 %]     Labs:    Recent Labs     22  0423 22  0629 22  0454   * 132* 136   K 3.9 4.5 3.4*    101 98   CO2 24 21* 27   CREATININE 2.9* 4.3* 3.1*   * 184* 141*   PHOS 2.8 4.6* 2.7   MG 2.1 2.2 2.0        No results for input(s): PH, PCO2, PO2, HCO3, POCSATURATED, BE in the last 72 hours.    ASSESSMENT/INTERVENTIONS    Last VS   Temp: 99.5 °F (37.5 °C) (712)  Pulse: 67 (759)  Resp: 20 (759)  BP: 146/69 (712)  SpO2: 96 % (837)    Level of Consciousness: Level of Consciousness (AVPU): alert  Respiratory Effort: Respiratory Effort: Normal, Unlabored Expansion/Accessory Muscle Usage: Expansion/Accessory Muscles/Retractions: no use of accessory muscles, no retractions, expansion symmetric  All Lung Field Breath Sounds: All Lung Fields Breath Sounds: Anterior:, Lateral:, clear  SUNNY Breath Sounds: clear  LLL Breath Sounds: diminished  RUL Breath Sounds: clear  RML Breath Sounds: diminished  RLL Breath Sounds: diminished    O2 Device/Concentration: 3L NC  Was the O2 device able to be weaned? yes  Oxygen discontinued: no  Order discontinued: no  Assigned RT that was notified: Amira           FOLLOW-UP    Please call back the  Rapid Response RT, Kami Knight, RRT at x [phone] for any questions or concerns.

## 2022-09-01 NOTE — PROGRESS NOTES
08/31/22 2000   Vital Signs   Temp 98 °F (36.7 °C)   Temp src Oral   Pulse 65   BP (!) 110/55   BP Location Right arm   BP Method Automatic   Patient Position Lying   Assessments (Pre/Post)   Level of Consciousness (AVPU) alert   Pre-Hemodialysis Assessment   Treatment Status Started   Gross Bleach Negative Yes   Machine Number 48   Alarms Verified Yes   pH 7   Machine Temperature 97.7 °F (36.5 °C)   Dialyzer F-160   Machine Conductivity 14   Meter Conductivity 14   Dialysate Na (mEq/L) 138   Dialysate K (mEq/L) 2   Dialysate CA (mEq/L) 3   Dialysate HCO3 (mEq/L) 35   Prime Ordered (mL) 500 mL   Pre-Hemodialysis Comments pt stable for HD TX   RO Rejection Within Limits? Yes        Hemodialysis AV Fistula Right upper arm   No Placement Date or Time found.   Present Prior to Hospital Arrival?: Yes  Location: Right upper arm   Needle Size 15ga   Site Assessment Clean;Dry;Intact;No redness;No swelling   Patency Present;Thrill;Bruit   Status Accessed   Flows Good   Dressing Status Clean;Dry;Intact   During Hemodialysis Assessment   Blood Flow Rate (mL/min) 300 mL/min   Dialysate Flow Rate (mL/min) 600 ml/min   Ultrafiltration Rate (mL/Hr) 1000 mL/Hr   Arteriovenous Lines Secure Yes   Arterial Pressure (mmHg) -100 mmHg   Venous Pressure (mmHg) 70   TMP 50   Venous Line in Air Detector Yes   Intake (mL) 250 mL   Intra-Hemodialysis Comments TX started     Bedside HD TX started; access secured; orders verified; pt stable at this time.

## 2022-09-01 NOTE — PROGRESS NOTES
08/31/22 2330   Vital Signs   Temp 98 °F (36.7 °C)   Temp src Oral   Pulse 67   Resp 18   SpO2 96 %   BP (!) 141/64   Pre-Hemodialysis Assessment   Treatment Status Completed   During Hemodialysis Assessment   Blood Volume Processed (Liters) 64.5 L   UF Removed (mL) 2500 mL   Intake (mL) 250 mL   Intra-Hemodialysis Comments TX complete   Post-Hemodialysis Assessment   Rinseback Volume (mL) 500 mL   Blood Volume Processed (Liters) 64.5 L   Dialyzer Clearance Lightly streaked   Total UF (mL) 2500 mL   Net Fluid Removal 2000   Patient Response to Treatment pt tolerated Tx well   Post-Hemodialysis Comments pt stable at this time.       Pt tolerated TX well; access secured; total net UF 2000  ml; pt stable at this time; see flow sheet for details.

## 2022-09-01 NOTE — PT/OT/SLP PROGRESS
Physical Therapy      Patient Name:  Vega Brownlee   MRN:  [id number]    Patient not seen today. RN placing IV on attempt. Writing therapist unable to return today. Will follow-up per POC.

## 2022-09-01 NOTE — SUBJECTIVE & OBJECTIVE
Interval History: No pain or trouble breathing this morning. He has no complaints. Hoping to get to rehab today. BP and WBC improved.     Review of Systems   Constitutional:  Negative for fever.   Respiratory:  Negative for shortness of breath.    Cardiovascular:  Negative for chest pain.   Gastrointestinal:  Negative for abdominal pain, constipation, nausea and vomiting.   Neurological:  Negative for headaches.   Objective:     Vital Signs (Most Recent):  Temp: 97.4 °F (36.3 °C) (09/01/22 1520)  Pulse: 63 (09/01/22 1520)  Resp: 18 (09/01/22 1520)  BP: (!) 147/64 (09/01/22 1520)  SpO2: 95 % (09/01/22 1520)   Vital Signs (24h Range):  Temp:  [97.4 °F (36.3 °C)-99.5 °F (37.5 °C)] 97.4 °F (36.3 °C)  Pulse:  [63-72] 63  Resp:  [16-20] 18  SpO2:  [95 %-98 %] 95 %  BP: (110-150)/(50-69) 147/64     Weight: 57.2 kg (126 lb 1.7 oz)  Body mass index is 19.17 kg/m².    Intake/Output Summary (Last 24 hours) at 9/1/2022 1632  Last data filed at 9/1/2022 1319  Gross per 24 hour   Intake 418 ml   Output 2500 ml   Net -2082 ml      Physical Exam  Vitals reviewed.   Constitutional:       General: He is not in acute distress.     Comments: Chronically ill appearing   HENT:      Head: Normocephalic and atraumatic.      Mouth/Throat:      Mouth: Mucous membranes are moist.   Eyes:      Extraocular Movements: Extraocular movements intact.      Conjunctiva/sclera: Conjunctivae normal.   Cardiovascular:      Rate and Rhythm: Normal rate and regular rhythm.   Pulmonary:      Effort: Pulmonary effort is normal. No respiratory distress.      Comments: Rhonchi and crackles appreciated on R anterior chest.   Abdominal:      General: There is no distension.      Palpations: Abdomen is soft.   Skin:     General: Skin is warm and dry.      Capillary Refill: Capillary refill takes less than 2 seconds.   Neurological:      General: No focal deficit present.       Significant Labs: All pertinent labs within the past 24 hours have been  reviewed.    Significant Imaging: I have reviewed all pertinent imaging results/findings within the past 24 hours.

## 2022-09-01 NOTE — PROGRESS NOTES
"[facility] - Cardiology [facility] Medicine  Progress Note    Patient Name: Vega Brownlee  MRN: [id number]  Patient Class: IP- Inpatient   Admission Date: 8/15/2022  Length of Stay: 17 days  Attending Physician: Jacey Duke MD  Primary Care Provider: Ko Perez MD        Subjective:     Principal Problem:Recurrent pleural effusion on right        HPI:  HPI per :  Patient is a 61 y.o. 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 Patient has history of recurrent pleural effusion and was sent to ED for worsening symptoms. IR was consulted and attempted US-guided therapeutic right-sided thoracentesis. Per IR "US reveals an extensively loculated Rt effusion with innumerable isolated pockets with sonographic appearance suggestive of a large hematoma/hemothorax. Only able to remove 50-cc of old, dark blood products also consistent with large Rt hemothorax. Pt would likely require large-bore chest tube placement and instillation of lytics if attempts will be made to resolve the hemothorax vs VATS." Facility seeking transfer for CTS evaluation. Patient is stable on med tele unit only requiring 3L NC with no distress. Stable for transfer.      Overview/Hospital Course:  [facility] Medicine Course:  IR was consulted and attempted US-guided therapeutic right-sided thoracentesis. Per IR "US reveals an extensively loculated Rt effusion with innumerable isolated pockets with sonographic appearance suggestive of a large hematoma/hemothorax. Only able to remove 50-cc of old, dark blood products also consistent with large Rt hemothorax. Pt would likely require large-bore chest tube " "placement and instillation of lytics if attempts will be made to resolve the hemothorax vs VATS."  Patient accepted to [facility], in queue to transfer asap. Stable vitals. Will allow to eat and make NPO again at midnight. Lokelma for K 5.8.     [facility] Hospital Medicine Course:  Pt accepted to [facility], Nephrology following for HD needs. Evaluated by thoracic surgery, with placement of PleurX catheter x2 on 8/18. Required 2u pRBC after the procedure, with stable low Hgb since that time. Pleural fluid exudative, with elevated LDH and low glucose. Cultures negative. Cytology pending. Interpretation is difficult with prior manipulation with traumatic thoras in the past. However, with new CT chest findings would certainly benefit from outpatient malignancy eval with follow up CT chest in 3 months and possible bx pending results.     Chest tube removed by CTS on 8/30, tolerated well with same O2 requirements. Leukocytosis noted with elevated procal, BCx obtained and started on azithro/rocephin; leukocytosis worsened on 8/31 with relative hypotension compared to previous (nifedipine held), so abx broadened to vanc and cefepime pending culture results. Overnight and into 8/31, pt noted to have increased O2 requirements up to 6L NC from 2L: CXR without pneumothorax; discussed with CTS, who stated they have no further inpatient recommendations and will follow him in clinic.    Planning to discharge to [facility] when medically stable, SW/CM assisting.      Interval History: No pain or trouble breathing this morning. He has no complaints. Hoping to get to rehab today. BP and WBC improved.     Review of Systems   Constitutional:  Negative for fever.   Respiratory:  Negative for shortness of breath.    Cardiovascular:  Negative for chest pain.   Gastrointestinal:  Negative for abdominal pain, constipation, nausea and vomiting.   Neurological:  Negative for headaches.   Objective:     Vital Signs (Most Recent):  Temp: 97.4 °F (36.3 °C) " (09/01/22 1520)  Pulse: 63 (09/01/22 1520)  Resp: 18 (09/01/22 1520)  BP: (!) 147/64 (09/01/22 1520)  SpO2: 95 % (09/01/22 1520)   Vital Signs (24h Range):  Temp:  [97.4 °F (36.3 °C)-99.5 °F (37.5 °C)] 97.4 °F (36.3 °C)  Pulse:  [63-72] 63  Resp:  [16-20] 18  SpO2:  [95 %-98 %] 95 %  BP: (110-150)/(50-69) 147/64     Weight: 57.2 kg (126 lb 1.7 oz)  Body mass index is 19.17 kg/m².    Intake/Output Summary (Last 24 hours) at 9/1/2022 1632  Last data filed at 9/1/2022 1319  Gross per 24 hour   Intake 418 ml   Output 2500 ml   Net -2082 ml      Physical Exam  Vitals reviewed.   Constitutional:       General: He is not in acute distress.     Comments: Chronically ill appearing   HENT:      Head: Normocephalic and atraumatic.      Mouth/Throat:      Mouth: Mucous membranes are moist.   Eyes:      Extraocular Movements: Extraocular movements intact.      Conjunctiva/sclera: Conjunctivae normal.   Cardiovascular:      Rate and Rhythm: Normal rate and regular rhythm.   Pulmonary:      Effort: Pulmonary effort is normal. No respiratory distress.      Comments: Rhonchi and crackles appreciated on R anterior chest.   Abdominal:      General: There is no distension.      Palpations: Abdomen is soft.   Skin:     General: Skin is warm and dry.      Capillary Refill: Capillary refill takes less than 2 seconds.   Neurological:      General: No focal deficit present.       Significant Labs: All pertinent labs within the past 24 hours have been reviewed.    Significant Imaging: I have reviewed all pertinent imaging results/findings within the past 24 hours.      Assessment/Plan:      * Recurrent pleural effusion on right  - Interval history and physical exam findings as described above  - S/p left thoracotomy prior to admission  - Recurring right effusion s/p thoracentesis x2  - Chest tubes removed 8/29 by CTS   - No further inpatient interventions, will follow in clinic  - Increased O2 requirements to 6L on 8/31, improving    - CXR  without pneumothorax; discussed with CTS, no interventions from their service   - Infectious work-up as below for possible infectious cause  - IS encouraged  - Weaning O2 requirements as tolerated  - Continuing to monitor    Acute on chronic respiratory failure with hypoxia  - As above    Leukocytosis  - Uptrend noted, remains afebrile  - Procal elevated  - BCx pending  - Azithro/rocephin broadened to vanc and cefepime on 8/31    Physical deconditioning  - Rehab medicine following  - Accepted to [facility] when medically clear for DC    Chronic diastolic heart failure  - Remains grossly asymptomatic, euvolemic, not in acute exacerbation  - Continue home cardioprudent regimen  - Monitoring on tele    Chronic kidney disease-mineral and bone disorder  - Renal management as above    Anemia in ESRD (end-stage renal disease)  - H/H stable near baseline on admission, with drop after pleurex catheter placement 8/18. S/p 2u pRBC 8/19. S/p 1u pRBC 8/23, no active bleeding   - Will continue to monitor on daily labs    ESRD (end stage renal disease) on dialysis  - Interval history and physical exam findings as described above  - Nephology following  - Further decision for HD per nephology  - Renally dosing all medications  - Avoid nephrotoxins  - Will continue to monitor on daily labs    Mixed hyperlipidemia associated with type 2 diabetes mellitus  - Continue home statin regimen    Type 2 diabetes mellitus with chronic kidney disease on chronic dialysis, without long-term current use of insulin  - Working to optimize BG control  - SSI provided for corrective dosing  - DXTs as ordered  - Hypoglycemic protocol in effect  - Diabetic diet provided    Hypertension associated with ESRD caused by type 2 diabetes mellitus, on dialysis  - Working to optimize BP control   - Continue home cardioprudent regimen; nifedipine held on 8/31 due to relative hypotension in the setting of new infection concerns  - HD to assist with BP control  - PRN  IV hydralazine and/or IV labetalol provided for SBP>180  - Will continue to monitor and further titrate antihypertensives as clinically indicated       VTE Risk Mitigation (From admission, onward)         Ordered     IP VTE HIGH RISK PATIENT  Once         08/15/22 2005     Place sequential compression device  Until discontinued         08/15/22 2005                Discharge Planning   WENDY: 9/2/2022     Code Status: Full Code   Is the patient medically ready for discharge?: No    Reason for patient still in hospital (select all that apply): Patient unstable  Discharge Plan A: Rehab                  Jacey Duke MD  Department of Hospital Medicine   [facility] - Cardiology Stepdown

## 2022-09-02 NOTE — SUBJECTIVE & OBJECTIVE
Interval History: No acute events overnight. He reports feeling good this morning aside from some vague abdominal pain. Has not had a BM in 4d. iHD done today. BP high, will monitor after iHD    Review of Systems   Constitutional:  Negative for fever.   Respiratory:  Negative for shortness of breath.    Cardiovascular:  Negative for chest pain.   Gastrointestinal:  Negative for abdominal pain, constipation, nausea and vomiting.   Neurological:  Negative for headaches.   Objective:     Vital Signs (Most Recent):  Temp: 98.9 °F (37.2 °C) (09/02/22 0717)  Pulse: 62 (09/02/22 1335)  Resp: 18 (09/02/22 1335)  BP: (!) 190/72 (09/02/22 1335)  SpO2: 98 % (09/02/22 0902)   Vital Signs (24h Range):  Temp:  [97.4 °F (36.3 °C)-98.9 °F (37.2 °C)] 98.9 °F (37.2 °C)  Pulse:  [53-80] 62  Resp:  [17-19] 18  SpO2:  [94 %-98 %] 98 %  BP: (129-190)/(61-77) 190/72     Weight: 57.7 kg (127 lb 3.3 oz)  Body mass index is 19.34 kg/m².    Intake/Output Summary (Last 24 hours) at 9/2/2022 1433  Last data filed at 9/2/2022 1335  Gross per 24 hour   Intake 650 ml   Output 2150 ml   Net -1500 ml      Physical Exam  Vitals reviewed.   Constitutional:       General: He is not in acute distress.     Comments: Chronically ill appearing   HENT:      Head: Normocephalic and atraumatic.      Mouth/Throat:      Mouth: Mucous membranes are moist.   Eyes:      Extraocular Movements: Extraocular movements intact.      Conjunctiva/sclera: Conjunctivae normal.   Cardiovascular:      Rate and Rhythm: Normal rate and regular rhythm.   Pulmonary:      Effort: Pulmonary effort is normal. No respiratory distress.      Comments: Rhonchi and crackles appreciated on R anterior chest. Improved from last exam  Abdominal:      General: There is no distension.      Palpations: Abdomen is soft.   Skin:     General: Skin is warm and dry.      Capillary Refill: Capillary refill takes less than 2 seconds.   Neurological:      General: No focal deficit present.        Significant Labs: All pertinent labs within the past 24 hours have been reviewed.    Significant Imaging: I have reviewed all pertinent imaging results/findings within the past 24 hours.

## 2022-09-02 NOTE — ASSESSMENT & PLAN NOTE
- Rehab medicine following  - [facility] when medically [facility] for [facility]

## 2022-09-02 NOTE — PT/OT/SLP PROGRESS
Occupational Therapy  Co- Treatment    Name: Vega Brownlee  MRN: [id number]  Admitting Diagnosis:  Recurrent pleural effusion on right  15 Days Post-Op    Recommendations:     Discharge Recommendations: rehabilitation facility  Discharge Equipment Recommendations:  bedside commode, walker, rolling, shower chair    Assessment:     Vega Brownlee is a 62 y.o. male with a medical diagnosis of Recurrent pleural effusion on right.Performance deficits affecting function are weakness, impaired endurance, impaired self care skills, gait instability, decreased upper extremity function, decreased lower extremity function, decreased safety awareness.   Pt tolerated session well with improved performance this date. Continue to anticipate pt will benefit from post acute therapy prior to return home.   Rehab Prognosis:  Good; patient would benefit from acute skilled OT services to address these deficits and reach maximum level of function.       Plan:     Patient to be seen 4 x/week to address the above listed problems via self-care/home management, therapeutic activities, therapeutic exercises  Plan of Care Expires:    Plan of Care Reviewed with: patient    Subjective   Pt agreeable to therapy and denies pain this date   Pain/Comfort:  Pain Rating 1: 0/10    Objective:     Communicated with: nsg  prior to session.  Patient found supine in bed with 4 LPM oxygen and tele in place.  Cotx completed thisd ate to optimize functional performance and safety given impaired tolerance for activity.   General Precautions: Standard, fall     Occupational Performance:     Bed Mobility:    MIN A supine>sit.     Functional Mobility/Transfers:  Sit>stand CGA    Activities of Daily Living:  Feeding: set-up  G/H: standing with CGA wash hands/face and comb hair.  LE dressing: CGA  UE dressing: MIN A        AMPAC 6 Click ADL: 19    Treatment & Education:  Pt demo Fair to Fair+ sitting balance. Pt completed functional mobility with RW in room with CGA. Pt  tolerated standing at sink in bathroom approx 2 min for g/h skills with increased reports of fatigue and deteriorating posture. Pt returned to chair with cues for hand placement and safe RW use. Education provided re: OT POC and safety with functional mobility/ADl skills.       Patient left up in chair with all lines intact and call button in reach    GOALS:   Multidisciplinary Problems       Occupational Therapy Goals          Problem: Occupational Therapy    Goal Priority Disciplines Outcome Interventions   Occupational Therapy Goal     OT, PT/OT Ongoing, Progressing    Description: Goals to be met by: 7 days 8/29/22     Patient will increase functional independence with ADLs by performing:    Pt to complete UE dressing with set-up  Pt to complete LE dressing with SBA  Pt to complete standing g/h skills with SBA  Pt to complete toileting with SBA  Pt to complete t/f bed, chair and commode with SBA                        Time Tracking:     OT Date of Treatment: 09/02/22  OT Start Time: 0845  OT Stop Time: 0857  OT Total Time (min): 12 min    Billable Minutes:Self Care/Home Management 12    OT/VARGAS: OT          9/2/2022

## 2022-09-02 NOTE — PT/OT/SLP PROGRESS
Physical Therapy Treatment    Patient Name:  Vega Brownlee   MRN:  [id number]  Admit Date: 8/15/2022  Admitting Diagnosis:  Recurrent pleural effusion on right   Length of Stay: 18 days  Recent Surgery: Procedure(s) (LRB):  VATS, WITH DECORTICATION, LUNG (Right)  BLOCK, NERVE, INTERCOSTAL, 2 OR MORE (Right)  BRONCHOSCOPY, FLEXIBLE (N/A)  INSERTION, CATHETER, INTERCOSTAL, FOR DRAINAGE (Right) 15 Days Post-Op    Recommendations:     Discharge Recommendations:  rehabilitation facility   Discharge Equipment Recommendations: bedside commode, walker, rolling   Barriers to discharge: None    Plan:     During this hospitalization, patient to be seen 4 x/week to address the listed problems via gait training, therapeutic activities, therapeutic exercises, neuromuscular re-education  Plan of Care Expires:  09/21/22  Plan of Care Reviewed with: patient    Assessment:     Vega Brownlee is a 62 y.o. male admitted with a medical diagnosis of Recurrent pleural effusion on right. Pt found alert and cooperative. Pt demo'd significantly improved today in regards to gait quality and endurance. Pt progressing towards goals, but not at PLOF. Pt tolerated session well. Pt is improving with therapy evidenced by increased gait distance, decreased SOB, and improved posture during ambulation. Pt would benefit from continued PT services to improve overall functional mobility. Pt is an excellent IP rehab candidate due to good activity tolerance, decline from PLOF, good family support, and excellent motivation. Recommend d/c to Rehab to maximize functional independence.         Problem List: weakness, impaired endurance, impaired functional mobility, gait instability, impaired balance, impaired cardiopulmonary response to activity.  Rehab Prognosis: Good     GOALS:   Multidisciplinary Problems       Physical Therapy Goals          Problem: Physical Therapy    Goal Priority Disciplines Outcome Goal Variances Interventions   Physical Therapy Goal     PT,  "PT/OT Ongoing, Progressing     Description: Goals to be met by: 2022    Patient will increase functional independence with mobility by performin. Supine to sit with Supervision   2. Sit to stand transfer with Stand-by Assistance using Rolling Walker  3. Gait  x 50 feet with Stand-by Assistance using Rolling Walker.   4. Stand for 3 minutes with Stand-by Assistance using Rolling Walker  5. Lower extremity exercise program x10 reps per handout, with independence                         Subjective   Communicated with RN prior to session.  Patient found HOB elevated upon PT entry to room, agreeable to evaluation. Ferrari Miles Brownlee's alone during session.    Chief Complaint: none reported   Patient/Family Comments/goals: to get better  Pain/Comfort:  Pain Rating 1: 0/10  Pain Rating Post-Intervention 1: 0/10    Objective:   Patient found with: telemetry, pulse ox (continuous), peripheral IV, oxygen   General Precautions: Standard, Cardiac fall   Orthopedic Precautions:N/A   Braces: N/A   Oxygen Device: Nasal Cannula   Vitals: BP (!) 152/67 (Patient Position: Lying)   Pulse 62   Temp 98.9 °F (37.2 °C) (Axillary)   Resp 18   Ht 5' 8" (1.727 m)   Wt 57.7 kg (127 lb 3.3 oz)   SpO2 98%   BMI 19.34 kg/m²     Outcome Measures:  AM-PAC 6 CLICK MOBILITY  Turning over in bed (including adjusting bedclothes, sheets and blankets)?: 3  Sitting down on and standing up from a chair with arms (e.g., wheelchair, bedside commode, etc.): 3  Moving from lying on back to sitting on the side of the bed?: 3  Moving to and from a bed to a chair (including a wheelchair)?: 3  Need to walk in hospital room?: 3  Climbing 3-5 steps with a railing?: 2  Basic Mobility Total Score: 17    Functional Mobility:  Additional staff present: OT - due to pt requiring 2 skilled therapists to safely perform functional mobility and to accommodate pt's activity tolerance    Bed Mobility:   Supine to Sit: minimum assistance; HOB elevated  Scooting " anteriorly to EOB to have both feet planted on floor: stand by assistance      Sitting Balance at Edge of Bed:  Assistance Level Required: Supervision  Time: 3 minutes  Comments:    Worked on activity tolerance sitting EOB and worked on tolerance to positional change     Transfers:   Sit <> Stand Transfer: contact guard assistance with rolling walker from EOB  Verbal cuing for hand placement       Gait:  Patient ambulated: 40ft    Patient required: contact guard  Patient used: rolling walker  Gait Pattern observed: reciprocal gait  Gait Deviation(s): occasional unsteady gait, decreased step length, flexed posture, and decreased cortney  Impairments due to: impaired balance, decreased strength, and decreased endurance  Comments:   Mask donned  Pt demo'd increased time with upright posture and improved gait quality  Slight SOB present  No LOB but pt occasionally unsteady  As pt fatigued, pt demo'd increased forward flexed posture         Therapeutic Activities, Exercises, and Education:   Educated pt on PT role/POC  Educated pt on importance of OOB activity and daily ambulation   Pt verbalized understanding     Pt performed standing ADLs at the sink with OT after ambulation trial    T/f to chair to increase tolerance to OOB activity and to create optimal positioning for lung expansion     Patient left up in chair with all lines intact, call button in reach, and RN notified..    Time Tracking:     PT Received On: 09/02/22  PT Start Time: 0843     PT Stop Time: 0857  PT Total Time (min): 14 min       Billable Minutes:   Gait Training 8    Treatment Type: Treatment  PT/PTA: PT

## 2022-09-02 NOTE — PLAN OF CARE
[facility] - Cardiology Stepdown  Discharge Reassessment    Primary Care Provider: Ko Perez MD    Expected Discharge Date: 9/6/2022    Reassessment (most recent)       Discharge Reassessment - 09/02/22 1346          Discharge Reassessment    Assessment Type Discharge Planning Reassessment     Did the patient's condition or plan change since previous assessment? No     Discharge Plan discussed with: Patient     Communicated WENDY with patient/caregiver Date not available/Unable to determine     Discharge Plan A Rehab     Discharge Plan B Long-term acute care facility (LTAC)     DME Needed Upon Discharge  none     Discharge Barriers Identified None     Why the patient remains in the hospital Requires continued medical care                   Per Dr Duke pt is more appropriate for rehab than LTAC and might be medically ready to discharge early next week.  Natasha Hampton updated.  ESTELA went to see pt at bedside but pt was MASON at dialysis.  SW name on whiteboard.  Will continue to follow.    Chacha Dooley, KRYSTINA  [facility]  [phone]

## 2022-09-02 NOTE — PROGRESS NOTES
Pharmacokinetic Assessment Follow Up: IV Vancomycin    Vancomycin serum concentration assessment(s):  Vancomycin concentration this AM within dosing range at 14.1 mcg/mL  Patient to receive HD today per nephrology    Vancomycin Regimen Plan:   Vancomycin 1000 mg IV x 1  Obtain random with AM labs tomorrow, dose if random < 20 mcg/mL   Continue to monitor RF and make adjustments as needed    Drug levels (last 3 results):  Recent Labs   Lab Result Units 09/01/22  0454 09/02/22  0436   Vancomycin, Random ug/mL <1.4 14.1         Pharmacy will continue to follow and monitor vancomycin.    Thank you for the consult,   Yuridia Rod, PharmD, BCPS  [id number]

## 2022-09-02 NOTE — PROGRESS NOTES
[facility] NOTE    Vega Brwonlee is a 62 y.o. male currently on hemodialysis for removal of uremic toxins and volume.     Patient seen and evaluated on hemodialysis, tolerating treatment, see HD flowsheet for vitals and assessments.    No Hypotension, chest pain, shortness of breath, cramping, nausea or vomiting.      Labs have been reviewed and the dialysate bath has been adjusted.     Labs:     Recent Labs   Lab 08/31/22  0629 09/01/22  0454 09/02/22  0436   * 136 133*   K 4.5 3.4* 4.0    98 100   CO2 21* 27 24   BUN 36* 25* 34*   CREATININE 4.3* 3.1* 4.4*   CALCIUM 7.3* 7.5* 7.2*   PHOS 4.6* 2.7 3.9     Recent Labs   Lab 08/30/22  0423 08/31/22 0629 09/01/22 0454 09/01/22  0752 09/02/22  0436   WBC 13.88* 15.24* 11.76  --  10.63   HGB 8.0* 8.1* 7.0* 7.4* 7.7*   HCT 25.1* 25.0* 21.3*  --  22.9*    241 228  --   --      Lab Results   Component Value Date    FESATURATED 27 08/22/2022    FERRITIN 1,751 (H) 08/22/2022        Assessment/Plan      Ultrafiltration goal: Liters: 1.5L. Maintain MAP > 65 mmHg.    Duration:  3.5 hrs    - Seen on dialysis today, tolerating session with current UFR, no complications.  Oxygen requirements much improved, NC at 4 L, no distress on exam.   - Limit fluid intake to 32 oz per day.   - No lab stick/BP intake on access site  - Continue to monitor intake and output, daily weights   - Please avoid gadolinium, fleets, phos-based laxatives, NSAIDs  - Will follow closely and continue dialysis treatments while in-patient    Anemia  - Hgb 7.7, Continue TAYLOR with dialysis treatments    BMM  - Renal diet with protein intake goal 1.5 g/kg/d  - Novasource with meals   - F/U PO4, Mg, Calcium. And albumin levels.   - Phos 3.9.    HTN  - BP Elevated  - Goal for BP <140mmHg SBP and <90 mmHg DBP.   - Continue home antihypertensive regimen; adjust as needed.       Ayana Townsend DNP, APRN, FNP-C  Nephrology Department  Pager:  [phone]

## 2022-09-02 NOTE — PROGRESS NOTES
Attempted to see Mr. Brownlee but was not on floor. In HD at this time.     PM&R Recommendation:     At this time, the PM&R team has reviewed this patient's ongoing medical case including inpatient diagnosis, medical history, clinical examination, labs, vitals, current social and functional history to provide the post-acute recommendation as follows:     RECOMMENDATIONS: inpatient rehabilitation due to good motivation/participation with therapies and good potential for recovery.    MEDICAL STABILITY:     At this time, barriers for post-acute rehab admission include concern for pleural effusion, increased oxygen requirements, and abx plan if needed upon discharge.    We will continue to follow.      RUDOLPH Toney, FNP-C  Physical Medicine & Rehabilitation   09/02/2022

## 2022-09-02 NOTE — PHYSICIAN QUERY
PT Name: Vega Brownlee                                                                                                                                    Withdrawn-JG  MR #: [id number]     DOCUMENTATION CLARIFICATION     CDS: Олег Staley RN CCDS               Contact information: [email or web address]    This form is a permanent document in the medical record.    Query Date: September 2, 2022    By submitting this query, we are merely seeking further clarification of documentation.  Please utilize your independent clinical judgment when addressing the question(s) below.  The Medical Record contains the following:   Indicators   Supporting Clinical Findings Location in Medical Record    Pneumonia documented      x Chest X-Ray/CT Scan Previously present right-sided thoracostomy tube is no longer observed.  No significant detrimental interval change in the appearance of the chest since 08/29/2022 is appreciated, with the volume of pleural fluid on the right appearing stable.  Lucency in the right inferior hemithorax is again seen and could represent a small amount of extrapulmonary air, but this appears unchanged from the prior study and no pneumothorax of any significant volume is observed.    Pulmonary edema pneumonia aspiration or sepsis. 8/30/2022 Chest x-ray              9/1/2022 Chest x-ray    x PaO2    PaCO2     O2 sat O2 sat 89% on 3L  O2 sat 92-97% on 3L  O2 sat 92% on 6L O2 8/29/2022 Vitals  8/30/2022 Vitals  8/31/2022 Vitals    x WBC  08/29/22 06:11 08/30/22 04:23 08/31/22 06:29 09/01/22 04:54   WBC 12.71 (H) 13.88 (H) 15.24 (H) 11.76    Lab values    x Vital Signs T 97.7-98.9, HR 62-67, RR 16-17, /64  T 97.4-98.4, HR 61-69, RR 17-20, SBP /DBP 50-59  T 97.7-98.2, HR 58-89, RR 12-18, SBP /DBP 46-63 8/29/2022 Vitals  8/30/2022 Vitals  8/31/2022 Vitals    Cultures       x Treatment  cefepime 1 g IVPB q24h  vancomycin 1 gram IVPB x 1  cefTRIAXone (ROCEPHIN) 1 g IVPB q24h  azithromycin 500 mg PO  x 1 9/1/2022- Current Medication  9/1/2022 Medication  8/30-8/31/2022 Medication  8/30/2022 Medication    x Supplemental O2 Supplemental O2 @ 3L  Supplemental O2 ranging from 3-6L 8/29-8/30/2022 Vitals  8/31/2022 Vitals    Dysphagia/Swallow study      x Other Resp: coarse and mildly decreased breath sounds in the right lung fields (improved from previous);     Leukocytosis noted with elevated procal, BCx obtained and started on azithro/rocephin; leukocytosis worsened on 8/31 with relative hypotension compared to previous (nifedipine held), so abx broadened to vanc and cefepime pending culture results. Overnight and into 8/31, pt noted to have increased O2 requirements up to 6L NC from 2L    Comments: Rhonchi and crackles appreciated on R anterior chest.      08/30/22 07:39   Procalcitonin 1.19 (H)    8/30/2022 Hospital Medicine progress notes    9/1/2022 Hospital Medicine progress notes                Lab value     Provider, please provide the diagnosis related to the above clinical indicators:    [   ] Bacterial Pneumonia    [   ] Unspecified Pneumonia   [   ] Other type of pneumonia (please specify): ________   [   ] Other infectious diagnosis (please specify): _________   [  ] Clinically undetermined         Please document in your progress notes daily for the duration of treatment, until resolved, and include in your discharge summary.     Form No. [id number]

## 2022-09-02 NOTE — ASSESSMENT & PLAN NOTE
- Uptrend noted, remains afebrile. Now improving  - Procal elevated  - BCx pending  - Azithro/rocephin broadened to vanc and cefepime on 8/31

## 2022-09-02 NOTE — ASSESSMENT & PLAN NOTE
- Interval history and physical exam findings as described above  - S/p left thoracotomy prior to admission  - Recurring right effusion s/p thoracentesis x2  - Chest tubes removed 8/29 by CTS   - No further inpatient interventions, will follow in clinic  - Increased O2 requirements to 6L on 8/31, improving on abx  - IS encouraged  - Weaning O2 requirements as tolerated  - Continuing to monitor

## 2022-09-02 NOTE — PLAN OF CARE
Problem: Adult Inpatient Plan of Care  Goal: Plan of Care Review  Outcome: Ongoing, Progressing  Goal: Patient-Specific Goal (Individualized)  Outcome: Ongoing, Progressing  Goal: Absence of Hospital-Acquired Illness or Injury  Outcome: Ongoing, Progressing  Goal: Optimal Comfort and Wellbeing  Outcome: Ongoing, Progressing  Goal: Readiness for Transition of Care  Outcome: Ongoing, Progressing     Problem: Diabetes Comorbidity  Goal: Blood Glucose Level Within Targeted Range  Outcome: Ongoing, Progressing     Problem: Infection  Goal: Absence of Infection Signs and Symptoms  Outcome: Ongoing, Progressing     Problem: Impaired Wound Healing  Goal: Optimal Wound Healing  Outcome: Ongoing, Progressing     Problem: Device-Related Complication Risk (Hemodialysis)  Goal: Safe, Effective Therapy Delivery  Outcome: Ongoing, Progressing     Problem: Hemodynamic Instability (Hemodialysis)  Goal: Effective Tissue Perfusion  Outcome: Ongoing, Progressing     Problem: Infection (Hemodialysis)  Goal: Absence of Infection Signs and Symptoms  Outcome: Ongoing, Progressing     Problem: Skin Injury Risk Increased  Goal: Skin Health and Integrity  Outcome: Ongoing, Progressing     Problem: Fall Injury Risk  Goal: Absence of Fall and Fall-Related Injury  Outcome: Ongoing, Progressing     Problem: Electrolyte Imbalance (Chronic Kidney Disease)  Goal: Electrolyte Balance  Outcome: Ongoing, Progressing     Problem: Fluid Volume Excess (Chronic Kidney Disease)  Goal: Fluid Balance  Outcome: Ongoing, Progressing    Patient resting comfortably in bed. No distress noted. Re-dressed Right side wound twice during shift. Dressing saturated and suture still in place. Vitals stable. No c/o pain nor have any other concerns at this time. Bed in lowest position, call light within reach, plan of care discussed with patient and is agreed upon. Will continue to monitor.

## 2022-09-02 NOTE — PROGRESS NOTES
"Mike Jasso - Cardiology [facility] Medicine  Progress Note    Patient Name: Vega Brownlee  MRN: [id number]  Patient Class: IP- Inpatient   Admission Date: 8/15/2022  Length of Stay: 18 days  Attending Physician: Jacey Duke MD  Primary Care Provider: Ko Perez MD        Subjective:     Principal Problem:Recurrent pleural effusion on right        HPI:  HPI per :  Patient is a 61 y.o. 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 Patient has history of recurrent pleural effusion and was sent to ED for worsening symptoms. IR was consulted and attempted US-guided therapeutic right-sided thoracentesis. Per IR "US reveals an extensively loculated Rt effusion with innumerable isolated pockets with sonographic appearance suggestive of a large hematoma/hemothorax. Only able to remove 50-cc of old, dark blood products also consistent with large Rt hemothorax. Pt would likely require large-bore chest tube placement and instillation of lytics if attempts will be made to resolve the hemothorax vs VATS." Facility seeking transfer for CTS evaluation. Patient is stable on med tele unit only requiring 3L NC with no distress. Stable for transfer.      Overview/Hospital Course:  [facility] Medicine Course:  IR was consulted and attempted US-guided therapeutic right-sided thoracentesis. Per IR "US reveals an extensively loculated Rt effusion with innumerable isolated pockets with sonographic appearance suggestive of a large hematoma/hemothorax. Only able to remove 50-cc of old, dark blood products also consistent with large Rt hemothorax. Pt would likely require large-bore chest tube " "placement and instillation of lytics if attempts will be made to resolve the hemothorax vs VATS."  Patient accepted to [facility] [place], in queue to transfer asap. Stable vitals. Will allow to eat and make NPO again at midnight. Lokelma for K 5.8.     [facility]:  Pt accepted to [facility] [place], Nephrology following for HD needs. Evaluated by thoracic surgery, with placement of PleurX catheter x2 on 8/18. Required 2u pRBC after the procedure, with stable low Hgb since that time. Pleural fluid exudative, with elevated LDH and low glucose. Cultures negative. Cytology pending. Interpretation is difficult with prior manipulation with traumatic thoras in the past. However, with new CT chest findings would certainly benefit from outpatient malignancy eval with follow up CT chest in 3 months and possible bx pending results.     Chest tube removed by CTS on 8/30, tolerated well with same O2 requirements. Leukocytosis noted with elevated procal, BCx obtained and started on azithro/rocephin; leukocytosis worsened on 8/31 with relative hypotension compared to previous (nifedipine held), so abx broadened to vanc and cefepime pending culture results. Overnight and into 8/31, pt noted to have increased O2 requirements up to 6L NC from 2L: CXR without pneumothorax; discussed with CTS, who stated they have no further inpatient recommendations and will follow him in clinic.    Planning to discharge to [facility] when medically stable, SW/CM assisting.      Interval History: No acute events overnight. He reports feeling good this morning aside from some vague abdominal pain. Has not had a BM in 4d. iHD done today. BP high, will monitor after iHD    Review of Systems   Constitutional:  Negative for fever.   Respiratory:  Negative for shortness of breath.    Cardiovascular:  Negative for chest pain.   Gastrointestinal:  Negative for abdominal pain, constipation, nausea and vomiting.   Neurological:  Negative for headaches.   Objective: "     Vital Signs (Most Recent):  Temp: 98.9 °F (37.2 °C) (09/02/22 0717)  Pulse: 62 (09/02/22 1335)  Resp: 18 (09/02/22 1335)  BP: (!) 190/72 (09/02/22 1335)  SpO2: 98 % (09/02/22 0902)   Vital Signs (24h Range):  Temp:  [97.4 °F (36.3 °C)-98.9 °F (37.2 °C)] 98.9 °F (37.2 °C)  Pulse:  [53-80] 62  Resp:  [17-19] 18  SpO2:  [94 %-98 %] 98 %  BP: (129-190)/(61-77) 190/72     Weight: 57.7 kg (127 lb 3.3 oz)  Body mass index is 19.34 kg/m².    Intake/Output Summary (Last 24 hours) at 9/2/2022 1433  Last data filed at 9/2/2022 1335  Gross per 24 hour   Intake 650 ml   Output 2150 ml   Net -1500 ml      Physical Exam  Vitals reviewed.   Constitutional:       General: He is not in acute distress.     Comments: Chronically ill appearing   HENT:      Head: Normocephalic and atraumatic.      Mouth/Throat:      Mouth: Mucous membranes are moist.   Eyes:      Extraocular Movements: Extraocular movements intact.      Conjunctiva/sclera: Conjunctivae normal.   Cardiovascular:      Rate and Rhythm: Normal rate and regular rhythm.   Pulmonary:      Effort: Pulmonary effort is normal. No respiratory distress.      Comments: Rhonchi and crackles appreciated on R anterior chest. Improved from last exam  Abdominal:      General: There is no distension.      Palpations: Abdomen is soft.   Skin:     General: Skin is warm and dry.      Capillary Refill: Capillary refill takes less than 2 seconds.   Neurological:      General: No focal deficit present.       Significant Labs: All pertinent labs within the past 24 hours have been reviewed.    Significant Imaging: I have reviewed all pertinent imaging results/findings within the past 24 hours.      Assessment/Plan:      * Recurrent pleural effusion on right  - Interval history and physical exam findings as described above  - S/p left thoracotomy prior to admission  - Recurring right effusion s/p thoracentesis x2  - Chest tubes removed 8/29 by CTS   - No further inpatient interventions, will  follow in clinic  - Increased O2 requirements to 6L on 8/31, improving on abx  - IS encouraged  - Weaning O2 requirements as tolerated  - Continuing to monitor    Acute on chronic respiratory failure with hypoxia  - As above    Leukocytosis  - Uptrend noted, remains afebrile. Now improving  - Procal elevated  - BCx pending  - Azithro/rocephin broadened to vanc and cefepime on 8/31    Physical deconditioning  - Rehab medicine following  - Accepted to [facility] when medically clear for DC    Chronic diastolic heart failure  - Remains grossly asymptomatic, euvolemic, not in acute exacerbation  - Continue home cardioprudent regimen  - Monitoring on tele    Chronic kidney disease-mineral and bone disorder  - Renal management as above    Anemia in ESRD (end-stage renal disease)  - H/H stable near baseline on admission, with drop after pleurex catheter placement 8/18. S/p 2u pRBC 8/19. S/p 1u pRBC 8/23, no active bleeding   - Will continue to monitor on daily labs    ESRD (end stage renal disease) on dialysis  - Interval history and physical exam findings as described above  - Nephology following  - Further decision for HD per nephology  - Renally dosing all medications  - Avoid nephrotoxins  - Will continue to monitor on daily labs    Mixed hyperlipidemia associated with type 2 diabetes mellitus  - Continue home statin regimen    Type 2 diabetes mellitus with chronic kidney disease on chronic dialysis, without long-term current use of insulin  - Working to optimize BG control  - SSI provided for corrective dosing  - DXTs as ordered  - Hypoglycemic protocol in effect  - Diabetic diet provided    Hypertension associated with ESRD caused by type 2 diabetes mellitus, on dialysis  - Working to optimize BP control   - Continue home cardioprudent regimen; nifedipine held on 8/31 due to relative hypotension in the setting of new infection concerns  - HD to assist with BP control  - PRN IV hydralazine and/or IV labetalol provided for  SBP>180  - Will continue to monitor and further titrate antihypertensives as clinically indicated       VTE Risk Mitigation (From admission, onward)         Ordered     IP VTE HIGH RISK PATIENT  Once         08/15/22 2005     Place sequential compression device  Until discontinued         08/15/22 2005                Discharge Planning   WENDY: 9/6/2022     Code Status: Full Code   Is the patient medically ready for discharge?: No    Reason for patient still in hospital (select all that apply): Patient unstable  Discharge Plan A: Rehab                  May SPENCER Duke MD  Department of Hospital Medicine   [facility] - Cardiology Stepdown

## 2022-09-03 NOTE — SUBJECTIVE & OBJECTIVE
Interval History: Remains on 3L. Will work on weaning down O2. No concerns this morning    Review of Systems   Constitutional:  Negative for fever.   Respiratory:  Negative for shortness of breath.    Cardiovascular:  Negative for chest pain.   Gastrointestinal:  Negative for abdominal pain, constipation, nausea and vomiting.   Neurological:  Negative for headaches.   Objective:     Vital Signs (Most Recent):  Temp: 98.8 °F (37.1 °C) (09/03/22 0540)  Pulse: 92 (09/03/22 0540)  Resp: 18 (09/03/22 0540)  BP: 139/64 (09/03/22 0540)  SpO2: 96 % (09/03/22 0540) Vital Signs (24h Range):  Temp:  [96.8 °F (36 °C)-99.8 °F (37.7 °C)] 98.8 °F (37.1 °C)  Pulse:  [58-92] 92  Resp:  [18-20] 18  SpO2:  [93 %-98 %] 96 %  BP: (130-190)/(60-77) 139/64     Weight: 57.7 kg (127 lb 3.3 oz)  Body mass index is 19.34 kg/m².    Intake/Output Summary (Last 24 hours) at 9/3/2022 0616  Last data filed at 9/3/2022 0100  Gross per 24 hour   Intake 1644 ml   Output 2154 ml   Net -510 ml      Physical Exam  Vitals reviewed.   Constitutional:       General: He is not in acute distress.     Comments: Chronically ill appearing   HENT:      Head: Normocephalic and atraumatic.      Mouth/Throat:      Mouth: Mucous membranes are moist.   Eyes:      Extraocular Movements: Extraocular movements intact.      Conjunctiva/sclera: Conjunctivae normal.   Cardiovascular:      Rate and Rhythm: Normal rate and regular rhythm.   Pulmonary:      Effort: Pulmonary effort is normal. No respiratory distress.      Comments: Rhonchi and crackles appreciated on R anterior chest. Improving  Abdominal:      General: There is no distension.      Palpations: Abdomen is soft.   Skin:     General: Skin is warm and dry.      Capillary Refill: Capillary refill takes less than 2 seconds.   Neurological:      General: No focal deficit present.       Significant Labs: All pertinent labs within the past 24 hours have been reviewed.    Significant Imaging: I have reviewed all  pertinent imaging results/findings within the past 24 hours.

## 2022-09-03 NOTE — PROGRESS NOTES
Pharmacokinetic Assessment Follow Up: IV Vancomycin    Vancomycin serum concentration assessment/plan:  Random level resulted as 26.8 mcg/mL; Goal 10-20 mcg/mL (unclear infection source)  ESRD on HD TRS; Last HD 9/2/22  Hold Vancomycin today; re-dose when random level is less than 20 mcg/mL  Next level to be drawn on 9/4/22 with AM labs.        Drug levels (last 3 results):  Recent Labs   Lab Result Units 09/01/22 0454 09/02/22 0436 09/03/22  0315   Vancomycin, Random ug/mL <1.4 14.1 26.8       Pharmacy will continue to follow and monitor vancomycin.    Please contact pharmacy at extension [phone] for questions regarding this assessment.    Thank you for the consult,   Sandra Orozco, MaddiD          Patient brief summary:  Vega Brownlee is a 62 y.o. male initiated on antimicrobial therapy with IV Vancomycin for treatment of  leukocytosis.    The patient's current regimen is pulse dose    Drug Allergies:   Review of patient's allergies indicates:  No Known Allergies    Actual Body Weight:   57.7 kg     Renal Function:   Estimated Creatinine Clearance: 22.3 mL/min (A) (based on SCr of 2.8 mg/dL (H)).,     Dialysis Method (if applicable):  intermittent HD    CBC (last 72 hours):  Recent Labs   Lab Result Units 09/01/22 0454 09/01/22 0752 09/02/22 0436 09/03/22  0315   WBC K/uL 11.76  --  10.63 10.29   Hemoglobin g/dL 7.0* 7.4* 7.7* 7.4*   Hematocrit % 21.3*  --  22.9* 23.2*   Platelets K/uL 228  --  231 221   Gran % % 83.4*  --  81.9* 79.1*   Lymph % % 5.4*  --  5.5* 7.1*   Mono % % 9.4  --  9.2 10.3   Eosinophil % % 0.9  --  1.4 1.8   Basophil % % 0.5  --  1.1 1.2   Differential Method  Automated  --  Automated Automated       Metabolic Panel (last 72 hours):  Recent Labs   Lab Result Units 09/01/22 0454 09/02/22  0436 09/03/22  0315   Sodium mmol/L 136 133* 137   Potassium mmol/L 3.4* 4.0 3.7   Chloride mmol/L 98 100 101   CO2 mmol/L 27 24 27   Glucose mg/dL 141* 128* 127*   BUN mg/dL 25* 34* 18    Creatinine mg/dL 3.1* 4.4* 2.8*   Albumin g/dL 2.1* 1.9* 1.9*   Total Bilirubin mg/dL 1.0 1.0 1.1*   Alkaline Phosphatase U/L 157* 140* 137*   AST U/L 25 24 21   ALT U/L 9* 8* 8*   Magnesium mg/dL 2.0 2.1 1.9   Phosphorus mg/dL 2.7 3.9 2.3*       Vancomycin Administrations:  vancomycin given in the last 96 hours                     vancomycin in dextrose 5 % 1 gram/250 mL IVPB 1,000 mg (mg) 1,000 mg New Bag 09/02/22 1712    vancomycin in dextrose 5 % 1 gram/250 mL IVPB 1,000 mg (mg) 1,000 mg New Bag 09/01/22 1044                    Microbiologic Results:  Microbiology Results (last 7 days)       Procedure Component Value Units Date/Time    Blood culture [[id number]] Collected: 08/30/22 0741    Order Status: Completed Specimen: Blood from Peripheral, Left Hand Updated: 09/03/22 0822     Blood Culture, Routine No Growth to date      No Growth to date      No Growth to date      No Growth to date      No Growth to date    Blood culture [[id number]] Collected: 08/30/22 0739    Order Status: Completed Specimen: Blood from Antecubital, Left Arm Updated: 09/03/22 0822     Blood Culture, Routine No Growth to date      No Growth to date      No Growth to date      No Growth to date      No Growth to date

## 2022-09-03 NOTE — PROGRESS NOTES
"Mike Jasso - Cardiology [facility] Medicine  Progress Note    Patient Name: Vega Brownlee  MRN: [id number]  Patient Class: IP- Inpatient   Admission Date: 8/15/2022  Length of Stay: 19 days  Attending Physician: Jacey Duke MD  Primary Care Provider: Ko Perez MD        Subjective:     Principal Problem:Recurrent pleural effusion on right        HPI:  HPI per :  Patient is a 61 y.o. 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 Patient has history of recurrent pleural effusion and was sent to ED for worsening symptoms. IR was consulted and attempted US-guided therapeutic right-sided thoracentesis. Per IR "US reveals an extensively loculated Rt effusion with innumerable isolated pockets with sonographic appearance suggestive of a large hematoma/hemothorax. Only able to remove 50-cc of old, dark blood products also consistent with large Rt hemothorax. Pt would likely require large-bore chest tube placement and instillation of lytics if attempts will be made to resolve the hemothorax vs VATS." Facility seeking transfer for CTS evaluation. Patient is stable on med tele unit only requiring 3L NC with no distress. Stable for transfer.      Overview/Hospital Course:  [facility] Medicine Course:  IR was consulted and attempted US-guided therapeutic right-sided thoracentesis. Per IR "US reveals an extensively loculated Rt effusion with innumerable isolated pockets with sonographic appearance suggestive of a large hematoma/hemothorax. Only able to remove 50-cc of old, dark blood products also consistent with large Rt hemothorax. Pt would likely require large-bore chest tube " "placement and instillation of lytics if attempts will be made to resolve the hemothorax vs VATS."  Patient accepted to [facility] [place], in queue to transfer asap. Stable vitals. Will allow to eat and make NPO again at midnight. Lokelma for K 5.8.     [facility] [place] [facility]:  Pt accepted to [facility] [place], Nephrology following for HD needs. Evaluated by thoracic surgery, with placement of PleurX catheter x2 on 8/18. Required 2u pRBC after the procedure, with stable low Hgb since that time. Pleural fluid exudative, with elevated LDH and low glucose. Cultures negative. Cytology pending. Interpretation is difficult with prior manipulation with traumatic thoras in the past. However, with new CT chest findings would certainly benefit from outpatient malignancy eval with follow up CT chest in 3 months and possible bx pending results.     Chest tube removed by CTS on 8/30, tolerated well with same O2 requirements. Leukocytosis noted with elevated procal, BCx obtained and started on azithro/rocephin; leukocytosis worsened on 8/31 with relative hypotension compared to previous (nifedipine held), so abx broadened to vanc and cefepime pending culture results. Overnight and into 8/31, pt noted to have increased O2 requirements up to 6L NC from 2L: CXR without pneumothorax; discussed with CTS, who stated they have no further inpatient recommendations and will follow him in clinic.    Planning to discharge to [facility] when medically stable, SW/CM assisting.      Interval History: Remains on 3L. Will work on weaning down O2. No concerns this morning    Review of Systems   Constitutional:  Negative for fever.   Respiratory:  Negative for shortness of breath.    Cardiovascular:  Negative for chest pain.   Gastrointestinal:  Negative for abdominal pain, constipation, nausea and vomiting.   Neurological:  Negative for headaches.   Objective:     Vital Signs (Most Recent):  Temp: 98.8 °F (37.1 °C) (09/03/22 0540)  Pulse: 92 (09/03/22 0540)  Resp: " 18 (09/03/22 0540)  BP: 139/64 (09/03/22 0540)  SpO2: 96 % (09/03/22 0540) Vital Signs (24h Range):  Temp:  [96.8 °F (36 °C)-99.8 °F (37.7 °C)] 98.8 °F (37.1 °C)  Pulse:  [58-92] 92  Resp:  [18-20] 18  SpO2:  [93 %-98 %] 96 %  BP: (130-190)/(60-77) 139/64     Weight: 57.7 kg (127 lb 3.3 oz)  Body mass index is 19.34 kg/m².    Intake/Output Summary (Last 24 hours) at 9/3/2022 0616  Last data filed at 9/3/2022 0100  Gross per 24 hour   Intake 1644 ml   Output 2154 ml   Net -510 ml      Physical Exam  Vitals reviewed.   Constitutional:       General: He is not in acute distress.     Comments: Chronically ill appearing   HENT:      Head: Normocephalic and atraumatic.      Mouth/Throat:      Mouth: Mucous membranes are moist.   Eyes:      Extraocular Movements: Extraocular movements intact.      Conjunctiva/sclera: Conjunctivae normal.   Cardiovascular:      Rate and Rhythm: Normal rate and regular rhythm.   Pulmonary:      Effort: Pulmonary effort is normal. No respiratory distress.      Comments: Rhonchi and crackles appreciated on R anterior chest. Improving  Abdominal:      General: There is no distension.      Palpations: Abdomen is soft.   Skin:     General: Skin is warm and dry.      Capillary Refill: Capillary refill takes less than 2 seconds.   Neurological:      General: No focal deficit present.       Significant Labs: All pertinent labs within the past 24 hours have been reviewed.    Significant Imaging: I have reviewed all pertinent imaging results/findings within the past 24 hours.      Assessment/Plan:      * Recurrent pleural effusion on right  - Interval history and physical exam findings as described above  - S/p left thoracotomy prior to admission  - Recurring right effusion s/p thoracentesis x2  - Chest tubes removed 8/29 by CTS   - No further inpatient interventions, will follow in clinic  - Increased O2 requirements to 6L on 8/31, improving on abx  - IS encouraged  - Weaning O2 requirements as  tolerated  - Continuing to monitor    Acute on chronic respiratory failure with hypoxia  - As above    Leukocytosis  - Uptrend noted, remains afebrile. Now improving  - Procal elevated  - BCx pending  - Azithro/rocephin broadened to vanc and cefepime on 8/31    Physical deconditioning  - Rehab medicine following  - Accepted to [facility] when medically clear for DC    Chronic diastolic heart failure  - Remains grossly asymptomatic, euvolemic, not in acute exacerbation  - Continue home cardioprudent regimen  - Monitoring on tele    Chronic kidney disease-mineral and bone disorder  - Renal management as above    Anemia in ESRD (end-stage renal disease)  - H/H stable near baseline on admission, with drop after pleurex catheter placement 8/18. S/p 2u pRBC 8/19. S/p 1u pRBC 8/23, no active bleeding   - Will continue to monitor on daily labs    ESRD (end stage renal disease) on dialysis  - Interval history and physical exam findings as described above  - Nephology following  - Further decision for HD per nephology  - Renally dosing all medications  - Avoid nephrotoxins  - Will continue to monitor on daily labs    Mixed hyperlipidemia associated with type 2 diabetes mellitus  - Continue home statin regimen    Type 2 diabetes mellitus with chronic kidney disease on chronic dialysis, without long-term current use of insulin  - Working to optimize BG control  - SSI provided for corrective dosing  - DXTs as ordered  - Hypoglycemic protocol in effect  - Diabetic diet provided    Hypertension associated with ESRD caused by type 2 diabetes mellitus, on dialysis  - Working to optimize BP control   - Continue home cardioprudent regimen; nifedipine held on 8/31 due to relative hypotension in the setting of new infection concerns  - HD to assist with BP control  - PRN IV hydralazine and/or IV labetalol provided for SBP>180  - Will continue to monitor and further titrate antihypertensives as clinically indicated       VTE Risk Mitigation  (From admission, onward)         Ordered     IP VTE HIGH RISK PATIENT  Once         08/15/22 2005     Place sequential compression device  Until discontinued         08/15/22 2005                Discharge Planning   WENDY: 9/6/2022     Code Status: Full Code   Is the patient medically ready for discharge?: No    Reason for patient still in hospital (select all that apply): Patient unstable  Discharge Plan A: Rehab                  May SPENCER Duke MD  Department of Hospital Medicine   [facility] - Cardiology Stepdown

## 2022-09-03 NOTE — NURSING
Notified MD. Duke there is moderate serosanguineous drainage at old dressing  at prior chest tube site with stiches; site cleansed and dressing changed with petroleum gauze and gauze.

## 2022-09-03 NOTE — ASSESSMENT & PLAN NOTE
- Rehab medicine following  - [facility]

## 2022-09-04 NOTE — PROGRESS NOTES
"Mike Jasso - Cardiology [facility] Medicine  Progress Note    Patient Name: Vega Brownlee  MRN: [id number]  Patient Class: IP- Inpatient   Admission Date: 8/15/2022  Length of Stay: 20 days  Attending Physician: Jacey Duke MD  Primary Care Provider: Ko Perez MD        Subjective:     Principal Problem:Recurrent pleural effusion on right        HPI:  HPI per :  Patient is a 61 y.o. 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 Patient has history of recurrent pleural effusion and was sent to ED for worsening symptoms. IR was consulted and attempted US-guided therapeutic right-sided thoracentesis. Per IR "US reveals an extensively loculated Rt effusion with innumerable isolated pockets with sonographic appearance suggestive of a large hematoma/hemothorax. Only able to remove 50-cc of old, dark blood products also consistent with large Rt hemothorax. Pt would likely require large-bore chest tube placement and instillation of lytics if attempts will be made to resolve the hemothorax vs VATS." Facility seeking transfer for CTS evaluation. Patient is stable on med tele unit only requiring 3L NC with no distress. Stable for transfer.      Overview/Hospital Course:  [facility] Medicine Course:  IR was consulted and attempted US-guided therapeutic right-sided thoracentesis. Per IR "US reveals an extensively loculated Rt effusion with innumerable isolated pockets with sonographic appearance suggestive of a large hematoma/hemothorax. Only able to remove 50-cc of old, dark blood products also consistent with large Rt hemothorax. Pt would likely require large-bore chest tube " "placement and instillation of lytics if attempts will be made to resolve the hemothorax vs VATS."  Patient accepted to [facility], in queue to transfer asap. Stable vitals. Will allow to eat and make NPO again at midnight. Lokelma for K 5.8.     [facility] Hospital Medicine Course:  Pt accepted to [facility] [place], Nephrology following for HD needs. Evaluated by thoracic surgery, with placement of PleurX catheter x2 on 8/18. Required 2u pRBC after the procedure, with stable low Hgb since that time. Pleural fluid exudative, with elevated LDH and low glucose. Cultures negative. Cytology pending. Interpretation is difficult with prior manipulation with traumatic thoras in the past. However, with new CT chest findings would certainly benefit from outpatient malignancy eval with follow up CT chest in 3 months and possible bx pending results.     Chest tube removed by CTS on 8/30, tolerated well with same O2 requirements. Leukocytosis noted with elevated procal, BCx obtained and started on azithro/rocephin; leukocytosis worsened on 8/31 with relative hypotension compared to previous (nifedipine held), so abx broadened to vanc and cefepime pending culture results. Overnight and into 8/31, pt noted to have increased O2 requirements up to 6L NC from 2L: CXR without pneumothorax; discussed with CTS, who stated they have no further inpatient recommendations and will follow him in clinic.    Planning to discharge to [facility] when medically stable, SW/CM assisting.      Interval History: No acute events overnight. Vitals and labs stable. Will complete abx tomorrow. Awaiting rehab placement, likely Tues.    Review of Systems   Constitutional:  Negative for fever.   Respiratory:  Negative for shortness of breath.    Cardiovascular:  Negative for chest pain.   Gastrointestinal:  Negative for abdominal pain, constipation, nausea and vomiting.   Neurological:  Negative for headaches.   Objective:     Vital Signs (Most Recent):  Temp: 98 °F (36.7 °C) " (09/04/22 0517)  Pulse: (!) 57 (09/04/22 0517)  Resp: 17 (09/04/22 0517)  BP: (!) 157/66 (09/04/22 0517)  SpO2: 99 % (09/04/22 0055)   Vital Signs (24h Range):  Temp:  [97.3 °F (36.3 °C)-99.5 °F (37.5 °C)] 98 °F (36.7 °C)  Pulse:  [57-68] 57  Resp:  [12-20] 17  SpO2:  [90 %-100 %] 99 %  BP: (138-171)/(63-69) 157/66     Weight: 57.7 kg (127 lb 3.3 oz)  Body mass index is 19.34 kg/m².    Intake/Output Summary (Last 24 hours) at 9/4/2022 0634  Last data filed at 9/3/2022 1950  Gross per 24 hour   Intake 900 ml   Output 1 ml   Net 899 ml      Physical Exam  Vitals reviewed.   Constitutional:       General: He is not in acute distress.     Comments: Chronically ill appearing   HENT:      Head: Normocephalic and atraumatic.      Mouth/Throat:      Mouth: Mucous membranes are moist.   Eyes:      Extraocular Movements: Extraocular movements intact.      Conjunctiva/sclera: Conjunctivae normal.   Cardiovascular:      Rate and Rhythm: Normal rate and regular rhythm.   Pulmonary:      Effort: Pulmonary effort is normal. No respiratory distress.      Comments: Lungs with good airflow bilaterally; course in the R lung base anteriorly. Significant improvement   Abdominal:      General: There is no distension.      Palpations: Abdomen is soft.   Skin:     General: Skin is warm and dry.      Capillary Refill: Capillary refill takes less than 2 seconds.   Neurological:      General: No focal deficit present.       Significant Labs: All pertinent labs within the past 24 hours have been reviewed.    Significant Imaging: I have reviewed all pertinent imaging results/findings within the past 24 hours.      Assessment/Plan:      * Recurrent pleural effusion on right  - Interval history and physical exam findings as described above  - S/p left thoracotomy prior to admission  - Recurring right effusion s/p thoracentesis x2  - Chest tubes removed 8/29 by CTS   - No further inpatient interventions, will follow in clinic  - Increased O2  requirements to 6L on 8/31, improving on abx  - IS encouraged  - Weaning O2 requirements as tolerated  - Continuing to monitor    Acute on chronic respiratory failure with hypoxia  - As above    Leukocytosis  - Now improving  - Procal elevated  - BCx NTD  - Azithro/rocephin broadened to vanc and cefepime on 8/31. Complete 7d course, end 9/6    Physical deconditioning  - Rehab medicine following  - Accepted to [facility] when medically clear for DC. Likely 9/6 after antibiotic completion     Chronic diastolic heart failure  - Remains grossly asymptomatic, euvolemic, not in acute exacerbation  - Continue home cardioprudent regimen  - Monitoring on tele    Chronic kidney disease-mineral and bone disorder  - Renal management as above    Anemia in ESRD (end-stage renal disease)  - H/H stable near baseline on admission, with drop after pleurex catheter placement 8/18. S/p 2u pRBC 8/19. S/p 1u pRBC 8/23, no active bleeding   - Now stable    ESRD (end stage renal disease) on dialysis  - Interval history and physical exam findings as described above  - Nephology following  - Further decision for HD per nephology  - Renally dosing all medications  - Avoid nephrotoxins  - Will continue to monitor on daily labs    Mixed hyperlipidemia associated with type 2 diabetes mellitus  - Continue home statin regimen    Type 2 diabetes mellitus with chronic kidney disease on chronic dialysis, without long-term current use of insulin  - Working to optimize BG control  - SSI provided for corrective dosing  - DXTs as ordered  - Hypoglycemic protocol in effect  - Diabetic diet provided    Hypertension associated with ESRD caused by type 2 diabetes mellitus, on dialysis  - Working to optimize BP control   - Continue home cardioprudent regimen; nifedipine held on 8/31 due to relative hypotension in the setting of new infection concerns  - HD to assist with BP control  - PRN IV hydralazine and/or IV labetalol provided for SBP>180  - Will continue to  monitor and further titrate antihypertensives as clinically indicated       VTE Risk Mitigation (From admission, onward)         Ordered     IP VTE HIGH RISK PATIENT  Once         08/15/22 2005     Place sequential compression device  Until discontinued         08/15/22 2005                Discharge Planning   WENDY: 9/6/2022     Code Status: Full Code   Is the patient medically ready for discharge?: No    Reason for patient still in hospital (select all that apply): Patient trending condition and Pending disposition  Discharge Plan A: Rehab                  May SPENCER Duke MD  Department of Hospital Medicine   [facility] - Cardiology Stepdown

## 2022-09-04 NOTE — NURSING
Notified MD. Duke at bedside pt's old dressing at prior chest tube site from yesterday is saturated with serosanguineous drainage, and site cleaned and dressing changed.

## 2022-09-04 NOTE — PLAN OF CARE
BG monitored. Titrated O2 to 2 L. Pt on hemodialysis, anuria. Pt educated on fall risk and remained free from falls/trauma/injury. Denies chest pain, SOB, palpitations, dizziness, pain, or discomfort. Plan of care reviewed with pt, all questions answered. Bed locked in lowest position, call bell within reach, no acute distress noted, will continue to monitor.

## 2022-09-04 NOTE — SUBJECTIVE & OBJECTIVE
Interval History: No acute events overnight. Vitals and labs stable. Will complete abx tomorrow. Awaiting rehab placement, likely Tues.    Review of Systems   Constitutional:  Negative for fever.   Respiratory:  Negative for shortness of breath.    Cardiovascular:  Negative for chest pain.   Gastrointestinal:  Negative for abdominal pain, constipation, nausea and vomiting.   Neurological:  Negative for headaches.   Objective:     Vital Signs (Most Recent):  Temp: 98 °F (36.7 °C) (09/04/22 0517)  Pulse: (!) 57 (09/04/22 0517)  Resp: 17 (09/04/22 0517)  BP: (!) 157/66 (09/04/22 0517)  SpO2: 99 % (09/04/22 0055)   Vital Signs (24h Range):  Temp:  [97.3 °F (36.3 °C)-99.5 °F (37.5 °C)] 98 °F (36.7 °C)  Pulse:  [57-68] 57  Resp:  [12-20] 17  SpO2:  [90 %-100 %] 99 %  BP: (138-171)/(63-69) 157/66     Weight: 57.7 kg (127 lb 3.3 oz)  Body mass index is 19.34 kg/m².    Intake/Output Summary (Last 24 hours) at 9/4/2022 0634  Last data filed at 9/3/2022 1950  Gross per 24 hour   Intake 900 ml   Output 1 ml   Net 899 ml      Physical Exam  Vitals reviewed.   Constitutional:       General: He is not in acute distress.     Comments: Chronically ill appearing   HENT:      Head: Normocephalic and atraumatic.      Mouth/Throat:      Mouth: Mucous membranes are moist.   Eyes:      Extraocular Movements: Extraocular movements intact.      Conjunctiva/sclera: Conjunctivae normal.   Cardiovascular:      Rate and Rhythm: Normal rate and regular rhythm.   Pulmonary:      Effort: Pulmonary effort is normal. No respiratory distress.      Comments: Lungs with good airflow bilaterally; course in the R lung base anteriorly. Significant improvement   Abdominal:      General: There is no distension.      Palpations: Abdomen is soft.   Skin:     General: Skin is warm and dry.      Capillary Refill: Capillary refill takes less than 2 seconds.   Neurological:      General: No focal deficit present.       Significant Labs: All pertinent labs within  the past 24 hours have been reviewed.    Significant Imaging: I have reviewed all pertinent imaging results/findings within the past 24 hours.

## 2022-09-04 NOTE — ASSESSMENT & PLAN NOTE
- H/H stable near baseline on admission, with drop after pleurex catheter placement 8/18. S/p 2u pRBC 8/19. S/p 1u pRBC 8/23, no active bleeding   - Now stable

## 2022-09-04 NOTE — ASSESSMENT & PLAN NOTE
- Rehab medicine following  - Accepted to [facility] when medically clear for DC. Likely 9/6 after antibiotic completion

## 2022-09-04 NOTE — PROGRESS NOTES
Pharmacokinetic Assessment Follow Up: IV Vancomycin    Vancomycin serum concentration assessment/plan:  Random level resulted as 20.4 mcg/mL; Goal 10-20 mcg/mL (unclear infection source)  ESRD on HD TRS; Last HD 9/2/22  (HD on patient calendar for 9/5/22)  Hold Vancomycin today; re-dose when random level is less than 20 mcg/mL  Next level to be drawn on 9/5/22 with AM labs.      Drug levels (last 3 results):  Recent Labs   Lab Result Units 09/02/22 0436 09/03/22 0315 09/04/22  0451   Vancomycin, Random ug/mL 14.1 26.8 20.4         Pharmacy will continue to follow and monitor vancomycin.    Please contact pharmacy at extension [phone] for questions regarding this assessment.    Thank you for the consult,   Sandra Orozco, MaddiD          Patient brief summary:  Vega Brownlee is a 62 y.o. male initiated on antimicrobial therapy with IV Vancomycin for treatment of  leukocytosis.    The patient's current regimen is pulse dose    Drug Allergies:   Review of patient's allergies indicates:  No Known Allergies    Actual Body Weight:   57.7 kg     Renal Function:   Estimated Creatinine Clearance: 14.5 mL/min (A) (based on SCr of 4.3 mg/dL (H)).,     Dialysis Method (if applicable):  intermittent HD    CBC (last 72 hours):  Recent Labs   Lab Result Units 09/02/22 0436 09/03/22 0315 09/04/22  0451   WBC K/uL 10.63 10.29 11.01   Hemoglobin g/dL 7.7* 7.4* 7.9*   Hematocrit % 22.9* 23.2* 25.0*   Platelets K/uL 231 221 223   Gran % % 81.9* 79.1* 80.8*   Lymph % % 5.5* 7.1* 6.0*   Mono % % 9.2 10.3 9.5   Eosinophil % % 1.4 1.8 2.3   Basophil % % 1.1 1.2 1.0   Differential Method  Automated Automated Automated         Metabolic Panel (last 72 hours):  Recent Labs   Lab Result Units 09/02/22 0436 09/03/22 0315 09/04/22  0451   Sodium mmol/L 133* 137 134*   Potassium mmol/L 4.0 3.7 3.8   Chloride mmol/L 100 101 98   CO2 mmol/L 24 27 26   Glucose mg/dL 128* 127* 138*   BUN mg/dL 34* 18 26*   Creatinine mg/dL 4.4* 2.8* 4.3*    Albumin g/dL 1.9* 1.9* 2.0*   Total Bilirubin mg/dL 1.0 1.1* 1.2*   Alkaline Phosphatase U/L 140* 137* 158*   AST U/L 24 21 23   ALT U/L 8* 8* 10   Magnesium mg/dL 2.1 1.9 2.1   Phosphorus mg/dL 3.9 2.3* 3.1         Vancomycin Administrations:  vancomycin given in the last 96 hours                     vancomycin in dextrose 5 % 1 gram/250 mL IVPB 1,000 mg (mg) 1,000 mg New Bag 09/02/22 1712    vancomycin in dextrose 5 % 1 gram/250 mL IVPB 1,000 mg (mg) 1,000 mg New Bag 09/01/22 1044                    Microbiologic Results:  Microbiology Results (last 7 days)       Procedure Component Value Units Date/Time    Blood culture [[id number]] Collected: 08/30/22 0739    Order Status: Completed Specimen: Blood from Antecubital, Left Arm Updated: 09/04/22 0822     Blood Culture, Routine No growth after 5 days.    Blood culture [[id number]] Collected: 08/30/22 0741    Order Status: Completed Specimen: Blood from Peripheral, Left Hand Updated: 09/04/22 0822     Blood Culture, Routine No growth after 5 days.

## 2022-09-04 NOTE — PLAN OF CARE
BG monitored. Dressing changed at prior chest tube site.Titrated O2 to 1.5 L. Pt on hemodialysis, anuria. Pt educated on fall risk and remained free from falls/trauma/injury. Denies chest pain, SOB, palpitations, dizziness, pain, or discomfort. Plan of care reviewed with pt, all questions answered. Bed locked in lowest position, call bell within reach, no acute distress noted, will continue to monitor.

## 2022-09-04 NOTE — ASSESSMENT & PLAN NOTE
- Now improving  - Procal elevated  - BCx NTD  - Azithro/rocephin broadened to vanc and cefepime on 8/31. Complete 7d course, end 9/6

## 2022-09-05 NOTE — NURSING TRANSFER
Nursing Transfer Note      9/5/2022     Reason patient is being transferred: dialysis finished    Transfer To: room 306    Transfer via bed    Transfer with  to O2, cardiac monitoring    Transported by transportation team    Medicines sent: none      Chart send with patient: Yes    Patient reassessed at: 1320 09/05/2022   Upon arrival to floor: cardiac monitor applied, patient oriented to room, call bell in reach, and bed in lowest position

## 2022-09-05 NOTE — ASSESSMENT & PLAN NOTE
- S/p left thoracotomy prior to admission  - Recurring right effusion s/p thoracentesis x2  - Chest tubes removed 8/29 by CTS   - No further inpatient interventions, follow in clinic  - Increased O2 requirements to 6L on 8/31, improving on antibiotics  - IS encouraged  - Weaning O2 requirements as tolerated

## 2022-09-05 NOTE — PLAN OF CARE
Problem: Adult Inpatient Plan of Care  Goal: Plan of Care Review  Outcome: Ongoing, Progressing  Goal: Patient-Specific Goal (Individualized)  Outcome: Ongoing, Progressing  Goal: Absence of Hospital-Acquired Illness or Injury  Outcome: Ongoing, Progressing  Goal: Optimal Comfort and Wellbeing  Outcome: Ongoing, Progressing  Goal: Readiness for Transition of Care  Outcome: Ongoing, Progressing     Problem: Diabetes Comorbidity  Goal: Blood Glucose Level Within Targeted Range  Outcome: Ongoing, Progressing     Problem: Infection  Goal: Absence of Infection Signs and Symptoms  Outcome: Ongoing, Progressing     Problem: Impaired Wound Healing  Goal: Optimal Wound Healing  Outcome: Ongoing, Progressing     Problem: Device-Related Complication Risk (Hemodialysis)  Goal: Safe, Effective Therapy Delivery  Outcome: Ongoing, Progressing     Problem: Hemodynamic Instability (Hemodialysis)  Goal: Effective Tissue Perfusion  Outcome: Ongoing, Progressing     Problem: Infection (Hemodialysis)  Goal: Absence of Infection Signs and Symptoms  Outcome: Ongoing, Progressing     Problem: Skin Injury Risk Increased  Goal: Skin Health and Integrity  Outcome: Ongoing, Progressing     Problem: Fall Injury Risk  Goal: Absence of Fall and Fall-Related Injury  Outcome: Ongoing, Progressing     Problem: Electrolyte Imbalance (Chronic Kidney Disease)  Goal: Electrolyte Balance  Outcome: Ongoing, Progressing     Problem: Fluid Volume Excess (Chronic Kidney Disease)  Goal: Fluid Balance  Outcome: Ongoing, Progressing     Problem: Respiratory Compromise (Pneumothorax)  Goal: Optimal Oxygenation and Ventilation  Outcome: Ongoing, Progressing

## 2022-09-05 NOTE — ASSESSMENT & PLAN NOTE
- Rehab medicine following  - Accepted to [facility] when medically clear for DC. Likely 9/6 after antibiotic completion.

## 2022-09-05 NOTE — CONSULTS
Mike Jasso - Cardiology [facility] Medicine  Telemedicine Consult Note    Patient Name: Vega Brownlee  MRN: [id number]  Admission Date: 8/15/2022  Hospital Length of Stay: 21 days  Attending Physician: Mary Grace Marin MD   Primary Care Provider: Ko Perez MD       Thank you for your consult to [facility]. We have reviewed the patient chart. This patient does meet criteria for [facility] service at this time. Will assume care on 09/05/22 at 7AM.          Mary Grace Marin MD  Department of Hospital Medicine   [facility]

## 2022-09-05 NOTE — NURSING TRANSFER
Nursing Transfer Note      9/5/2022     Reason patient is being transferred: dialysis    Transfer To: dialysis init    Transfer via bed    Transfer with  to O2, cardiac monitoring    Transported by transportation team    Medicines sent: none    Any special needs or follow-up needed: monitor BG    Chart send with patient: Yes

## 2022-09-05 NOTE — ASSESSMENT & PLAN NOTE
- Procal elevated  - BCx NTD  - Azithro/Rocephin broadened to vancomycin and cefepime on 8/31. Complete 7d course, end date 9/6

## 2022-09-05 NOTE — CONSULTS
Mike Jasso - Cardiology [facility] Medicine  Telemedicine Consult Note    Patient Name: Vega Brownlee  MRN: [id number]  Admission Date: 8/15/2022  Hospital Length of Stay: 20 days  Attending Physician: Jacey Duke MD   Primary Care Provider: Ko Perez MD       Thank you for your consult to [facility]. We have reviewed the patient chart. This patient does not meet criteria for [facility] service at this time due to [facility] service capacity limitations. Will hand back to In-house service..          Mary Grace Marin MD  Department of Hospital Medicine   [facility]

## 2022-09-05 NOTE — PROGRESS NOTES
Pharmacokinetic Assessment Follow Up: IV Vancomycin    Vancomycin serum concentration assessment(s):    AM random = 19.8. Will give 500mg after HD scheduled today. Repeat level on Wednesday with AM labs when HD anticipated again. Redose if pre-HD level <20. Cx ngtd.     Drug levels (last 3 results):  Recent Labs   Lab Result Units 09/03/22 0315 09/04/22 0451 09/05/22  0438   Vancomycin, Random ug/mL 26.8 20.4 19.8       Pharmacy will continue to follow and monitor vancomycin.    Please contact pharmacy at extension [phone] for questions regarding this assessment.    Thank you for the consult,   Christian Hollingsworth       Patient brief summary:  Vega Brownlee is a 62 y.o. male initiated on antimicrobial therapy with IV Vancomycin for treatment of sepsis      Drug Allergies:   Review of patient's allergies indicates:  No Known Allergies      CBC (last 72 hours):  Recent Labs   Lab Result Units 09/03/22 0315 09/04/22 0451 09/05/22  0438   WBC K/uL 10.29 11.01 10.39   Hemoglobin g/dL 7.4* 7.9* 7.4*   Hematocrit % 23.2* 25.0* 23.4*   Platelets K/uL 221 223 220   Gran % % 79.1* 80.8* 80.1*   Lymph % % 7.1* 6.0* 6.9*   Mono % % 10.3 9.5 9.0   Eosinophil % % 1.8 2.3 2.4   Basophil % % 1.2 1.0 1.0   Differential Method  Automated Automated Automated       Metabolic Panel (last 72 hours):  Recent Labs   Lab Result Units 09/03/22 0315 09/04/22 0451 09/05/22  0438   Sodium mmol/L 137 134* 132*   Potassium mmol/L 3.7 3.8 4.3   Chloride mmol/L 101 98 98   CO2 mmol/L 27 26 25   Glucose mg/dL 127* 138* 185*   BUN mg/dL 18 26* 38*   Creatinine mg/dL 2.8* 4.3* 5.2*   Albumin g/dL 1.9* 2.0* 1.9*   Total Bilirubin mg/dL 1.1* 1.2* 1.1*   Alkaline Phosphatase U/L 137* 158* 156*   AST U/L 21 23 22   ALT U/L 8* 10 9*   Magnesium mg/dL 1.9 2.1 2.1   Phosphorus mg/dL 2.3* 3.1 3.1       Vancomycin Administrations:  vancomycin given in the last 96 hours                     vancomycin in dextrose 5 % 1 gram/250 mL IVPB 1,000 mg (mg) 1,000 mg New Bag  09/02/22 1712    vancomycin in dextrose 5 % 1 gram/250 mL IVPB 1,000 mg (mg) 1,000 mg New Bag 09/01/22 1044                    Microbiologic Results:  Microbiology Results (last 7 days)       Procedure Component Value Units Date/Time    Blood culture [[id number]] Collected: 08/30/22 0739    Order Status: Completed Specimen: Blood from Antecubital, Left Arm Updated: 09/04/22 0822     Blood Culture, Routine No growth after 5 days.    Blood culture [[id number]] Collected: 08/30/22 0741    Order Status: Completed Specimen: Blood from Peripheral, Left Hand Updated: 09/04/22 0822     Blood Culture, Routine No growth after 5 days.

## 2022-09-05 NOTE — PLAN OF CARE
Pt AAOx3 with wife at bedside.  Fistula with bruit and thrill.  Pt desat's into the 60's while moving to bedside chair.  O2 came up when pt instructed to breath deeply through NC.  Glucose 204, 2 units given.  Call light within reach.  Will continue to monitor.

## 2022-09-05 NOTE — ASSESSMENT & PLAN NOTE
- H/H stable near baseline on admission, with drop after pleurex catheter placement 8/18.   - S/p 2u pRBC 8/19. S/p 1u pRBC 8/23, no active bleeding   - Now stable

## 2022-09-05 NOTE — SUBJECTIVE & OBJECTIVE
Telemedicine  This service was provided by Virtual Visit.    Patient was transferred to [facility] on:  9/5/2022    Chief Complaint   Patient presents with    Shortness of Breath     Pt reports was sent to ER for CT. Pt with increased SOB and hx of bilateral plural effusions.      The patient location is: 306/306 A   Admitted 8/15/2022 10:54 AM  Present with the patient at the time of the telemed/virtual assessment: Telepresenter    Interval History / Events Overnight:   The patient is able to provide adequate history - patient declines the use of a Lao . Additional history was obtained from past medical records. No significant events reported by Nursing.  Patient complains of nothing new. Symptoms have improved since yesterday. Associated symptoms include: fatigue. Symptoms are decreasing in severity.     Lab test(s) reviewed: H&H stable    Review of Systems   Constitutional:  Negative for fever.   Respiratory:  Negative for shortness of breath.      Objective:     Vital Signs (Most Recent):  Temp: 98 °F (36.7 °C) (09/05/22 0455)  Pulse: 64 (09/05/22 0455)  Resp: 18 (09/05/22 0455)  BP: (!) 150/64 (09/05/22 0455)  SpO2: 100 % (09/05/22 0455)   Vital Signs (24h Range):  Temp:  [97.6 °F (36.4 °C)-98.2 °F (36.8 °C)] 98 °F (36.7 °C)  Pulse:  [60-92] 64  Resp:  [14-20] 18  SpO2:  [91 %-100 %] 100 %  BP: (127-157)/(58-67) 150/64     Weight: 57.7 kg (127 lb 3.3 oz)  Body mass index is 19.34 kg/m².    Intake/Output Summary (Last 24 hours) at 9/5/2022 0629  Last data filed at 9/4/2022 2218  Gross per 24 hour   Intake 960 ml   Output 0 ml   Net 960 ml      Physical Exam  Constitutional:       General: He is not in acute distress.     Appearance: Normal appearance.   Eyes:      General: Lids are normal. No scleral icterus.        Right eye: No discharge.         Left eye: No discharge.      Conjunctiva/sclera: Conjunctivae normal.   Neck:      Trachea: Phonation normal.   Cardiovascular:       Rate and Rhythm: Normal rate.      Comments: Monitor / Vital signs reviewed at time of visit  Pulmonary:      Effort: Pulmonary effort is normal. No tachypnea, accessory muscle usage or respiratory distress.   Abdominal:      General: There is no distension.   Skin:     Coloration: Skin is not cyanotic.   Neurological:      Mental Status: He is alert. He is not disoriented.   Psychiatric:         Attention and Perception: Attention normal.         Mood and Affect: Affect normal.         Behavior: Behavior is cooperative.       Significant Labs:   Recent Labs   Lab 08/15/22  2119   HGBA1C 5.6     Recent Labs   Lab 09/04/22  2044 09/05/22  0746 09/05/22  1529   POCTGLUCOSE 204* 152* 189*     Recent Labs   Lab 09/03/22 0315 09/04/22  0451 09/05/22  0438   WBC 10.29 11.01 10.39   HGB 7.4* 7.9* 7.4*   HCT 23.2* 25.0* 23.4*    223 220     Recent Labs   Lab 09/03/22 0315 09/04/22 0451 09/05/22  0438   GRAN 79.1*  8.1* 80.8*  8.9* 80.1*  8.3*   LYMPH 7.1*  0.7* 6.0*  0.7* 6.9*  0.7*   MONO 10.3  1.1* 9.5  1.1* 9.0  0.9   EOS 0.2 0.3 0.3     Recent Labs   Lab 09/03/22 0315 09/04/22 0451 09/05/22  0438    134* 132*   K 3.7 3.8 4.3    98 98   CO2 27 26 25   BUN 18 26* 38*   CREATININE 2.8* 4.3* 5.2*   * 138* 185*   CALCIUM 7.3* 7.4* 7.2*   ALBUMIN 1.9* 2.0* 1.9*   MG 1.9 2.1 2.1   PHOS 2.3* 3.1 3.1     Recent Labs   Lab 09/01/22  0752 09/02/22  0436 09/03/22 0315 09/04/22 0451 09/05/22  0438   ALKPHOS  --    < > 137* 158* 156*   ALT  --    < > 8* 10 9*   AST  --    < > 21 23 22   PROT  --    < > 5.0* 5.4* 5.3*   BILITOT  --    < > 1.1* 1.2* 1.1*     --   --   --   --     < > = values in this interval not displayed.     Recent Labs   Lab 08/21/22  2136 08/22/22  0313 08/30/22  0739   PROCAL  --   --  1.19*   LACTATE 0.8  --  0.7   FERRITIN  --  1,751*  --      SARS-CoV2 (COVID-19) Qualitative PCR (no units)   Date Value   07/31/2020 Negative     SARS-CoV-2 RNA,  Amplification, Qual (no units)   Date Value   05/04/2020 Negative     POC Rapid COVID (no units)   Date Value   08/15/2022 Negative   02/02/2021 Negative       ECG Results              EKG 12-LEAD (Final result)  Result time 08/16/22 12:10:03      Final result by Unknown User (08/16/22 12:10:03)                                        Results for orders placed during the hospital encounter of 09/02/20    Echo Color Flow Doppler? Yes    Interpretation Summary  · Concentric left ventricular hypertrophy.  · Normal left ventricular systolic function. The estimated ejection fraction is 55%.  · Moderate right ventricular enlargement.  · Normal right ventricular systolic function.  · Grade II (moderate) left ventricular diastolic dysfunction consistent with pseudonormalization.  · Severe biatrial enlargement.  · Mild tricuspid regurgitation.  · Trivial pericardial effusion.  · The estimated PA systolic pressure is 77 mmHg.  · Elevated central venous pressure (15 mmHg).      X-Ray Chest AP Portable  Narrative: EXAMINATION:  XR CHEST AP PORTABLE    CLINICAL HISTORY:  hypoxia;    FINDINGS:  Chest one view portable.    There is cardiomegaly.  There is aortic plaque.  There is moderate-severe edema.  There is right pleural fluid.  Impression: Pulmonary edema pneumonia aspiration or sepsis.    Electronically signed by: Johnny Suh MD  Date:    09/01/2022  Time:    10:02      Labs and Imaging within the last 24 hours listed above were reviewed.       Diet: Diet diabetic [facility]; 2000 Calorie  Significant LDAs:   IV Access Type: Peripheral and Dialysis Access  Urinary Catheter Indication if present: Patient Does Not Have Urinary Catheter  Other Lines/Tubes/Drains:    HIGH RISK CONDITION(S):   Patient has a condition that poses threat to life and bodily function: Respiratory Distress     Goals of Care:    Previous admission:  2/2/21  Likely prognosis:  Fair  Code Status: Full Code  Comfort Only: No  Hospice: No  Goals at  discharge: remain at home, with physician follow-up    Discharge Planning   WENDY: 9/6/2022     Code Status: Full Code   Is the patient medically ready for discharge?: Yes    Reason for patient still in hospital (select all that apply): Pending disposition  Discharge Plan A: Rehab

## 2022-09-05 NOTE — ASSESSMENT & PLAN NOTE
- Continue home cardioprudent regimen; nifedipine held on 8/31 due to relative hypotension in the setting of new infection concerns  - HD to assist with BP control  - PRN IV hydralazine and/or IV labetalol provided for SBP>180  - continue to monitor and further titrate antihypertensives as clinically indicated

## 2022-09-05 NOTE — NURSING
Notified MD. [facility] pt's old dressing at prior chest tube site  is saturated with serosanguineous drainage again like yesterday after 24 hrs, site cleaned and dressing changed at this AM.

## 2022-09-06 PROBLEM — R50.9 FEVER: Status: ACTIVE | Noted: 2022-01-01

## 2022-09-06 NOTE — PROGRESS NOTES
[facility] H[facility] NOTE     Patient currently on hemodialysis for removal of uremic toxins .     Patient seen and evaluated on hemodialysis, tolerating treatment, see HD flowsheet for vitals and assessments.      No Hypotension, chest pain, shortness of breath, cramping, nausea or vomiting.

## 2022-09-06 NOTE — ASSESSMENT & PLAN NOTE
61 y.o M w/ hx of  recurrent pleural effusions, HTN, HLD, anemia, cataract, CHF (congestive heart failure), T2DM, ESRD on hemodialysis who was [place] [facility] [place] for placement of chest tube for extensively loculated right effusion. IR unable to complete US-guided therapeutic right-sided thoracentesis due to extensively loculated effusions with innumerable isolated pockets with sonographic appearance suggestive of a large hematoma/hemothorax. Cardiothoracic surgery placed a pleurx catheter with lytics on 08/18 with pleural studies notable for exudative effusion and cultures negative. Chest tube was removed on 08/30 which was followed by worsening leukocytosis, increasing supplemental oxygen requirements, procal elevation so he was started on azithromycin/ceftriaxone then broadened to vancomycin/cefepim. Repeat CXR did not show worsening of pleural effusions or new airspace disease. No intervention per CTS.      -ID consulted for evaluation after fever >101 on broad spectrum antibiotics.     -No leukocytosis, BC NGTD. Suspect possible non-infectious causes of recent fever.     RECOMMENDATIONS  -Recommend routine workup of inpatient fever  -COVID testing  -Followup repeat blood cultures. ESRD patient so urine cultures unavailable  -Currently not on DVT prophylaxis, would recommend atleast lower extremity ultrasound, given his recent history of worsening respiratory status   -Continue Vancomycin/Cefepime with plans to deescalate pending cultures  -Monitor fever curve, leukocytes on CBC

## 2022-09-06 NOTE — PROGRESS NOTES
"  [facility] - Cardiology [facility] Medicine  Telemedicine Progress Note    Patient Name: Vega Brownlee  MRN: [id number]  Patient Class: IP- Inpatient   Admission Date: 8/15/2022  Length of Stay: 21 days  Attending Physician: Mary Grace Marin MD  Primary Care Provider: Ko Perez MD      Subjective:     Principal Problem:Recurrent pleural effusion on right    HPI:  HPI per :  Patient is a 61 y.o. 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 Patient has history of recurrent pleural effusion and was sent to ED for worsening symptoms. IR was consulted and attempted US-guided therapeutic right-sided thoracentesis. Per IR "US reveals an extensively loculated Rt effusion with innumerable isolated pockets with sonographic appearance suggestive of a large hematoma/hemothorax. Only able to remove 50-cc of old, dark blood products also consistent with large Rt hemothorax. Pt would likely require large-bore chest tube placement and instillation of lytics if attempts will be made to resolve the hemothorax vs VATS." Facility seeking transfer for CTS evaluation. Patient is stable on med tele unit only requiring 3L NC with no distress. Stable for transfer.      Overview/Hospital Course:  [facility] Medicine Course:  IR was consulted and attempted US-guided therapeutic right-sided thoracentesis. Per IR "US reveals an extensively loculated Rt effusion with innumerable isolated pockets with sonographic appearance suggestive of a large hematoma/hemothorax. Only able to remove 50-cc of old, dark blood products also consistent with large Rt hemothorax. Pt would likely require large-bore " "chest tube placement and instillation of lytics if attempts will be made to resolve the hemothorax vs VATS."  Patient accepted to [facility], in queue to transfer asap. Stable vitals. Will allow to eat and make NPO again at midnight. Lokelma for K 5.8.     [facility] Medicine Course:  Pt accepted to [facility], Nephrology following for HD needs. Evaluated by thoracic surgery, with placement of PleurX catheter x2 on 8/18. Required 2u pRBC after the procedure, with stable low Hgb since that time. Pleural fluid exudative, with elevated LDH and low glucose. Cultures negative. Cytology pending. Interpretation is difficult with prior manipulation with traumatic thoras in the past. However, with new CT chest findings would certainly benefit from outpatient malignancy eval with follow up CT chest in 3 months and possible bx pending results.     Chest tube removed by CTS on 8/30, tolerated well with same O2 requirements. Leukocytosis noted with elevated procal, BCx obtained and started on azithro/rocephin; leukocytosis worsened on 8/31 with relative hypotension compared to previous (nifedipine held), so abx broadened to vanc and cefepime pending culture results. Overnight and into 8/31, pt noted to have increased O2 requirements up to 6L NC from 2L: CXR without pneumothorax; discussed with CTS, who stated they have no further inpatient recommendations and will follow him in clinic.    Planning to discharge to [facility] when medically stable, SW/CM assisting.      Telemedicine  This service was provided by Virtual Visit.    Patient was transferred to [facility] on:  9/5/2022    Chief Complaint   Patient presents with    Shortness of Breath     Pt reports was sent to ER for CT. Pt with increased SOB and hx of bilateral plural effusions.      The patient location is: 306/306 A   Admitted 8/15/2022 10:54 AM  Present with the patient at the time of the telemed/virtual assessment: Telepresenter    Interval History / Events " Overnight:   The patient is able to provide adequate history - patient declines the use of a Uzbek . Additional history was obtained from past medical records. No significant events reported by Nursing.  Patient complains of nothing new. Symptoms have improved since yesterday. Associated symptoms include: fatigue. Symptoms are decreasing in severity.     Lab test(s) reviewed: H&H stable    Review of Systems   Constitutional:  Negative for fever.   Respiratory:  Negative for shortness of breath.      Objective:     Vital Signs (Most Recent):  Temp: 98 °F (36.7 °C) (09/05/22 0455)  Pulse: 64 (09/05/22 0455)  Resp: 18 (09/05/22 0455)  BP: (!) 150/64 (09/05/22 0455)  SpO2: 100 % (09/05/22 0455)   Vital Signs (24h Range):  Temp:  [97.6 °F (36.4 °C)-98.2 °F (36.8 °C)] 98 °F (36.7 °C)  Pulse:  [60-92] 64  Resp:  [14-20] 18  SpO2:  [91 %-100 %] 100 %  BP: (127-157)/(58-67) 150/64     Weight: 57.7 kg (127 lb 3.3 oz)  Body mass index is 19.34 kg/m².    Intake/Output Summary (Last 24 hours) at 9/5/2022 0629  Last data filed at 9/4/2022 2218  Gross per 24 hour   Intake 960 ml   Output 0 ml   Net 960 ml      Physical Exam  Constitutional:       General: He is not in acute distress.     Appearance: Normal appearance.   Eyes:      General: Lids are normal. No scleral icterus.        Right eye: No discharge.         Left eye: No discharge.      Conjunctiva/sclera: Conjunctivae normal.   Neck:      Trachea: Phonation normal.   Cardiovascular:      Rate and Rhythm: Normal rate.      Comments: Monitor / Vital signs reviewed at time of visit  Pulmonary:      Effort: Pulmonary effort is normal. No tachypnea, accessory muscle usage or respiratory distress.   Abdominal:      General: There is no distension.   Skin:     Coloration: Skin is not cyanotic.   Neurological:      Mental Status: He is alert. He is not disoriented.   Psychiatric:         Attention and Perception: Attention normal.         Mood and Affect:  Affect normal.         Behavior: Behavior is cooperative.       Significant Labs:   Recent Labs   Lab 08/15/22  2119   HGBA1C 5.6     Recent Labs   Lab 09/04/22  2044 09/05/22  0746 09/05/22  1529   POCTGLUCOSE 204* 152* 189*     Recent Labs   Lab 09/03/22 0315 09/04/22 0451 09/05/22  0438   WBC 10.29 11.01 10.39   HGB 7.4* 7.9* 7.4*   HCT 23.2* 25.0* 23.4*    223 220     Recent Labs   Lab 09/03/22 0315 09/04/22 0451 09/05/22  0438   GRAN 79.1*  8.1* 80.8*  8.9* 80.1*  8.3*   LYMPH 7.1*  0.7* 6.0*  0.7* 6.9*  0.7*   MONO 10.3  1.1* 9.5  1.1* 9.0  0.9   EOS 0.2 0.3 0.3     Recent Labs   Lab 09/03/22 0315 09/04/22 0451 09/05/22  0438    134* 132*   K 3.7 3.8 4.3    98 98   CO2 27 26 25   BUN 18 26* 38*   CREATININE 2.8* 4.3* 5.2*   * 138* 185*   CALCIUM 7.3* 7.4* 7.2*   ALBUMIN 1.9* 2.0* 1.9*   MG 1.9 2.1 2.1   PHOS 2.3* 3.1 3.1     Recent Labs   Lab 09/01/22 0752 09/02/22 0436 09/03/22 0315 09/04/22 0451 09/05/22  0438   ALKPHOS  --    < > 137* 158* 156*   ALT  --    < > 8* 10 9*   AST  --    < > 21 23 22   PROT  --    < > 5.0* 5.4* 5.3*   BILITOT  --    < > 1.1* 1.2* 1.1*     --   --   --   --     < > = values in this interval not displayed.     Recent Labs   Lab 08/21/22 2136 08/22/22 0313 08/30/22  0739   PROCAL  --   --  1.19*   LACTATE 0.8  --  0.7   FERRITIN  --  1,751*  --      SARS-CoV2 (COVID-19) Qualitative PCR (no units)   Date Value   07/31/2020 Negative     SARS-CoV-2 RNA, Amplification, Qual (no units)   Date Value   05/04/2020 Negative     POC Rapid COVID (no units)   Date Value   08/15/2022 Negative   02/02/2021 Negative       ECG Results              EKG 12-LEAD (Final result)  Result time 08/16/22 12:10:03      Final result by Unknown User (08/16/22 12:10:03)                                        Results for orders placed during the hospital encounter of 09/02/20    Echo Color Flow Doppler? Yes    Interpretation Summary  · Concentric left  ventricular hypertrophy.  · Normal left ventricular systolic function. The estimated ejection fraction is 55%.  · Moderate right ventricular enlargement.  · Normal right ventricular systolic function.  · Grade II (moderate) left ventricular diastolic dysfunction consistent with pseudonormalization.  · Severe biatrial enlargement.  · Mild tricuspid regurgitation.  · Trivial pericardial effusion.  · The estimated PA systolic pressure is 77 mmHg.  · Elevated central venous pressure (15 mmHg).      X-Ray Chest AP Portable  Narrative: EXAMINATION:  XR CHEST AP PORTABLE    CLINICAL HISTORY:  hypoxia;    FINDINGS:  Chest one view portable.    There is cardiomegaly.  There is aortic plaque.  There is moderate-severe edema.  There is right pleural fluid.  Impression: Pulmonary edema pneumonia aspiration or sepsis.    Electronically signed by: Johnny Suh MD  Date:    09/01/2022  Time:    10:02      Labs and Imaging within the last 24 hours listed above were reviewed.       Diet: Diet diabetic [facility]; 2000 Calorie  Significant LDAs:   IV Access Type: Peripheral and Dialysis Access  Urinary Catheter Indication if present: Patient Does Not Have Urinary Catheter  Other Lines/Tubes/Drains:    HIGH RISK CONDITION(S):   Patient has a condition that poses threat to life and bodily function: Respiratory Distress     Goals of Care:    Previous admission:  2/2/21  Likely prognosis:  Fair  Code Status: Full Code  Comfort Only: No  Hospice: No  Goals at discharge: remain at home, with physician follow-up    Discharge Planning   WENDY: 9/6/2022     Code Status: Full Code   Is the patient medically ready for discharge?: Yes    Reason for patient still in hospital (select all that apply): Pending disposition  Discharge Plan A: Rehab        Assessment/Plan:      * Recurrent pleural effusion on right  - S/p left thoracotomy prior to admission  - Recurring right effusion s/p thoracentesis x2  - Chest tubes removed 8/29 by CTS   - No  further inpatient interventions, follow in clinic  - Increased O2 requirements to 6L on 8/31, improving on antibiotics  - IS encouraged  - Weaning O2 requirements as tolerated    Acute on chronic respiratory failure with hypoxia  Improving. Weaned to 2 LPM NC    Leukocytosis  - Procal elevated  - BCx NTD  - Azithro/Rocephin broadened to vancomycin and cefepime on 8/31. Complete 7d course, end date 9/6    Physical deconditioning  - Rehab medicine following  - Accepted to [facility] when medically clear for DC. Likely 9/6 after antibiotic completion.    Chronic diastolic heart failure  - Remains grossly asymptomatic, euvolemic, not in acute exacerbation  - Continue home cardioprudent regimen  - Monitoring on tele    Chronic kidney disease-mineral and bone disorder  - Renal management     Anemia in ESRD (end-stage renal disease)  - H/H stable near baseline on admission, with drop after pleurex catheter placement 8/18.   - S/p 2u pRBC 8/19. S/p 1u pRBC 8/23, no active bleeding   - Now stable    ESRD (end stage renal disease) on dialysis  - Nephology following  - Further decision for HD per Nephology  - Renally dosing all medications  - Avoid nephrotoxins    Mixed hyperlipidemia associated with type 2 diabetes mellitus  - Continue home statin     Type 2 diabetes mellitus with chronic kidney disease on chronic dialysis, without long-term current use of insulin  - SSI provided for corrective dosing  - Hypoglycemic protocol in effect  - Diabetic diet provided    Hypertension associated with ESRD caused by type 2 diabetes mellitus, on dialysis  - Continue home cardioprudent regimen; nifedipine held on 8/31 due to relative hypotension in the setting of new infection concerns  - HD to assist with BP control  - PRN IV hydralazine and/or IV labetalol provided for SBP>180  - continue to monitor and further titrate antihypertensives as clinically indicated         Active Hospital Problems    Diagnosis  POA    *Recurrent pleural  effusion on right [J90]  Yes    Acute on chronic respiratory failure with hypoxia [J96.21]  Yes    Leukocytosis [D72.829]  Yes    Physical deconditioning [R53.81]  Yes    Anemia in ESRD (end-stage renal disease) [N18.6, D63.1]  Yes    Chronic kidney disease-mineral and bone disorder [N18.9, E83.9, M89.9]  Yes    Chronic diastolic heart failure [I50.32]  Yes    ESRD (end stage renal disease) on dialysis [N18.6, Z99.2]  Not Applicable     -HD on T-Th-Sat with Dr. Lowery      Mixed hyperlipidemia associated with type 2 diabetes mellitus [E11.69, E78.2]  Yes    Type 2 diabetes mellitus with chronic kidney disease on chronic dialysis, without long-term current use of insulin [E11.22, N18.6, Z99.2]  Not Applicable    Hypertension associated with ESRD caused by type 2 diabetes mellitus, on dialysis [E11.22, I12.0, Z99.2, N18.6]  Not Applicable      Resolved Hospital Problems    Diagnosis Date Resolved POA    Hypophosphatemia [E83.39] 08/25/2022 Yes    Thrombocytopenia [D69.6] 08/31/2022 Yes    Hyperkalemia [E87.5] 08/28/2022 Yes       Inpatient Medications Prescribed for Management of Current Problems:     Scheduled Meds:    albuterol-ipratropium  3 mL Nebulization Q6H    atorvastatin  40 mg Oral QHS    carvediloL  3.125 mg Oral BID    ceFEPime (MAXIPIME) IVPB  1 g Intravenous Q24H    diphenhydrAMINE  25 mg Oral QHS    epoetin michael (PROCRIT) injection  5,000 Units Intravenous Every Tues, Thurs, Sat    LIDOcaine HCL 10 mg/ml (1%)  10 mL Intradermal Once    naloxone  0.4 mg Intravenous Once    pantoprazole  40 mg Oral Daily    polyethylene glycol  17 g Oral BID     Continuous Infusions:   As Needed: sodium chloride, acetaminophen, dextrose 10%, dextrose 10%, glucagon (human recombinant), glucose, glucose, hydrALAZINE, HYDROmorphone, hydrOXYzine HCL, insulin aspart U-100, labetalol, naloxone, ondansetron, sodium chloride 0.9%, Pharmacy to dose Vancomycin consult **AND** vancomycin - pharmacy to dose    VTE  Risk Mitigation (From admission, onward)         Ordered     IP VTE HIGH RISK PATIENT  Once         08/15/22 2005     Place sequential compression device  Until discontinued         08/15/22 2005              I have assessed these finding virtually using telemed platform and with assistance of bedside nurse     The attending portion of this evaluation, treatment, and documentation was performed per Mary Grace Marin MD via Telemedicine AudioVisual using the secure [employer] software platform with 2 way audio/video. The provider was located off-site and the patient is located in the hospital. The aforementioned video software was utilized to document the relevant history and physical exam.    Mary Grace Marin MD  Department of Hospital Medicine   [facility] - Cardiology Stepdown

## 2022-09-06 NOTE — PT/OT/SLP PROGRESS
Occupational Therapy   Co-Treatment    Name: Vega Brownlee  MRN: [id number]  Admitting Diagnosis:  Recurrent pleural effusion on right  19 Days Post-Op    Recommendations:     Discharge Recommendations: rehabilitation facility  Discharge Equipment Recommendations:  bedside commode, walker, rolling, shower chair    Assessment:     Vega Brownlee is a 62 y.o. male with a medical diagnosis of Recurrent pleural effusion on right.   Performance deficits affecting function are weakness, impaired endurance, impaired self care skills, impaired functional mobility, gait instability, impaired balance. Pt tolerated session well with good effort, performance and functional progress. Continue to anticipate pt will benefit from post acute therapy prior to return home.     Rehab Prognosis:  Good; patient would benefit from acute skilled OT services to address these deficits and reach maximum level of function.       Plan:     Patient to be seen 4 x/week to address the above listed problems via self-care/home management, therapeutic activities, therapeutic exercises  Plan of Care Expires:    Plan of Care Reviewed with: patient    Subjective   Pt agreeable to therapy.     Pain/Comfort:   Pt denies pain.     Objective:     Communicated with: nsg prior to session.  Patient found supine in bed with 2 LPM oxygen via NC and Tele in place.  Cotx completed to optimize functional performance and safety given impaired tolerance for activity.   General Precautions: Standard, fall        Occupational Performance:     Bed Mobility:    Supine>sit with MIN A     Functional Mobility/Transfers:  Sit>Stand with CGA from bed   Sit>Stand with VINCENT  from commode.   Pt with poor safety noted with approach to t/f surfaces. Pt moving greatly outside base of support of RW and attempting to sit before close enough to surface. Cues and education provided with all transition with little carryover and understanding noted.       Activities of Daily Living:  Feeding:  set-up  G/H: standing with CGA to wash face and hands. Pt combed hair in sitting with Set-up. Pt tolerated standing for g/h skills appox 2min with impaired standing tolerance/endurance noted to complete all g/h skills in stand.   LE dressing: SBA manage footwear seated.  UE dressing: ANDRES  to anastasiia gown like a jacket/robe  Toileting: MIN A simulated toileting for clothing shantel't.       [facility] 6 Click ADL: 19    Treatment & Education:  Pt completed functional mobility in room with RW and CGA. Pt needing increased cues for upright posture a session continued. Pt also noted to have SOB with fatigue.   Education provided re: pursed lip breathing for which pt had difficulty performing and to benefit from further education.   Education provided re: OT POC and safety with functional mobility/ADL skills.       Patient left up in chair with all lines intact and call button in reach    GOALS:   Multidisciplinary Problems       Occupational Therapy Goals          Problem: Occupational Therapy    Goal Priority Disciplines Outcome Interventions   Occupational Therapy Goal     OT, PT/OT Ongoing, Progressing    Description: Goals to be met by: 7 days 8/29/22     Patient will increase functional independence with ADLs by performing:    Pt to complete UE dressing with set-up  Pt to complete LE dressing with SBA  Pt to complete standing g/h skills with SBA  Pt to complete toileting with SBA  Pt to complete t/f bed, chair and commode with SBA                        Time Tracking:     OT Date of Treatment: 09/06/22  OT Start Time: 1320  OT Stop Time: 1337  OT Total Time (min): 17 min    Billable Minutes:Self Care/Home Management 17    OT/VARGAS: OT          9/6/2022

## 2022-09-06 NOTE — HPI
Patient information was obtained from patient, past medical records, and ER records. HPI limited due to language barrier. Virtual  was used for assistance.     Mr. Brownlee is a 61 y.o M w/ hx of  recurrent pleural effusions, HTN, HLD, anemia, cataract, CHF (congestive heart failure), T2DM, ESRD on hemodialysis who was transferred to [facility] for IR evaluation for placement of chest tube for extensively loculated right effusion. IR unable to complete US-guided therapeutic right-sided thoracentesis due to extensively loculated effusions with innumerable isolated pockets with sonographic appearance suggestive of a large hematoma/hemothorax. Cardiothoracic surgery placed a pleurx catheter with lytics on 08/18 with pleural studies notable for exudative effusion and cultures negative. Chest tube was removed on 08/30 which was followed by worsening leukocytosis, increasing supplemental oxygen requirements, procal elevation so he was started on azithromycin/ceftriaxone then broadened to vancomycin/cefepim. Repeat CXR did not show worsening of pleural effusions or new airspace disease. No intervention per CTS.    ID consulted for evaluation after fever >101 on broad spectrum antibiotics.

## 2022-09-06 NOTE — PLAN OF CARE
[facility] - Cardiology Stepdown  Discharge Reassessment    Primary Care Provider: Ko Perez MD    Expected Discharge Date: 9/8/2022    Reassessment (most recent)       Discharge Reassessment - 09/06/22 1218          Discharge Reassessment    Assessment Type Discharge Planning Reassessment     Did the patient's condition or plan change since previous assessment? Yes     Discharge Plan discussed with: Patient     Communicated WENDY with patient/caregiver Date not available/Unable to determine     Discharge Plan A Rehab     Discharge Plan B Long-term acute care facility (LTAC)     DME Needed Upon Discharge  none     Discharge Barriers Identified None     Why the patient remains in the hospital Requires continued medical care                   Per Dr Marin pt spiked a fever yesterday and is now pending work-up from ID.  Discharge disposition is rehab vs LTAC depending on ID recommendations.  SW relayed this information to pt at bedside.  Will continue to follow.    Chacha Dooley LMSW  [facility]  [phone]

## 2022-09-06 NOTE — SUBJECTIVE & OBJECTIVE
Telemedicine  This service was provided by Virtual Visit.    Patient was transferred to [facility] Medicine on:  9/5/2022    Chief Complaint   Patient presents with    Shortness of Breath     Pt reports was sent to ER for CT. Pt with increased SOB and hx of bilateral plural effusions.      The patient location is: 306/306 A   Admitted 8/15/2022 10:54 AM  Present with the patient at the time of the telemed/virtual assessment: Telepresenter    Interval History / Events Overnight:   The patient is able to provide adequate history - patient declines the use of a Divehi . Additional history was obtained from past medical records. No significant events reported by Nursing.  BP noted to be elevated  Patient complains of nothing new, feels well. Symptoms have been unchanged since yesterday. Associated symptoms include: fatigue. Symptoms are stable.     Lab test(s) reviewed: H&H stable    Review of Systems   Constitutional:  Positive for chills and fever.   Respiratory:  Negative for shortness of breath.      Objective:     Vital Signs (Most Recent):  Temp: 97.5 °F (36.4 °C) (09/06/22 1117)  Pulse: 61 (09/06/22 1117)  Resp: 18 (09/06/22 1117)  BP: (!) 163/72 (09/06/22 1117)  SpO2: (!) 90 % (09/06/22 1117)   Vital Signs (24h Range):  Temp:  [97.5 °F (36.4 °C)-101.3 °F (38.5 °C)] 97.5 °F (36.4 °C)  Pulse:  [58-89] 61  Resp:  [15-20] 18  SpO2:  [90 %-100 %] 90 %  BP: (121-170)/(56-74) 163/72     Weight: 58.1 kg (128 lb 1.6 oz)  Body mass index is 19.48 kg/m².    Intake/Output Summary (Last 24 hours) at 9/6/2022 1147  Last data filed at 9/5/2022 1700  Gross per 24 hour   Intake 993.4 ml   Output 2150 ml   Net -1156.6 ml        Physical Exam  Constitutional:       General: He is not in acute distress.     Appearance: Normal appearance.   Eyes:      General: Lids are normal. No scleral icterus.        Right eye: No discharge.         Left eye: No discharge.      Conjunctiva/sclera: Conjunctivae normal.    Neck:      Trachea: Phonation normal.   Cardiovascular:      Rate and Rhythm: Normal rate.      Comments: Monitor / Vital signs reviewed at time of visit  Pulmonary:      Effort: Pulmonary effort is normal. No tachypnea, accessory muscle usage or respiratory distress.   Abdominal:      General: There is no distension.   Skin:     Coloration: Skin is not cyanotic.   Neurological:      Mental Status: He is alert. He is not disoriented.   Psychiatric:         Attention and Perception: Attention normal.         Mood and Affect: Affect normal.         Behavior: Behavior is cooperative.       Significant Labs:   Recent Labs   Lab 08/15/22  2119   HGBA1C 5.6       Recent Labs   Lab 09/05/22  1529 09/05/22  2145 09/06/22  1115   POCTGLUCOSE 189* 239* 215*       Recent Labs   Lab 09/04/22  0451 09/05/22  0438 09/06/22  0445   WBC 11.01 10.39 10.96   HGB 7.9* 7.4* 7.6*   HCT 25.0* 23.4* 23.7*    220 222       Recent Labs   Lab 09/04/22  0451 09/05/22  0438 09/06/22  0445   GRAN 80.8*  8.9* 80.1*  8.3* 78.7*  8.6*   LYMPH 6.0*  0.7* 6.9*  0.7* 7.6*  0.8*   MONO 9.5  1.1* 9.0  0.9 10.6  1.2*   EOS 0.3 0.3 0.2       Recent Labs   Lab 09/04/22  0451 09/05/22  0438 09/06/22  0445   * 132* 134*   K 3.8 4.3 3.7   CL 98 98 101   CO2 26 25 25   BUN 26* 38* 22   CREATININE 4.3* 5.2* 3.2*   * 185* 132*   CALCIUM 7.4* 7.2* 7.5*   ALBUMIN 2.0* 1.9* 1.9*   MG 2.1 2.1 1.9   PHOS 3.1 3.1 2.5*       Recent Labs   Lab 09/01/22  0752 09/02/22  0436 09/04/22  0451 09/05/22  0438 09/06/22  0445   ALKPHOS  --    < > 158* 156* 196*   ALT  --    < > 10 9* 9*   AST  --    < > 23 22 23   PROT  --    < > 5.4* 5.3* 5.4*   BILITOT  --    < > 1.2* 1.1* 1.0     --   --   --   --     < > = values in this interval not displayed.       Recent Labs   Lab 08/21/22  2136 08/22/22  0313 08/30/22  0739 09/05/22 2038   PROCAL  --   --  1.19*  --    LACTATE 0.8  --  0.7 0.9   FERRITIN  --  1,751*  --   --        SARS-CoV2  (COVID-19) Qualitative PCR (no units)   Date Value   07/31/2020 Negative     SARS-CoV-2 RNA, Amplification, Qual (no units)   Date Value   05/04/2020 Negative     POC Rapid COVID (no units)   Date Value   08/15/2022 Negative   02/02/2021 Negative       ECG Results              EKG 12-LEAD (Final result)  Result time 08/16/22 12:10:03      Final result by Unknown User (08/16/22 12:10:03)                                        Results for orders placed during the hospital encounter of 09/02/20    Echo Color Flow Doppler? Yes    Interpretation Summary  · Concentric left ventricular hypertrophy.  · Normal left ventricular systolic function. The estimated ejection fraction is 55%.  · Moderate right ventricular enlargement.  · Normal right ventricular systolic function.  · Grade II (moderate) left ventricular diastolic dysfunction consistent with pseudonormalization.  · Severe biatrial enlargement.  · Mild tricuspid regurgitation.  · Trivial pericardial effusion.  · The estimated PA systolic pressure is 77 mmHg.  · Elevated central venous pressure (15 mmHg).      X-Ray Chest 1 View  Narrative: EXAMINATION:  XR CHEST 1 VIEW    CLINICAL HISTORY:  febrile, infectious w/u;    TECHNIQUE:  Single frontal view of the chest was performed.    COMPARISON:  09/01/2022.    FINDINGS:  There is stable appearance of a stent within the right paramediastinal region.  Monitoring EKG leads are present.  There are postoperative changes in the right axillary region.    The trachea is unremarkable.  There are calcifications of the aortic knob.  The cardiomediastinal silhouette is unchanged.  There is stable appearance of a large right-sided pleural effusion.  There is no significant pleural effusion on the left.  There is no evidence of a pneumothorax.  There is no evidence of pneumomediastinum.  There are unchanged airspace opacities within the right mid and lower lung zones.  There is also stable appearance of position opacities in the left  hemithorax.  Overall aeration of the lung fields are unchanged.  There are degenerative changes in the osseous structures.  Impression: Stable examination.  No new airspace disease.    Electronically signed by: Deni Cho MD  Date:    09/05/2022  Time:    21:17      Labs and Imaging within the last 24 hours listed above were reviewed.       Diet: Diet diabetic [facility]; 2000 Calorie  Significant LDAs:   IV Access Type: Peripheral and Dialysis Access  Urinary Catheter Indication if present: Patient Does Not Have Urinary Catheter  Other Lines/Tubes/Drains:    HIGH RISK CONDITION(S):   Patient has a condition that poses threat to life and bodily function: Respiratory Distress     Goals of Care:    Previous admission:  2/2/21  Likely prognosis:  Fair  Code Status: Full Code  Comfort Only: No  Hospice: No  Goals at discharge: remain at home, with physician follow-up    Discharge Planning   WENDY: 9/8/2022     Code Status: Full Code   Is the patient medically ready for discharge?: No    Reason for patient still in hospital (select all that apply): Pending disposition  Discharge Plan A: Rehab

## 2022-09-06 NOTE — PLAN OF CARE
POC discussed with wife at bedside. Patient very lethargic. Cardiac monitoring continues. Temp at start of shift 101. 3. On call notified. Labs done and tylenol given      Problem: Adult Inpatient Plan of Care  Goal: Plan of Care Review  Outcome: Ongoing, Progressing  Goal: Patient-Specific Goal (Individualized)  Outcome: Ongoing, Progressing  Goal: Absence of Hospital-Acquired Illness or Injury  Outcome: Ongoing, Progressing  Goal: Optimal Comfort and Wellbeing  Outcome: Ongoing, Progressing  Goal: Readiness for Transition of Care  Outcome: Ongoing, Progressing     Problem: Diabetes Comorbidity  Goal: Blood Glucose Level Within Targeted Range  Outcome: Ongoing, Progressing     Problem: Infection  Goal: Absence of Infection Signs and Symptoms  Outcome: Ongoing, Progressing     Problem: Impaired Wound Healing  Goal: Optimal Wound Healing  Outcome: Ongoing, Progressing     Problem: Device-Related Complication Risk (Hemodialysis)  Goal: Safe, Effective Therapy Delivery  Outcome: Ongoing, Progressing     Problem: Hemodynamic Instability (Hemodialysis)  Goal: Effective Tissue Perfusion  Outcome: Ongoing, Progressing     Problem: Infection (Hemodialysis)  Goal: Absence of Infection Signs and Symptoms  Outcome: Ongoing, Progressing     Problem: Skin Injury Risk Increased  Goal: Skin Health and Integrity  Outcome: Ongoing, Progressing     Problem: Fall Injury Risk  Goal: Absence of Fall and Fall-Related Injury  Outcome: Ongoing, Progressing     Problem: Electrolyte Imbalance (Chronic Kidney Disease)  Goal: Electrolyte Balance  Outcome: Ongoing, Progressing     Problem: Fluid Volume Excess (Chronic Kidney Disease)  Goal: Fluid Balance  Outcome: Ongoing, Progressing     Problem: Respiratory Compromise (Pneumothorax)  Goal: Optimal Oxygenation and Ventilation  Outcome: Ongoing, Progressing

## 2022-09-06 NOTE — PLAN OF CARE
Hemodialysis done during the shift. BG monitored. Dressing changed at prior chest tube site. Pt on hemodialysis, anuria. Pt educated on fall risk and remained free from falls/trauma/injury. Denies chest pain, SOB, palpitations, dizziness, pain, or discomfort. Plan of care reviewed with pt, all questions answered. Bed locked in lowest position, call bell within reach, no acute distress noted, will continue to monitor.

## 2022-09-06 NOTE — PT/OT/SLP PROGRESS
Physical Therapy Treatment    Patient Name:  Vega Brownlee   MRN:  [id number]  Admit Date: 8/15/2022  Admitting Diagnosis:  Recurrent pleural effusion on right   Length of Stay: 22 days  Recent Surgery: Procedure(s) (LRB):  VATS, WITH DECORTICATION, LUNG (Right)  BLOCK, NERVE, INTERCOSTAL, 2 OR MORE (Right)  BRONCHOSCOPY, FLEXIBLE (N/A)  INSERTION, CATHETER, INTERCOSTAL, FOR DRAINAGE (Right) 19 Days Post-Op    Recommendations:     Discharge Recommendations:  rehabilitation facility   Discharge Equipment Recommendations: bedside commode, walker, rolling   Barriers to discharge: None    Plan:     During this hospitalization, patient to be seen 4 x/week to address the listed problems via gait training, therapeutic activities, therapeutic exercises, neuromuscular re-education  Plan of Care Expires:  09/21/22  Plan of Care Reviewed with: patient    Assessment:     Vega Brownlee is a 62 y.o. male admitted with a medical diagnosis of Recurrent pleural effusion on right. Pt found alert and cooperative. Pt progressing towards goals, but not at PLOF. Pt tolerated session well. Pt is improving with therapy evidenced by increased gait distance, increased activity tolerance, and increased gait quality. Pt would benefit from continued PT services to improve overall functional mobility. Pt is an excellent IP rehab candidate due to good activity tolerance, decline from PLOF, good family support, and excellent motivation.  Recommend d/c to Rehab to maximize functional independence.        Problem List: weakness, impaired endurance, impaired functional mobility, gait instability, impaired balance, decreased safety awareness, impaired cardiopulmonary response to activity.  Rehab Prognosis: Good     GOALS:   Multidisciplinary Problems       Physical Therapy Goals          Problem: Physical Therapy    Goal Priority Disciplines Outcome Goal Variances Interventions   Physical Therapy Goal     PT, PT/OT Ongoing, Progressing     Description: Goals  "to be met by: 2022    Patient will increase functional independence with mobility by performin. Supine to sit with Supervision   2. Sit to stand transfer with Stand-by Assistance using Rolling Walker  3. Gait  x 50 feet with Stand-by Assistance using Rolling Walker.   4. Stand for 3 minutes with Stand-by Assistance using Rolling Walker  5. Lower extremity exercise program x10 reps per handout, with independence                         Subjective   Communicated with RN prior to session.  Patient found HOB elevated upon PT entry to room, agreeable to evaluation. Vega Brownlee's alone during session.    Chief Complaint: debility   Patient/Family Comments/goals: to get better and return home   Pain/Comfort:  Pain Rating 1: 0/10  Pain Rating Post-Intervention 1: 0/10    Objective:   Patient found with: telemetry, peripheral IV, oxygen   General Precautions: Standard, Cardiac fall   Orthopedic Precautions:N/A   Braces: N/A   Oxygen Device: Nasal Cannula   Vitals: BP (!) 163/72 (BP Location: Left arm, Patient Position: Lying)   Pulse 67   Temp 97.5 °F (36.4 °C) (Oral)   Resp 16   Ht 5' 8" (1.727 m)   Wt 58.1 kg (128 lb 1.6 oz)   SpO2 96%   BMI 19.48 kg/m²     Outcome Measures:  AM-PAC 6 CLICK MOBILITY  Turning over in bed (including adjusting bedclothes, sheets and blankets)?: 3  Sitting down on and standing up from a chair with arms (e.g., wheelchair, bedside commode, etc.): 3  Moving from lying on back to sitting on the side of the bed?: 3  Moving to and from a bed to a chair (including a wheelchair)?: 3  Need to walk in hospital room?: 3  Climbing 3-5 steps with a railing?: 2  Basic Mobility Total Score: 17    Functional Mobility:  Additional staff present: OT  Bed Mobility:  Supine to Sit: minimum assistance; HOB elevated  Scooting anteriorly to EOB to have both feet planted on floor: stand by assistance    Sitting Balance at Edge of Bed:  Assistance Level Required: Stand-by Assistance  Time: 5 " minutes  Comments:   Worked on activity tolerance sitting EOB and worked on tolerance to positional change   Pt performed dressing with OT    Transfers:   Sit <> Stand Transfer:   CGA from EOB using RW  Min A from toilet using RW  Pt requires max cuing for hand placement during sit<>stand transfer and for safety       Gait:  Patient ambulated: 50ft   Patient required: contact guard  Patient used: rolling walker  Gait Pattern observed: reciprocal gait  Gait Deviation(s): occasional unsteady gait, decreased step length, flexed posture, and decreased cortney  Impairments due to: impaired balance, SOB, decreased strength, and decreased endurance  Comments:  Mask donned  Verbal cuing for upright posture and RW management   With fatigue pt demo'd decreased step length, forward flexed posture, and decreased safety awareness. Pt notably SOB but recovered well within a few minutes   Pt demo'd one posterior LOB when turning that required min A to correct       Therapeutic Activities, Exercises, and Education:   Educated pt on PT role/POC  Educated pt on importance of OOB activity and daily ambulation   Pt verbalized understanding     Pt performed standing ADLs with OT at the bathroom sink    T/f to chair to increase tolerance to OOB activity and to create optimal positioning for lung expansion         Patient left up in chair with all lines intact, call button in reach, and RN notified..    Time Tracking:     PT Received On: 09/06/22  PT Start Time: 1320     PT Stop Time: 1340  PT Total Time (min): 20 min       Billable Minutes:   Gait Training 12    Treatment Type: Treatment  PT/PTA: PT

## 2022-09-06 NOTE — CONSULTS
[facility] - Cardiology Stepdown  Infectious Disease  Consult Note    Patient Name: Vega Brownlee  MRN: [id number]  Admission Date: 8/15/2022  Hospital Length of Stay: 22 days  Attending Physician: Mary Grace Marin MD  Primary Care Provider: Ko Perez MD     Isolation Status: No active isolations    Patient information was obtained from patient, past medical records, ER records and primary team.      Inpatient consult to Infectious Diseases  Consult performed by: Ishaan Crowe DO  Consult ordered by: Mary Grace Marin MD        Assessment/Plan:   * Recurrent pleural effusion on right  61 y.o M w/ hx of  recurrent pleural effusions, HTN, HLD, anemia, cataract, CHF (congestive heart failure), T2DM, ESRD on hemodialysis who was transferred to [facility] [place] for IR evaluation for placement of chest tube for extensively loculated right effusion. IR unable to complete US-guided therapeutic right-sided thoracentesis due to extensively loculated effusions with innumerable isolated pockets with sonographic appearance suggestive of a large hematoma/hemothorax. Cardiothoracic surgery placed a pleurx catheter with lytics on 08/18 with pleural studies notable for exudative effusion and cultures negative. Chest tube was removed on 08/30 which was followed by worsening leukocytosis, increasing supplemental oxygen requirements, procal elevation so he was started on azithromycin/ceftriaxone then broadened to vancomycin/cefepim. Repeat CXR did not show worsening of pleural effusions or new airspace disease. No intervention per CTS.      -ID consulted for evaluation after fever >101 on broad spectrum antibiotics.     -No leukocytosis, BC NGTD. Suspect possible non-infectious causes of recent fever.     RECOMMENDATIONS  -Recommend routine workup of inpatient fever  -COVID testing  -Followup repeat blood cultures. ESRD patient so urine cultures unavailable  -Currently not on DVT prophylaxis, would recommend atleast lower extremity ultrasound,  given his recent history of worsening respiratory status   -Continue Vancomycin/Cefepime with plans to deescalate pending cultures  -Monitor fever curve, leukocytes on CBC      Thank you for your consult. I will follow-up with patient. Please contact us if you have any additional questions.    Ishaan Crowe, DO  Infectious Disease  [facility] - Cardiology Stepdown    Subjective:     Principal Problem: Recurrent pleural effusion on right    HPI: Patient information was obtained from patient, past medical records, and ER records. HPI limited due to language barrier. Virtual  was used for assistance.     Mr. Brownlee is a 61 y.o M w/ hx of  recurrent pleural effusions, HTN, HLD, anemia, cataract, CHF (congestive heart failure), T2DM, ESRD on hemodialysis who [place] [facility] [place] of chest tube for extensively loculated right effusion. IR unable to complete US-guided therapeutic right-sided thoracentesis due to extensively loculated effusions with innumerable isolated pockets with sonographic appearance suggestive of a large hematoma/hemothorax. Cardiothoracic surgery placed a pleurx catheter with lytics on 08/18 with pleural studies notable for exudative effusion and cultures negative. Chest tube was removed on 08/30 which was followed by worsening leukocytosis, increasing supplemental oxygen requirements, procal elevation so he was started on azithromycin/ceftriaxone then broadened to vancomycin/cefepim. Repeat CXR did not show worsening of pleural effusions or new airspace disease. No intervention per CTS.    ID consulted for evaluation after fever >101 on broad spectrum antibiotics.       Past Medical History:   Diagnosis Date    A-V fistula     Anemia     Cataract     CHF (congestive heart failure)     Cigarette smoker     Diabetes mellitus, type 2     Disorder of kidney and ureter     ESRD on hemodialysis     Hyperlipidemia     Hypertension     Type 2 diabetes mellitus  with proliferative retinopathy of both eyes and macular edema        Past Surgical History:   Procedure Laterality Date    arm fracture Left     BRONCHOSCOPY N/A 8/18/2022    Procedure: BRONCHOSCOPY, FLEXIBLE;  Surgeon: Memo Peraza MD;  Location: [facility] OR 2ND FLR;  Service: Thoracic;  Laterality: N/A;    COLONOSCOPY N/A 8/16/2017    Procedure: COLONOSCOPY;  Surgeon: Sebastián Lamas MD;  Location: [facility] ENDO (4TH FLR);  Service: Endoscopy;  Laterality: N/A;  dialysis pt/potassium level 1st/svn    FINGER AMPUTATION Left     left index finger    INJECTION OF ANESTHETIC AGENT AROUND MULTIPLE INTERCOSTAL NERVES Right 8/18/2022    Procedure: BLOCK, NERVE, INTERCOSTAL, 2 OR MORE;  Surgeon: Memo Peraza MD;  Location: [facility] OR 2ND FLR;  Service: Thoracic;  Laterality: Right;    LUNG SURGERY      thoracotomy    PORTACATH PLACEMENT  08/2016    THORACOSCOPIC DECORTICATION OF LUNG Right 8/18/2022    Procedure: VATS, WITH DECORTICATION, LUNG;  Surgeon: Memo Peraza MD;  Location: [facility] OR 2ND FLR;  Service: Thoracic;  Laterality: Right;    TUBE THORACOTOMY Right 8/18/2022    Procedure: INSERTION, CATHETER, INTERCOSTAL, FOR DRAINAGE;  Surgeon: Memo Peraza MD;  Location: [facility] OR 2ND FLR;  Service: Thoracic;  Laterality: Right;       Review of patient's allergies indicates:  No Known Allergies    Medications:  Medications Prior to Admission   Medication Sig    carvediloL (COREG) 12.5 MG tablet Take 1 tablet (12.5 mg total) by mouth 2 (two) times daily.    aspirin (ECOTRIN) 81 MG EC tablet Take 81 mg by mouth.    atorvastatin (LIPITOR) 40 MG tablet TAKE 1 TABLET BY MOUTH EVERY DAY IN THE EVENING    BASAGLAR KWIKPEN U-100 INSULIN glargine 100 units/mL SubQ pen INJECT 7 UNITS UNDER THE SKIN ONCE DAILY    betamethasone dipropionate (DIPROLENE) 0.05 % cream Apply topically 2 (two) times daily. X 1-2 wks then prn flares only    blood sugar diagnostic Strp 1 each by Misc.(Non-Drug; Combo Route)  "route as directed.    blood-glucose meter (FREESTYLE SYSTEM KIT) kit Use as instructed    calcium acetate,phosphat bind, (PHOSLO) 667 mg capsule TAKE ONE CAPSULE BY MOUTH THREE TIMES DAILY WITH MEALS    calcium carbonate 470 mg calcium (1,177 mg) Chew Take 2 tablets by mouth 3 (three) times daily with meals.     clobetasoL (CLOBEX) 0.05 % shampoo Wash scalp qod. Let sit on affected areas x 5 min prior to rinsing. Avoid getting on face/in eyes.    cloNIDine (CATAPRES) 0.2 MG tablet TAKE 1 TABLET BY MOUTH THREE TIMES A DAY    DUREZOL 0.05 % Drop ophthalmic solution     fluticasone propionate (FLONASE) 50 mcg/actuation nasal spray 1 spray (50 mcg total) by Each Nostril route once daily.    FOLIC ACID/VIT BCOMP,C (JAM-YAQUELIN ORAL) Take 1 tablet by mouth once daily.    furosemide (LASIX) 80 MG tablet TAKE 1 TABLET (80 MG TOTAL) BY MOUTH ONCE DAILY.    hydrALAZINE (APRESOLINE) 100 MG tablet TAKE 1 TABLET BY MOUTH EVERY 12 HOURS    isosorbide mononitrate (IMDUR) 120 MG 24 hr tablet TAKE 2 TABLETS BY MOUTH ONCE DAILY    lancets (ACCU-CHEK SOFTCLIX LANCETS) Misc 1 application by Misc.(Non-Drug; Combo Route) route 4 (four) times daily before meals and nightly.    losartan (COZAAR) 50 MG tablet     pen needle, diabetic 31 gauge x 3/16" Ndle BD Ultra Fine Mini pen needles (5mm x 31g) - dispense 100    RENVELA 800 mg Tab Take 800 mg by mouth 3 (three) times daily with meals.     traZODone (DESYREL) 50 MG tablet TAKE 1 TABLET BY MOUTH NIGHTLY AS NEEDED FOR INSOMNIA.     Antibiotics (From admission, onward)      Start     Stop Route Frequency Ordered    09/01/22 1015  cefepime in dextrose 5 % 1 gram/50 mL IVPB 1 g         -- IV Every 24 hours (non-standard times) 09/01/22 0906    08/31/22 0907  vancomycin - pharmacy to dose  (vancomycin IVPB)        See Hyperspace for full Linked Orders Report.    -- IV pharmacy to manage frequency 08/31/22 0807    08/16/22 1000  mupirocin 2 % ointment         08/21 0859 Nasl 2 " times daily 08/16/22 0855          Antifungals (From admission, onward)      None          Antivirals (From admission, onward)      None             Immunization History   Administered Date(s) Administered    COVID-19, MRNA, LN-S, PF (Pfizer) (Purple Cap) 04/09/2021, 04/28/2021    Hepatitis A, Adult 04/26/2017    Hepatitis B, Adult 10/11/2016, 11/17/2016, 11/17/2016, 12/15/2016, 12/15/2016, 04/13/2017, 04/13/2017    Influenza - High Dose - PF (65 years and older) 09/13/2016    Influenza - Quadrivalent - PF *Preferred* (6 months and older) 09/13/2016    Influenza - Trivalent (ADULT) 09/09/2017, 09/27/2018, 10/19/2019, 10/10/2020    PPD Test 07/29/2016, 07/29/2016, 09/14/2017, 02/21/2019, 04/28/2020, 05/04/2021    Pneumococcal 04/26/2017    Pneumococcal Conjugate - 13 Valent 04/26/2017, 01/09/2020, 01/09/2020    Tdap 04/26/2017       Family History       Problem Relation (Age of Onset)    Arthritis Sister    Diabetes Mother    Memory loss Mother    No Known Problems Father, Sister, Brother, Sister          Social History     Socioeconomic History    Marital status:     Number of children: 2   Tobacco Use    Smoking status: Former     Packs/day: 0.25     Years: 30.00     Pack years: 7.50     Types: Cigarettes     Start date: 4/26/1982    Smokeless tobacco: Former   Substance and Sexual Activity    Alcohol use: No    Drug use: No    Sexual activity: Yes     Partners: Female     Review of Systems   Constitutional:  Positive for fatigue and fever. Negative for chills.   HENT:  Negative for congestion and rhinorrhea.    Eyes:  Negative for discharge and visual disturbance.   Respiratory:  Positive for cough. Negative for shortness of breath.    Cardiovascular:  Negative for chest pain and leg swelling.   Gastrointestinal:  Negative for constipation, diarrhea, nausea and vomiting.   Genitourinary:  Positive for decreased urine volume (ESRD on dialysis) and difficulty urinating (ESRD on dialysis).    Musculoskeletal:  Negative for arthralgias and back pain.   Neurological:  Negative for dizziness and headaches.   Objective:     Vital Signs (Most Recent):  Temp: 98.7 °F (37.1 °C) (09/06/22 1527)  Pulse: 69 (09/06/22 1527)  Resp: 18 (09/06/22 1527)  BP: (!) 160/61 (09/06/22 1527)  SpO2: (!) 90 % (09/06/22 1527)   Vital Signs (24h Range):  Temp:  [97.5 °F (36.4 °C)-101.3 °F (38.5 °C)] 98.7 °F (37.1 °C)  Pulse:  [58-89] 69  Resp:  [15-18] 18  SpO2:  [90 %-100 %] 90 %  BP: (121-167)/(56-72) 160/61     Weight: 58.1 kg (128 lb 1.6 oz)  Body mass index is 19.48 kg/m².    Estimated Creatinine Clearance: 19.7 mL/min (A) (based on SCr of 3.2 mg/dL (H)).    Physical Exam  Vitals and nursing note reviewed.   Constitutional:       General: He is not in acute distress.     Appearance: He is not ill-appearing, toxic-appearing or diaphoretic.   HENT:      Head: Normocephalic and atraumatic.   Eyes:      General: No scleral icterus.        Right eye: No discharge.         Left eye: No discharge.   Cardiovascular:      Rate and Rhythm: Normal rate and regular rhythm.      Heart sounds: Normal heart sounds.   Pulmonary:      Effort: Pulmonary effort is normal. No respiratory distress.   Abdominal:      General: Abdomen is flat. Bowel sounds are normal. There is no distension.      Tenderness: There is no abdominal tenderness. There is no guarding.   Musculoskeletal:      Cervical back: Normal range of motion. No rigidity.      Right lower leg: No edema.      Left lower leg: No edema.   Skin:     General: Skin is warm and dry.      Coloration: Skin is not jaundiced.   Neurological:      General: No focal deficit present.      Mental Status: He is alert and oriented to person, place, and time. Mental status is at baseline.      Motor: Weakness (generalized) present.   Psychiatric:         Mood and Affect: Mood normal.         Behavior: Behavior normal.       Significant Labs:   CBC:   Recent Labs   Lab 09/05/22  0438 09/06/22  0440    WBC 10.39 10.96   HGB 7.4* 7.6*   HCT 23.4* 23.7*    222     CMP:   Recent Labs   Lab 09/05/22  0438 09/06/22  0445   * 134*   K 4.3 3.7   CL 98 101   CO2 25 25   * 132*   BUN 38* 22   CREATININE 5.2* 3.2*   CALCIUM 7.2* 7.5*   PROT 5.3* 5.4*   ALBUMIN 1.9* 1.9*   BILITOT 1.1* 1.0   ALKPHOS 156* 196*   AST 22 23   ALT 9* 9*   ANIONGAP 9 8       Microbiology Results (last 7 days)       Procedure Component Value Units Date/Time    Blood culture [[id number]] Collected: 09/05/22 2038    Order Status: Completed Specimen: Blood Updated: 09/06/22 0515     Blood Culture, Routine No Growth to date    Blood culture [[id number]] Collected: 09/05/22 2037    Order Status: Completed Specimen: Blood Updated: 09/06/22 0515     Blood Culture, Routine No Growth to date    Blood culture [[id number]] Collected: 08/30/22 0739    Order Status: Completed Specimen: Blood from Antecubital, Left Arm Updated: 09/04/22 0822     Blood Culture, Routine No growth after 5 days.    Blood culture [[id number]] Collected: 08/30/22 0741    Order Status: Completed Specimen: Blood from Peripheral, Left Hand Updated: 09/04/22 0822     Blood Culture, Routine No growth after 5 days.              Significant Labs: All pertinent labs within the past 24 hours have been reviewed.    Significant Imaging: I have reviewed all pertinent imaging results/findings within the past 24 hours.    Inpatient Medications:    Scheduled Meds:   [START ON 9/7/2022] sodium chloride 0.9%   Intravenous Once    albuterol-ipratropium  3 mL Nebulization Q6H    atorvastatin  40 mg Oral QHS    carvediloL  3.125 mg Oral BID    ceFEPime (MAXIPIME) IVPB  1 g Intravenous Q24H    diphenhydrAMINE  25 mg Oral QHS    epoetin michael (PROCRIT) injection  5,000 Units Intravenous Every Tues, Thurs, Sat    LIDOcaine HCL 10 mg/ml (1%)  10 mL Intradermal Once    naloxone  0.4 mg Intravenous Once    pantoprazole  40 mg Oral Daily    polyethylene glycol  17 g Oral BID     PRN  Meds:sodium chloride, acetaminophen, dextrose 10%, dextrose 10%, glucagon (human recombinant), glucose, glucose, hydrALAZINE, HYDROmorphone, hydrOXYzine HCL, insulin aspart U-100, labetalol, naloxone, ondansetron, sodium chloride 0.9%, Pharmacy to dose Vancomycin consult **AND** vancomycin - pharmacy to dose

## 2022-09-06 NOTE — SUBJECTIVE & OBJECTIVE
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        Past Surgical History:   Procedure Laterality Date    arm fracture Left     BRONCHOSCOPY N/A 8/18/2022    Procedure: BRONCHOSCOPY, FLEXIBLE;  Surgeon: Memo Peraza MD;  [facility]: [facility];  Service: Thoracic;  Laterality: N/A;    COLONOSCOPY N/A 8/16/2017    Procedure: COLONOSCOPY;  Surgeon: Sebastián Lamas MD;  Location: [facility] ENDO (4TH FLR);  Service: Endoscopy;  Laterality: N/A;  dialysis pt/potassium level 1st/svn    FINGER AMPUTATION Left     left index finger    INJECTION OF ANESTHETIC AGENT AROUND MULTIPLE INTERCOSTAL NERVES Right 8/18/2022    Procedure: BLOCK, NERVE, INTERCOSTAL, 2 OR MORE;  Surgeon: Memo Peraza MD;  Location: [facility];  Service: Thoracic;  Laterality: Right;    LUNG SURGERY      thoracotomy    PORTACATH PLACEMENT  08/2016    THORACOSCOPIC DECORTICATION OF LUNG Right 8/18/2022    Procedure: VATS, WITH DECORTICATION, LUNG;  Surgeon: Memo Peraza MD;  [facility]: [facility];  Service: Thoracic;  Laterality: Right;    TUBE THORACOTOMY Right 8/18/2022    Procedure: INSERTION, CATHETER, INTERCOSTAL, FOR DRAINAGE;  Surgeon: Memo Peraza MD;  Location: [facility];  Service: Thoracic;  Laterality: Right;       Review of patient's allergies indicates:  No Known Allergies    Medications:  Medications Prior to Admission   Medication Sig    carvediloL (COREG) 12.5 MG tablet Take 1 tablet (12.5 mg total) by mouth 2 (two) times daily.    aspirin (ECOTRIN) 81 MG EC tablet Take 81 mg by mouth.    atorvastatin (LIPITOR) 40 MG tablet TAKE 1 TABLET BY MOUTH EVERY DAY IN THE EVENING    BASAGLAR KWIKPEN U-100  "INSULIN glargine 100 units/mL SubQ pen INJECT 7 UNITS UNDER THE SKIN ONCE DAILY    betamethasone dipropionate (DIPROLENE) 0.05 % cream Apply topically 2 (two) times daily. X 1-2 wks then prn flares only    blood sugar diagnostic Strp 1 each by Misc.(Non-Drug; Combo Route) route as directed.    blood-glucose meter (FREESTYLE SYSTEM KIT) kit Use as instructed    calcium acetate,phosphat bind, (PHOSLO) 667 mg capsule TAKE ONE CAPSULE BY MOUTH THREE TIMES DAILY WITH MEALS    calcium carbonate 470 mg calcium (1,177 mg) Chew Take 2 tablets by mouth 3 (three) times daily with meals.     clobetasoL (CLOBEX) 0.05 % shampoo Wash scalp qod. Let sit on affected areas x 5 min prior to rinsing. Avoid getting on face/in eyes.    cloNIDine (CATAPRES) 0.2 MG tablet TAKE 1 TABLET BY MOUTH THREE TIMES A DAY    DUREZOL 0.05 % Drop ophthalmic solution     fluticasone propionate (FLONASE) 50 mcg/actuation nasal spray 1 spray (50 mcg total) by Each Nostril route once daily.    FOLIC ACID/VIT BCOMP,C (JAM-YAQUELIN ORAL) Take 1 tablet by mouth once daily.    furosemide (LASIX) 80 MG tablet TAKE 1 TABLET (80 MG TOTAL) BY MOUTH ONCE DAILY.    hydrALAZINE (APRESOLINE) 100 MG tablet TAKE 1 TABLET BY MOUTH EVERY 12 HOURS    isosorbide mononitrate (IMDUR) 120 MG 24 hr tablet TAKE 2 TABLETS BY MOUTH ONCE DAILY    lancets (ACCU-CHEK SOFTCLIX LANCETS) Misc 1 application by Misc.(Non-Drug; Combo Route) route 4 (four) times daily before meals and nightly.    losartan (COZAAR) 50 MG tablet     pen needle, diabetic 31 gauge x 3/16" Ndle BD Ultra Fine Mini pen needles (5mm x 31g) - dispense 100    RENVELA 800 mg Tab Take 800 mg by mouth 3 (three) times daily with meals.     traZODone (DESYREL) 50 MG tablet TAKE 1 TABLET BY MOUTH NIGHTLY AS NEEDED FOR INSOMNIA.     Antibiotics (From admission, onward)      Start     Stop Route Frequency Ordered    09/01/22 1015  cefepime in dextrose 5 % 1 gram/50 mL IVPB 1 g         -- IV Every 24 hours (non-standard times) " 09/01/22 0906    08/31/22 0907  vancomycin - pharmacy to dose  (vancomycin IVPB)        See Hyperspace for full Linked Orders Report.    -- IV pharmacy to manage frequency 08/31/22 0807    08/16/22 1000  mupirocin 2 % ointment         08/21 0859 Nasl 2 times daily 08/16/22 0855          Antifungals (From admission, onward)      None          Antivirals (From admission, onward)      None             Immunization History   Administered Date(s) Administered    COVID-19, MRNA, LN-S, PF (Pfizer) (Purple Cap) 04/09/2021, 04/28/2021    Hepatitis A, Adult 04/26/2017    Hepatitis B, Adult 10/11/2016, 11/17/2016, 11/17/2016, 12/15/2016, 12/15/2016, 04/13/2017, 04/13/2017    Influenza - High Dose - PF (65 years and older) 09/13/2016    Influenza - Quadrivalent - PF *Preferred* (6 months and older) 09/13/2016    Influenza - Trivalent (ADULT) 09/09/2017, 09/27/2018, 10/19/2019, 10/10/2020    PPD Test 07/29/2016, 07/29/2016, 09/14/2017, 02/21/2019, 04/28/2020, 05/04/2021    Pneumococcal 04/26/2017    Pneumococcal Conjugate - 13 Valent 04/26/2017, 01/09/2020, 01/09/2020    Tdap 04/26/2017       Family History       Problem Relation (Age of Onset)    Arthritis Sister    Diabetes Mother    Memory loss Mother    No Known Problems Father, Sister, Brother, Sister          Social History     Socioeconomic History    Marital status:     Number of children: 2   Tobacco Use    Smoking status: Former     Packs/day: 0.25     Years: 30.00     Pack years: 7.50     Types: Cigarettes     Start date: 4/26/1982    Smokeless tobacco: Former   Substance and Sexual Activity    Alcohol use: No    Drug use: No    Sexual activity: Yes     Partners: Female     Review of Systems   Constitutional:  Positive for fatigue and fever. Negative for chills.   HENT:  Negative for congestion and rhinorrhea.    Eyes:  Negative for discharge and visual disturbance.   Respiratory:  Positive for cough. Negative for shortness of breath.    Cardiovascular:   Negative for chest pain and leg swelling.   Gastrointestinal:  Negative for constipation, diarrhea, nausea and vomiting.   Genitourinary:  Positive for decreased urine volume (ESRD on dialysis) and difficulty urinating (ESRD on dialysis).   Musculoskeletal:  Negative for arthralgias and back pain.   Neurological:  Negative for dizziness and headaches.   Objective:     Vital Signs (Most Recent):  Temp: 98.7 °F (37.1 °C) (09/06/22 1527)  Pulse: 69 (09/06/22 1527)  Resp: 18 (09/06/22 1527)  BP: (!) 160/61 (09/06/22 1527)  SpO2: (!) 90 % (09/06/22 1527)   Vital Signs (24h Range):  Temp:  [97.5 °F (36.4 °C)-101.3 °F (38.5 °C)] 98.7 °F (37.1 °C)  Pulse:  [58-89] 69  Resp:  [15-18] 18  SpO2:  [90 %-100 %] 90 %  BP: (121-167)/(56-72) 160/61     Weight: 58.1 kg (128 lb 1.6 oz)  Body mass index is 19.48 kg/m².    Estimated Creatinine Clearance: 19.7 mL/min (A) (based on SCr of 3.2 mg/dL (H)).    Physical Exam  Vitals and nursing note reviewed.   Constitutional:       General: He is not in acute distress.     Appearance: He is not ill-appearing, toxic-appearing or diaphoretic.   HENT:      Head: Normocephalic and atraumatic.   Eyes:      General: No scleral icterus.        Right eye: No discharge.         Left eye: No discharge.   Cardiovascular:      Rate and Rhythm: Normal rate and regular rhythm.      Heart sounds: Normal heart sounds.   Pulmonary:      Effort: Pulmonary effort is normal. No respiratory distress.   Abdominal:      General: Abdomen is flat. Bowel sounds are normal. There is no distension.      Tenderness: There is no abdominal tenderness. There is no guarding.   Musculoskeletal:      Cervical back: Normal range of motion. No rigidity.      Right lower leg: No edema.      Left lower leg: No edema.   Skin:     General: Skin is warm and dry.      Coloration: Skin is not jaundiced.   Neurological:      General: No focal deficit present.      Mental Status: He is alert and oriented to person, place, and time.  Mental status is at baseline.      Motor: Weakness (generalized) present.   Psychiatric:         Mood and Affect: Mood normal.         Behavior: Behavior normal.       Significant Labs:   CBC:   Recent Labs   Lab 09/05/22 0438 09/06/22 0445   WBC 10.39 10.96   HGB 7.4* 7.6*   HCT 23.4* 23.7*    222     CMP:   Recent Labs   Lab 09/05/22 0438 09/06/22 0445   * 134*   K 4.3 3.7   CL 98 101   CO2 25 25   * 132*   BUN 38* 22   CREATININE 5.2* 3.2*   CALCIUM 7.2* 7.5*   PROT 5.3* 5.4*   ALBUMIN 1.9* 1.9*   BILITOT 1.1* 1.0   ALKPHOS 156* 196*   AST 22 23   ALT 9* 9*   ANIONGAP 9 8       Microbiology Results (last 7 days)       Procedure Component Value Units Date/Time    Blood culture [[id number]] Collected: 09/05/22 2038    Order Status: Completed Specimen: Blood Updated: 09/06/22 0515     Blood Culture, Routine No Growth to date    Blood culture [[id number]] Collected: 09/05/22 2037    Order Status: Completed Specimen: Blood Updated: 09/06/22 0515     Blood Culture, Routine No Growth to date    Blood culture [[id number]] Collected: 08/30/22 0739    Order Status: Completed Specimen: Blood from Antecubital, Left Arm Updated: 09/04/22 0822     Blood Culture, Routine No growth after 5 days.    Blood culture [[id number]] Collected: 08/30/22 0741    Order Status: Completed Specimen: Blood from Peripheral, Left Hand Updated: 09/04/22 0822     Blood Culture, Routine No growth after 5 days.              Significant Labs: All pertinent labs within the past 24 hours have been reviewed.    Significant Imaging: I have reviewed all pertinent imaging results/findings within the past 24 hours.    Inpatient Medications:    Scheduled Meds:   [START ON 9/7/2022] sodium chloride 0.9%   Intravenous Once    albuterol-ipratropium  3 mL Nebulization Q6H    atorvastatin  40 mg Oral QHS    carvediloL  3.125 mg Oral BID    ceFEPime (MAXIPIME) IVPB  1 g Intravenous Q24H    diphenhydrAMINE  25 mg Oral QHS    epoetin michael  (PROCRIT) injection  5,000 Units Intravenous Every Tues, Thurs, Sat    LIDOcaine HCL 10 mg/ml (1%)  10 mL Intradermal Once    naloxone  0.4 mg Intravenous Once    pantoprazole  40 mg Oral Daily    polyethylene glycol  17 g Oral BID     PRN Meds:sodium chloride, acetaminophen, dextrose 10%, dextrose 10%, glucagon (human recombinant), glucose, glucose, hydrALAZINE, HYDROmorphone, hydrOXYzine HCL, insulin aspart U-100, labetalol, naloxone, ondansetron, sodium chloride 0.9%, Pharmacy to dose Vancomycin consult **AND** vancomycin - pharmacy to dose

## 2022-09-07 NOTE — ASSESSMENT & PLAN NOTE
- Procal elevated  - BCx NTD  - Azithro/Rocephin broadened to vancomycin and cefepime on 8/31.   - Leukocytosis resolved. Completed 7d course (end date 9/6) but antibiotics continued due to fever. ID consulted.

## 2022-09-07 NOTE — PLAN OF CARE
Problem: Adult Inpatient Plan of Care  Goal: Patient-Specific Goal (Individualized)  Outcome: Ongoing, Progressing  Flowsheets (Taken 9/7/2022 2568)  Anxieties, Fears or Concerns: None voiced  Individualized Care Needs: Monitor R Side dressing for saturation and change as needed. Maintain BG protocol. Monitor SPO2. Maintain ABX protocol.  Patient-Specific Goals (Include Timeframe): Pt will be free of falls and injuries throughout my shift.       Plan of care discussed with patient. Patient is free of fall/trauma/injury. Denies CP, SOB, or pain/discomfort. Encouraging IS. All questions addressed. Will continue to monitor throughout my shift.

## 2022-09-07 NOTE — PROGRESS NOTES
HD txt complete.  2L UF removed.  Needles removed and dressing applied.  Patient's access bleeds a lot used gel foam to stop access bleeding.  Tolerated well.    Pt transported back to his room in his bed.

## 2022-09-07 NOTE — PROGRESS NOTES
[facility] ST[facility]FF HEMODIALYSIS NOTE     Patient currently on hemodialysis for removal of uremic toxins and volume.     Patient seen and evaluated on hemodialysis, tolerating treatment, see HD flowsheet for vitals and assessments.    Labs have been reviewed and the dialysate bath has been adjusted.       Assessment/Plan:      -Patient seen on HD, tolerating treatment well, w/o complaints   -UF goal of 2L   -Renal diet, if not NPO   -Strict I/O's and daily weights  -Daily renal function panels  -Keep MAP >65 while on HD   -Maintain Hgb >7.0  -continue epo 5,000 units MWF   -Will continue to follow while inpatient     Sharon Mancilla DNP-FNP, C  Nephrology  Pager: [phone]

## 2022-09-07 NOTE — PROGRESS NOTES
"  Mike Jasso - Cardiology [facility]  Telemedicine Progress Note    Patient Name: Vega Brownlee  MRN: [id number]  Patient Class: IP- Inpatient   Admission Date: 8/15/2022  Length of Stay: 22 days  Attending Physician: Mary Grace Marin MD  Primary Care Provider: Ko Perez MD    Subjective:     Principal Problem:Recurrent pleural effusion on right     HPI:  HPI per :  Patient is a 61 y.o. 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 Patient has history of recurrent pleural effusion and was sent to ED for worsening symptoms. IR was consulted and attempted US-guided therapeutic right-sided thoracentesis. Per IR "US reveals an extensively loculated Rt effusion with innumerable isolated pockets with sonographic appearance suggestive of a large hematoma/hemothorax. Only able to remove 50-cc of old, dark blood products also consistent with large Rt hemothorax. Pt would likely require large-bore chest tube placement and instillation of lytics if attempts will be made to resolve the hemothorax vs VATS." Facility seeking transfer for CTS evaluation. Patient is stable on med tele unit only requiring 3L NC with no distress. Stable for transfer.      Overview/Hospital Course:  [facility] Medicine Course:  IR was consulted and attempted US-guided therapeutic right-sided thoracentesis. Per IR "US reveals an extensively loculated Rt effusion with innumerable isolated pockets with sonographic appearance suggestive of a large hematoma/hemothorax. Only able to remove 50-cc of old, dark blood products also consistent with large Rt hemothorax. Pt would likely require large-bore " "chest tube placement and instillation of lytics if attempts will be made to resolve the hemothorax vs VATS."  Patient accepted to [facility], in queue to transfer asap. Stable vitals. Will allow to eat and make NPO again at midnight. Lokelma for K 5.8.     [facility] Medicine Course:  Pt accepted to [facility], Nephrology following for HD needs. Evaluated by thoracic surgery, with placement of PleurX catheter x2 on 8/18. Required 2u pRBC after the procedure, with stable low Hgb since that time. Pleural fluid exudative, with elevated LDH and low glucose. Cultures negative. Cytology pending. Interpretation is difficult with prior manipulation with traumatic thoras in the past. However, with new CT chest findings would certainly benefit from outpatient malignancy eval with follow up CT chest in 3 months and possible bx pending results.     Chest tube removed by CTS on 8/30, tolerated well with same O2 requirements. Leukocytosis noted with elevated procal, BCx obtained and started on azithro/rocephin; leukocytosis worsened on 8/31 with relative hypotension compared to previous (nifedipine held), so abx broadened to vanc and cefepime pending culture results. Overnight and into 8/31, pt noted to have increased O2 requirements up to 6L NC from 2L: CXR without pneumothorax; discussed with CTS, who stated they have no further inpatient recommendations and will follow him in clinic.    Planning to discharge to [facility] when medically stable, SW/CM assisting.      Telemedicine  This service was provided by Virtual Visit.    Patient was transferred to [facility] on:  9/5/2022    Chief Complaint   Patient presents with    Shortness of Breath     Pt reports was sent to ER for CT. Pt with increased SOB and hx of bilateral plural effusions.      The patient location is: 306/306 A   Admitted 8/15/2022 10:54 AM  Present with the patient at the time of the telemed/virtual assessment: Telepresenter    Interval History / Events " Overnight:   The patient is able to provide adequate history - patient declines the use of a Hungarian . Additional history was obtained from past medical records. No significant events reported by Nursing.  BP noted to be elevated  Patient complains of nothing new, feels well. Symptoms have been unchanged since yesterday. Associated symptoms include: fatigue. Symptoms are stable.     Lab test(s) reviewed: H&H stable    Review of Systems   Constitutional:  Positive for chills and fever.   Respiratory:  Negative for shortness of breath.      Objective:     Vital Signs (Most Recent):  Temp: 97.5 °F (36.4 °C) (09/06/22 1117)  Pulse: 61 (09/06/22 1117)  Resp: 18 (09/06/22 1117)  BP: (!) 163/72 (09/06/22 1117)  SpO2: (!) 90 % (09/06/22 1117)   Vital Signs (24h Range):  Temp:  [97.5 °F (36.4 °C)-101.3 °F (38.5 °C)] 97.5 °F (36.4 °C)  Pulse:  [58-89] 61  Resp:  [15-20] 18  SpO2:  [90 %-100 %] 90 %  BP: (121-170)/(56-74) 163/72     Weight: 58.1 kg (128 lb 1.6 oz)  Body mass index is 19.48 kg/m².    Intake/Output Summary (Last 24 hours) at 9/6/2022 1147  Last data filed at 9/5/2022 1700  Gross per 24 hour   Intake 993.4 ml   Output 2150 ml   Net -1156.6 ml        Physical Exam  Constitutional:       General: He is not in acute distress.     Appearance: Normal appearance.   Eyes:      General: Lids are normal. No scleral icterus.        Right eye: No discharge.         Left eye: No discharge.      Conjunctiva/sclera: Conjunctivae normal.   Neck:      Trachea: Phonation normal.   Cardiovascular:      Rate and Rhythm: Normal rate.      Comments: Monitor / Vital signs reviewed at time of visit  Pulmonary:      Effort: Pulmonary effort is normal. No tachypnea, accessory muscle usage or respiratory distress.   Abdominal:      General: There is no distension.   Skin:     Coloration: Skin is not cyanotic.   Neurological:      Mental Status: He is alert. He is not disoriented.   Psychiatric:         Attention and  Perception: Attention normal.         Mood and Affect: Affect normal.         Behavior: Behavior is cooperative.       Significant Labs:   Recent Labs   Lab 08/15/22  2119   HGBA1C 5.6       Recent Labs   Lab 09/05/22  1529 09/05/22  2145 09/06/22  1115   POCTGLUCOSE 189* 239* 215*       Recent Labs   Lab 09/04/22  0451 09/05/22  0438 09/06/22  0445   WBC 11.01 10.39 10.96   HGB 7.9* 7.4* 7.6*   HCT 25.0* 23.4* 23.7*    220 222       Recent Labs   Lab 09/04/22 0451 09/05/22  0438 09/06/22  0445   GRAN 80.8*  8.9* 80.1*  8.3* 78.7*  8.6*   LYMPH 6.0*  0.7* 6.9*  0.7* 7.6*  0.8*   MONO 9.5  1.1* 9.0  0.9 10.6  1.2*   EOS 0.3 0.3 0.2       Recent Labs   Lab 09/04/22 0451 09/05/22 0438 09/06/22  0445   * 132* 134*   K 3.8 4.3 3.7   CL 98 98 101   CO2 26 25 25   BUN 26* 38* 22   CREATININE 4.3* 5.2* 3.2*   * 185* 132*   CALCIUM 7.4* 7.2* 7.5*   ALBUMIN 2.0* 1.9* 1.9*   MG 2.1 2.1 1.9   PHOS 3.1 3.1 2.5*       Recent Labs   Lab 09/01/22 0752 09/02/22 0436 09/04/22 0451 09/05/22  0438 09/06/22  0445   ALKPHOS  --    < > 158* 156* 196*   ALT  --    < > 10 9* 9*   AST  --    < > 23 22 23   PROT  --    < > 5.4* 5.3* 5.4*   BILITOT  --    < > 1.2* 1.1* 1.0     --   --   --   --     < > = values in this interval not displayed.       Recent Labs   Lab 08/21/22 2136 08/22/22  0313 08/30/22  0739 09/05/22 2038   PROCAL  --   --  1.19*  --    LACTATE 0.8  --  0.7 0.9   FERRITIN  --  1,751*  --   --        SARS-CoV2 (COVID-19) Qualitative PCR (no units)   Date Value   07/31/2020 Negative     SARS-CoV-2 RNA, Amplification, Qual (no units)   Date Value   05/04/2020 Negative     POC Rapid COVID (no units)   Date Value   08/15/2022 Negative   02/02/2021 Negative       ECG Results              EKG 12-LEAD (Final result)  Result time 08/16/22 12:10:03      Final result by Unknown User (08/16/22 12:10:03)                                        Results for orders placed during the hospital  encounter of 09/02/20    Echo Color Flow Doppler? Yes    Interpretation Summary  · Concentric left ventricular hypertrophy.  · Normal left ventricular systolic function. The estimated ejection fraction is 55%.  · Moderate right ventricular enlargement.  · Normal right ventricular systolic function.  · Grade II (moderate) left ventricular diastolic dysfunction consistent with pseudonormalization.  · Severe biatrial enlargement.  · Mild tricuspid regurgitation.  · Trivial pericardial effusion.  · The estimated PA systolic pressure is 77 mmHg.  · Elevated central venous pressure (15 mmHg).      X-Ray Chest 1 View  Narrative: EXAMINATION:  XR CHEST 1 VIEW    CLINICAL HISTORY:  febrile, infectious w/u;    TECHNIQUE:  Single frontal view of the chest was performed.    COMPARISON:  09/01/2022.    FINDINGS:  There is stable appearance of a stent within the right paramediastinal region.  Monitoring EKG leads are present.  There are postoperative changes in the right axillary region.    The trachea is unremarkable.  There are calcifications of the aortic knob.  The cardiomediastinal silhouette is unchanged.  There is stable appearance of a large right-sided pleural effusion.  There is no significant pleural effusion on the left.  There is no evidence of a pneumothorax.  There is no evidence of pneumomediastinum.  There are unchanged airspace opacities within the right mid and lower lung zones.  There is also stable appearance of position opacities in the left hemithorax.  Overall aeration of the lung fields are unchanged.  There are degenerative changes in the osseous structures.  Impression: Stable examination.  No new airspace disease.    Electronically signed by: Deni Cho MD  Date:    09/05/2022  Time:    21:17      Labs and Imaging within the last 24 hours listed above were reviewed.       Diet: Diet diabetic [facility]; 2000 Calorie  Significant LDAs:   IV Access Type: Peripheral and Dialysis Access  Urinary  Catheter Indication if present: Patient Does Not Have Urinary Catheter  Other Lines/Tubes/Drains:    HIGH RISK CONDITION(S):   Patient has a condition that poses threat to life and bodily function: Respiratory Distress     Goals of Care:    Previous admission:  2/2/21  Likely prognosis:  Fair  Code Status: Full Code  Comfort Only: No  Hospice: No  Goals at discharge: remain at home, with physician follow-up    Discharge Planning   WENDY: 9/8/2022     Code Status: Full Code   Is the patient medically ready for discharge?: No    Reason for patient still in hospital (select all that apply): Pending disposition  Discharge Plan A: Rehab          Assessment/Plan:      * Recurrent pleural effusion on right  - S/p left thoracotomy prior to admission  - Recurring right effusion s/p thoracentesis x2  - Chest tubes removed 8/29 by CTS   - No further inpatient interventions, follow in clinic  - Increased O2 requirements to 6L on 8/31, improving on antibiotics  - IS encouraged  - Weaning O2 as tolerated    Fever  Febrile at end of planned antibiotic course. Asymptomatic with exception of chills.  ID consulted. Check COVID test, LE US, blood culture.    Acute on chronic respiratory failure with hypoxia  Improving. Weaned to 2 LPM NC    Leukocytosis  - Procal elevated  - BCx NTD  - Azithro/Rocephin broadened to vancomycin and cefepime on 8/31.   - Leukocytosis resolved. Completed 7d course (end date 9/6) but antibiotics continued due to fever. ID consulted.    Physical deconditioning  - Rehab medicine following  - Accepted to [facility] when medically clear for DC.     Chronic diastolic heart failure  - Remains grossly asymptomatic, euvolemic, not in acute exacerbation  - Continue home cardioprudent regimen  - Monitoring on tele    Chronic kidney disease-mineral and bone disorder  - Renal management     Anemia in ESRD (end-stage renal disease)  - H/H stable near baseline on admission, with drop after pleurex catheter placement 8/18.   -  S/p 2u pRBC 8/19. S/p 1u pRBC 8/23, no active bleeding   - Now stable    ESRD (end stage renal disease) on dialysis  - Nephology following  - Further decision for HD per Nephology  - Renally dosing all medications  - Avoid nephrotoxins    Mixed hyperlipidemia associated with type 2 diabetes mellitus  - Continue home statin     Type 2 diabetes mellitus with chronic kidney disease on chronic dialysis, without long-term current use of insulin  - SSI provided for corrective dosing  - Hypoglycemic protocol in effect  - Diabetic diet provided    Hypertension associated with ESRD caused by type 2 diabetes mellitus, on dialysis  - Continue home cardioprudent regimen; nifedipine held on 8/31 due to relative hypotension in the setting of new infection concerns  - HD to assist with BP control  - PRN IV hydralazine and/or IV labetalol provided for SBP>180  - continue to monitor and further titrate antihypertensives as clinically indicated       Active Hospital Problems    Diagnosis  POA    *Recurrent pleural effusion on right [J90]  Yes    Fever [R50.9]  Unknown    Acute on chronic respiratory failure with hypoxia [J96.21]  Yes    Leukocytosis [D72.829]  Yes    Physical deconditioning [R53.81]  Yes    Anemia in ESRD (end-stage renal disease) [N18.6, D63.1]  Yes    Chronic kidney disease-mineral and bone disorder [N18.9, E83.9, M89.9]  Yes    Chronic diastolic heart failure [I50.32]  Yes    ESRD (end stage renal disease) on dialysis [N18.6, Z99.2]  Not Applicable     -HD on T-Th-Sat with Dr. Lowery      Mixed hyperlipidemia associated with type 2 diabetes mellitus [E11.69, E78.2]  Yes    Type 2 diabetes mellitus with chronic kidney disease on chronic dialysis, without long-term current use of insulin [E11.22, N18.6, Z99.2]  Not Applicable    Hypertension associated with ESRD caused by type 2 diabetes mellitus, on dialysis [E11.22, I12.0, Z99.2, N18.6]  Not Applicable      Resolved Hospital Problems    Diagnosis Date  Resolved POA    Hypophosphatemia [E83.39] 08/25/2022 Yes    Thrombocytopenia [D69.6] 08/31/2022 Yes    Hyperkalemia [E87.5] 08/28/2022 Yes       Inpatient Medications Prescribed for Management of Current Problems:     Scheduled Meds:    [START ON 9/7/2022] sodium chloride 0.9%   Intravenous Once    albuterol-ipratropium  3 mL Nebulization Q6H    atorvastatin  40 mg Oral QHS    carvediloL  3.125 mg Oral BID    ceFEPime (MAXIPIME) IVPB  1 g Intravenous Q24H    diphenhydrAMINE  25 mg Oral QHS    epoetin michael (PROCRIT) injection  5,000 Units Intravenous Every Tues, Thurs, Sat    LIDOcaine HCL 10 mg/ml (1%)  10 mL Intradermal Once    naloxone  0.4 mg Intravenous Once    pantoprazole  40 mg Oral Daily    polyethylene glycol  17 g Oral BID     Continuous Infusions:   As Needed: sodium chloride, acetaminophen, dextrose 10%, dextrose 10%, glucagon (human recombinant), glucose, glucose, hydrALAZINE, HYDROmorphone, hydrOXYzine HCL, insulin aspart U-100, labetalol, naloxone, ondansetron, sodium chloride 0.9%, Pharmacy to dose Vancomycin consult **AND** vancomycin - pharmacy to dose    VTE Risk Mitigation (From admission, onward)         Ordered     IP VTE HIGH RISK PATIENT  Once         08/15/22 2005     Place sequential compression device  Until discontinued         08/15/22 2005              I have assessed these finding virtually using telemed platform and with assistance of bedside nurse     The attending portion of this evaluation, treatment, and documentation was performed per Mary Grace Marin MD via Telemedicine AudioVisual using the secure Inter[employer] software platform with 2 way audio/video. The provider was located off-site and the patient is located in the hospital. The aforementioned video software was utilized to document the relevant history and physical exam    Mary Grace Marin MD  Department of Hospital Medicine   [facility] - Cardiology Stepdown

## 2022-09-07 NOTE — ASSESSMENT & PLAN NOTE
Febrile at end of planned antibiotic course. Asymptomatic with exception of chills.  ID consulted. Check COVID test, LE US, blood culture.

## 2022-09-07 NOTE — PROGRESS NOTES
HD txt complete.  2L UF removed.  Needles removed & dressing applied.  Tolerated well.      Pt transported back to his room in his bed.

## 2022-09-07 NOTE — SUBJECTIVE & OBJECTIVE
Interval History: Patient seen at bedside. In HD this morning. Fever to 100.7 yesterday evening. Afebrile today. On 7 days of broad spectrum antibiotics. US negative for DVT. COVID negative. No new findings on recent CXR.     Review of Systems   Constitutional:  Positive for fatigue and fever. Negative for chills.   HENT:  Negative for congestion and rhinorrhea.    Eyes:  Negative for discharge and visual disturbance.   Respiratory:  Positive for cough. Negative for shortness of breath.    Cardiovascular:  Negative for chest pain and leg swelling.   Gastrointestinal:  Negative for constipation, diarrhea, nausea and vomiting.   Genitourinary:  Positive for decreased urine volume (ESRD on dialysis) and difficulty urinating (ESRD on dialysis).   Musculoskeletal:  Negative for arthralgias and back pain.   Neurological:  Negative for dizziness and headaches.   Objective:     Vital Signs (Most Recent):  Temp: 97.9 °F (36.6 °C) (09/07/22 0734)  Pulse: 61 (09/07/22 1145)  Resp: 18 (09/07/22 0845)  BP: (!) 165/73 (09/07/22 1145)  SpO2: 97 % (09/07/22 0740)   Vital Signs (24h Range):  Temp:  [97.5 °F (36.4 °C)-100.7 °F (38.2 °C)] 97.9 °F (36.6 °C)  Pulse:  [59-69] 61  Resp:  [16-20] 18  SpO2:  [90 %-98 %] 97 %  BP: (131-182)/(58-73) 165/73     Weight: 58.1 kg (128 lb 1.6 oz)  Body mass index is 19.48 kg/m².    Estimated Creatinine Clearance: 15.4 mL/min (A) (based on SCr of 4.1 mg/dL (H)).    Physical Exam  Vitals and nursing note reviewed.   Constitutional:       General: He is not in acute distress.     Appearance: He is not ill-appearing, toxic-appearing or diaphoretic.   HENT:      Head: Normocephalic and atraumatic.   Eyes:      General: No scleral icterus.        Right eye: No discharge.         Left eye: No discharge.   Cardiovascular:      Rate and Rhythm: Normal rate and regular rhythm.      Heart sounds: Normal heart sounds.   Pulmonary:      Effort: Pulmonary effort is normal. No respiratory distress.   Abdominal:       General: Abdomen is flat. Bowel sounds are normal. There is no distension.      Tenderness: There is no abdominal tenderness. There is no guarding.   Musculoskeletal:      Cervical back: Normal range of motion. No rigidity.      Right lower leg: No edema.      Left lower leg: No edema.   Skin:     General: Skin is warm and dry.      Coloration: Skin is not jaundiced.   Neurological:      General: No focal deficit present.      Mental Status: He is alert and oriented to person, place, and time. Mental status is at baseline.      Motor: Weakness (generalized) present.   Psychiatric:         Mood and Affect: Mood normal.         Behavior: Behavior normal.       Significant Labs: All pertinent labs within the past 24 hours have been reviewed.    Significant Imaging: I have reviewed all pertinent imaging results/findings within the past 24 hours.

## 2022-09-07 NOTE — PROGRESS NOTES
ESRD MWF HD initiated to [facility]ED fistula.  Tolerated well.      Pt transported to [facility] in his bed.

## 2022-09-07 NOTE — PT/OT/SLP PROGRESS
Occupational Therapy      Patient Name:  Vega Brownlee   MRN:  [id number]    Pt off unit for HD this date and OT unable to return today. OT to check status at later date.   9/7/2022

## 2022-09-07 NOTE — PROGRESS NOTES
09/07/22 0835   Type of Frequent Check   Type Patient Rounds  (pt taken to Dialysis)    Pt taken to dialisys, report given to  Rn receiving pt. Pt left with transport.

## 2022-09-07 NOTE — PROGRESS NOTES
09/07/22 1300   Vital Signs   Temp 97.3 °F (36.3 °C)   Temp src Oral   Pulse 61   Heart Rate Source Monitor   Resp 20   SpO2 96 %   Pulse Oximetry Type Intermittent   Flow (L/min) 2   O2 Device (Oxygen Therapy) nasal cannula w/ humidification   BP (!) 186/73   MAP (mmHg) 105   BP Location Left arm   BP Method Automatic   Patient Position Lying        Cardiac/Telemetry Details / Alarms   Cardiac/Telemetry Monitor On Yes   Cardiac/Telemetry Audible Yes   Cardiac/Telemetry Alarms Set Yes   Assessments (Pre/Post)   Level of Consciousness (AVPU) alert   Pt back from dialysis.

## 2022-09-07 NOTE — PROGRESS NOTES
Pharmacokinetic Assessment Follow Up: IV Vancomycin    Vancomycin serum concentration assessment(s):  Vancomycin random concentration this AM resulted as 19.9 mcg/mL   Patient ESRD on HD, last HD today  Nephrology on board - continue to monitor     Vancomycin plan:   Vancomycin 500 mg IV x 1 today post-HD  Obtain random with AM labs prior to next HD session, anticipated Friday  Continue to monitor nephrology's plans and make adjustments as needed    Drug levels (last 3 results):  Recent Labs   Lab Result Units 09/05/22  0438 09/07/22  0430   Vancomycin, Random ug/mL 19.8 19.9         Pharmacy will continue to follow and monitor vancomycin.      Thank you for the consult,   Yuridia Rod, PharmD, BCPS  Ext. [phone]

## 2022-09-07 NOTE — PROGRESS NOTES
[facility] - Cardiology Stepdown  Infectious Disease  Progress Note    Patient Name: Vega Brownlee  MRN: [id number]  Admission Date: 8/15/2022  Length of Stay: 23 days  Attending Physician: Mary Grace Marin MD  Primary Care Provider: Ko Perez MD    Isolation Status: No active isolations  Assessment/Plan:      Recurrent pleural effusion on right  Fever  61 y.o M w/ hx of  recurrent pleural effusions, HTN, HLD, anemia, cataract, CHF (congestive heart failure), T2DM, ESRD on hemodialysis who was transferred to [facility] [place] for IR evaluation for placement of chest tube for extensively loculated right effusion. IR unable to complete US-guided therapeutic right-sided thoracentesis due to extensively loculated effusions with innumerable isolated pockets with sonographic appearance suggestive of a large hematoma/hemothorax. Cardiothoracic surgery placed a pleurx catheter with lytics on 08/18 with pleural studies notable for exudative effusion and cultures negative. Chest tube was removed on 08/30 which was followed by worsening leukocytosis, increasing supplemental oxygen requirements, procal elevation so he was started on azithromycin/ceftriaxone then broadened to vancomycin/cefepim. Repeat CXR did not show worsening of pleural effusions or new airspace disease. No intervention per CTS.    -ID consulted for evaluation after fever >101 on broad spectrum antibiotics.   -No leukocytosis, BC NGTD. Suspect possible non-infectious causes of recent fever.   -Last Tmax 100.7 night of 9/6   -No DVT on LE US   -COVID negative     Recommendations:   --Routine workup of inpatient fever  --Follow up repeat blood cultures  --Continue IV vancomycin and cefepime (both day 7) with plans to de-escalate pending cultures  --Monitor fever curve    Thank you for your consult. I will follow-up with patient. Please contact us if you have any additional questions.    Michael Fabian, DO  Infectious Disease  [facility] - Cardiology Stepdown    Subjective:      Principal Problem:Recurrent pleural effusion on right    HPI: Patient information was obtained from patient, past medical records, and ER records. HPI limited due to language barrier. Virtual  was used for assistance.     Mr. Brownlee is a 61 y.o M w/ hx of  recurrent pleural effusions, HTN, HLD, anemia, cataract, CHF (congestive heart failure), T2DM, ESRD on hemodialysis who was [place] [facility] [place] for placement of chest tube for extensively loculated right effusion. IR unable to complete US-guided therapeutic right-sided thoracentesis due to extensively loculated effusions with innumerable isolated pockets with sonographic appearance suggestive of a large hematoma/hemothorax. Cardiothoracic surgery placed a pleurx catheter with lytics on 08/18 with pleural studies notable for exudative effusion and cultures negative. Chest tube was removed on 08/30 which was followed by worsening leukocytosis, increasing supplemental oxygen requirements, procal elevation so he was started on azithromycin/ceftriaxone then broadened to vancomycin/cefepim. Repeat CXR did not show worsening of pleural effusions or new airspace disease. No intervention per CTS.    ID consulted for evaluation after fever >101 on broad spectrum antibiotics.     Interval History: Patient seen at bedside. In HD this morning. Fever to 100.7 yesterday evening. Afebrile today. On 7 days of broad spectrum antibiotics. US negative for DVT. COVID negative. No new findings on recent CXR.     Review of Systems   Constitutional:  Positive for fatigue and fever. Negative for chills.   HENT:  Negative for congestion and rhinorrhea.    Eyes:  Negative for discharge and visual disturbance.   Respiratory:  Positive for cough. Negative for shortness of breath.    Cardiovascular:  Negative for chest pain and leg swelling.   Gastrointestinal:  Negative for constipation, diarrhea, nausea and vomiting.   Genitourinary:  Positive for decreased urine  volume (ESRD on dialysis) and difficulty urinating (ESRD on dialysis).   Musculoskeletal:  Negative for arthralgias and back pain.   Neurological:  Negative for dizziness and headaches.   Objective:     Vital Signs (Most Recent):  Temp: 97.9 °F (36.6 °C) (09/07/22 0734)  Pulse: 61 (09/07/22 1145)  Resp: 18 (09/07/22 0845)  BP: (!) 165/73 (09/07/22 1145)  SpO2: 97 % (09/07/22 0740)   Vital Signs (24h Range):  Temp:  [97.5 °F (36.4 °C)-100.7 °F (38.2 °C)] 97.9 °F (36.6 °C)  Pulse:  [59-69] 61  Resp:  [16-20] 18  SpO2:  [90 %-98 %] 97 %  BP: (131-182)/(58-73) 165/73     Weight: 58.1 kg (128 lb 1.6 oz)  Body mass index is 19.48 kg/m².    Estimated Creatinine Clearance: 15.4 mL/min (A) (based on SCr of 4.1 mg/dL (H)).    Physical Exam  Vitals and nursing note reviewed.   Constitutional:       General: He is not in acute distress.     Appearance: He is not ill-appearing, toxic-appearing or diaphoretic.   HENT:      Head: Normocephalic and atraumatic.   Eyes:      General: No scleral icterus.        Right eye: No discharge.         Left eye: No discharge.   Cardiovascular:      Rate and Rhythm: Normal rate and regular rhythm.      Heart sounds: Normal heart sounds.   Pulmonary:      Effort: Pulmonary effort is normal. No respiratory distress.   Abdominal:      General: Abdomen is flat. Bowel sounds are normal. There is no distension.      Tenderness: There is no abdominal tenderness. There is no guarding.   Musculoskeletal:      Cervical back: Normal range of motion. No rigidity.      Right lower leg: No edema.      Left lower leg: No edema.   Skin:     General: Skin is warm and dry.      Coloration: Skin is not jaundiced.   Neurological:      General: No focal deficit present.      Mental Status: He is alert and oriented to person, place, and time. Mental status is at baseline.      Motor: Weakness (generalized) present.   Psychiatric:         Mood and Affect: Mood normal.         Behavior: Behavior normal.        Significant Labs: All pertinent labs within the past 24 hours have been reviewed.    Significant Imaging: I have reviewed all pertinent imaging results/findings within the past 24 hours.

## 2022-09-07 NOTE — ASSESSMENT & PLAN NOTE
- S/p left thoracotomy prior to admission  - Recurring right effusion s/p thoracentesis x2  - Chest tubes removed 8/29 by CTS   - No further inpatient interventions, follow in clinic  - Increased O2 requirements to 6L on 8/31, improving on antibiotics  - IS encouraged  - Weaning O2 as tolerated

## 2022-09-07 NOTE — ASSESSMENT & PLAN NOTE
- Nephology following  - Further decision for HD per Nephology  - Renally dosing all medications  - Avoid nephrotoxins

## 2022-09-07 NOTE — PLAN OF CARE
Patient free from falls and injuries throughout shift.  AAO X 4 and VSS. Chest tube site dressing changed once during shift. Pt ambulating with one person assist. Satting 92 and above on 2 L NC. Afebrile during night. No acute events overnight. Patient resting well at this time.  Plan of care discussed with patient.  Patient verbalizes understanding.  Will continue to monitor.

## 2022-09-07 NOTE — ASSESSMENT & PLAN NOTE
61 y.o M w/ hx of  recurrent pleural effusions, HTN, HLD, anemia, cataract, CHF (congestive heart failure), T2DM, ESRD on hemodialysis who was transferred to [facility] for IR evaluation for placement of chest tube for extensively loculated right effusion. IR unable to complete US-guided therapeutic right-sided thoracentesis due to extensively loculated effusions with innumerable isolated pockets with sonographic appearance suggestive of a large hematoma/hemothorax. Cardiothoracic surgery placed a pleurx catheter with lytics on 08/18 with pleural studies notable for exudative effusion and cultures negative. Chest tube was removed on 08/30 which was followed by worsening leukocytosis, increasing supplemental oxygen requirements, procal elevation so he was started on azithromycin/ceftriaxone then broadened to vancomycin/cefepim. Repeat CXR did not show worsening of pleural effusions or new airspace disease. No intervention per CTS.      -ID consulted for evaluation after fever >101 on broad spectrum antibiotics.   -No leukocytosis, BC NGTD. Suspect possible non-infectious causes of recent fever.   -Last Tmax 100.7 night of 9/6   -No DVT on LE US   -COVID negative     Recommendations:   --Routine workup of inpatient fever  --Follow up repeat blood cultures  --Continue IV vancomycin and cefepime (both day 7) with plans to de-escalate pending cultures  --Monitor fever curve

## 2022-09-08 PROBLEM — B37.0 THRUSH: Status: ACTIVE | Noted: 2022-01-01

## 2022-09-08 NOTE — PT/OT/SLP PROGRESS
Physical Therapy      Patient Name:  Vega Brownlee   MRN:  [id number]    Patient not seen today secondary to pt refusal. Will follow-up per PT POC.

## 2022-09-08 NOTE — PT/OT/SLP PROGRESS
Occupational Therapy   Treatment    Name: Vega Brownlee  MRN: [id number]  Admitting Diagnosis:  Recurrent pleural effusion on right  21 Days Post-Op    Recommendations:     Discharge Recommendations: rehabilitation facility  Discharge Equipment Recommendations:  bedside commode, bath bench, walker, rolling  Barriers to discharge:  None    Assessment:     Vega Brownlee is a 62 y.o. male with a medical diagnosis of Recurrent pleural effusion on right.  He presents with improved bed mobility and decreased assistance needed with grooming tasks while standing at sink. Pt continues with easy fatigability and impaired safety awareness/high fall risk during ADL. Performance deficits affecting function are weakness, impaired balance, decreased safety awareness, impaired endurance, impaired cardiopulmonary response to activity, impaired self care skills, impaired functional mobility, gait instability.     Rehab Prognosis:  Good; patient would benefit from acute skilled OT services to address these deficits and reach maximum level of function.       Plan:     Patient to be seen 3 x/week to address the above listed problems via self-care/home management, therapeutic exercises, therapeutic activities  Plan of Care Expires:    Plan of Care Reviewed with: patient    Subjective     Pain/Comfort:  Pain Rating 1: 0/10  Pain Rating Post-Intervention 1: 0/10    Objective:     Communicated with: RN prior to session.  Patient found supine with telemetry, oxygen upon OT entry to room.    General Precautions: Standard, fall   Orthopedic Precautions:    Braces:    Respiratory Status: Nasal cannula, flow 2 L/min     Occupational Performance:     Bed Mobility:    Patient completed Rolling/Turning to Right with stand by assistance and with side rail  Patient completed Scooting/Bridging with stand by assistance  Patient completed Supine to Sit with contact guard assistance     Functional Mobility/Transfers:  Patient completed Sit <> Stand Transfer  with contact guard assistance  with  rolling walker   Functional Mobility: CGA to/from bathroom with cueing needed for upright posture and walker management    Activities of Daily Living:  Grooming: modified independence for tasks with SBA provided for static standing at sink with cueing for upright posture  Upper Body Dressing: contact guard assistance    Lower Body Dressing: minimum assistance        AMPAC 6 Click ADL: 20    Treatment & Education:  Pt ed on OT POC  Pt ed on safety and need for assistance  Pt ed on importance of OOB and ROM ex's for increased overall strength and endurance    Patient left up in chair with all lines intact, call button in reach, and RN notified    GOALS:   Multidisciplinary Problems       Occupational Therapy Goals          Problem: Occupational Therapy    Goal Priority Disciplines Outcome Interventions   Occupational Therapy Goal     OT, PT/OT Ongoing, Progressing    Description: Goals to be met by: 7 days 8/29/22     Patient will increase functional independence with ADLs by performing:    Pt to complete UE dressing with set-up  Pt to complete LE dressing with SBA  Pt to complete standing g/h skills with SBA  Pt to complete toileting with SBA  Pt to complete t/f bed, chair and commode with SBA                        Time Tracking:     OT Date of Treatment: 09/08/22  OT Start Time: 1100  OT Stop Time: 1116  OT Total Time (min): 16 min    Billable Minutes:Self Care/Home Management 16               9/8/2022

## 2022-09-08 NOTE — PROGRESS NOTES
"  [facility]asso - Cardiology [facility] Medicine  Telemedicine Progress Note    Patient Name: Vega Brownlee  MRN: [id number]  Patient Class: IP- Inpatient   Admission Date: 8/15/2022  Length of Stay: 24 days  Attending Physician: Mary Grace Marin MD  Primary Care Provider: Ko Perez MD      Subjective:     Principal Problem:Recurrent pleural effusion on right    HPI:  HPI per :  Patient is a 61 y.o. 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 Patient has history of recurrent pleural effusion and was sent to ED for worsening symptoms. IR was consulted and attempted US-guided therapeutic right-sided thoracentesis. Per IR "US reveals an extensively loculated Rt effusion with innumerable isolated pockets with sonographic appearance suggestive of a large hematoma/hemothorax. Only able to remove 50-cc of old, dark blood products also consistent with large Rt hemothorax. Pt would likely require large-bore chest tube placement and instillation of lytics if attempts will be made to resolve the hemothorax vs VATS." Facility seeking transfer for CTS evaluation. Patient is stable on med tele unit only requiring 3L NC with no distress. Stable for transfer.      Overview/Hospital Course:  [facility] Medicine Course:  IR was consulted and attempted US-guided therapeutic right-sided thoracentesis. Per IR "US reveals an extensively loculated Rt effusion with innumerable isolated pockets with sonographic appearance suggestive of a large hematoma/hemothorax. Only able to remove 50-cc of old, dark blood products also consistent with large Rt hemothorax. Pt would likely require large-bore " "chest tube placement and instillation of lytics if attempts will be made to resolve the hemothorax vs VATS."  Patient accepted [place] [facility], in queue to transfer asap. Stable vitals. Will allow to eat and make NPO again at midnight. Lokelma for K 5.8.     [facility] Medicine Course:  Pt accepted to [facility], Nephrology following for HD needs. Evaluated by thoracic surgery, with placement of PleurX catheter x2 on 8/18. Required 2u pRBC after the procedure, with stable low Hgb since that time. Pleural fluid exudative, with elevated LDH and low glucose. Cultures negative. Cytology pending. Interpretation is difficult with prior manipulation with traumatic thoras in the past. However, with new CT chest findings would certainly benefit from outpatient malignancy eval with follow up CT chest in 3 months and possible bx pending results.     Chest tube removed by CTS on 8/30, tolerated well with same O2 requirements. Leukocytosis noted with elevated procal, BCx obtained and started on azithro/rocephin; leukocytosis worsened on 8/31 with relative hypotension compared to previous (nifedipine held), so abx broadened to vanc and cefepime pending culture results. Overnight and into 8/31, pt noted to have increased O2 requirements up to 6L NC from 2L: CXR without pneumothorax; discussed with CTS, who stated they have no further inpatient recommendations and will follow him in clinic.    Planning to discharge to [facility] when medically stable, SW/CM assisting.      Telemedicine  This service was provided by Virtual Visit.    Patient was transferred to [facility] on:  9/5/2022    Chief Complaint   Patient presents with    Shortness of Breath     Pt reports was sent to ER for CT. Pt with increased SOB and hx of bilateral plural effusions.      The patient location is: 306/306 A   Admitted 8/15/2022 10:54 AM  Present with the patient at the time of the telemed/virtual assessment: Telepresenter    Interval History / Events " Overnight:   The patient is able to provide adequate history - patient declines the use of a Tajik . Additional history was obtained from past medical records. No significant events reported by Nursing.  BP noted to remain elevated  Patient complains of nothing new, feels well. Symptoms have been unchanged since yesterday. Associated symptoms include: fatigue. Symptoms are stable.     Lab test(s) reviewed: H&H stable    Review of Systems   Constitutional:  Positive for fever.   Respiratory:  Negative for shortness of breath.      Objective:     Vital Signs (Most Recent):  Temp: 97.3 °F (36.3 °C) (09/07/22 1300)  Pulse: 61 (09/07/22 1303)  Resp: 17 (09/07/22 1303)  BP: (!) 186/73 (09/07/22 1300)  SpO2: 97 % (09/07/22 1303)   Vital Signs (24h Range):  Temp:  [97.3 °F (36.3 °C)-100.7 °F (38.2 °C)] 97.3 °F (36.3 °C)  Pulse:  [58-69] 61  Resp:  [17-20] 17  SpO2:  [90 %-98 %] 97 %  BP: (131-186)/(58-73) 186/73     Weight: 58.1 kg (128 lb 1.6 oz)  Body mass index is 19.48 kg/m².    Intake/Output Summary (Last 24 hours) at 9/7/2022 1318  Last data filed at 9/7/2022 1135  Gross per 24 hour   Intake 840 ml   Output 2650 ml   Net -1810 ml        Physical Exam  Constitutional:       General: He is not in acute distress.     Appearance: Normal appearance.   Eyes:      General: Lids are normal. No scleral icterus.        Right eye: No discharge.         Left eye: No discharge.      Conjunctiva/sclera: Conjunctivae normal.   Neck:      Trachea: Phonation normal.   Cardiovascular:      Rate and Rhythm: Normal rate.      Comments: Monitor / Vital signs reviewed at time of visit  Pulmonary:      Effort: Pulmonary effort is normal. No tachypnea, accessory muscle usage or respiratory distress.   Abdominal:      General: There is no distension.   Skin:     Coloration: Skin is not cyanotic.   Neurological:      Mental Status: He is alert. He is not disoriented.   Psychiatric:         Attention and Perception:  Attention normal.         Mood and Affect: Affect normal.         Behavior: Behavior is cooperative.       Significant Labs:   Recent Labs   Lab 08/15/22  2119   HGBA1C 5.6       Recent Labs   Lab 09/06/22  1115 09/06/22  1524 09/06/22 2110   POCTGLUCOSE 215* 192* 129*       Recent Labs   Lab 09/05/22  0438 09/06/22 0445 09/07/22  0430   WBC 10.39 10.96 10.62   HGB 7.4* 7.6* 7.5*   HCT 23.4* 23.7* 23.3*    222 216       Recent Labs   Lab 09/05/22  0438 09/06/22 0445 09/07/22  0430   GRAN 80.1*  8.3* 78.7*  8.6* 81.0*  8.6*   LYMPH 6.9*  0.7* 7.6*  0.8* 6.5*  0.7*   MONO 9.0  0.9 10.6  1.2* 8.9  0.9   EOS 0.3 0.2 0.2       Recent Labs   Lab 09/05/22 0438 09/06/22 0445 09/07/22  0430   * 134* 133*   K 4.3 3.7 4.2   CL 98 101 99   CO2 25 25 24   BUN 38* 22 30*   CREATININE 5.2* 3.2* 4.1*   * 132* 128*   CALCIUM 7.2* 7.5* 7.5*   ALBUMIN 1.9* 1.9* 1.9*   MG 2.1 1.9 1.9   PHOS 3.1 2.5* 2.5*       Recent Labs   Lab 09/01/22  0752 09/02/22 0436 09/05/22 0438 09/06/22 0445 09/07/22  0430   ALKPHOS  --    < > 156* 196* 184*   ALT  --    < > 9* 9* 9*   AST  --    < > 22 23 26   PROT  --    < > 5.3* 5.4* 5.5*   BILITOT  --    < > 1.1* 1.0 1.1*     --   --   --   --     < > = values in this interval not displayed.       Recent Labs   Lab 08/21/22 2136 08/22/22 0313 08/30/22  0739 09/05/22 2038   PROCAL  --   --  1.19*  --    LACTATE 0.8  --  0.7 0.9   FERRITIN  --  1,751*  --   --        SARS-CoV2 (COVID-19) Qualitative PCR (no units)   Date Value   09/06/2022 Not Detected   07/31/2020 Negative     SARS-CoV-2 RNA, Amplification, Qual (no units)   Date Value   05/04/2020 Negative     POC Rapid COVID (no units)   Date Value   08/15/2022 Negative   02/02/2021 Negative       ECG Results              EKG 12-LEAD (Final result)  Result time 08/16/22 12:10:03      Final result by Unknown User (08/16/22 12:10:03)                                        Results for orders placed during the  hospital encounter of 09/02/20    Echo Color Flow Doppler? Yes    Interpretation Summary  · Concentric left ventricular hypertrophy.  · Normal left ventricular systolic function. The estimated ejection fraction is 55%.  · Moderate right ventricular enlargement.  · Normal right ventricular systolic function.  · Grade II (moderate) left ventricular diastolic dysfunction consistent with pseudonormalization.  · Severe biatrial enlargement.  · Mild tricuspid regurgitation.  · Trivial pericardial effusion.  · The estimated PA systolic pressure is 77 mmHg.  · Elevated central venous pressure (15 mmHg).      US Lower Extremity Veins Bilateral  Narrative: EXAMINATION:  US LOWER EXTREMITY VEINS BILATERAL    CLINICAL HISTORY:  fever;    TECHNIQUE:  Duplex and color flow Doppler and dynamic compression was performed of the bilateral lower extremity veins was performed.    COMPARISON:  None    FINDINGS:  Right thigh veins: The common femoral, femoral, popliteal, upper greater saphenous, and deep femoral veins are patent and free of thrombus. The veins are normally compressible and have normal phasic flow and augmentation response.    Right calf veins: The visualized calf veins are patent.    Left thigh veins: The common femoral, femoral, popliteal, upper greater saphenous, and deep femoral veins are patent and free of thrombus. The veins are normally compressible and have normal phasic flow and augmentation response.    Left calf veins: The visualized calf veins are patent.    Miscellaneous: None  Impression: No evidence of deep venous thrombosis in either lower extremity.    Electronically signed by resident: Jeffrey Quintero  Date:    09/07/2022  Time:    01:52    Electronically signed by: Selam Gonzalez MD  Date:    09/07/2022  Time:    01:55      Labs and Imaging within the last 24 hours listed above were reviewed.       Diet: Diet diabetic [facility]; 2000 Calorie  Significant LDAs:   IV Access Type: Peripheral and  Dialysis Access  Urinary Catheter Indication if present: Patient Does Not Have Urinary Catheter  Other Lines/Tubes/Drains:    HIGH RISK CONDITION(S):   Patient has a condition that poses threat to life and bodily function: Respiratory Distress     Goals of Care:    Previous admission:  2/2/21  Likely prognosis:  Fair  Code Status: Full Code  Comfort Only: No  Hospice: No  Goals at discharge: remain at home, with physician follow-up    Discharge Planning   WENDY: 9/8/2022     Code Status: Full Code   Is the patient medically ready for discharge?: No    Reason for patient still in hospital (select all that apply): Pending disposition  Discharge Plan A: Rehab          Assessment/Plan:      * Recurrent pleural effusion on right  - S/p left thoracotomy prior to admission  - Recurring right effusion s/p thoracentesis x2  - Chest tubes removed 8/29 by CTS   - No further inpatient interventions, follow in clinic  - Increased O2 requirements to 6L on 8/31, improving on antibiotics  - IS encouraged  - Weaning O2 as tolerated    Fever  Febrile at end of planned antibiotic course. Asymptomatic with exception of chills.  ID consulted. Checked COVID test, LE US, blood culture.  Continuing IV antibiotics for now pending cultures    Acute on chronic respiratory failure with hypoxia  Improving. Weaned to 2 LPM NC    Leukocytosis  - Procal elevated  - BCx NTD  - Azithro/Rocephin broadened to vancomycin and cefepime on 8/31.   - Leukocytosis resolved. Completed 7d course (end date 9/6) but antibiotics continued due to fever. ID consulted.    Physical deconditioning  - Rehab medicine following  - Accepted to [facility] when medically clear for DC.     Chronic diastolic heart failure  - Remains grossly asymptomatic, euvolemic, not in acute exacerbation  - Continue home cardioprudent regimen  - Monitoring on tele    Chronic kidney disease-mineral and bone disorder  - Renal management     Anemia in ESRD (end-stage renal disease)  - H/H stable  near baseline on admission, with drop after pleurex catheter placement 8/18.   - S/p 2u pRBC 8/19. S/p 1u pRBC 8/23, no active bleeding   - Now stable    ESRD (end stage renal disease) on dialysis  - Nephology following  - Further decision for HD per Nephology  - Renally dosing all medications  - Avoid nephrotoxins    Mixed hyperlipidemia associated with type 2 diabetes mellitus  - Continue home statin     Type 2 diabetes mellitus with chronic kidney disease on chronic dialysis, without long-term current use of insulin  - SSI provided for corrective dosing  - Hypoglycemic protocol in effect  - Diabetic diet provided    Hypertension associated with ESRD caused by type 2 diabetes mellitus, on dialysis  - Continue home cardioprudent regimen; nifedipine held on 8/31 due to relative hypotension in the setting of new infection concerns  - HD to assist with BP control  - PRN IV hydralazine and/or IV labetalol provided for SBP>180  - continue to monitor and further titrate antihypertensives as clinically indicated         Active Hospital Problems    Diagnosis  POA    *Recurrent pleural effusion on right [J90]  Yes    Fever [R50.9]  Unknown    Acute on chronic respiratory failure with hypoxia [J96.21]  Yes    Leukocytosis [D72.829]  Yes    Physical deconditioning [R53.81]  Yes    Anemia in ESRD (end-stage renal disease) [N18.6, D63.1]  Yes    Chronic kidney disease-mineral and bone disorder [N18.9, E83.9, M89.9]  Yes    Chronic diastolic heart failure [I50.32]  Yes    ESRD (end stage renal disease) on dialysis [N18.6, Z99.2]  Not Applicable     -HD on T-Th-Sat with Dr. Lowery      Mixed hyperlipidemia associated with type 2 diabetes mellitus [E11.69, E78.2]  Yes    Type 2 diabetes mellitus with chronic kidney disease on chronic dialysis, without long-term current use of insulin [E11.22, N18.6, Z99.2]  Not Applicable    Hypertension associated with ESRD caused by type 2 diabetes mellitus, on dialysis [E11.22, I12.0,  Z99.2, N18.6]  Not Applicable      Resolved Hospital Problems    Diagnosis Date Resolved POA    Hypophosphatemia [E83.39] 08/25/2022 Yes    Thrombocytopenia [D69.6] 08/31/2022 Yes    Hyperkalemia [E87.5] 08/28/2022 Yes       Inpatient Medications Prescribed for Management of Current Problems:     Scheduled Meds:    sodium chloride 0.9%   Intravenous Once    albuterol-ipratropium  3 mL Nebulization Q6H    atorvastatin  40 mg Oral QHS    carvediloL  3.125 mg Oral BID    ceFEPime (MAXIPIME) IVPB  1 g Intravenous Q24H    diphenhydrAMINE  25 mg Oral QHS    epoetin michael (PROCRIT) injection  5,000 Units Intravenous Every Mon, Wed, Fri    LIDOcaine HCL 10 mg/ml (1%)  10 mL Intradermal Once    naloxone  0.4 mg Intravenous Once    pantoprazole  40 mg Oral Daily    polyethylene glycol  17 g Oral BID     Continuous Infusions:   As Needed: sodium chloride, acetaminophen, dextrose 10%, dextrose 10%, gelatin adsorbable 12-7 mm top sponge, glucagon (human recombinant), glucose, glucose, hydrALAZINE, HYDROmorphone, hydrOXYzine HCL, insulin aspart U-100, labetalol, naloxone, ondansetron, sodium chloride 0.9%, Pharmacy to dose Vancomycin consult **AND** vancomycin - pharmacy to dose    VTE Risk Mitigation (From admission, onward)         Ordered     IP VTE HIGH RISK PATIENT  Once         08/15/22 2005     Place sequential compression device  Until discontinued         08/15/22 2005              I have assessed these finding virtually using telemed platform and with assistance of bedside nurse     The attending portion of this evaluation, treatment, and documentation was performed per Mary Grace Marin MD via Telemedicine AudioVisual using the secure Baby [employer] Language software platform with 2 way audio/video. The provider was located off-site and the patient is located in the hospital. The aforementioned video software was utilized to document the relevant history and physical exam    Mary Grace Marin MD  Department of Hospital  Medicine   [facility] - Cardiology Stepdown

## 2022-09-08 NOTE — ASSESSMENT & PLAN NOTE
Febrile at end of planned antibiotic course. Asymptomatic with exception of chills.  ID consulted. Checked COVID test neg, LE US neg for DVT, blood culture NG so far  Continuing IV antibiotics for now pending cultures  ID following.

## 2022-09-08 NOTE — ASSESSMENT & PLAN NOTE
- Nephology following  - Further decisions regarding HD per Nephology  - Renally dosing all medications  - Avoid nephrotoxins

## 2022-09-08 NOTE — ASSESSMENT & PLAN NOTE
- Continue home cardioprudent regimen; nifedipine held on 8/31 due to relative hypotension in the setting of new infection concerns; continue carvedilol  - HD to assist with BP control  - PRN IV hydralazine and/or IV labetalol provided for SBP>180  - continue to monitor and further titrate antihypertensives as clinically indicated

## 2022-09-08 NOTE — ASSESSMENT & PLAN NOTE
- Procal elevated  - BCx NTD  - Azithro/Rocephin broadened to vancomycin and cefepime on 8/31.   - Leukocytosis resolved. Completed 7d course (end date 9/6) but antibiotics continued due to fever. ID following.

## 2022-09-08 NOTE — PLAN OF CARE
Recommendations  1. When medically appropriate modify diet to renal/diabetic diet-fluid per MD   2. If po intake declines <50% meal consumption add Novasource Renal for optimization of calorie intake   3. RD following     Goals: Continue to meet % of EEN/EPN by RD f/u date  Nutrition Goal Status: goal met  Communication of RD Recs: other (POC)

## 2022-09-08 NOTE — PROGRESS NOTES
"      [facility] - Cardiology [facility] Medicine  Telemedicine Progress Note    Patient Name: Vega Brownlee  MRN: [id number]  Patient Class: IP- Inpatient   Admission Date: 8/15/2022  Length of Stay: 24 days  Attending Physician: Kiana Anguiano MD  Primary Care Provider: Ko Perez MD          Subjective:     Principal Problem:Recurrent pleural effusion on right        HPI:  HPI per :  Patient is a 61 y.o. 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 Patient has history of recurrent pleural effusion and was sent to ED for worsening symptoms. IR was consulted and attempted US-guided therapeutic right-sided thoracentesis. Per IR "US reveals an extensively loculated Rt effusion with innumerable isolated pockets with sonographic appearance suggestive of a large hematoma/hemothorax. Only able to remove 50-cc of old, dark blood products also consistent with large Rt hemothorax. Pt would likely require large-bore chest tube placement and instillation of lytics if attempts will be made to resolve the hemothorax vs VATS." Facility seeking transfer for CTS evaluation. Patient is stable on med tele unit only requiring 3L NC with no distress. Stable for transfer.      Overview/Hospital Course:  [facility] Medicine Course:  IR was consulted and attempted US-guided therapeutic right-sided thoracentesis. Per IR "US reveals an extensively loculated Rt effusion with innumerable isolated pockets with sonographic appearance suggestive of a large hematoma/hemothorax. Only able to remove 50-cc of old, dark blood products also consistent with large Rt hemothorax. Pt would likely require " "large-bore chest tube placement and instillation of lytics if attempts will be made to resolve the hemothorax vs VATS."  Patient accepted to [facility], in queue to transfer asap. Stable vitals. Will allow to eat and make NPO again at midnight. Lokelma for K 5.8.     [facility] Medicine Course:  [place] [facility] [place] needs. Evaluated by thoracic surgery, with placement of PleurX catheter x2 on 8/18. Required 2u pRBC after the procedure, with stable low Hgb since that time. Pleural fluid exudative, with elevated LDH and low glucose. Cultures negative. Cytology pending. Interpretation is difficult with prior manipulation with traumatic thoras in the past. However, with new CT chest findings would certainly benefit from outpatient malignancy eval with follow up CT chest in 3 months and possible bx pending results.     Chest tube removed by CTS on 8/30, tolerated well with same O2 requirements. Leukocytosis noted with elevated procal, BCx obtained and started on azithro/rocephin; leukocytosis worsened on 8/31 with relative hypotension compared to previous (nifedipine held), so abx broadened to vanc and cefepime pending culture results. Overnight and into 8/31, pt noted to have increased O2 requirements up to 6L NC from 2L: CXR without pneumothorax; discussed with CTS, who stated they have no further inpatient recommendations and will follow him in clinic.    Planning to discharge to [facility] when medically stable, SW/CM assisting.        This encounter was provided through telemedicine.  Patient was transferred to the telemedicine service on:  09/05/2022   The [facility] admitted 8/15/2022 10:54 AM.  Present with the patient at the time of the telemed/virtual assessment: Telepresenter    Interval History/Overnight Events:   Clinical record since admit reviewed.    Patient without complaints - uneventful night - denies dyspnea or cough; he remains on supplemental oxygen at " 2L/min; has not had BM in 3 days but refusing miralax so changed to senokot-s; afebrile for 24 hours    Review of Systems   Constitutional:  Positive for activity change and fatigue. Negative for appetite change and fever.   Respiratory:  Negative for shortness of breath.    Cardiovascular:  Negative for chest pain.   Gastrointestinal:  Negative for diarrhea and vomiting.   Musculoskeletal:  Negative for arthralgias and back pain.      Inpatient Medications:  Scheduled Meds:   [START ON 9/9/2022] sodium chloride 0.9%   Intravenous Once    albuterol-ipratropium  3 mL Nebulization Q6H    atorvastatin  40 mg Oral QHS    carvediloL  3.125 mg Oral BID    ceFEPime (MAXIPIME) IVPB  1 g Intravenous Q24H    diphenhydrAMINE  25 mg Oral QHS    epoetin michael (PROCRIT) injection  5,000 Units Intravenous Every Mon, Wed, Fri    fluconazole  200 mg Oral Daily    Followed by    [START ON 9/9/2022] fluconazole  100 mg Oral Daily    LIDOcaine HCL 10 mg/ml (1%)  10 mL Intradermal Once    naloxone  0.4 mg Intravenous Once    pantoprazole  40 mg Oral Daily    senna-docusate 8.6-50 mg  1 tablet Oral BID     Continuous Infusions:  PRN Meds:.sodium chloride, acetaminophen, dextrose 10%, dextrose 10%, gelatin adsorbable 12-7 mm top sponge, glucagon (human recombinant), glucose, glucose, hydrALAZINE, HYDROmorphone, hydrOXYzine HCL, insulin aspart U-100, labetalol, naloxone, ondansetron, sodium chloride 0.9%, Pharmacy to dose Vancomycin consult **AND** vancomycin - pharmacy to dose      Objective:     Temp:  [97.4 °F (36.3 °C)-98.6 °F (37 °C)] 97.4 °F (36.3 °C)  Pulse:  [62-76] 62  Resp:  [16-18] 18  SpO2:  [85 %-99 %] 99 %  BP: (132-172)/(60-71) 144/64      Intake/Output Summary (Last 24 hours) at 9/8/2022 1430  Last data filed at 9/8/2022 1300  Gross per 24 hour   Intake 1502 ml   Output --   Net 1502 ml        Body mass index is 19.48 kg/m².    Physical Exam  Vitals and nursing note reviewed.   Constitutional:       General: He  is not in acute distress.     Appearance: He is normal weight. He is ill-appearing.   HENT:      Head: Normocephalic and atraumatic.      Right Ear: Hearing normal.      Left Ear: Hearing normal.      Nose: Nose normal.      Mouth/Throat:      Pharynx: Oropharyngeal exudate present.   Eyes:      General: No scleral icterus.        Right eye: No discharge.         Left eye: No discharge.      Extraocular Movements: Extraocular movements intact.   Cardiovascular:      Rate and Rhythm: Normal rate.   Pulmonary:      Effort: Pulmonary effort is normal. No accessory muscle usage or respiratory distress.   Neurological:      General: No focal deficit present.      Mental Status: He is alert and oriented to person, place, and time.      Cranial Nerves: No cranial nerve deficit.      Motor: No weakness.   Psychiatric:         Attention and Perception: Attention normal.         Mood and Affect: Mood normal.         Speech: Speech normal.         Behavior: Behavior is cooperative.        Labs:  Recent Results (from the past 24 hour(s))   POCT glucose    Collection Time: 09/07/22  4:21 PM   Result Value Ref Range    POCT Glucose 160 (H) 70 - 110 mg/dL   POCT glucose    Collection Time: 09/07/22  9:07 PM   Result Value Ref Range    POCT Glucose 222 (H) 70 - 110 mg/dL   Phosphorus    Collection Time: 09/08/22  5:08 AM   Result Value Ref Range    Phosphorus 2.0 (L) 2.7 - 4.5 mg/dL   Magnesium    Collection Time: 09/08/22  5:08 AM   Result Value Ref Range    Magnesium 1.9 1.6 - 2.6 mg/dL   CBC Auto Differential    Collection Time: 09/08/22  5:08 AM   Result Value Ref Range    WBC 10.87 3.90 - 12.70 K/uL    RBC 2.49 (L) 4.60 - 6.20 M/uL    Hemoglobin 7.6 (L) 14.0 - 18.0 g/dL    Hematocrit 24.3 (L) 40.0 - 54.0 %    MCV 98 82 - 98 fL    MCH 30.5 27.0 - 31.0 pg    MCHC 31.3 (L) 32.0 - 36.0 g/dL    RDW 19.3 (H) 11.5 - 14.5 %    Platelets 236 150 - 450 K/uL    MPV 9.2 9.2 - 12.9 fL    Immature Granulocytes 0.6 (H) 0.0 - 0.5 %    Gran #  (ANC) 8.6 (H) 1.8 - 7.7 K/uL    Immature Grans (Abs) 0.06 (H) 0.00 - 0.04 K/uL    Lymph # 0.9 (L) 1.0 - 4.8 K/uL    Mono # 1.1 (H) 0.3 - 1.0 K/uL    Eos # 0.2 0.0 - 0.5 K/uL    Baso # 0.11 0.00 - 0.20 K/uL    nRBC 0 0 /100 WBC    Gran % 79.3 (H) 38.0 - 73.0 %    Lymph % 7.8 (L) 18.0 - 48.0 %    Mono % 9.7 4.0 - 15.0 %    Eosinophil % 1.6 0.0 - 8.0 %    Basophil % 1.0 0.0 - 1.9 %    Differential Method Automated    Comprehensive Metabolic Panel    Collection Time: 09/08/22  5:08 AM   Result Value Ref Range    Sodium 134 (L) 136 - 145 mmol/L    Potassium 4.0 3.5 - 5.1 mmol/L    Chloride 100 95 - 110 mmol/L    CO2 23 23 - 29 mmol/L    Glucose 164 (H) 70 - 110 mg/dL    BUN 20 8 - 23 mg/dL    Creatinine 2.9 (H) 0.5 - 1.4 mg/dL    Calcium 7.6 (L) 8.7 - 10.5 mg/dL    Total Protein 5.7 (L) 6.0 - 8.4 g/dL    Albumin 1.9 (L) 3.5 - 5.2 g/dL    Total Bilirubin 1.0 0.1 - 1.0 mg/dL    Alkaline Phosphatase 192 (H) 55 - 135 U/L    AST 25 10 - 40 U/L    ALT 7 (L) 10 - 44 U/L    Anion Gap 11 8 - 16 mmol/L    eGFR 23.7 (A) >60 mL/min/1.73 m^2   POCT glucose    Collection Time: 09/08/22  8:28 AM   Result Value Ref Range    POCT Glucose 193 (H) 70 - 110 mg/dL   POCT glucose    Collection Time: 09/08/22 10:52 AM   Result Value Ref Range    POCT Glucose 203 (H) 70 - 110 mg/dL        Lab Results   Component Value Date    [id number] Not Detected 09/06/2022       Recent Labs   Lab 09/06/22 0445 09/07/22  0430 09/08/22  0508   WBC 10.96 10.62 10.87   LYMPH 7.6*  0.8* 6.5*  0.7* 7.8*  0.9*   HGB 7.6* 7.5* 7.6*   HCT 23.7* 23.3* 24.3*    216 236     Recent Labs   Lab 09/06/22 0445 09/07/22 0430 09/08/22  0508   * 133* 134*   K 3.7 4.2 4.0    99 100   CO2 25 24 23   BUN 22 30* 20   CREATININE 3.2* 4.1* 2.9*   * 128* 164*   CALCIUM 7.5* 7.5* 7.6*   MG 1.9 1.9 1.9   PHOS 2.5* 2.5* 2.0*     Recent Labs   Lab 09/06/22 0445 09/07/22  0430 09/08/22  0508   ALKPHOS 196* 184* 192*   ALT 9* 9* 7*   AST 23 26 25    ALBUMIN 1.9* 1.9* 1.9*   PROT 5.4* 5.5* 5.7*   BILITOT 1.0 1.1* 1.0        No results for input(s): DDIMER, FERRITIN, CRP, LDH, BNP, TROPONINI, CPK in the last 72 hours.    Invalid input(s): PROCALCITONIN    All labs within the last 24 hours were reviewed.     Microbiology:  Microbiology Results (last 7 days)       Procedure Component Value Units Date/Time    Blood culture [[id number]] Collected: 09/05/22 2038    Order Status: Completed Specimen: Blood Updated: 09/07/22 2312     Blood Culture, Routine No Growth to date      No Growth to date      No Growth to date    Blood culture [[id number]] Collected: 09/05/22 2037    Order Status: Completed Specimen: Blood Updated: 09/07/22 2312     Blood Culture, Routine No Growth to date      No Growth to date      No Growth to date    Fungus culture [[id number]] Collected: 08/18/22 1057    Order Status: Completed Specimen: Pleural Fluid from Lung, Right Updated: 09/07/22 1023     Fungus (Mycology) Culture Culture in progress      No fungus isolated after 2 weeks    Narrative:      Right pleural effusion for culture    Blood culture [[id number]] Collected: 08/30/22 0739    Order Status: Completed Specimen: Blood from Antecubital, Left Arm Updated: 09/04/22 0822     Blood Culture, Routine No growth after 5 days.    Blood culture [[id number]] Collected: 08/30/22 0741    Order Status: Completed Specimen: Blood from Peripheral, Left Hand Updated: 09/04/22 0822     Blood Culture, Routine No growth after 5 days.              Imaging  ECG Results              EKG 12-LEAD (Final result)  Result time 08/16/22 12:10:03      Final result by Unknown User (08/16/22 12:10:03)                                        Results for orders placed during the hospital encounter of 09/02/20    Echo Color Flow Doppler? Yes    Interpretation Summary  · Concentric left ventricular hypertrophy.  · Normal left ventricular systolic function. The estimated ejection fraction is 55%.  · Moderate right  ventricular enlargement.  · Normal right ventricular systolic function.  · Grade II (moderate) left ventricular diastolic dysfunction consistent with pseudonormalization.  · Severe biatrial enlargement.  · Mild tricuspid regurgitation.  · Trivial pericardial effusion.  · The estimated PA systolic pressure is 77 mmHg.  · Elevated central venous pressure (15 mmHg).      US Lower Extremity Veins Bilateral  Narrative: EXAMINATION:  US LOWER EXTREMITY VEINS BILATERAL    CLINICAL HISTORY:  fever;    TECHNIQUE:  Duplex and color flow Doppler and dynamic compression was performed of the bilateral lower extremity veins was performed.    COMPARISON:  None    FINDINGS:  Right thigh veins: The common femoral, femoral, popliteal, upper greater saphenous, and deep femoral veins are patent and free of thrombus. The veins are normally compressible and have normal phasic flow and augmentation response.    Right calf veins: The visualized calf veins are patent.    Left thigh veins: The common femoral, femoral, popliteal, upper greater saphenous, and deep femoral veins are patent and free of thrombus. The veins are normally compressible and have normal phasic flow and augmentation response.    Left calf veins: The visualized calf veins are patent.    Miscellaneous: None  Impression: No evidence of deep venous thrombosis in either lower extremity.    Electronically signed by resident: Jeffrey Quintero  Date:    09/07/2022  Time:    01:52    Electronically signed by: Selam Gonzalez MD  Date:    09/07/2022  Time:    01:55      All imaging within the last 24 hours was reviewed.       Discharge Planning   WENDY: 9/9/2022     Code Status: Full Code   Is the patient medically ready for discharge?: No    Reason for patient still in hospital (select all that apply): Patient trending condition, Treatment, Consult recommendations, and Pending disposition  Discharge Plan A: Rehab            Assessment/Plan:      * Recurrent pleural effusion on  right  - S/p left thoracotomy prior to admission  - Recurring right effusion s/p thoracentesis x2  - Chest tubes removed 8/29 by CTS   - No further inpatient interventions, follow in clinic  - Increased O2 requirements to 6L on 8/31, improving on antibiotics  - IS encouraged  - Weaning O2 as tolerated    Thrush  Continue therapy with diflucan      Fever  Febrile at end of planned antibiotic course. Asymptomatic with exception of chills.  ID consulted. Checked COVID test neg, LE US neg for DVT, blood culture NG so far  Continuing IV antibiotics for now pending cultures  ID following.    Acute on chronic respiratory failure with hypoxia  Improving. Weaned to 2 LPM NC; continue to wean as tolerated    Leukocytosis  - Procal elevated  - BCx NTD  - Azithro/Rocephin broadened to vancomycin and cefepime on 8/31.   - Leukocytosis resolved. Completed 7d course (end date 9/6) but antibiotics continued due to fever. ID following.    Physical deconditioning  - Rehab medicine following  - Accepted to [facility] when medically clear for DC.     Chronic diastolic heart failure  - Remains grossly asymptomatic, euvolemic, not in acute exacerbation  - Continue home cardioprudent regimen and HD for volume removal  - Monitoring on tele    Chronic kidney disease-mineral and bone disorder  - Renal management     Anemia in ESRD (end-stage renal disease)  - H/H stable near baseline on admission, with drop after pleurex catheter placement 8/18.   - S/p 2u pRBC 8/19. S/p 1u pRBC 8/23, no active bleeding   - Now stable  -continue Epo therapy    ESRD (end stage renal disease) on dialysis  - Nephology following  - Further decisions regarding HD per Nephology  - Renally dosing all medications  - Avoid nephrotoxins    Mixed hyperlipidemia associated with type 2 diabetes mellitus  - Continue home statin     Type 2 diabetes mellitus with chronic kidney disease on chronic dialysis, without long-term current use of insulin  - SSI provided for corrective  dosing  - Hypoglycemic protocol in effect  - Diabetic diet provided    Hypertension associated with ESRD caused by type 2 diabetes mellitus, on dialysis  - Continue home cardioprudent regimen; nifedipine held on 8/31 due to relative hypotension in the setting of new infection concerns; continue carvedilol  - HD to assist with BP control  - PRN IV hydralazine and/or IV labetalol provided for SBP>180  - continue to monitor and further titrate antihypertensives as clinically indicated       VTE Risk Mitigation (From admission, onward)         Ordered     IP VTE HIGH RISK PATIENT  Once         08/15/22 2005     Place sequential compression device  Until discontinued         08/15/22 2005              High Risk Conditions:  Patient is currently on drug therapy requiring intensive monitoring for toxicity: Vancomycin    I have assessed these findings virtually using a telemed platform and with assistance of the bedside nurse.      The attending portion of this evaluation, treatment, and documentation was performed per Kiana Anguiano MD via Telemedicine AudioVisual using the secure Vidyo software platform with 2 way audio/video. The provider was located off-site and the patient is located in the hospital. The aforementioned video software was utilized to document the relevant history and physical exam    Kiana Anguiano MD  Department of Hospital Medicine   [facility] - Cardiology Stepdown

## 2022-09-08 NOTE — ASSESSMENT & PLAN NOTE
- Remains grossly asymptomatic, euvolemic, not in acute exacerbation  - Continue home cardioprudent regimen and HD for volume removal  - Monitoring on tele

## 2022-09-08 NOTE — ASSESSMENT & PLAN NOTE
- Rehab medicine following  - Accepted to [facility] when medically clear for DC.

## 2022-09-08 NOTE — ASSESSMENT & PLAN NOTE
- H/H stable near baseline on admission, with drop after pleurex catheter placement 8/18.   - S/p 2u pRBC 8/19. S/p 1u pRBC 8/23, no active bleeding   - Now stable  -continue Epo therapy

## 2022-09-08 NOTE — PROGRESS NOTES
[facility] - Cardiology Stepdown  Infectious Disease  Progress Note    Patient Name: Vega Brownlee  MRN: [id number]  Admission Date: 8/15/2022  Length of Stay: 24 days  Attending Physician: Kiana Anguiano MD  Primary Care Provider: Ko Perez MD    Isolation Status: No active isolations  Assessment/Plan:      * Recurrent pleural effusion on right  Fever  61 y.o M w/ hx of  recurrent pleural effusions, HTN, HLD, anemia, cataract, CHF (congestive heart failure), T2DM, ESRD on hemodialysis who was transferred to [facility] for IR evaluation for placement of chest tube for extensively loculated right effusion. IR unable to complete US-guided therapeutic right-sided thoracentesis due to extensively loculated effusions with innumerable isolated pockets with sonographic appearance suggestive of a large hematoma/hemothorax. Cardiothoracic surgery placed a pleurx catheter with lytics on 08/18 with pleural studies notable for exudative effusion and cultures negative. Chest tube was removed on 08/30 which was followed by worsening leukocytosis, increasing supplemental oxygen requirements, procal elevation so he was started on azithromycin/ceftriaxone then broadened to vancomycin/cefepim. Repeat CXR did not show worsening of pleural effusions or new airspace disease. No intervention per CTS.     -ID consulted for evaluation after fever >101 on broad spectrum antibiotics.   -No leukocytosis, BC NGTD. Suspect possible non-infectious causes of recent fever.   -Last Tmax 100.7 night of 9/6; afebrile since   -No DVT on LE US   -COVID negative      Recommendations:   --Routine workup of inpatient fever  --Continue IV vancomycin and cefepime to complete 10 day empiric course; anticipated stop date 9/10/22 then stop all antibiotics and monitor     ID will sign off. Please re-consult if new infectious concern arises.     Thank you for your consult. I will sign off. Please contact us if you have any additional questions.    Michael Fabian  DO  Infectious Disease  [address] - Cardiology Stepdown    Subjective:     Principal Problem:Recurrent pleural effusion on right    HPI: Patient information was obtained from patient, past medical records, and ER records. HPI limited due to language barrier. Virtual  was used for assistance.     Mr. Brownlee is a 61 y.o M w/ hx of  recurrent pleural effusions, HTN, HLD, anemia, cataract, CHF (congestive heart failure), T2DM, ESRD on hemodialysis who was [place] [facility] [place] for placement of chest tube for extensively loculated right effusion. IR unable to complete US-guided therapeutic right-sided thoracentesis due to extensively loculated effusions with innumerable isolated pockets with sonographic appearance suggestive of a large hematoma/hemothorax. Cardiothoracic surgery placed a pleurx catheter with lytics on 08/18 with pleural studies notable for exudative effusion and cultures negative. Chest tube was removed on 08/30 which was followed by worsening leukocytosis, increasing supplemental oxygen requirements, procal elevation so he was started on azithromycin/ceftriaxone then broadened to vancomycin/cefepim. Repeat CXR did not show worsening of pleural effusions or new airspace disease. No intervention per CTS.    ID consulted for evaluation after fever >101 on broad spectrum antibiotics.     Interval History: Patient seen at bedside. No new concerns today. Having wound cleaned by nursing. Feels like he is improving. Will complete empiric antibiotic course now that fevers have resolved.     Review of Systems   Constitutional:  Positive for fatigue and fever. Negative for chills.   HENT:  Negative for congestion and rhinorrhea.    Eyes:  Negative for discharge and visual disturbance.   Respiratory:  Positive for cough. Negative for shortness of breath.    Cardiovascular:  Negative for chest pain and leg swelling.   Gastrointestinal:  Negative for constipation, diarrhea, nausea and vomiting.    Genitourinary:  Positive for decreased urine volume (ESRD on dialysis) and difficulty urinating (ESRD on dialysis).   Musculoskeletal:  Negative for arthralgias and back pain.   Neurological:  Negative for dizziness and headaches.   Objective:     Vital Signs (Most Recent):  Temp: 97.4 °F (36.3 °C) (09/08/22 1050)  Pulse: 62 (09/08/22 1254)  Resp: 18 (09/08/22 1254)  BP: (!) 144/64 (09/08/22 1050)  SpO2: 99 % (09/08/22 1254)   Vital Signs (24h Range):  Temp:  [97.4 °F (36.3 °C)-98.6 °F (37 °C)] 97.4 °F (36.3 °C)  Pulse:  [62-76] 62  Resp:  [16-18] 18  SpO2:  [85 %-99 %] 99 %  BP: (132-172)/(60-71) 144/64     Weight: 58.1 kg (128 lb 1.6 oz)  Body mass index is 19.48 kg/m².    Estimated Creatinine Clearance: 21.7 mL/min (A) (based on SCr of 2.9 mg/dL (H)).    Physical Exam  Vitals and nursing note reviewed.   Constitutional:       General: He is not in acute distress.     Appearance: He is not ill-appearing, toxic-appearing or diaphoretic.   HENT:      Head: Normocephalic and atraumatic.   Eyes:      General: No scleral icterus.        Right eye: No discharge.         Left eye: No discharge.   Cardiovascular:      Rate and Rhythm: Normal rate and regular rhythm.      Heart sounds: Normal heart sounds.   Pulmonary:      Effort: Pulmonary effort is normal. No respiratory distress.   Abdominal:      General: Abdomen is flat. Bowel sounds are normal. There is no distension.      Tenderness: There is no abdominal tenderness. There is no guarding.   Musculoskeletal:      Cervical back: Normal range of motion. No rigidity.      Right lower leg: No edema.      Left lower leg: No edema.   Skin:     General: Skin is warm and dry.      Coloration: Skin is not jaundiced.   Neurological:      General: No focal deficit present.      Mental Status: He is alert and oriented to person, place, and time. Mental status is at baseline.      Motor: Weakness (generalized) present.   Psychiatric:         Mood and Affect: Mood normal.          Behavior: Behavior normal.       Significant Labs: All pertinent labs within the past 24 hours have been reviewed.    Significant Imaging: I have reviewed all pertinent imaging results/findings within the past 24 hours.

## 2022-09-08 NOTE — PROGRESS NOTES
"[facility] - [facility] Stepdown  Adult Nutrition  Progress Note    SUMMARY       Recommendations  1. When medically appropriate modify diet to renal/diabetic diet-fluid per MD   2. If po intake declines <50% meal consumption add Novasource Renal for optimization of calorie intake   3. RD following    Goals: Continue to meet % of EEN/EPN by RD f/u date  Nutrition Goal Status: goal met  Communication of RD Recs: other (POC)    Assessment and Plan    Nutrition Problem  Increased protein/energy needs    Related to (etiology):   Physiological needs     Signs and Symptoms (as evidenced by):   ESRD on HD    Interventions(treatment strategy):  Collaboration of nutrition care w/ other providers     Nutrition Diagnosis Status:   New        Reason for Assessment    Reason For Assessment: length of stay  Diagnosis: cardiac disease  Relevant Medical History: HTN, DM type 2, HLD, CHF, ESRD on HD  Interdisciplinary Rounds: did not attend  General Information Comments: Spoke w/ pt at bedside, pt reports a good appetite currently. ESRD on dialysis noted. Per RN documentation, pt consuming % of most meals. Pt denies any issues w/ chewing/swallowing at this time. Pt refused NFPE during time of vist, RD unable to assess for malnutrition, will try at RD f/u. LBM noted-9/5/22  Nutrition Discharge Planning: diabetic/renal diet    Nutrition Risk Screen    Nutrition Risk Screen: no indicators present    Nutrition/Diet History    Spiritual, Cultural Beliefs, Taoism Practices, Values that Affect Care: no  Food Allergies: NKFA    Anthropometrics    Temp: 97.4 °F (36.3 °C)  Height Method: Stated  Height: 5' 8" (172.7 cm)  Height (inches): 68 in  Weight Method: Standard Scale  Weight: 58.1 kg (128 lb 1.6 oz)  Weight (lb): 128.1 lb  Ideal Body Weight (IBW), Male: 154 lb  % Ideal Body Weight, Male (lb): 94.16 %  BMI (Calculated): 19.5  BMI Grade: 18.5-24.9 - normal       Lab/Procedures/Meds    Pertinent Labs Reviewed: " reviewed  Pertinent Labs Comments: Sodium 134, Cr 2.9, GFR 23.9, Glucose 164, Phosphorus 2.0  Pertinent Medications Reviewed: reviewed  Pertinent Medications Comments: -      Estimated/Assessed Needs    Weight Used For Calorie Calculations: 58.1 kg (128 lb 1.4 oz)  Energy Calorie Requirements (kcal): 1693 (PAL 1.25)  Energy Need Method: [place]-St Jeor  Protein Requirements: 75 g (1.3 g/kg)  Weight Used For Protein Calculations: 58.1 kg (128 lb 1.4 oz)        RDA Method (mL): 1693  CHO Requirement: 211 g      Nutrition Prescription Ordered    Current Diet Order: Diabetic/Renal diet    Evaluation of Received Nutrient/Fluid Intake    I/O: -3.16 L since 8/25/22  Energy Calories Required: meeting needs  Protein Required: meeting needs  Fluid Required: meeting needs  Total Fluid Intake (mL/kg): 1 ml or fluid per MD  Tolerance: tolerating  % Intake of Estimated Energy Needs: 75 - 100 %  % Meal Intake: 75 - 100 %    Nutrition Risk    Level of Risk/Frequency of Follow-up: low ((1 x/week))     Monitor and Evaluation    Food and Nutrient Intake: energy intake, food and beverage intake  Food and Nutrient Adminstration: diet order  Knowledge/Beliefs/Attitudes: food and nutrition knowledge/skill, beliefs and attitudes  Physical Activity and Function: nutrition-related ADLs and IADLs, factors affecting access to physical activity  Anthropometric Measurements: height/length, weight, weight change, body mass index, growth pattern indices/percentile ranks  Biochemical Data, Medical Tests and Procedures: electrolyte and renal panel, gastrointestinal profile, glucose/endocrine profile, inflammatory profile, lipid profile  Nutrition-Focused Physical Findings: overall appearance, extremities, muscles and bones, head and eyes, skin     Nutrition Follow-Up    RD Follow-up?: Yes

## 2022-09-08 NOTE — ASSESSMENT & PLAN NOTE
- last temp 9/6  - covid negative  - Continues IV Vanc and Cefepime  - ID following and monitoring fever curve

## 2022-09-08 NOTE — SUBJECTIVE & OBJECTIVE
Interval History 9/8/2022:  Patient is seen for follow-up PM&R evaluation and recommendations: Participating with PT & OT. Continues to have goals for rehab. Last fever 9/6.    HPI, Past Medical, Family, and Social History remains the same as documented in the initial encounter.    Scheduled Medications:    sodium chloride 0.9%   Intravenous Once    albuterol-ipratropium  3 mL Nebulization Q6H    atorvastatin  40 mg Oral QHS    carvediloL  3.125 mg Oral BID    ceFEPime (MAXIPIME) IVPB  1 g Intravenous Q24H    diphenhydrAMINE  25 mg Oral QHS    epoetin michael (PROCRIT) injection  5,000 Units Intravenous Every Mon, Wed, Fri    LIDOcaine HCL 10 mg/ml (1%)  10 mL Intradermal Once    naloxone  0.4 mg Intravenous Once    pantoprazole  40 mg Oral Daily    polyethylene glycol  17 g Oral BID       Diagnostic Results: Labs: Reviewed  ECG: Reviewed  CT: Reviewed    PRN Medications: sodium chloride, acetaminophen, dextrose 10%, dextrose 10%, gelatin adsorbable 12-7 mm top sponge, glucagon (human recombinant), glucose, glucose, hydrALAZINE, HYDROmorphone, hydrOXYzine HCL, insulin aspart U-100, labetalol, naloxone, ondansetron, sodium chloride 0.9%, Pharmacy to dose Vancomycin consult **AND** vancomycin - pharmacy to dose    Review of Systems   Constitutional:  Positive for activity change and fever. Negative for fatigue.   HENT:  Negative for trouble swallowing and voice change.    Respiratory:  Negative for cough and shortness of breath.    Cardiovascular:  Negative for chest pain and leg swelling.   Gastrointestinal:  Negative for abdominal distention and abdominal pain.   Genitourinary:  Negative for difficulty urinating and flank pain.   Musculoskeletal:  Positive for gait problem. Negative for back pain.   Skin:  Negative for color change and rash.   Neurological:  Positive for weakness. Negative for speech difficulty and numbness.   Psychiatric/Behavioral:  Negative for agitation and confusion.    Objective:     Vital Signs  (Most Recent):  Temp: 98 °F (36.7 °C) (09/08/22 0500)  Pulse: 68 (09/08/22 0707)  Resp: 16 (09/08/22 0707)  BP: 132/60 (09/08/22 0500)  SpO2: 99 % (09/08/22 0707)    Vital Signs (24h Range):  Temp:  [97.3 °F (36.3 °C)-98.6 °F (37 °C)] 98 °F (36.7 °C)  Pulse:  [58-76] 68  Resp:  [16-20] 16  SpO2:  [92 %-99 %] 99 %  BP: (132-186)/(58-73) 132/60     Physical Exam  Vitals and nursing note reviewed.   Constitutional:       Appearance: He is well-developed.   HENT:      Head: Normocephalic and atraumatic.   Eyes:      General:         Right eye: No discharge.         Left eye: No discharge.      Pupils: Pupils are equal, round, and reactive to light.   Pulmonary:      Effort: Pulmonary effort is normal. No respiratory distress.   Abdominal:      General: There is no distension.      Palpations: Abdomen is soft.      Tenderness: There is no abdominal tenderness.   Musculoskeletal:         General: No deformity.      Cervical back: Neck supple.      Comments: Deconditioned and mild weakness   Skin:     General: Skin is warm and dry.   Neurological:      Sensory: No sensory deficit.      Motor: Weakness present. No abnormal muscle tone.      Gait: Gait abnormal.   Psychiatric:         Mood and Affect: Mood normal.         Behavior: Behavior normal.     NEUROLOGICAL EXAMINATION:     CRANIAL NERVES     CN III, IV, VI   Pupils are equal, round, and reactive to light.

## 2022-09-08 NOTE — ASSESSMENT & PLAN NOTE
Fever  61 y.o M w/ hx of  recurrent pleural effusions, HTN, HLD, anemia, cataract, CHF (congestive heart failure), T2DM, ESRD on hemodialysis who was transferred to [facility] [place] evaluation for placement of chest tube for extensively loculated right effusion. IR unable to complete US-guided therapeutic right-sided thoracentesis due to extensively loculated effusions with innumerable isolated pockets with sonographic appearance suggestive of a large hematoma/hemothorax. Cardiothoracic surgery placed a pleurx catheter with lytics on 08/18 with pleural studies notable for exudative effusion and cultures negative. Chest tube was removed on 08/30 which was followed by worsening leukocytosis, increasing supplemental oxygen requirements, procal elevation so he was started on azithromycin/ceftriaxone then broadened to vancomycin/cefepim. Repeat CXR did not show worsening of pleural effusions or new airspace disease. No intervention per CTS.     -ID consulted for evaluation after fever >101 on broad spectrum antibiotics.   -No leukocytosis, BC NGTD. Suspect possible non-infectious causes of recent fever.   -Last Tmax 100.7 night of 9/6; afebrile since   -No DVT on LE US   -COVID negative      Recommendations:   --Routine workup of inpatient fever  --Continue IV vancomycin and cefepime to complete 10 day empiric course; anticipated stop date 9/10/22 then stop all antibiotics and monitor     ID will sign off. Please re-consult if new infectious concern arises.

## 2022-09-08 NOTE — PROGRESS NOTES
[facility] - [facility] Step[facility]  Physical Medicine & Rehab  Progress Note    Patient Name: Vega Brownlee  MRN: [id number]  Admission Date: 8/15/2022  Length of Stay: 24 days  Attending Physician: Kiana Anguiano MD    Subjective:     Principal Problem:Recurrent pleural effusion on right    Hospital Course:   8/22/22: Participated w/ PT. Bed mob SBA- Delia. Sit to stand Delia. Ambulated 2 side steps to right + 4 side steps to right (seated rest break between trials due to fatigue) modA- HHA.   8/26/22: Participated w/ PT. Bed mob SBA- Delia. Ambulated 15 and 15 and 50 ft standing Adl's between trials 1 and 2 CGA w/ RW.       Interval History 9/8/2022:  Patient is seen for follow-up PM&R evaluation and recommendations: Participating with PT & OT. Continues to have goals for rehab. Last fever 9/6.    HPI, Past Medical, Family, and Social History remains the same as documented in the initial encounter.    Scheduled Medications:    sodium chloride 0.9%   Intravenous Once    albuterol-ipratropium  3 mL Nebulization Q6H    atorvastatin  40 mg Oral QHS    carvediloL  3.125 mg Oral BID    ceFEPime (MAXIPIME) IVPB  1 g Intravenous Q24H    diphenhydrAMINE  25 mg Oral QHS    epoetin michael (PROCRIT) injection  5,000 Units Intravenous Every Mon, Wed, Fri    LIDOcaine HCL 10 mg/ml (1%)  10 mL Intradermal Once    naloxone  0.4 mg Intravenous Once    pantoprazole  40 mg Oral Daily    polyethylene glycol  17 g Oral BID       Diagnostic Results: Labs: Reviewed  ECG: Reviewed  CT: Reviewed    PRN Medications: sodium chloride, acetaminophen, dextrose 10%, dextrose 10%, gelatin adsorbable 12-7 mm top sponge, glucagon (human recombinant), glucose, glucose, hydrALAZINE, HYDROmorphone, hydrOXYzine HCL, insulin aspart U-100, labetalol, naloxone, ondansetron, sodium chloride 0.9%, Pharmacy to dose Vancomycin consult **AND** vancomycin - pharmacy to dose    Review of Systems   Constitutional:  Positive for activity change and fever. Negative  for fatigue.   HENT:  Negative for trouble swallowing and voice change.    Respiratory:  Negative for cough and shortness of breath.    Cardiovascular:  Negative for chest pain and leg swelling.   Gastrointestinal:  Negative for abdominal distention and abdominal pain.   Genitourinary:  Negative for difficulty urinating and flank pain.   Musculoskeletal:  Positive for gait problem. Negative for back pain.   Skin:  Negative for color change and rash.   Neurological:  Positive for weakness. Negative for speech difficulty and numbness.   Psychiatric/Behavioral:  Negative for agitation and confusion.      Objective:     Vital Signs (Most Recent):  Temp: 98 °F (36.7 °C) (09/08/22 0500)  Pulse: 68 (09/08/22 0707)  Resp: 16 (09/08/22 0707)  BP: 132/60 (09/08/22 0500)  SpO2: 99 % (09/08/22 0707)    Vital Signs (24h Range):  Temp:  [97.3 °F (36.3 °C)-98.6 °F (37 °C)] 98 °F (36.7 °C)  Pulse:  [58-76] 68  Resp:  [16-20] 16  SpO2:  [92 %-99 %] 99 %  BP: (132-186)/(58-73) 132/60     Physical Exam  Vitals and nursing note reviewed.   Constitutional:       Appearance: He is well-developed.   HENT:      Head: Normocephalic and atraumatic.   Eyes:      General:         Right eye: No discharge.         Left eye: No discharge.      Pupils: Pupils are equal, round, and reactive to light.   Pulmonary:      Effort: Pulmonary effort is normal. No respiratory distress.   Abdominal:      General: There is no distension.      Palpations: Abdomen is soft.      Tenderness: There is no abdominal tenderness.   Musculoskeletal:         General: No deformity.      Cervical back: Neck supple.      Comments: Deconditioned and mild weakness   Skin:     General: Skin is warm and dry.   Neurological:      Sensory: No sensory deficit.      Motor: Weakness present. No abnormal muscle tone.      Gait: Gait abnormal.   Psychiatric:         Mood and Affect: Mood normal.         Behavior: Behavior normal.     Assessment/Plan:      * Recurrent pleural  effusion on right  - S/p R VATS, decortication with chest tube placements x2 8/18.  - chest tubes removed  -  Now on 2 L NC.     Fever  - last temp 9/6  - covid negative  - Continues IV Vanc and Cefepime  - ID following and monitoring fever curve    Physical deconditioning  - Related to prolonged/acute hospital course.     Recommendations  -  Encourage mobility, OOB in chair at least 3 hours per day, and early ambulation as appropriate  -  PT/OT evaluate and treat  -  Pain management  -  Monitor for and prevent skin breakdown and pressure ulcers  · Early mobility, repositioning/weight shifting every 20-30 minutes when sitting, turn patient every 2 hours, proper mattress/overlay and chair cushioning, pressure relief/heel protector boots  -  DVT prophylaxis    -  Reviewed discharge options (IP rehab, SNF, HH therapy, and OP therapy)    ESRD (end stage renal disease) on dialysis  - T, Th, Sat schedule before admission    PM&R Recommendation:     At this time, the PM&R team has reviewed this patient's ongoing medical case including inpatient diagnosis, medical history, clinical examination, labs, vitals, current social and functional history to provide the post-acute recommendation as follows:     RECOMMENDATIONS: inpatient rehabilitation due to good motivation/participation with therapies and good potential for recovery.    MEDICAL STABILITY:     At this time, barriers for post-acute rehab admission include improvement in fever and ID final plan.    I will sign off with the transfer of the patient to [facility] liaison who will continue to follow going forward until admission to [facility].     Michelle Araiza NP  Department of Physical Medicine & Rehab   [facility] - [facility]

## 2022-09-08 NOTE — PLAN OF CARE
Problem: Adult Inpatient Plan of Care  Goal: Plan of Care Review  Outcome: Ongoing, Progressing  Goal: Patient-Specific Goal (Individualized)  Outcome: Ongoing, Progressing  Goal: Absence of Hospital-Acquired Illness or Injury  Outcome: Ongoing, Progressing  Goal: Optimal Comfort and Wellbeing  Outcome: Ongoing, Progressing  Goal: Readiness for Transition of Care  Outcome: Ongoing, Progressing     Problem: Diabetes Comorbidity  Goal: Blood Glucose Level Within Targeted Range  Outcome: Ongoing, Progressing  Intervention: Monitor and Manage Glycemia  Flowsheets (Taken 9/8/2022 0307)  Glycemic Management: blood glucose monitored     Problem: Impaired Wound Healing  Goal: Optimal Wound Healing  Intervention: Promote Wound Healing  Flowsheets (Taken 9/8/2022 0307)  Sleep/Rest Enhancement: awakenings minimized  Activity Management: Rolling - L1     Problem: Fall Injury Risk  Goal: Absence of Fall and Fall-Related Injury  Intervention: Identify and Manage Contributors  Flowsheets (Taken 9/8/2022 0307)  Medication Review/Management: medications reviewed  Intervention: Promote Injury-Free Environment  Flowsheets (Taken 9/8/2022 0307)  Safety Promotion/Fall Prevention:   assistive device/personal item within reach   bed alarm set   Fall Risk reviewed with patient/family   Fall Risk signage in place   lighting adjusted   side rails raised x 2   instructed to call staff for mobility   Pt remained free of injuries, falls, and trauma. VSS. No complaints of pain mentioned. Glucose monitored 222, coverage needed. Pt had HD today 2 Liters removed. CT site dressing CDI throughout the night. Fall precautions maintained.  Plan of care reviewed w/ pt. Pt verbalized understanding. Will continue to monitor.

## 2022-09-08 NOTE — SUBJECTIVE & OBJECTIVE
Interval History: Patient seen at bedside. No new concerns today. Having wound cleaned by nursing. Feels like he is improving. Will complete empiric antibiotic course now that fevers have resolved.     Review of Systems   Constitutional:  Positive for fatigue and fever. Negative for chills.   HENT:  Negative for congestion and rhinorrhea.    Eyes:  Negative for discharge and visual disturbance.   Respiratory:  Positive for cough. Negative for shortness of breath.    Cardiovascular:  Negative for chest pain and leg swelling.   Gastrointestinal:  Negative for constipation, diarrhea, nausea and vomiting.   Genitourinary:  Positive for decreased urine volume (ESRD on dialysis) and difficulty urinating (ESRD on dialysis).   Musculoskeletal:  Negative for arthralgias and back pain.   Neurological:  Negative for dizziness and headaches.   Objective:     Vital Signs (Most Recent):  Temp: 97.4 °F (36.3 °C) (09/08/22 1050)  Pulse: 62 (09/08/22 1254)  Resp: 18 (09/08/22 1254)  BP: (!) 144/64 (09/08/22 1050)  SpO2: 99 % (09/08/22 1254)   Vital Signs (24h Range):  Temp:  [97.4 °F (36.3 °C)-98.6 °F (37 °C)] 97.4 °F (36.3 °C)  Pulse:  [62-76] 62  Resp:  [16-18] 18  SpO2:  [85 %-99 %] 99 %  BP: (132-172)/(60-71) 144/64     Weight: 58.1 kg (128 lb 1.6 oz)  Body mass index is 19.48 kg/m².    Estimated Creatinine Clearance: 21.7 mL/min (A) (based on SCr of 2.9 mg/dL (H)).    Physical Exam  Vitals and nursing note reviewed.   Constitutional:       General: He is not in acute distress.     Appearance: He is not ill-appearing, toxic-appearing or diaphoretic.   HENT:      Head: Normocephalic and atraumatic.   Eyes:      General: No scleral icterus.        Right eye: No discharge.         Left eye: No discharge.   Cardiovascular:      Rate and Rhythm: Normal rate and regular rhythm.      Heart sounds: Normal heart sounds.   Pulmonary:      Effort: Pulmonary effort is normal. No respiratory distress.   Abdominal:      General: Abdomen is  flat. Bowel sounds are normal. There is no distension.      Tenderness: There is no abdominal tenderness. There is no guarding.   Musculoskeletal:      Cervical back: Normal range of motion. No rigidity.      Right lower leg: No edema.      Left lower leg: No edema.   Skin:     General: Skin is warm and dry.      Coloration: Skin is not jaundiced.   Neurological:      General: No focal deficit present.      Mental Status: He is alert and oriented to person, place, and time. Mental status is at baseline.      Motor: Weakness (generalized) present.   Psychiatric:         Mood and Affect: Mood normal.         Behavior: Behavior normal.       Significant Labs: All pertinent labs within the past 24 hours have been reviewed.    Significant Imaging: I have reviewed all pertinent imaging results/findings within the past 24 hours.

## 2022-09-08 NOTE — SUBJECTIVE & OBJECTIVE
This encounter was provided through telemedicine.  Patient was transferred to the telemedicine service on:  09/05/2022   The patient location is: 306/306 A admitted 8/15/2022 10:54 AM.  Present with the patient at the time of the telemed/virtual assessment: Telepresenter    Interval History/Overnight Events:   Clinical record since admit reviewed.    Patient without complaints - uneventful night - denies dyspnea or cough; he remains on supplemental oxygen at 2L/min; has not had BM in 3 days but refusing miralax so changed to senokot-s; afebrile for 24 hours    Review of Systems   Constitutional:  Positive for activity change and fatigue. Negative for appetite change and fever.   Respiratory:  Negative for shortness of breath.    Cardiovascular:  Negative for chest pain.   Gastrointestinal:  Negative for diarrhea and vomiting.   Musculoskeletal:  Negative for arthralgias and back pain.      Inpatient Medications:  Scheduled Meds:   [START ON 9/9/2022] sodium chloride 0.9%   Intravenous Once    albuterol-ipratropium  3 mL Nebulization Q6H    atorvastatin  40 mg Oral QHS    carvediloL  3.125 mg Oral BID    ceFEPime (MAXIPIME) IVPB  1 g Intravenous Q24H    diphenhydrAMINE  25 mg Oral QHS    epoetin michael (PROCRIT) injection  5,000 Units Intravenous Every Mon, Wed, Fri    fluconazole  200 mg Oral Daily    Followed by    [START ON 9/9/2022] fluconazole  100 mg Oral Daily    LIDOcaine HCL 10 mg/ml (1%)  10 mL Intradermal Once    naloxone  0.4 mg Intravenous Once    pantoprazole  40 mg Oral Daily    senna-docusate 8.6-50 mg  1 tablet Oral BID     Continuous Infusions:  PRN Meds:.sodium chloride, acetaminophen, dextrose 10%, dextrose 10%, gelatin adsorbable 12-7 mm top sponge, glucagon (human recombinant), glucose, glucose, hydrALAZINE, HYDROmorphone, hydrOXYzine HCL, insulin aspart U-100, labetalol, naloxone, ondansetron, sodium chloride 0.9%, Pharmacy to dose Vancomycin consult **AND** vancomycin - pharmacy to dose       Objective:     Temp:  [97.4 °F (36.3 °C)-98.6 °F (37 °C)] 97.4 °F (36.3 °C)  Pulse:  [62-76] 62  Resp:  [16-18] 18  SpO2:  [85 %-99 %] 99 %  BP: (132-172)/(60-71) 144/64      Intake/Output Summary (Last 24 hours) at 9/8/2022 1430  Last data filed at 9/8/2022 1300  Gross per 24 hour   Intake 1502 ml   Output --   Net 1502 ml        Body mass index is 19.48 kg/m².    Physical Exam  Vitals and nursing note reviewed.   Constitutional:       General: He is not in acute distress.     Appearance: He is normal weight. He is ill-appearing.   HENT:      Head: Normocephalic and atraumatic.      Right Ear: Hearing normal.      Left Ear: Hearing normal.      Nose: Nose normal.      Mouth/Throat:      Pharynx: Oropharyngeal exudate present.   Eyes:      General: No scleral icterus.        Right eye: No discharge.         Left eye: No discharge.      Extraocular Movements: Extraocular movements intact.   Cardiovascular:      Rate and Rhythm: Normal rate.   Pulmonary:      Effort: Pulmonary effort is normal. No accessory muscle usage or respiratory distress.   Neurological:      General: No focal deficit present.      Mental Status: He is alert and oriented to person, place, and time.      Cranial Nerves: No cranial nerve deficit.      Motor: No weakness.   Psychiatric:         Attention and Perception: Attention normal.         Mood and Affect: Mood normal.         Speech: Speech normal.         Behavior: Behavior is cooperative.        Labs:  Recent Results (from the past 24 hour(s))   POCT glucose    Collection Time: 09/07/22  4:21 PM   Result Value Ref Range    POCT Glucose 160 (H) 70 - 110 mg/dL   POCT glucose    Collection Time: 09/07/22  9:07 PM   Result Value Ref Range    POCT Glucose 222 (H) 70 - 110 mg/dL   Phosphorus    Collection Time: 09/08/22  5:08 AM   Result Value Ref Range    Phosphorus 2.0 (L) 2.7 - 4.5 mg/dL   Magnesium    Collection Time: 09/08/22  5:08 AM   Result Value Ref Range    Magnesium 1.9 1.6 - 2.6  mg/dL   CBC Auto Differential    Collection Time: 09/08/22  5:08 AM   Result Value Ref Range    WBC 10.87 3.90 - 12.70 K/uL    RBC 2.49 (L) 4.60 - 6.20 M/uL    Hemoglobin 7.6 (L) 14.0 - 18.0 g/dL    Hematocrit 24.3 (L) 40.0 - 54.0 %    MCV 98 82 - 98 fL    MCH 30.5 27.0 - 31.0 pg    MCHC 31.3 (L) 32.0 - 36.0 g/dL    RDW 19.3 (H) 11.5 - 14.5 %    Platelets 236 150 - 450 K/uL    MPV 9.2 9.2 - 12.9 fL    Immature Granulocytes 0.6 (H) 0.0 - 0.5 %    Gran # (ANC) 8.6 (H) 1.8 - 7.7 K/uL    Immature Grans (Abs) 0.06 (H) 0.00 - 0.04 K/uL    Lymph # 0.9 (L) 1.0 - 4.8 K/uL    Mono # 1.1 (H) 0.3 - 1.0 K/uL    Eos # 0.2 0.0 - 0.5 K/uL    Baso # 0.11 0.00 - 0.20 K/uL    nRBC 0 0 /100 WBC    Gran % 79.3 (H) 38.0 - 73.0 %    Lymph % 7.8 (L) 18.0 - 48.0 %    Mono % 9.7 4.0 - 15.0 %    Eosinophil % 1.6 0.0 - 8.0 %    Basophil % 1.0 0.0 - 1.9 %    Differential Method Automated    Comprehensive Metabolic Panel    Collection Time: 09/08/22  5:08 AM   Result Value Ref Range    Sodium 134 (L) 136 - 145 mmol/L    Potassium 4.0 3.5 - 5.1 mmol/L    Chloride 100 95 - 110 mmol/L    CO2 23 23 - 29 mmol/L    Glucose 164 (H) 70 - 110 mg/dL    BUN 20 8 - 23 mg/dL    Creatinine 2.9 (H) 0.5 - 1.4 mg/dL    Calcium 7.6 (L) 8.7 - 10.5 mg/dL    Total Protein 5.7 (L) 6.0 - 8.4 g/dL    Albumin 1.9 (L) 3.5 - 5.2 g/dL    Total Bilirubin 1.0 0.1 - 1.0 mg/dL    Alkaline Phosphatase 192 (H) 55 - 135 U/L    AST 25 10 - 40 U/L    ALT 7 (L) 10 - 44 U/L    Anion Gap 11 8 - 16 mmol/L    eGFR 23.7 (A) >60 mL/min/1.73 m^2   POCT glucose    Collection Time: 09/08/22  8:28 AM   Result Value Ref Range    POCT Glucose 193 (H) 70 - 110 mg/dL   POCT glucose    Collection Time: 09/08/22 10:52 AM   Result Value Ref Range    POCT Glucose 203 (H) 70 - 110 mg/dL        Lab Results   Component Value Date    [id number] Not Detected 09/06/2022       Recent Labs   Lab 09/06/22  0445 09/07/22  0430 09/08/22  0508   WBC 10.96 10.62 10.87   LYMPH 7.6*  0.8* 6.5*  0.7* 7.8*  0.9*    HGB 7.6* 7.5* 7.6*   HCT 23.7* 23.3* 24.3*    216 236     Recent Labs   Lab 09/06/22 0445 09/07/22  0430 09/08/22  0508   * 133* 134*   K 3.7 4.2 4.0    99 100   CO2 25 24 23   BUN 22 30* 20   CREATININE 3.2* 4.1* 2.9*   * 128* 164*   CALCIUM 7.5* 7.5* 7.6*   MG 1.9 1.9 1.9   PHOS 2.5* 2.5* 2.0*     Recent Labs   Lab 09/06/22 0445 09/07/22  0430 09/08/22  0508   ALKPHOS 196* 184* 192*   ALT 9* 9* 7*   AST 23 26 25   ALBUMIN 1.9* 1.9* 1.9*   PROT 5.4* 5.5* 5.7*   BILITOT 1.0 1.1* 1.0        No results for input(s): DDIMER, FERRITIN, CRP, LDH, BNP, TROPONINI, CPK in the last 72 hours.    Invalid input(s): PROCALCITONIN    All labs within the last 24 hours were reviewed.     Microbiology:  Microbiology Results (last 7 days)       Procedure Component Value Units Date/Time    Blood culture [[id number]] Collected: 09/05/22 2038    Order Status: Completed Specimen: Blood Updated: 09/07/22 2312     Blood Culture, Routine No Growth to date      No Growth to date      No Growth to date    Blood culture [[id number]] Collected: 09/05/22 2037    Order Status: Completed Specimen: Blood Updated: 09/07/22 2312     Blood Culture, Routine No Growth to date      No Growth to date      No Growth to date    Fungus culture [[id number]] Collected: 08/18/22 1057    Order Status: Completed Specimen: Pleural Fluid from Lung, Right Updated: 09/07/22 1023     Fungus (Mycology) Culture Culture in progress      No fungus isolated after 2 weeks    Narrative:      Right pleural effusion for culture    Blood culture [[id number]] Collected: 08/30/22 0739    Order Status: Completed Specimen: Blood from Antecubital, Left Arm Updated: 09/04/22 0822     Blood Culture, Routine No growth after 5 days.    Blood culture [[id number]] Collected: 08/30/22 0741    Order Status: Completed Specimen: Blood from Peripheral, Left Hand Updated: 09/04/22 0822     Blood Culture, Routine No growth after 5 days.              Imaging  ECG Results               EKG 12-LEAD (Final result)  Result time 08/16/22 12:10:03      Final result by Unknown User (08/16/22 12:10:03)                                        Results for orders placed during the hospital encounter of 09/02/20    Echo Color Flow Doppler? Yes    Interpretation Summary  · Concentric left ventricular hypertrophy.  · Normal left ventricular systolic function. The estimated ejection fraction is 55%.  · Moderate right ventricular enlargement.  · Normal right ventricular systolic function.  · Grade II (moderate) left ventricular diastolic dysfunction consistent with pseudonormalization.  · Severe biatrial enlargement.  · Mild tricuspid regurgitation.  · Trivial pericardial effusion.  · The estimated PA systolic pressure is 77 mmHg.  · Elevated central venous pressure (15 mmHg).      US Lower Extremity Veins Bilateral  Narrative: EXAMINATION:  US LOWER EXTREMITY VEINS BILATERAL    CLINICAL HISTORY:  fever;    TECHNIQUE:  Duplex and color flow Doppler and dynamic compression was performed of the bilateral lower extremity veins was performed.    COMPARISON:  None    FINDINGS:  Right thigh veins: The common femoral, femoral, popliteal, upper greater saphenous, and deep femoral veins are patent and free of thrombus. The veins are normally compressible and have normal phasic flow and augmentation response.    Right calf veins: The visualized calf veins are patent.    Left thigh veins: The common femoral, femoral, popliteal, upper greater saphenous, and deep femoral veins are patent and free of thrombus. The veins are normally compressible and have normal phasic flow and augmentation response.    Left calf veins: The visualized calf veins are patent.    Miscellaneous: None  Impression: No evidence of deep venous thrombosis in either lower extremity.    Electronically signed by resident: Jeffrey Quintero  Date:    09/07/2022  Time:    01:52    Electronically signed by: Selam Gonzalez  MD  Date:    09/07/2022  Time:    01:55      All imaging within the last 24 hours was reviewed.       Discharge Planning   WENDY: 9/9/2022     Code Status: Full Code   Is the patient medically ready for discharge?: No    Reason for patient still in hospital (select all that apply): Patient trending condition, Treatment, Consult recommendations, and Pending disposition  Discharge Plan A: Rehab

## 2022-09-08 NOTE — ASSESSMENT & PLAN NOTE
Febrile at end of planned antibiotic course. Asymptomatic with exception of chills.  ID consulted. Checked COVID test, LE US, blood culture.  Continuing IV antibiotics for now pending cultures

## 2022-09-09 PROBLEM — N18.6 ESRD (END STAGE RENAL DISEASE): Status: ACTIVE | Noted: 2022-01-01

## 2022-09-09 NOTE — PROGRESS NOTES
Pharmacokinetic Assessment Follow Up: IV Vancomycin    Vancomycin serum concentration assessment(s):  Vancomycin random concentration this AM resulted as 18.8 mcg/mL   Patient ESRD on HD, last HD today  Nephrology on board - continue to monitor     Vancomycin plan:   Vancomycin 500 mg IV x 1 today post-HD  ID recommending 10 total days of antibiotic therapy, making EOT tomorrow.  No further kinetic monitoring or vancomycin doses required; will sign off    Drug levels (last 3 results):  Recent Labs   Lab Result Units 09/07/22  0430 09/09/22  0348   Vancomycin, Random ug/mL 19.9 18.8         Pharmacy will continue to follow and monitor vancomycin.      Thank you for the consult,   Yuridia Rod, PharmD, BCPS  Ext. [phone]

## 2022-09-09 NOTE — PROGRESS NOTES
HD treatment complete. Duration of treatment 3 hours and 2 L removed. Treatment was tolerated well and no complications with access to the right upper arm. Needles removed and hemostasis achieved. Dressing intact and no drainage noted. Thrill and bruit present.

## 2022-09-09 NOTE — PROGRESS NOTES
HD treatment started. No complications with access to the right upper arm. Lines secured and telemetry in place. No complaints of discomfort at this time.

## 2022-09-09 NOTE — PLAN OF CARE
[facility] - Cardiology Stepdown  Discharge Final Note    Primary Care Provider: Ko Perez MD    Expected Discharge Date: 9/9/2022    Final Discharge Note (most recent)       Final Note - 09/09/22 1322          Final Note    Assessment Type Final Discharge Note     Anticipated Discharge Disposition Rehab Facility        Post-Acute Status    Post-Acute Authorization Placement     Post-Acute Placement Status Set-up Complete/Auth obtained                   Transportation scheduled via wheelchair van for 2:00pm to transfer to [facility].  ELIN Pascual to call report to [phone].  ELIN Pascual was notified of the above information.  SW also notified pt at bedside and pt's wife via phone of transfer.    Chacha Dooley LMSW  [facility]  [phone]        Important Message from Medicare  Important Message from Medicare regarding Discharge Appeal Rights: Given to patient/caregiver, Explained to patient/caregiver, Signed/date by patient/caregiver     Date IMM was signed: 08/25/22  Time IMM was signed: 0829

## 2022-09-09 NOTE — PT/OT/SLP PROGRESS
Physical Therapy      Patient Name:  Vega Brownlee   MRN:  [id number]    Patient not seen today secondary to pt off floor at HD first attempt. I was unable to return for a second attempt. Will follow-up per PT POC .

## 2022-09-09 NOTE — PROCEDURES
[facility] NOTE     Patient currently on hemodialysis for removal of uremic toxins and volume.     Patient seen and evaluated on hemodialysis, tolerating treatment, see HD flowsheet for vitals and assessments.      Ultrafiltration goal is 2L     Labs have been reviewed and the dialysate bath has been adjusted.     Assessment/Plan:  Seen on HD, tolerating well  Next HD Monday if he remains in-house  Increase EPO dose  Holding phos binders    RUDOLPH Price, FNP-BC  Nephrology  Pager:  [phone]

## 2022-09-09 NOTE — PLAN OF CARE
VSS and afebrile through the night. No complaints of pain. Sanguinous drainage from chest incision site, dressing replaced. Comfort and safety maintained. Will continue plan of care.

## 2022-09-09 NOTE — NURSING
Pt AAO, VSS. Upon discharge, all PIV left in place for IV antibiotics, and AVS reviewed with pt. All questions were answered. Pt left via [facility] van.

## 2022-09-09 NOTE — PLAN OF CARE
[facility]     Department of Hospital Medicine     [address] [place] [address]     [phone] after hours  [phone] fax       FACILITY TRANSFER ORDERS    09/09/2022    Admit to  INPATIENT REHAB                                              Diagnoses:  Active Hospital Problems    Diagnosis  POA    *Recurrent pleural effusion on right [J90]  Yes    ESRD (end stage renal disease) [N18.6]  Unknown    Thrush [B37.0]  Yes    Fever [R50.9]  Yes    Acute on chronic respiratory failure with hypoxia [J96.21]  Yes    Leukocytosis [D72.829]  Yes    Physical deconditioning [R53.81]  Yes    Anemia in ESRD (end-stage renal disease) [N18.6, D63.1]  Yes    Chronic kidney disease-mineral and bone disorder [N18.9, E83.9, M89.9]  Yes    Chronic diastolic heart failure [I50.32]  Yes    ESRD (end stage renal disease) on dialysis [N18.6, Z99.2]  Not Applicable     -HD on T-Th-Sat with Dr. Lowery      Mixed hyperlipidemia associated with type 2 diabetes mellitus [E11.69, E78.2]  Yes    Type 2 diabetes mellitus with chronic kidney disease on chronic dialysis, without long-term current use of insulin [E11.22, N18.6, Z99.2]  Not Applicable    Hypertension associated with ESRD caused by type 2 diabetes mellitus, on dialysis [E11.22, I12.0, Z99.2, N18.6]  Not Applicable      Resolved Hospital Problems    Diagnosis Date Resolved POA    Hypophosphatemia [E83.39] 08/25/2022 Yes    Thrombocytopenia [D69.6] 08/31/2022 Yes    Hyperkalemia [E87.5] 08/28/2022 Yes     Code Status: full code    Allergies:Review of patient's allergies indicates:  No Known Allergies    Vitals:       Every shift     Diet:  Diet diabetic [facility]; 2000 Calorie    Supplement:  1 can every three times a day with meals                         Type:     Glucerna         Acitivities:      - Up in a chair each morning as tolerated   - Ambulate with assistance to bathroom   - May use walker, cane, or self-propelled  wheelchair   - Weight bearing: as tolerated to all extremities     LABS:  CBC, CMP, Mag, Phos every Mon, Wed, Fri     Nursing Precautions:     - Aspiration precautions:             - Total assistance with meals            -  Upright 90 degrees before, during, and after meals             -  Suction at bedside          - Fall precautions per facility protocol   - Seizure precaution per facility protocol   - Decubitus precautions:        -  for positioning   - Pressure reducing foam mattress   - Turn patient every two hours. Use wedge pillows to anchor patient    CONSULTS:      Physical Therapy to evaluate and treat     Occupational Therapy to evaluate and treat     Nutrition to evaluate and recommend diet     Nephrology to continue hemodialysis    MISCELLANEOUS CARE:         Routine Skin for Bedridden Patients:  Apply moisture barrier cream to all    skin folds and wet areas in perineal area daily and after baths and                           all bowel movements.     Infusion Therapy:   SN to perform Infusion Therapy    Administer (drug and dose): Vanc 500 mg IV X 1 on 9/10    Administer (drug and dose): Cefepime 1 g IV x 1 on 9/10     Oxygen:  Oxygen at 2 L/min nasal canula to be used:  Continuously., Assess oxygen saturation via pulse oximeter as needed for increase in SOB., and Notify physician if oxygen saturation less than 88%.  Wean as tolerated to maintain goal > 92%.    Wound Care:  change dry dressing to right chest as needed.    DIABETES CARE:      Check blood sugar:      Fingerstick blood sugar AC and HS   Fingerstick blood sugar every 6 hours if unable to eat   Report CBG < 60 or > 400 to physician.                    Medications: Discontinue all previous medication orders, if any. See new list below.    Current Discharge Medication List        START taking these medications    Details   albuterol-ipratropium (DUO-NEB) 2.5 mg-0.5 mg/3 mL nebulizer solution Take 3 mLs by nebulization every 6 (six)  "hours while awake. Rescue  Qty: 75 mL, Refills: 0      cefepime in dextrose 5 % (MAXIPIME) 1 gram/50 mL PgBk Inject 50 mLs (1 g total) into the vein once daily. for 1 day  Qty: 50 mL, Refills: 0      diphenhydrAMINE (BENADRYL) 25 mg capsule Take 1 capsule (25 mg total) by mouth every evening.  Refills: 0      epoetin michael (PROCRIT) 10,000 unit/mL injection Inject 1 mL (10,000 Units total) into the skin every Mon, Wed, Fri. With HD      fluconazole (DIFLUCAN) 100 MG tablet Take 1 tablet (100 mg total) by mouth once daily. for 5 days  Qty: 5 tablet, Refills: 0      gelatin adsorbable 12-7 mm top sponge (GELFOAM) 12-7 mm sponge Apply 1 applicator topically as needed (As needed in dialysis).  Refills: 12      insulin aspart U-100 (NOVOLOG) 100 unit/mL (3 mL) InPn pen Inject 0-5 Units into the skin before meals and at bedtime as needed (Hyperglycemia). **LOW CORRECTION DOSE**  Blood Glucose  mg/dL                  Pre-meal                2200  151-200                0 unit                      0 unit  201-250                2 units                    1 unit  251-300                3 units                    1 unit  301-350                4 units                    2 units  >350                     5 units                    3 units  Administer subcutaneously if needed at times designated by monitoring schedule.   DO NOT HOLD correction dose insulin for patients who are  NPO.  "HIGH ALERT MEDICATION" - Administer with meals or TF/TPN.  Refills: 0      pantoprazole (PROTONIX) 40 MG tablet Take 1 tablet (40 mg total) by mouth once daily.  Qty: 30 tablet, Refills: 11      senna-docusate 8.6-50 mg (PERICOLACE) 8.6-50 mg per tablet Take 1 tablet by mouth 2 (two) times daily.      vancomycin (VANCOCIN) 500 mg injection Inject 500 mg into the vein once. On 9/10/2022 for 1 dose for 1 dose  Qty: 1 each, Refills: 0           CONTINUE these medications which have CHANGED    Details   carvediloL (COREG) 3.125 MG tablet Take 1 tablet " "(3.125 mg total) by mouth 2 (two) times daily.  Qty: 60 tablet, Refills: 11    Comments: .      fluticasone propionate (FLONASE) 50 mcg/actuation nasal spray 1 spray (50 mcg total) by Each Nostril route daily as needed for Rhinitis or Allergies.  Qty: 16 g, Refills: 2    Associated Diagnoses: Non-seasonal allergic rhinitis due to other allergic trigger           CONTINUE these medications which have NOT CHANGED    Details   aspirin (ECOTRIN) 81 MG EC tablet Take 81 mg by mouth.      atorvastatin (LIPITOR) 40 MG tablet TAKE 1 TABLET BY MOUTH EVERY DAY IN THE EVENING  Qty: 90 tablet, Refills: 3    Associated Diagnoses: Mixed hyperlipidemia      blood sugar diagnostic Strp 1 each by Misc.(Non-Drug; Combo Route) route as directed.  Qty: 100 each, Refills: 0      blood-glucose meter (FREESTYLE SYSTEM KIT) kit Use as instructed  Qty: 1 each, Refills: 0      FOLIC ACID/VIT BCOMP,C (JAM-YAQUELIN ORAL) Take 1 tablet by mouth once daily.      lancets (ACCU-CHEK SOFTCLIX LANCETS) Misc 1 application by Misc.(Non-Drug; Combo Route) route 4 (four) times daily before meals and nightly.  Qty: 120 each, Refills: 0      pen needle, diabetic 31 gauge x 3/16" Ndle BD Ultra Fine Mini pen needles (5mm x 31g) - dispense 100  Qty: 100 each, Refills: 1    Associated Diagnoses: Diabetes mellitus with ESRD (end-stage renal disease)      traZODone (DESYREL) 50 MG tablet TAKE 1 TABLET BY MOUTH NIGHTLY AS NEEDED FOR INSOMNIA.  Qty: 90 tablet, Refills: 0    Associated Diagnoses: Insomnia, unspecified type           STOP taking these medications       BASAGLAR KWIKPEN U-100 INSULIN glargine 100 units/mL SubQ pen Comments:   Reason for Stopping:         betamethasone dipropionate (DIPROLENE) 0.05 % cream Comments:   Reason for Stopping:         calcium acetate,phosphat bind, (PHOSLO) 667 mg capsule Comments:   Reason for Stopping:         calcium carbonate 470 mg calcium (1,177 mg) Chew Comments:   Reason for Stopping:         clobetasoL (CLOBEX) 0.05 " % shampoo Comments:   Reason for Stopping:         cloNIDine (CATAPRES) 0.2 MG tablet Comments:   Reason for Stopping:         DUREZOL 0.05 % Drop ophthalmic solution Comments:   Reason for Stopping:         furosemide (LASIX) 80 MG tablet Comments:   Reason for Stopping:         hydrALAZINE (APRESOLINE) 100 MG tablet Comments:   Reason for Stopping:         isosorbide mononitrate (IMDUR) 120 MG 24 hr tablet Comments:   Reason for Stopping:         losartan (COZAAR) 50 MG tablet Comments:   Reason for Stopping:         RENVELA 800 mg Tab Comments:   Reason for Stopping:                      Future Appointments   Date Time Provider Department Center   9/21/2022 10:00 AM [facility]IC[facility]   9/21/2022 11:30 AM Memo Peraza MD [facility] A[facility] Villarreal         _________________________________  Kiana Anguiano MD  09/09/2022

## 2022-09-16 NOTE — ASSESSMENT & PLAN NOTE
- S/p left thoracotomy prior to admission  - Recurring right effusion s/p thoracentesis x2  - Chest tubes removed 8/29 by CTS   - No further inpatient interventions, follow in clinic  - Increased O2 requirements to 6L on 8/31, improving on antibiotics; 2L at discharge  - IS encouraged  - Weaning O2 as tolerated

## 2022-09-16 NOTE — ASSESSMENT & PLAN NOTE
Febrile at end of planned antibiotic course. Asymptomatic with exception of chills.  ID consulted. Checked COVID test neg, LE US neg for DVT, blood culture NG   Continuing IV antibiotics until 9/10.  ID following.

## 2022-09-16 NOTE — DISCHARGE SUMMARY
"[facility] - [facility] Medicine  Discharge Summary      Patient Name: Vega Brownlee  MRN: [id number]  Patient Class: IP- Inpatient  Admission Date: 8/15/2022  Hospital Length of Stay: 25 days  Discharge Date and Time: 9/9/2022  6:02 PM  Attending Physician: No att. providers found   Discharging Provider: Kiana Anguiano MD  Primary Care Provider: Ko Perez MD      HPI:   HPI per :  Patient is a 61 y.o. 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 Patient has history of recurrent pleural effusion and was sent to ED for worsening symptoms. IR was consulted and attempted US-guided therapeutic right-sided thoracentesis. Per IR "US reveals an extensively loculated Rt effusion with innumerable isolated pockets with sonographic appearance suggestive of a large hematoma/hemothorax. Only able to remove 50-cc of old, dark blood products also consistent with large Rt hemothorax. Pt would likely require large-bore chest tube placement and instillation of lytics if attempts will be made to resolve the hemothorax vs VATS." Facility seeking transfer for CTS evaluation. Patient is stable on med tele unit only requiring 3L NC with no distress. Stable for transfer.      Procedure(s) (LRB):  VATS, WITH DECORTICATION, LUNG (Right)  BLOCK, NERVE, INTERCOSTAL, 2 OR MORE (Right)  BRONCHOSCOPY, FLEXIBLE (N/A)  INSERTION, CATHETER, INTERCOSTAL, FOR DRAINAGE (Right)      Hospital Course:   [facility] Medicine Course:  IR was consulted and attempted US-guided therapeutic right-sided thoracentesis. Per IR "US reveals an extensively loculated Rt effusion with innumerable isolated pockets " "with sonographic appearance suggestive of a large hematoma/hemothorax. Only able to remove 50-cc of old, dark blood products also consistent with large Rt hemothorax. Pt would likely require large-bore chest tube placement and instillation of lytics if attempts will be made to resolve the hemothorax vs VATS."  Patient accepted to [facility], in queue to transfer asap. Stable vitals. Will allow to eat and make NPO again at midnight. [facility]     [facility] Hospital Medicine Course:  [place] [facility] for HD needs. Evaluated by thoracic surgery, with placement of PleurX catheter x2 on 8/18. Required 2u pRBC after the procedure, with stable low Hgb since that time. Pleural fluid exudative, with elevated LDH and low glucose. Cultures negative. Cytology pending. Interpretation is difficult with prior manipulation with traumatic thoras in the past. However, with new CT chest findings would certainly benefit from outpatient malignancy eval with follow up CT chest in 3 months and possible bx pending results.     Chest tube removed by CTS on 8/30, tolerated well with same O2 requirements. Leukocytosis noted with elevated procal, BCx obtained and started on azithro/rocephin; leukocytosis worsened on 8/31 with relative hypotension compared to previous (nifedipine held), so abx broadened to vanc and cefepime pending culture results. Overnight and into 8/31, pt noted to have increased O2 requirements up to 6L NC from 2L: CXR without pneumothorax; discussed with CTS, who stated they have no further inpatient recommendations and will follow him in clinic. Patient was able to be weaned to 2L prior to discharge to Rehab.    Discharged to [facility] when medically stable, SW/CM assisted.       Goals of Care Treatment Preferences:  Code Status: Full Code      Consults:   Consults (From admission, onward)        Status Ordering Provider     Inpatient consult to Infectious Diseases  Once        Provider:  (Not yet " assigned)    Completed KO BOWEN     Inpatient consult to Midline team  Once        Provider:  (Not yet assigned)    Completed KO BOWEN     Inpatient virtual consult to Hospital Medicine  Once        Provider:  (Not yet assigned)    Completed BRIANA, MAY M.     Inpatient consult to Physical Medicine Rehab  Once        Provider:  (Not yet assigned)    Completed BRIANA, MAY M.     Inpatient consult to Midline team  Once        Provider:  (Not yet assigned)    Completed BRIANA, MAY M.     Inpatient consult to Cardiothoracic Surgery  Once        Provider:  (Not yet assigned)    Completed NATHAN ROBERT     Inpatient consult to Nephrology  Once        Provider:  (Not yet assigned)    Completed NATHAN ROBERT     Inpatient consult to Interventional Radiology  Once        Provider:  Daniel Petty MD    Completed YANDEL KELLY          * Recurrent pleural effusion on right  - S/p left thoracotomy prior to admission  - Recurring right effusion s/p thoracentesis x2  - Chest tubes removed 8/29 by CTS   - No further inpatient interventions, follow in clinic  - Increased O2 requirements to 6L on 8/31, improving on antibiotics; 2L at discharge  - IS encouraged  - Weaning O2 as tolerated    Thrush  Continue therapy with diflucan      Fever  Febrile at end of planned antibiotic course. Asymptomatic with exception of chills.  ID consulted. Checked COVID test neg, LE US neg for DVT, blood culture NG   Continuing IV antibiotics until 9/10.  ID following.    Acute on chronic respiratory failure with hypoxia  Improving. Weaned to 2 LPM NC; continue to wean as tolerated    Leukocytosis  - Procal elevated  - BCx NTD  - Azithro/Rocephin broadened to vancomycin and cefepime on 8/31.   - Leukocytosis resolved. Initially to complete 7d course but antibiotics continued due to fever. Will complete on 9/10 at Rehab.    Physical deconditioning  - Rehab medicine following  - Accepted to [facility].    Chronic diastolic  heart failure  - Remains grossly asymptomatic, euvolemic, not in acute exacerbation  - Continue home cardioprudent regimen and HD for volume removal  - Monitoring on tele    Chronic kidney disease-mineral and bone disorder  - Renal management     Anemia in ESRD (end-stage renal disease)  - H/H stable near baseline on admission, with drop after pleurex catheter placement 8/18.   - S/p 2u pRBC 8/19. S/p 1u pRBC 8/23, no active bleeding   - Now stable  -continue Epo therapy    ESRD (end stage renal disease) on dialysis  - Nephology following  - Further decisions regarding HD per Nephology  - Renally dosing all medications  - Avoid nephrotoxins  - continue HD at Rehab    Mixed hyperlipidemia associated with type 2 diabetes mellitus  - Continue home statin     Type 2 diabetes mellitus with chronic kidney disease on chronic dialysis, without long-term current use of insulin  - SSI provided for corrective dosing  - Hypoglycemic protocol in effect  - Diabetic diet provided    Hypertension associated with ESRD caused by type 2 diabetes mellitus, on dialysis  - Continue home cardioprudent regimen; nifedipine held on 8/31 due to relative hypotension in the setting of new infection concerns; continue carvedilol  - HD to assist with BP control  - PRN IV hydralazine and/or IV labetalol provided for SBP>180  - continue to monitor and further titrate antihypertensives as clinically indicated       Final Active Diagnoses:    Diagnosis Date Noted POA    PRINCIPAL PROBLEM:  Recurrent pleural effusion on right [J90] 03/13/2020 Yes    ESRD (end stage renal disease) [N18.6] 09/09/2022 Yes    Thrush [B37.0] 09/08/2022 Yes    Fever [R50.9] 09/06/2022 Yes    Acute on chronic respiratory failure with hypoxia [J96.21] 08/31/2022 Yes    Leukocytosis [D72.829] 08/30/2022 Yes    Physical deconditioning [R53.81] 08/24/2022 Yes    Anemia in ESRD (end-stage renal disease) [N18.6, D63.1] 08/17/2022 Yes    Chronic kidney disease-mineral and  bone disorder [N18.9, E83.9, M89.9] 08/17/2022 Yes    Chronic diastolic heart failure [I50.32] 08/17/2022 Yes    ESRD (end stage renal disease) on dialysis [N18.6, Z99.2] 12/14/2016 Not Applicable    Mixed hyperlipidemia associated with type 2 diabetes mellitus [E11.69, E78.2] 08/04/2016 Yes    Type 2 diabetes mellitus with chronic kidney disease on chronic dialysis, without long-term current use of insulin [E11.22, N18.6, Z99.2] 07/26/2016 Not Applicable    Hypertension associated with ESRD caused by type 2 diabetes mellitus, on dialysis [E11.22, I12.0, Z99.2, N18.6] 07/26/2016 Not Applicable      Problems Resolved During this Admission:    Diagnosis Date Noted Date Resolved POA    Hypophosphatemia [E83.39] 08/22/2022 08/25/2022 Yes    Thrombocytopenia [D69.6] 08/17/2022 08/31/2022 Yes    Hyperkalemia [E87.5] 07/26/2016 08/28/2022 Yes       Discharged Condition: stable    Disposition: Rehab Facility    Follow Up:    Patient Instructions:      Ambulatory referral/consult to Infectious Disease   Standing Status: Future   Referral Priority: Urgent Referral Type: Consultation   Referral Reason: Specialty Services Required   Requested Specialty: Infectious Diseases   Number of Visits Requested: 1     Ambulatory referral/consult to Thoracic Surgery   Standing Status: Future   Referral Priority: Urgent Referral Type: Consultation   Number of Visits Requested: 1       Significant Diagnostic Studies: as above    Pending Diagnostic Studies:     Procedure Component Value Units Date/Time    CBC Auto Differential [[id number]] Collected: 08/19/22 0550    Order Status: Sent Lab Status: In process Updated: 08/19/22 0550    Specimen: Blood     Narrative:      Collection has been rescheduled by RFW at 08/18/2022 03:38 Reason:   Patient doing dialysis    Comprehensive Metabolic Panel [[id number]] Collected: 08/19/22 0550    Order Status: Sent Lab Status: In process Updated: 08/19/22 0550    Specimen: Blood     Narrative:       Collection has been rescheduled by RFW at 08/18/2022 03:38 Reason:   Patient doing dialysis    Magnesium [[id number]] Collected: 08/19/22 0550    Order Status: Sent Lab Status: In process Updated: 08/19/22 0550    Specimen: Blood     Narrative:      Collection has been rescheduled by RFW at 08/18/2022 03:38 Reason:   Patient doing dialysis    Phosphorus [[id number]] Collected: 08/19/22 0550    Order Status: Sent Lab Status: In process Updated: 08/19/22 0550    Specimen: Blood     Narrative:      Collection has been rescheduled by RFW at 08/18/2022 03:38 Reason:   Patient doing dialysis         Medications:  Reconciled Home Medications:      Medication List      START taking these medications    albuterol-ipratropium 2.5 mg-0.5 mg/3 mL nebulizer solution  Commonly known as: DUO-NEB  Take 3 mLs by nebulization every 6 (six) hours while awake. Rescue     diphenhydrAMINE 25 mg capsule  Commonly known as: BENADRYL  Take 1 capsule (25 mg total) by mouth every evening.     epoetin michael 10,000 unit/mL injection  Commonly known as: PROCRIT  Inject 1 mL (10,000 Units total) into the skin every Mon, Wed, Fri. With HD     gelatin adsorbable 12-7 mm top sponge 12-7 mm sponge  Commonly known as: GELFOAM  Apply 1 applicator topically as needed (As needed in dialysis).     insulin aspart U-100 100 unit/mL (3 mL) Inpn pen  Commonly known as: NovoLOG  Inject 0-5 Units into the skin before meals and at bedtime as needed (Hyperglycemia). **LOW CORRECTION DOSE**  Blood Glucose  mg/dL                  Pre-meal                2200  151-200                0 unit                      0 unit  201-250                2 units                    1 unit  251-300                3 units                    1 unit  301-350                4 units                    2 units  >350                     5 units                    3 units  Administer subcutaneously if needed at times designated by monitoring schedule.   DO NOT HOLD correction dose insulin for  "patients who are  NPO.  "HIGH ALERT MEDICATION" - Administer with meals or TF/TPN.     pantoprazole 40 MG tablet  Commonly known as: PROTONIX  Take 1 tablet (40 mg total) by mouth once daily.     senna-docusate 8.6-50 mg 8.6-50 mg per tablet  Commonly known as: PERICOLACE  Take 1 tablet by mouth 2 (two) times daily.        CHANGE how you take these medications    carvediloL 3.125 MG tablet  Commonly known as: COREG  Take 1 tablet (3.125 mg total) by mouth 2 (two) times daily.  What changed:   · medication strength  · how much to take     fluticasone propionate 50 mcg/actuation nasal spray  Commonly known as: FLONASE  1 spray (50 mcg total) by Each Nostril route daily as needed for Rhinitis or Allergies.  What changed:   · when to take this  · reasons to take this        CONTINUE taking these medications    atorvastatin 40 MG tablet  Commonly known as: LIPITOR  TAKE 1 TABLET BY MOUTH EVERY DAY IN THE EVENING     blood sugar diagnostic Strp  1 each by Misc.(Non-Drug; Combo Route) route as directed.     blood-glucose meter kit  Commonly known as: FREESTYLE SYSTEM KIT  Use as instructed     lancets Misc  Commonly known as: ACCU-CHEK SOFTCLIX LANCETS  1 application by Misc.(Non-Drug; Combo Route) route 4 (four) times daily before meals and nightly.     pen needle, diabetic 31 gauge x 3/16" Ndle  BD Ultra Fine Mini pen needles (5mm x 31g) - dispense 100     JAM-YAQUELIN ORAL  Take 1 tablet by mouth once daily.     traZODone 50 MG tablet  Commonly known as: DESYREL  TAKE 1 TABLET BY MOUTH NIGHTLY AS NEEDED FOR INSOMNIA.        STOP taking these medications    BASAGLAR KWIKPEN U-100 INSULIN glargine 100 units/mL SubQ pen  Generic drug: insulin     betamethasone dipropionate 0.05 % cream     calcium acetate(phosphat bind) 667 mg capsule  Commonly known as: PHOSLO     calcium carbonate 470 mg calcium (1,177 mg) Chew     clobetasoL 0.05 % shampoo  Commonly known as: CLOBEX     cloNIDine 0.2 MG tablet  Commonly known as: " CATAPRES     DurezoL 0.05 % Drop ophthalmic solution  Generic drug: difluprednate     furosemide 80 MG tablet  Commonly known as: LASIX     hydrALAZINE 100 MG tablet  Commonly known as: APRESOLINE     isosorbide mononitrate 120 MG 24 hr tablet  Commonly known as: IMDUR     losartan 50 MG tablet  Commonly known as: COZAAR     RENVELA 800 mg Tab  Generic drug: sevelamer carbonate        ASK your doctor about these medications    aspirin 81 MG EC tablet  Commonly known as: ECOTRIN  Take 81 mg by mouth.     cefepime in dextrose 5 % 1 gram/50 mL Pgbk  Commonly known as: MAXIPIME  Inject 50 mLs (1 g total) into the vein once daily. for 1 day  Ask about: Should I take this medication?     fluconazole 100 MG tablet  Commonly known as: DIFLUCAN  Take 1 tablet (100 mg total) by mouth once daily. for 5 days  Ask about: Should I take this medication?     vancomycin 500 mg injection  Commonly known as: VANCOCIN  Inject 500 mg into the vein once. On 9/10/2022 for 1 dose for 1 dose  Ask about: Should I take this medication?            Indwelling Lines/Drains at time of discharge:   Lines/Drains/Airways     Drain  Duration                Hemodialysis AV Fistula Right upper arm -- days                Time spent on the discharge of patient: 37 minutes         The attending portion of this evaluation, treatment, and documentation was performed per Kiana Anguiano MD via Telemedicine AudioVisual using the secure Vidyo software platform with 2 way audio/video. The provider was located off-site and the patient is located in the hospital. The aforementioned video software was utilized to document the relevant history and physical exam    Kiana Anguiano MD  Department of Hospital Medicine  [facility] - Cardiology Stepdown

## 2022-09-16 NOTE — ASSESSMENT & PLAN NOTE
- Procal elevated  - BCx NTD  - Azithro/Rocephin broadened to vancomycin and cefepime on 8/31.   - Leukocytosis resolved. Initially to complete 7d course but antibiotics continued due to fever. Will complete on 9/10 at Rehab.

## 2022-09-16 NOTE — ASSESSMENT & PLAN NOTE
- Nephology following  - Further decisions regarding HD per Nephology  - Renally dosing all medications  - Avoid nephrotoxins  - continue HD at Rehab

## 2022-09-19 LAB — FUNGUS SPEC CULT: NORMAL

## 2022-10-06 LAB
ACID FAST MOD KINY STN SPEC: NORMAL
MYCOBACTERIUM SPEC QL CULT: NORMAL

## 2022-11-14 PROBLEM — J96.11 CHRONIC RESPIRATORY FAILURE WITH HYPOXIA: Status: RESOLVED | Noted: 2022-01-01 | Resolved: 2022-11-14

## 2022-11-30 NOTE — ASSESSMENT & PLAN NOTE
-Management per primary    Patient's mom called in stating that the pharmacy is out of cephalexin suspension  Will substitute Bactrim suspension  Mom to call back with any concerns or issues

## 2022-12-05 PROBLEM — J96.21 ACUTE ON CHRONIC RESPIRATORY FAILURE WITH HYPOXIA: Status: RESOLVED | Noted: 2022-01-01 | Resolved: 2022-12-05

## 2023-04-05 NOTE — SUBJECTIVE & OBJECTIVE
Telemedicine  This service was provided by Virtual Visit.    Patient was transferred to [facility] on:  9/5/2022    Chief Complaint   Patient presents with    Shortness of Breath     Pt reports was sent to ER for CT. Pt with increased SOB and hx of bilateral plural effusions.      The patient location is: 306/306 [address]   Admitted 8/15/2022 10:54 AM  Present with the patient at the time of the telemed/virtual assessment: Telepresenter    Interval History / Events Overnight:   The patient is able to provide adequate history - patient declines the use of a Polish . Additional history was obtained from past medical records. No significant events reported by Nursing.  BP noted to remain elevated  Patient complains of nothing new, feels well. Symptoms have been unchanged since yesterday. Associated symptoms include: fatigue. Symptoms are stable.     Lab test(s) reviewed: H&H stable    Review of Systems   Constitutional:  Positive for fever.   Respiratory:  Negative for shortness of breath.      Objective:     Vital Signs (Most Recent):  Temp: 97.3 °F (36.3 °C) (09/07/22 1300)  Pulse: 61 (09/07/22 1303)  Resp: 17 (09/07/22 1303)  BP: (!) 186/73 (09/07/22 1300)  SpO2: 97 % (09/07/22 1303)   Vital Signs (24h Range):  Temp:  [97.3 °F (36.3 °C)-100.7 °F (38.2 °C)] 97.3 °F (36.3 °C)  Pulse:  [58-69] 61  Resp:  [17-20] 17  SpO2:  [90 %-98 %] 97 %  BP: (131-186)/(58-73) 186/73     Weight: 58.1 kg (128 lb 1.6 oz)  Body mass index is 19.48 kg/m².    Intake/Output Summary (Last 24 hours) at 9/7/2022 1318  Last data filed at 9/7/2022 1135  Gross per 24 hour   Intake 840 ml   Output 2650 ml   Net -1810 ml        Physical Exam  Constitutional:       General: He is not in acute distress.     Appearance: Normal appearance.   Eyes:      General: Lids are normal. No scleral icterus.        Right eye: No discharge.         Left eye: No discharge.      Conjunctiva/sclera: Conjunctivae normal.   Neck:       Trachea: Phonation normal.   Cardiovascular:      Rate and Rhythm: Normal rate.      Comments: Monitor / Vital signs reviewed at time of visit  Pulmonary:      Effort: Pulmonary effort is normal. No tachypnea, accessory muscle usage or respiratory distress.   Abdominal:      General: There is no distension.   Skin:     Coloration: Skin is not cyanotic.   Neurological:      Mental Status: He is alert. He is not disoriented.   Psychiatric:         Attention and Perception: Attention normal.         Mood and Affect: Affect normal.         Behavior: Behavior is cooperative.       Significant Labs:   Recent Labs   Lab 08/15/22  2119   HGBA1C 5.6       Recent Labs   Lab 09/06/22  1115 09/06/22  1524 09/06/22  2110   POCTGLUCOSE 215* 192* 129*       Recent Labs   Lab 09/05/22  0438 09/06/22  0445 09/07/22  0430   WBC 10.39 10.96 10.62   HGB 7.4* 7.6* 7.5*   HCT 23.4* 23.7* 23.3*    222 216       Recent Labs   Lab 09/05/22  0438 09/06/22  0445 09/07/22  0430   GRAN 80.1*  8.3* 78.7*  8.6* 81.0*  8.6*   LYMPH 6.9*  0.7* 7.6*  0.8* 6.5*  0.7*   MONO 9.0  0.9 10.6  1.2* 8.9  0.9   EOS 0.3 0.2 0.2       Recent Labs   Lab 09/05/22  0438 09/06/22 0445 09/07/22  0430   * 134* 133*   K 4.3 3.7 4.2   CL 98 101 99   CO2 25 25 24   BUN 38* 22 30*   CREATININE 5.2* 3.2* 4.1*   * 132* 128*   CALCIUM 7.2* 7.5* 7.5*   ALBUMIN 1.9* 1.9* 1.9*   MG 2.1 1.9 1.9   PHOS 3.1 2.5* 2.5*       Recent Labs   Lab 09/01/22  0752 09/02/22  0436 09/05/22  0438 09/06/22  0445 09/07/22  0430   ALKPHOS  --    < > 156* 196* 184*   ALT  --    < > 9* 9* 9*   AST  --    < > 22 23 26   PROT  --    < > 5.3* 5.4* 5.5*   BILITOT  --    < > 1.1* 1.0 1.1*     --   --   --   --     < > = values in this interval not displayed.       Recent Labs   Lab 08/21/22  2136 08/22/22  0313 08/30/22  0739 09/05/22 2038   PROCAL  --   --  1.19*  --    LACTATE 0.8  --  0.7 0.9   FERRITIN  --  1,751*  --   --        SARS-CoV2 (COVID-19)  Qualitative PCR (no units)   Date Value   09/06/2022 Not Detected   07/31/2020 Negative     SARS-CoV-2 RNA, Amplification, Qual (no units)   Date Value   05/04/2020 Negative     POC Rapid COVID (no units)   Date Value   08/15/2022 Negative   02/02/2021 Negative       ECG Results              EKG 12-LEAD (Final result)  Result time 08/16/22 12:10:03      Final result by Unknown User (08/16/22 12:10:03)                                        Results for orders placed during the hospital encounter of 09/02/20    Echo Color Flow Doppler? Yes    Interpretation Summary  · Concentric left ventricular hypertrophy.  · Normal left ventricular systolic function. The estimated ejection fraction is 55%.  · Moderate right ventricular enlargement.  · Normal right ventricular systolic function.  · Grade II (moderate) left ventricular diastolic dysfunction consistent with pseudonormalization.  · Severe biatrial enlargement.  · Mild tricuspid regurgitation.  · Trivial pericardial effusion.  · The estimated PA systolic pressure is 77 mmHg.  · Elevated central venous pressure (15 mmHg).      US Lower Extremity Veins Bilateral  Narrative: EXAMINATION:  US LOWER EXTREMITY VEINS BILATERAL    CLINICAL HISTORY:  fever;    TECHNIQUE:  Duplex and color flow Doppler and dynamic compression was performed of the bilateral lower extremity veins was performed.    COMPARISON:  None    FINDINGS:  Right thigh veins: The common femoral, femoral, popliteal, upper greater saphenous, and deep femoral veins are patent and free of thrombus. The veins are normally compressible and have normal phasic flow and augmentation response.    Right calf veins: The visualized calf veins are patent.    Left thigh veins: The common femoral, femoral, popliteal, upper greater saphenous, and deep femoral veins are patent and free of thrombus. The veins are normally compressible and have normal phasic flow and augmentation response.    Left calf veins: The visualized calf  veins are patent.    Miscellaneous: None  Impression: No evidence of deep venous thrombosis in either lower extremity.    Electronically signed by resident: Jeffrey Quintero  Date:    09/07/2022  Time:    01:52    Electronically signed by: Selam Gonzalez MD  Date:    09/07/2022  Time:    01:55      Labs and Imaging within the last 24 hours listed above were reviewed.       Diet: Diet diabetic [facility]; 2000 Calorie  Significant LDAs:   IV Access Type: Peripheral and Dialysis Access  Urinary Catheter Indication if present: Patient Does Not Have Urinary Catheter  Other Lines/Tubes/Drains:    HIGH RISK CONDITION(S):   Patient has a condition that poses threat to life and bodily function: Respiratory Distress     Goals of Care:    Previous admission:  2/2/21  Likely prognosis:  Fair  Code Status: Full Code  Comfort Only: No  Hospice: No  Goals at discharge: remain at home, with physician follow-up    Discharge Planning   WENDY: 9/8/2022     Code Status: Full Code   Is the patient medically ready for discharge?: No    Reason for patient still in hospital (select all that apply): Pending disposition  Discharge Plan A: Rehab       ear L/ear R/nose/mouth details…

## 2023-04-06 ENCOUNTER — PATIENT OUTREACH (OUTPATIENT)
Dept: ADMINISTRATIVE | Facility: HOSPITAL | Age: 63
End: 2023-04-06
Payer: MEDICARE

## 2023-04-06 NOTE — LETTER
AUTHORIZATION FOR RELEASE OF   CONFIDENTIAL INFORMATION      We are seeing Vega Brownlee, date of birth 1960, in the clinic at [facility]. Ko Perez MD is the patient's PCP. Vega Brownlee has an outstanding lab/procedure at the time we reviewed his chart. In order to help keep his health information updated, he has authorized us to request the following medical record(s):        (  )  MAMMOGRAM                                      (  )  COLONOSCOPY      (  )  PAP SMEAR                                          (  )  OUTSIDE LAB RESULTS     (  )  DEXA SCAN                                          ( X ) DIABETIC EYE EXAM            (  )  FOOT EXAM                                          (  )  ENTIRE RECORD     (  )  OUTSIDE IMMUNIZATIONS                 (  )  _______________         Please fax records to [facility], Joshua S Fowler, MD, FAX [phone]   [address] [facility] [facility] [address]       If you have any questions, please contact Rebecca Pryor MA,[facility] at [phone].            Patient Name: Vega Brownlee  : 1960  Patient Phone #: [phone]

## 2023-04-11 ENCOUNTER — PATIENT OUTREACH (OUTPATIENT)
Dept: ADMINISTRATIVE | Facility: HOSPITAL | Age: 63
End: 2023-04-11
Payer: MEDICARE

## 2023-04-21 ENCOUNTER — PATIENT OUTREACH (OUTPATIENT)
Dept: ADMINISTRATIVE | Facility: HOSPITAL | Age: 63
End: 2023-04-21
Payer: MEDICARE

## 2023-04-27 NOTE — ANESTHESIA RELEASE NOTE
Anesthesia Release from PACU Note    Patient: Vega Brownlee    Procedure(s) Performed: Procedure(s) (LRB):  BIOPSY-PROSTATE (N/A)  CYSTOSCOPY (N/A)  TRANSRECTAL ULTRASOUND (N/A)    Anesthesia type: General    Post pain: Adequate analgesia    Post assessment: no apparent anesthetic complications    Last Vitals:   Vitals:    06/08/17 0913   BP: (!) 153/70   Pulse: 61   Resp: 18   Temp: 36.6 °C (97.9 °F)   SpO2: 98%       Post vital signs: stable    Level of consciousness: awake    Complications: none    Airway Patency: patent    Respiratory: spontaneous    Cardiovascular: stable    Hydration: euvolemic     Bleeding that does not stop/Pain not relieved by Medications/Fever greater than (need to indicate Fahrenheit or Celsius)/Wound/Surgical Site with redness, or foul smelling discharge or pus/Nausea and vomiting that does not stop/Inability to tolerate liquids or foods

## 2023-05-14 NOTE — ASSESSMENT & PLAN NOTE
- Working to optimize BG control  - SSI provided for corrective dosing  - DXTs as ordered  - Hypoglycemic protocol in effect  - Diabetic diet provided   PAST MEDICAL HISTORY:  No pertinent past medical history

## (undated) DEVICE — DRAPE INCISE IOBAN 2 23X17IN

## (undated) DEVICE — GOWN SMARTGOWN LVL4 X-LONG XL

## (undated) DEVICE — TOWEL OR DISP STRL BLUE 4/PK

## (undated) DEVICE — DRESSING TRANS 4X4 TEGADERM

## (undated) DEVICE — ELECTRODE REM PLYHSV RETURN 9

## (undated) DEVICE — LOOP VESSEL BLUE MAXI

## (undated) DEVICE — PACK CYSTO

## (undated) DEVICE — GLOVE BIOGEL SKINSENSE PI 7.5

## (undated) DEVICE — DRAIN CHEST DRY SUCTION

## (undated) DEVICE — DRESSING TELFA STRL 4X3 LF

## (undated) DEVICE — SPONGE IV DRAIN 4X4 STERILE

## (undated) DEVICE — PLEDGET SUT SOFT 3/8X3/16X1/16

## (undated) DEVICE — SUT SILK 0 STRANDS 30IN BLK

## (undated) DEVICE — TUBING SUC UNIV W/CONN 12FT

## (undated) DEVICE — TAPE SILK 3IN

## (undated) DEVICE — SUT 2/0 30IN SILK BLK BRAI

## (undated) DEVICE — CATH THORACIC 28FR ST

## (undated) DEVICE — CATH THOR STND RGHT ANG 28F

## (undated) DEVICE — DRAPE STERI INSTRUMENT 1018

## (undated) DEVICE — ELECTRODE BLADE INSULATED 1 IN

## (undated) DEVICE — TAPE MEDIPORE 4IN X 2YDS

## (undated) DEVICE — BLADE ELECTRO EDGE INSULATED

## (undated) DEVICE — SET CYSTO IRRIGATING

## (undated) DEVICE — SYR 10CC LUER LOCK

## (undated) DEVICE — TUBE ASPIRATING LUKI 3-1/4IN

## (undated) DEVICE — SUT VICRYL 3-0 27 SH

## (undated) DEVICE — CONTAINER SPECIMEN STRL 4OZ

## (undated) DEVICE — TRAY CYSTO BASIN

## (undated) DEVICE — GAUZE SPONGE 4X4 12PLY

## (undated) DEVICE — ADHESIVE DERMABOND ADVANCED

## (undated) DEVICE — DRAPE ABDOMINAL TIBURON 14X11

## (undated) DEVICE — GOWN SMART IMP BREATHABLE XXLG

## (undated) DEVICE — SOL IRR NACL .9% 1000CC

## (undated) DEVICE — GOWN POLY REINF BRTH SLV XL

## (undated) DEVICE — TRAY MINOR GEN SURG